# Patient Record
Sex: MALE | Race: WHITE | NOT HISPANIC OR LATINO | Employment: OTHER | URBAN - METROPOLITAN AREA
[De-identification: names, ages, dates, MRNs, and addresses within clinical notes are randomized per-mention and may not be internally consistent; named-entity substitution may affect disease eponyms.]

---

## 2019-01-10 ENCOUNTER — HOSPITAL ENCOUNTER (OUTPATIENT)
Dept: OTHER | Facility: HOSPITAL | Age: 54
Discharge: HOME | End: 2019-01-10
Attending: INTERNAL MEDICINE

## 2019-01-10 LAB — VALPROATE SERPL-MCNC: 83.2 UG/ML (ref 50–100)

## 2019-07-26 ENCOUNTER — HOSPITAL ENCOUNTER (OUTPATIENT)
Dept: OTHER | Facility: HOSPITAL | Age: 54
Discharge: HOME | End: 2019-07-26
Attending: INTERNAL MEDICINE

## 2019-07-26 LAB — VALPROATE SERPL-MCNC: 65.5 UG/ML (ref 50–100)

## 2019-07-27 LAB — HCV AB SER DONR QL: <0.1 S/CO RATIO (ref 0–0.9)

## 2020-05-29 ENCOUNTER — HOSPITAL ENCOUNTER (OUTPATIENT)
Dept: DIABETES SERVICES | Facility: HOSPITAL | Age: 55
Setting detail: RECURRING SERIES
Discharge: HOME | End: 2020-08-27
Attending: NURSE PRACTITIONER

## 2021-01-28 ENCOUNTER — OFFICE VISIT CONVERTED (OUTPATIENT)
Dept: FAMILY MEDICINE CLINIC | Facility: CLINIC | Age: 56
End: 2021-01-28
Attending: NURSE PRACTITIONER

## 2021-01-28 ENCOUNTER — HOSPITAL ENCOUNTER (OUTPATIENT)
Dept: FAMILY MEDICINE CLINIC | Facility: CLINIC | Age: 56
Discharge: HOME | End: 2021-01-28
Attending: NURSE PRACTITIONER

## 2021-01-28 LAB
ALBUMIN SERPL-MCNC: 4.2 G/DL (ref 3.5–5)
ALBUMIN/GLOB SERPL: 1.3 {RATIO} (ref 1.4–2.6)
ALP SERPL-CCNC: 57 U/L (ref 56–119)
ALT SERPL-CCNC: 31 U/L (ref 10–40)
ANION GAP SERPL CALC-SCNC: 21 MMOL/L (ref 8–19)
AST SERPL-CCNC: 35 U/L (ref 15–50)
BASOPHILS # BLD AUTO: 0.11 10*3/UL (ref 0–0.2)
BASOPHILS NFR BLD AUTO: 1.1 % (ref 0–3)
BILIRUB SERPL-MCNC: 0.32 MG/DL (ref 0.2–1.3)
BUN SERPL-MCNC: 10 MG/DL (ref 5–25)
BUN/CREAT SERPL: 9 {RATIO} (ref 6–20)
CALCIUM SERPL-MCNC: 9.2 MG/DL (ref 8.7–10.4)
CHLORIDE SERPL-SCNC: 101 MMOL/L (ref 99–111)
CHOLEST SERPL-MCNC: 249 MG/DL (ref 107–200)
CHOLEST/HDLC SERPL: 8 {RATIO} (ref 3–6)
CONV ABS IMM GRAN: 0.46 10*3/UL (ref 0–0.2)
CONV CO2: 22 MMOL/L (ref 22–32)
CONV IMMATURE GRAN: 4.8 % (ref 0–1.8)
CONV TOTAL PROTEIN: 7.4 G/DL (ref 6.3–8.2)
CREAT UR-MCNC: 1.12 MG/DL (ref 0.7–1.2)
DEPRECATED RDW RBC AUTO: 49.4 FL (ref 35.1–43.9)
EOSINOPHIL # BLD AUTO: 0.65 10*3/UL (ref 0–0.7)
EOSINOPHIL # BLD AUTO: 6.7 % (ref 0–7)
ERYTHROCYTE [DISTWIDTH] IN BLOOD BY AUTOMATED COUNT: 14 % (ref 11.6–14.4)
GFR SERPLBLD BASED ON 1.73 SQ M-ARVRAT: >60 ML/MIN/{1.73_M2}
GLOBULIN UR ELPH-MCNC: 3.2 G/DL (ref 2–3.5)
GLUCOSE SERPL-MCNC: 95 MG/DL (ref 70–99)
HCT VFR BLD AUTO: 48.3 % (ref 42–52)
HDLC SERPL-MCNC: 31 MG/DL (ref 40–60)
HGB BLD-MCNC: 15.2 G/DL (ref 14–18)
LDLC SERPL CALC-MCNC: 143 MG/DL (ref 70–100)
LYMPHOCYTES # BLD AUTO: 2.49 10*3/UL (ref 1–5)
LYMPHOCYTES NFR BLD AUTO: 25.8 % (ref 20–45)
MCH RBC QN AUTO: 30.3 PG (ref 27–31)
MCHC RBC AUTO-ENTMCNC: 31.5 G/DL (ref 33–37)
MCV RBC AUTO: 96.2 FL (ref 80–96)
MONOCYTES # BLD AUTO: 1.18 10*3/UL (ref 0.2–1.2)
MONOCYTES NFR BLD AUTO: 12.2 % (ref 3–10)
NEUTROPHILS # BLD AUTO: 4.75 10*3/UL (ref 2–8)
NEUTROPHILS NFR BLD AUTO: 49.4 % (ref 30–85)
NRBC CBCN: 0 % (ref 0–0.7)
OSMOLALITY SERPL CALC.SUM OF ELEC: 287 MOSM/KG (ref 273–304)
PLATELET # BLD AUTO: 239 10*3/UL (ref 130–400)
PMV BLD AUTO: 10.6 FL (ref 9.4–12.4)
POTASSIUM SERPL-SCNC: 4.6 MMOL/L (ref 3.5–5.3)
PSA SERPL-MCNC: 0.33 NG/ML (ref 0–4)
RBC # BLD AUTO: 5.02 10*6/UL (ref 4.7–6.1)
SODIUM SERPL-SCNC: 139 MMOL/L (ref 135–147)
TRIGL SERPL-MCNC: 374 MG/DL (ref 40–150)
TSH SERPL-ACNC: 1.92 M[IU]/L (ref 0.27–4.2)
VLDLC SERPL-MCNC: 75 MG/DL (ref 5–37)
WBC # BLD AUTO: 9.64 10*3/UL (ref 4.8–10.8)

## 2021-03-04 ENCOUNTER — OFFICE VISIT CONVERTED (OUTPATIENT)
Dept: FAMILY MEDICINE CLINIC | Facility: CLINIC | Age: 56
End: 2021-03-04
Attending: NURSE PRACTITIONER

## 2021-03-04 ENCOUNTER — CONVERSION ENCOUNTER (OUTPATIENT)
Dept: FAMILY MEDICINE CLINIC | Facility: CLINIC | Age: 56
End: 2021-03-04

## 2021-03-22 ENCOUNTER — OFFICE VISIT CONVERTED (OUTPATIENT)
Dept: FAMILY MEDICINE CLINIC | Facility: CLINIC | Age: 56
End: 2021-03-22
Attending: NURSE PRACTITIONER

## 2021-04-06 ENCOUNTER — HOSPITAL ENCOUNTER (OUTPATIENT)
Dept: FAMILY MEDICINE CLINIC | Facility: CLINIC | Age: 56
Discharge: HOME | End: 2021-04-06
Attending: NURSE PRACTITIONER

## 2021-04-06 ENCOUNTER — OFFICE VISIT CONVERTED (OUTPATIENT)
Dept: FAMILY MEDICINE CLINIC | Facility: CLINIC | Age: 56
End: 2021-04-06
Attending: NURSE PRACTITIONER

## 2021-04-06 LAB
25(OH)D3 SERPL-MCNC: 27 NG/ML (ref 30–100)
ALBUMIN SERPL-MCNC: 4.2 G/DL (ref 3.5–5)
ALBUMIN/GLOB SERPL: 1.4 {RATIO} (ref 1.4–2.6)
ALP SERPL-CCNC: 55 U/L (ref 56–119)
ALT SERPL-CCNC: 21 U/L (ref 10–40)
ANION GAP SERPL CALC-SCNC: 19 MMOL/L (ref 8–19)
AST SERPL-CCNC: 27 U/L (ref 15–50)
BILIRUB SERPL-MCNC: 0.19 MG/DL (ref 0.2–1.3)
BUN SERPL-MCNC: 9 MG/DL (ref 5–25)
BUN/CREAT SERPL: 8 {RATIO} (ref 6–20)
CALCIUM SERPL-MCNC: 9.7 MG/DL (ref 8.7–10.4)
CHLORIDE SERPL-SCNC: 96 MMOL/L (ref 99–111)
CHOLEST SERPL-MCNC: 223 MG/DL (ref 107–200)
CHOLEST/HDLC SERPL: 7.2 {RATIO} (ref 3–6)
CONV CO2: 24 MMOL/L (ref 22–32)
CONV HIV-1/ HIV-2: NONREACTIVE
CONV TOTAL PROTEIN: 7.3 G/DL (ref 6.3–8.2)
CREAT UR-MCNC: 1.16 MG/DL (ref 0.7–1.2)
GFR SERPLBLD BASED ON 1.73 SQ M-ARVRAT: >60 ML/MIN/{1.73_M2}
GLOBULIN UR ELPH-MCNC: 3.1 G/DL (ref 2–3.5)
GLUCOSE SERPL-MCNC: 97 MG/DL (ref 70–99)
HDLC SERPL-MCNC: 31 MG/DL (ref 40–60)
LDLC SERPL CALC-MCNC: 126 MG/DL (ref 70–100)
OSMOLALITY SERPL CALC.SUM OF ELEC: 279 MOSM/KG (ref 273–304)
POTASSIUM SERPL-SCNC: 4.3 MMOL/L (ref 3.5–5.3)
SODIUM SERPL-SCNC: 135 MMOL/L (ref 135–147)
TRIGL SERPL-MCNC: 522 MG/DL (ref 40–150)

## 2021-04-08 LAB
CONV HEPATITIS C AB WITH REFLEX TO CONFIRMATION: <0.1 S/CO RATIO (ref 0–0.9)
CONV HEPATITIS COMMENT: NORMAL

## 2021-04-12 LAB
A1AT SERPL-MCNC: 132 MG/DL (ref 101–187)
PHENOTYPE: NORMAL

## 2021-05-10 NOTE — H&P
History and Physical      Patient Name: Atilio Recinos   Patient ID: [id number]   Sex: Male   YOB: 1965    Primary Care Provider: Ronaldo CAVAZOS   Referring Provider: Lynne CAVAZOS    Visit Date: January 28, 2021    Provider: DIRK Ibarra   Location: [facility] [place]   Location Address: [address] [place] [address], [address]  [place], [place]  [id number]   Location Phone: [phone]          Chief Complaint  · est care      History Of Present Illness  Atilio Recinos is a 56 year old male who presents for evaluation and treatment of:      Presents today to [facility] care.  Previous PCP is Dr. Sellers.  He has a history of COPD and oxygen dependent x3 years.  He smokes 1-1/2 pack/day and drinks alcohol.  Denies substance use.  He also has a history of hypertension, bipolar, anxiety, depression, tremors, and hyperlipidemia.  He sees a psychiatrist in [place].  He sees a neurologist in [place] for his tremors and is being treated with gabapentin.    He has a history of pancreatitis in 2019 and 2020.  He had pneumonia COPD and was hospitalized in 2017.       Past Medical History  Allergies; Anxiety; Bipolar 1 disorder; COPD (chronic obstructive pulmonary disease); Depression; Forgetfulness; Head injury; Hepatitis; High blood pressure; High cholesterol; Psychiatric illness; Shortness of Breath; Sinus trouble         Past Surgical History  Pneumothorax; Sphincterotomy         Medication List  amlodipine 5 mg oral tablet; Depakote 250 mg oral tablet,delayed release (DR/EC); gabapentin 800 mg oral tablet; lisinopril-hydrochlorothiazide 20-12.5 mg oral tablet; Lopid 600 mg oral tablet; mirtazapine 15 mg oral tablet; ProAir HFA 90 mcg/actuation inhalation HFA aerosol inhaler; Seroquel 300 mg oral tablet; Symbicort 160-4.5 mcg/actuation inhalation HFA aerosol inhaler; Trelegy Ellipta 100-62.5-25 mcg inhalation blister with device; Vitamin D2 1,250 mcg (50,000 unit) oral capsule  "        Allergy List  Antihistamine; Augmentin; Contrast media allergy         Family Medical History  Stroke; Heart Disease; Diabetes         Social History  Tobacco (Current every day)         Review of Systems  · Constitutional  o Denies  o : fatigue, fever, night sweats  · Eyes  o Denies  o : double vision, blurred vision  · HENT  o Denies  o : headaches, vertigo, lightheadedness  · Cardiovascular  o Admits  o : dyspnea on exertion  o Denies  o : chest pain, irregular heart beats, rapid heart rate, lower extremity edema  · Respiratory  o Admits  o : shortness of breath  o Denies  o : wheezing  · Gastrointestinal  o Denies  o : nausea, vomiting, diarrhea, constipation, reflux, abdominal pain  · Genitourinary  o Denies  o : dysuria, urinary retention  · Integument  o Denies  o : hair growth change, new skin lesions  · Neurologic  o Admits  o : tremors  o Denies  o : altered mental status, seizures  · Endocrine  o Admits  o : central obesity  o Denies  o : polyuria, polydipsia, cold intolerance, heat intolerance  · Psychiatric  o Admits  o : anxiety, depression, difficulty sleeping  o Denies  o : hallucinations, delusions, feeling confused, compulsive behaviors, impulsive behaviors, suicidal ideation, homicidal ideation  · Heme-Lymph  o Denies  o : petechiae, lymph node enlargement or tenderness      Vitals  Date Time BP Position Site L\R Cuff Size HR RR TEMP (F) WT  HT  BMI kg/m2 BSA m2 O2 Sat FR L/min FiO2 HC       01/28/2021 10:30 /82 Sitting    100 - R  97.5 227lbs 8oz 5'  6\" 36.72 2.19 96 %  21%          Physical Examination  · Constitutional  o Appearance  o : obese, alert, in no acute distress  · Head and Face  o Head  o :   § Inspection  § : atraumatic, normocephalic  o Face  o :   § Inspection  § : no facial lesions  o HEENT  o : Unremarkable  · Eyes  o Conjunctivae  o : conjunctivae normal  o Sclerae  o : sclerae white  o Pupils and Irises  o : pupils equal and round, pupils reactive to light " bilaterally  o Eyelids/Ocular Adnexae  o : eyelid appearance normal  · Ears, Nose, Mouth and Throat  o Ears  o :   § External Ears  § : appearance within normal limits, no lesions present  o Nose  o :   § External Nose  § : appearance normal  o Oral Cavity  o :   § Oral Mucosa  § : oral mucosa normal  § Lips  § : lip appearance normal  § Teeth  § : normal dentition for age  § Gums  § : gums pink, non-swollen, no bleeding present  § Tongue  § : tongue appearance normal  § Palate  § : hard palate normal, soft palate appearance normal  · Neck  o Inspection/Palpation  o : normal appearance, no masses or tenderness, trachea midline  o Thyroid  o : gland size normal, nontender, no nodules or masses present on palpation  · Respiratory  o Respiratory Effort  o : breathing unlabored, no accessory muscle use  o Auscultation of Lungs  o : diminished breath sounds present    · Cardiovascular  o Heart  o :   § Auscultation of Heart  § : regular rate, normal rhythm, no murmurs present, no pericardial friction rub  o Peripheral Vascular System  o :   § Extremities  § : no edema  · Gastrointestinal  o Abdominal Examination  o : abdomen nontender to palpation, normal bowel sounds, tone normal without rigidity or guarding, no masses present, abdominal obesity present, abdomen scaphoid upon supine  o Liver and spleen  o : no hepatomegaly present  · Lymphatic  o Neck  o : no lymphadenopathy   o Supraclavicular Nodes  o : no supraclavicular nodes          Assessment  · Annual physical exam     V70.0/Z00.00  Check to CBC, CMP, TSH, and lipid  · COPD (chronic obstructive pulmonary disease)     496/J44.9  Consult pulmonology  · Essential hypertension     401.9/I10  refill lisinopril and amlodipine  · Hyperlipidemia     272.4/E78.5  · Nicotine dependence     305.1/F17.200  Discussed smoking cessation  · Obesity     278.00/E66.9  · Tobacco abuse counseling       Tobacco abuse counseling     V65.42/Z71.6  · Screening for prostate  cancer     V76.44/Z12.5  check PSA  · Oxygen dependent     V46.2/Z99.81  · Bipolar 1 disorder     296.7/F31.9  Continue follow-ups with counseling and psychiatry      Plan  · Orders  o Smoking cessation counseling, 3-10 minutes [facility] ([id number]) - 496/J44.9 - 01/28/2021  o ACO-17: Screened for tobacco use AND received tobacco cessation intervention (4004F) - 305.1/F17.200 - 01/28/2021  o Smoking cessation counseling, 3-10 minutes [facility] ([id number]) - V65.42/Z71.6 - 02/03/2021  o ACO-17: Screened for tobacco use AND received tobacco cessation intervention (4004F) - V65.42/Z71.6 - 01/28/2021  o Male Physical Primary Care Panel (CMP, CBC, TSH, Lipid, PSA) [facility] (83357, 70597, 86033, 81175, 88763, ) - - 01/28/2021  o ACO-18: Positive screen for clinical depression using a standardized tool and a follow-up plan documented () - - 01/28/2021   7 points  o ACO-39: Current medications updated and reviewed (1159F, ) - - 01/28/2021  o Fluzone Quadrivalent Vaccine, age 6 months + (75736) - - 01/28/2021   Vaccine - Influenza; Dose: 0.5; Site: Right Upper Arm; Route: Intramuscular; Date: 01/28/2021 12:02:00; Exp: 06/30/2021; Lot: [id number]; Mfg: [employer]i pasteur; TradeName: Fluzone Quadrivalent; Administered By: Jyoti Fierro MA; Comment: tolerated well.left office stable  o PULMONARY CONSULTATION (PULMO) - 496/J44.9, V46.2/Z99.81 - 02/03/2021   [facility]  · Medications  o amlodipine 5 mg oral tablet   SIG: take 1 tablet (5 mg) by oral route once daily for 30 days   DISP: (30) Tablet with 2 refills  Prescribed on 01/28/2021     o lisinopril-hydrochlorothiazide 20-12.5 mg oral tablet   SIG: take 1 tablet by oral route once daily for 30 days   DISP: (30) Tablet with 2 refills  Prescribed on 01/28/2021     o Louis Mccray 100-62.5-25 mcg inhalation blister with device   SIG: inhale 1 puff by inhalation route once daily at the same time each day   DISP: (1) Inhaler with 2 refills  Prescribed on 01/28/2021      o Medications have been Reconciled  o Transition of Care or Provider Policy  · Instructions  o Reviewed health maintenance flowsheet and updated information. Orders were placed and/or patient's response was documented.  o Smoking Cessation counseling and education provided.   o Patient advised to monitor blood pressure (B/P) at home and journal readings. Patient informed that a B/P reading at home of more than 130/80 is considered hypertension. For readings greater irkc198/90 or higher patient is advised to follow up in the office with readings for management. Patient advised to limit sodium intake.  o Advised that cheeses and other sources of dairy fats, animal fats, fast food, and the extras (candy, pastries, pies, doughnuts and cookies) all contain LDL raising nutrients. Advised to increase fruits, vegetables, whole grains, and to monitor portion sizes.   o *Form of nicotine being used:   o Patient was strongly encouraged to discontinue use of any nicotine containing product or minimize the use of the product.  o Tobacco and smoking cessation counseling for more than 3 minutes was completed.  o Patient was educated/instructed on their diagnosis, treatment and medications prior to discharge from the clinic today.  o Patient instructed to seek medical attention urgently for new or worsening symptoms.  o Call the office with any concerns or questions.  · Disposition  o Call or Return if symptoms worsen or persist.  o f/u 1 month            Electronically Signed by: DIRK Ibarra -Author on February 3, 2021 07:25:41 AM

## 2021-05-14 VITALS
OXYGEN SATURATION: 98 % | TEMPERATURE: 98.1 F | SYSTOLIC BLOOD PRESSURE: 158 MMHG | DIASTOLIC BLOOD PRESSURE: 72 MMHG | WEIGHT: 223.37 LBS | BODY MASS INDEX: 35.9 KG/M2 | HEIGHT: 66 IN | HEART RATE: 85 BPM

## 2021-05-14 VITALS
TEMPERATURE: 97.5 F | WEIGHT: 227.5 LBS | DIASTOLIC BLOOD PRESSURE: 82 MMHG | SYSTOLIC BLOOD PRESSURE: 148 MMHG | HEART RATE: 100 BPM | OXYGEN SATURATION: 96 % | BODY MASS INDEX: 36.56 KG/M2 | HEIGHT: 66 IN

## 2021-05-14 VITALS
HEART RATE: 100 BPM | HEIGHT: 66 IN | OXYGEN SATURATION: 96 % | TEMPERATURE: 97 F | DIASTOLIC BLOOD PRESSURE: 76 MMHG | SYSTOLIC BLOOD PRESSURE: 158 MMHG | BODY MASS INDEX: 36 KG/M2 | WEIGHT: 224 LBS

## 2021-05-14 VITALS
HEIGHT: 66 IN | OXYGEN SATURATION: 94 % | SYSTOLIC BLOOD PRESSURE: 124 MMHG | SYSTOLIC BLOOD PRESSURE: 142 MMHG | BODY MASS INDEX: 37.14 KG/M2 | DIASTOLIC BLOOD PRESSURE: 62 MMHG | HEART RATE: 102 BPM | DIASTOLIC BLOOD PRESSURE: 64 MMHG | WEIGHT: 231.12 LBS | TEMPERATURE: 98.6 F

## 2021-05-14 NOTE — PROGRESS NOTES
Progress Note      Patient Name: Atilio Recinos   Patient ID: [id number]   Sex: Male   YOB: 1965    Primary Care Provider: Ronaldo CAVAZOS   Referring Provider: Lynne CAVAZOS    Visit Date: March 22, 2021    Provider: DIRK Valenzuela   Location: [facility]   Location Address: [address] [place] [address], [address] [place] [address]  [id number]   Location Phone: [phone]          Chief Complaint  · broncitis       History Of Present Illness  Atilio Recinos is a 56 year old male who presents for evaluation and treatment of:      He is here for an acute visit today, he is a patient of DIRK Dixon.    History of COPD: He is complaining of cough and tightness in his chest for the past 2 days.  He states he did have a fever of 101 yesterday but is afebrile today.  He denies any ear pain or sore throat.  He denies been exposed anyone who has been sick.  He is a current smoker.  He states Z-Paks do not work for him.  He is allergic to Augmentin.       Past Medical History  Disease Name Date Onset Notes   Allergies --  --    Anxiety --  --    Bipolar 1 disorder --  --    COPD (chronic obstructive pulmonary disease) --  --    Depression --  --    Forgetfulness --  --    Head injury --  --    Hepatitis --  --    High blood pressure --  --    High cholesterol --  --    Psychiatric illness --  --    Shortness of Breath --  --    Sinus trouble --  --          Past Surgical History  Procedure Name Date Notes   Pneumothorax --  --    Sphincterotomy --  --          Medication List  Name Date Started Instructions   amlodipine 5 mg oral tablet 01/28/2021 take 1 tablet (5 mg) by oral route once daily for 30 days   Depakote 250 mg oral tablet,delayed release (DR/EC)  take 2 tablets in am 4 tablets in pm   gabapentin 800 mg oral tablet  take 1 tablet (800 mg) by oral route 4 times per day   lisinopril-hydrochlorothiazide 20-12.5 mg oral tablet 01/28/2021 take 1 tablet by oral route once  daily for 30 days   Lopid 600 mg oral tablet  take 1 tablet (600 mg) by oral route 2 times per day 30 minutes before morning and evening meal   mirtazapine 15 mg oral tablet  take 1 tablet (15 mg) by oral route once daily before bedtime   ProAir HFA 90 mcg/actuation inhalation HFA aerosol inhaler  inhale 1 puff (90 mcg) by inhalation route every 6 hours as needed   Seroquel 300 mg oral tablet  take 1 tablet (300 mg) by oral route once daily   Trelegy Ellipta 100-62.5-25 mcg inhalation blister with device 03/04/2021 inhale 1 puff by inhalation route once daily at the same time each day   Vitamin D2 1,250 mcg (50,000 unit) oral capsule  take 1 capsule by oral route weekly         Allergy List  Allergen Name Date Reaction Notes   Antihistamine --  --  --    Augmentin --  dehydration --    Contrast media allergy --  --  --        Allergies Reconciled  Family Medical History  Disease Name Relative/Age Notes   Stroke  --    Heart Disease  --    Diabetes  --          Social History  Finding Status Start/Stop Quantity Notes   Tobacco Current every day --/-- --  --          Immunizations  NameDate Admin Mfg Trade Name Lot Number Route Inj VIS Given VIS Publication   [id number]/28/2021 PMC Fluzone Quadrivalent [id number] IM  01/28/2021 08/15/2019   Comments: tolerated well.left office stable   [id number]/04/2021 MSD PNEUMOVAX 23 [id number] IM LD 03/04/2021    Comments: Patient tolerated injection well, left in stable condition.         Review of Systems  · Constitutional  o Denies  o : fever, fatigue, weight loss, weight gain  · HENT  o Admits  o : nasal congestion  o Denies  o : nasal discharge, sore throat, ear pain  · Cardiovascular  o Denies  o : lower extremity edema, claudication, chest pressure, palpitations  · Respiratory  o Admits  o : shortness of breath, dry cough  o Denies  o : wheezing, hemoptysis, productive cough  · Gastrointestinal  o Denies  o : nausea, vomiting, diarrhea, constipation, abdominal  "pain  · Integument  o Denies  o : rash, itching      Vitals  Date Time BP Position Site L\R Cuff Size HR RR TEMP (F) WT  HT  BMI kg/m2 BSA m2 O2 Sat FR L/min FiO2 HC       03/22/2021 03:38 /72 Sitting    85 - R  98.1 223lbs 6oz 5'  6\" 36.05 2.17 98 %            Physical Examination  · Constitutional  o Appearance  o : no acute distress, well-nourished  · Head and Face  o Head  o :   § Inspection  § : atraumatic, normocephalic  · Ears, Nose, Mouth and Throat  o Ears  o :   § External Ears  § : no auricle tenderness to palpation present  § Otoscopic Examination  § : tympanic membrane appearance within normal limits bilaterally  o Throat  o :   § Oropharynx  § : no inflammation or lesions present, tonsils within normal limits  · Neck  o Thyroid  o : gland size normal, nontender, no nodules or masses present on palpation, symmetric  · Respiratory  o Respiratory Effort  o : breathing comfortably, symmetric chest rise  o Auscultation of Lungs  o : clear to asculatation bilaterally, no wheezes, rales, or rhonchii  · Cardiovascular  o Heart  o :   § Auscultation of Heart  § : regular rate and rhythm, no murmurs, rubs, or gallops  o Peripheral Vascular System  o :   § Extremities  § : no edema  · Lymphatic  o Neck  o : no lymphadenopathy present  · Neurologic  o Mental Status Examination  o :   § Orientation  § : grossly oriented to person, place and time  o Gait and Station  o :   § Gait Screening  § : normal gait  · Psychiatric  o General  o : normal mood and affect          Assessment  · Bronchitis, acute     466.0/J20.9  I will treat him with prednisone Dosepak. I advised him that if he does not feel better or if he starts feeling worse in the next 2 to 3 days to give us a call and we will call him in a prescription for an antibiotic.  · Chronic obstructive pulmonary disease with acute lower respiratory infection       Chronic obstructive pulmonary disease with (acute) lower respiratory " infection     496/J44.0  Advised him to continue using his Trelegy inhaler daily and to use his albuterol inhaler 3-4 times a day while he is sick.  · Cough     786.2/R05  Tessalon Perles 3 times daily as needed for cough.      Plan  · Orders  o ACO-14: Influenza immunization administered or previously received [facility] () - - 03/22/2021 01/28/2021  o ACO-39: Current medications updated and reviewed (, 1159F) - - 03/22/2021  · Medications  o benzonatate 200 mg oral capsule   SIG: take 1 capsule (200 mg) by oral route 3 times per day as needed for cough for 7 days   DISP: (20) Capsule with 0 refills  Prescribed on 03/22/2021     o prednisone 20 mg oral tablet   SIG: Take 3 tabs daily (60 mg) for 2 days, then take 2 tabs daily (40 mg) x3 days, then take 1 tab daily (20 mg) x3 days   DISP: (15) Tablet with 0 refills  Prescribed on 03/22/2021     · Instructions  o Instructed patient when feeling out of breath to use their fast-acting or rescue inhaler.   o Instructed patient to use their long-acting inhaler everyday whether they feel short of breath or not. NEVER to use the long-acting inhaler as a short acting inhaler.  o Patient was educated/instructed on their diagnosis, treatment and medications prior to discharge from the clinic today.  o Patient instructed to seek medical attention urgently for new or worsening symptoms.  o Call the office with any concerns or questions.  · Disposition  o Call or Return if symptoms worsen or persist.            Electronically Signed by: DIRK Valenzuela -Author on March 22, 2021 04:03:09 PM

## 2021-05-14 NOTE — PROGRESS NOTES
Progress Note      Patient Name: Atilio Recinos   Patient ID: [id number]   Sex: Male   YOB: 1965    Primary Care Provider: Ronaldo CAVAOZS   Referring Provider: Lynne CAVAZOS    Visit Date: March 4, 2021    Provider: DIKR Ibarra   Location: [facility] [place]   Location Address: [address] [place] [address], [address]  [place], [place]  [id number]   Location Phone: [phone]          Chief Complaint  · 1 MO F/U      History Of Present Illness  Atilio Recinos is a 56 year old male who presents for evaluation and treatment of:      Presents today for a 1 month follow-up for hypertension, COPD, oxygen dependent, hyperlipidemia.  He does not check his blood pressure at home.  BP today is elevated 142/62.  Heart rate is 102.  Repeat blood pressure is 124/64.  Denies chest pain, syncope, palpitations.  His cholesterol remains elevated while he is on Lopid triglycerides 374, total cholesterol 249, HDL 31, .      He smokes and has a morning cough.  He denies shortness of breath.  He uses oxygen at home intermittently or at night.  Shortness of breath with exertion.  He is scheduled to have PFT in April and see pulmonologist in July.  He misunderstood that the trilogy Ellipta inhaler would replace his other inhalers.  He did not  the inhaler since all the inhalers together would cost too much.    No history of colorectal cancer screening.  Denies blood in stools.       Past Medical History  Allergies; Anxiety; Bipolar 1 disorder; COPD (chronic obstructive pulmonary disease); Depression; Forgetfulness; Head injury; Hepatitis; High blood pressure; High cholesterol; Psychiatric illness; Shortness of Breath; Sinus trouble         Past Surgical History  Pneumothorax; Sphincterotomy         Medication List  amlodipine 5 mg oral tablet; Depakote 250 mg oral tablet,delayed release (DR/EC); gabapentin 800 mg oral tablet; lisinopril-hydrochlorothiazide 20-12.5 mg oral tablet; Lopid 600 mg  "oral tablet; metoprolol succinate 25 mg oral tablet extended release 24 hr; mirtazapine 15 mg oral tablet; ProAir HFA 90 mcg/actuation inhalation HFA aerosol inhaler; Seroquel 300 mg oral tablet; Trelegy Ellipta 100-62.5-25 mcg inhalation blister with device; Vitamin D2 1,250 mcg (50,000 unit) oral capsule         Allergy List  Antihistamine; Augmentin; Contrast media allergy         Family Medical History  Stroke; Heart Disease; Diabetes         Social History  Tobacco (Current every day)         Immunizations  Name Date Admin   Influenza 01/28/2021   [id number] 03/04/2021         Review of Systems  · Constitutional  o Denies  o : fatigue  · Eyes  o Denies  o : blurred vision, changes in vision  · HENT  o Denies  o : headaches, lightheadedness  · Cardiovascular  o Denies  o : chest pain, irregular heart beats, rapid heart rate, dyspnea on exertion, lower extremity edema  · Respiratory  o Admits  o : cough  o Denies  o : shortness of breath, wheezing  · Gastrointestinal  o Denies  o : nausea, vomiting, diarrhea, constipation, abdominal pain, blood in stools, melena  · Genitourinary  o Denies  o : frequency, dysuria, hematuria  · Integument  o Denies  o : rash, new skin lesions  · Musculoskeletal  o Denies  o : joint pain, joint swelling, muscle pain  · Endocrine  o Admits  o : central obesity  o Denies  o : polyuria, polydipsia      Vitals  Date Time BP Position Site L\R Cuff Size HR RR TEMP (F) WT  HT  BMI kg/m2 BSA m2 O2 Sat FR L/min FiO2 HC       03/04/2021 01:20 /62 Sitting    102 - R  98.6 231lbs 2oz 5'  6\" 37.3 2.21 94 %      03/04/2021 02:34 /64 Sitting                       Physical Examination  · Constitutional  o Appearance  o : alert, in no acute distress  · Head and Face  o Head  o :   § Inspection  § : atraumatic, normocephalic  o Face  o :   § Inspection  § : no facial lesions  o HEENT  o : Unremarkable  · Eyes  o Conjunctivae  o : conjunctivae normal  o Sclerae  o : sclerae " white  o Pupils and Irises  o : pupils equal and round, pupils reactive to light bilaterally  o Eyelids/Ocular Adnexae  o : eyelid appearance normal  · Neck  o Inspection/Palpation  o : normal appearance, no masses or tenderness, trachea midline  o Thyroid  o : gland size normal, nontender, no nodules or masses present on palpation  · Respiratory  o Respiratory Effort  o : breathing unlabored  o Auscultation of Lungs  o : normal breath sounds  · Cardiovascular  o Heart  o :   § Auscultation of Heart  § : regular rate, normal rhythm, no murmurs present  o Peripheral Vascular System  o :   § Extremities  § : no edema  · Gastrointestinal  o Abdominal Examination  o : abdomen nontender to palpation, normal bowel sounds, tone normal without rigidity or guarding, no masses present, abdominal obesity present, abdomen scaphoid upon supine  o Liver and spleen  o : no hepatomegaly present  · Lymphatic  o Neck  o : no lymphadenopathy           Assessment  · Need for pneumococcal vaccination     V03.82/Z23  Give Pneumovax 23  · Screening for colon cancer     V76.51/Z12.11  Order Cologuard  · COPD (chronic obstructive pulmonary disease)     496/J44.9  Provide Trelegy Ellipta samples, only use rescue inhaler as needed and Trelegy Ellipta daily  · Essential hypertension     401.9/I10  Blood pressures controlled, continue current regimen  · Tachycardia     785.0/R00.0  Heart rate when walking in was 102. EKG done due to tachycardia. EKG showed sinus rhythm, rate 87, , QRST 90, , QTc B4 115, axis P 75, QRS 78, T 55.      Plan  · Orders  o Immunization Admin Fee (Single) ([facility]) (76616) - V03.82/Z23 - 03/04/2021  o Pneumococcal Vaccine, 23 valent, adult (08876) - V03.82/Z23 - 03/04/2021   Vaccine - [id number]; Dose: 0.5; Site: Left Deltoid; Route: Intramuscular; Date: 03/04/2021 14:36:00; Exp: 01/08/2022; Lot: [id number]; Mfg: [employer] TradeName: [id number] 23; Administered By: Mark Carter MA; Comment: Patient  tolerated injection well, left in stable condition.  o Arvind ([id number]) - V76.51/Z12.11 - 03/04/2021  o ACO-39: Current medications updated and reviewed (, 1159F) - - 03/04/2021  o EKG (Recording only) [facility] ([id number]) - - 03/04/2021   EKG performed in office by KARMEN HERNÁNDEZ.  · Medications  o Trelegy Ellipta 100-62.5-25 mcg inhalation blister with device   SIG: inhale 1 puff by inhalation route once daily at the same time each day   DISP: (1) Inhaler with 2 refills  Adjusted on 03/04/2021     o metoprolol succinate 25 mg oral tablet extended release 24 hr   SIG: take 1 tablet (25 mg) by oral route once daily for 30 days   DISP: (30) Tablet with 2 refills  Discontinued on 03/04/2021     o Symbicort 160-4.5 mcg/actuation inhalation HFA aerosol inhaler   SIG: inhale 2 puffs by inhalation route 2 times per day in the morning and evening for 30 days   DISP: (1) Inhaler with 2 refills  Discontinued on 03/04/2021     · Instructions  o Patient was educated/instructed on their diagnosis, treatment and medications prior to discharge from the clinic today.  o Patient instructed to seek medical attention urgently for new or worsening symptoms.  o Call the office with any concerns or questions.  · Disposition  o Call or Return if symptoms worsen or persist.  o f/u 1 month            Electronically Signed by: DIRK Ibarra -Author on March 4, 2021 03:31:21 PM

## 2021-05-14 NOTE — PROGRESS NOTES
Progress Note      Patient Name: Atilio Recinos   Patient ID: [id number]   Sex: Male   YOB: 1965    Primary Care Provider: Ronaldo CAVAZOS   Referring Provider: Lynne CAVAZOS    Visit Date: April 6, 2021    Provider: DIRK Ibarra   Location: [facility] [place]   Location Address: [address] [place] [address], [address] [place] [address]  [id number]   Location Phone: [phone]          Chief Complaint  · Annual Wellness Exam      History Of Present Illness  The patient is a 56 year old male who has come to this office for his Annual Wellness Visit.   His Primary Care Provider is Ronaldo CAVAZOS. His comprehensive Care Team list, including suppliers, has been updated on the Facesheet. His medical/family history, height, weight, BMI, and blood pressure have been reviewed and are in the chart. The Health Risk Assessment has been completed and scanned in the chart.   Medications are listed in the medication list.   The active problem list includes: Allergies, Anxiety, Bipolar 1 disorder, COPD (chronic obstructive pulmonary disease), Depression, Forgetfulness, Hepatitis, High blood pressure, High cholesterol, Psychiatric illness, Shortness of Breath, and Sinus trouble   The patient does not have a history of substance use.   Patient reports his diet is adequate.   The Mini-Cog has been administered and is scanned in chart. The results are negative. His cognitive function is without limitation.   A hearing loss screen was completed today and the result is negative.   Patient does not have any risk factors for depression. Patient completed the PHQ-9 today and it has been scanned in the chart. The total score is 1-4.   The Timed Up and Go screen was administered today and the result is negative.   The Gupta Index of [place] in ADLs indicated full function (score of 6).   A Falls Risk Assessment has been completed, including a review of home fall hazards and medication review.    Overall, the patient's functional ability is noted by this provider to be within normal limits. His level of safety is noted to be within normal limits. His balance/gait is within normal limits. There have been no falls in the past year. Patient-specific home safety recommendations have been reviewed and a copy has been given to patient.   He denies issues with leaking urine.   There are no additional risk factors identified.   Living Will/Advanced Directive previously executed but not in chart.   Personalized health advice was given to the patient and a written health screening schedule was established; see Plan for details.   Atilio Recinos is a 56 year old male who presents for evaluation and treatment of:      History of COPD, hypertension, hyperlipidemia, and bipolar.  Since being on trilogy Ellipta daily he is shortness of breath has improved.  He uses oxygen as needed at home.  He is scheduled for pulmonary function test on 4/16/2021.  He smokes 1-1/2 packs/day.    He does not check his blood pressure at home.  There was a miscommunication when she had stopped taking his amlodipine 5 mg daily.  Blood pressure is elevated today 158/76.  Denies chest pain, syncope, palpitations.  Last month he started taking metoprolol succinate for his tachycardia.    He has a male partner and has been in a relationship for the past 10 years.  Do not use condoms.  He would like to be checked for HIV and hep C.       Past Medical History  Allergies; Anxiety; Bipolar 1 disorder; COPD (chronic obstructive pulmonary disease); Depression; Forgetfulness; Head injury; Hepatitis; High blood pressure; High cholesterol; Psychiatric illness; Shortness of Breath; Sinus trouble         Past Surgical History  Pneumothorax; Sphincterotomy         Medication List  Depakote 250 mg oral tablet,delayed release (DR/EC); gabapentin 800 mg oral tablet; lisinopril-hydrochlorothiazide 20-12.5 mg oral tablet; Lopid 600 mg oral tablet; mirtazapine 15 mg  "oral tablet; ProAir HFA 90 mcg/actuation inhalation HFA aerosol inhaler; Seroquel 300 mg oral tablet; Trelegy Ellipta 100-62.5-25 mcg inhalation blister with device; Vitamin D2 1,250 mcg (50,000 unit) oral capsule         Allergy List  Antihistamine; Augmentin; Contrast media allergy         Family Medical History  Stroke; Heart Disease; Diabetes         Social History  Tobacco (Current every day)         Immunizations  Name Date Admin   Influenza 01/28/2021   [id number] 03/04/2021         Review of Systems  · Constitutional  o Denies  o : fatigue, fever, chills, night sweats  · Eyes  o Denies  o : double vision, blurred vision  · HENT  o Denies  o : vertigo, recent head injury  · Cardiovascular  o Admits  o : dyspnea on exertion  o Denies  o : chest pain, irregular heart beats, rapid heart rate, lower extremity edema  · Respiratory  o Denies  o : shortness of breath, productive cough  · Gastrointestinal  o Denies  o : nausea, vomiting  · Genitourinary  o Denies  o : dysuria, urinary retention  · Integument  o Denies  o : hair growth change, new skin lesions  · Neurologic  o Denies  o : altered mental status, seizures  · Musculoskeletal  o Denies  o : joint swelling, limitation of motion  · Endocrine  o Denies  o : cold intolerance, heat intolerance  · Heme-Lymph  o Denies  o : petechiae, lymph node enlargement or tenderness  · Allergic-Immunologic  o Denies  o : frequent illnesses      Vitals  Date Time BP Position Site L\R Cuff Size HR RR TEMP (F) WT  HT  BMI kg/m2 BSA m2 O2 Sat FR L/min FiO2 HC       04/06/2021 01:41 /76 Sitting    100 - R  97 223lbs 16oz 5'  6\" 36.15 2.18 96 %            Physical Examination  · Constitutional  o Appearance  o : alert, in no acute distress  · Head and Face  o Head  o :   § Inspection  § : atraumatic, normocephalic  o Face  o :   § Inspection  § : no facial lesions  o HEENT  o : Unremarkable  · Eyes  o Conjunctivae  o : conjunctivae normal  o Sclerae  o : sclerae " white  o Pupils and Irises  o : pupils equal and round, pupils reactive to light bilaterally  o Eyelids/Ocular Adnexae  o : eyelid appearance normal  · Ears, Nose, Mouth and Throat  o Ears  o :   § External Ears  § : appearance within normal limits, no lesions present  o Nose  o :   § External Nose  § : appearance normal  o Oral Cavity  o :   § Oral Mucosa  § : oral mucosa normal  § Lips  § : lip appearance normal  § Teeth  § : normal dentition for age  § Gums  § : gums pink, non-swollen, no bleeding present  § Tongue  § : tongue appearance normal  § Palate  § : hard palate normal, soft palate appearance normal  · Neck  o Inspection/Palpation  o : normal appearance, no masses or tenderness, trachea midline  o Thyroid  o : gland size normal, nontender, no nodules or masses present on palpation  · Respiratory  o Respiratory Effort  o : breathing unlabored  o Auscultation of Lungs  o : normal breath sounds  · Cardiovascular  o Heart  o :   § Auscultation of Heart  § : regular rate, normal rhythm, no murmurs present  o Peripheral Vascular System  o :   § Extremities  § : no edema  · Gastrointestinal  o Abdominal Examination  o : abdomen nontender to palpation, normal bowel sounds, tone normal without rigidity or guarding, no masses present, abdominal obesity present, abdomen scaphoid upon supine  o Liver and spleen  o : no hepatomegaly present  · Lymphatic  o Neck  o : no lymphadenopathy   o Supraclavicular Nodes  o : no supraclavicular nodes          Assessment  · Encounter for Medicare annual wellness exam     V70.0/Z00.00  · Screening for depression     V79.0/Z13.89  · Screening for alcoholism     V79.1/Z13.39  · Need for hepatitis C screening test     V73.89/Z11.59  · Nicotine dependence     305.1/F17.200  Discussed smoking cessation. He agreed that he would decrease his cigarettes to 1 pack/day by May.  · COPD (chronic obstructive pulmonary disease)     496/J44.9  · Essential hypertension     401.9/I10  Refill  amlodipine 5 mg. Continue metoprolol succinate and lisinopril hydrochlorothiazide  · Hyperlipidemia     272.4/E78.5  Check lipid panel. He states he has an intolerance to statins and is taking Lopid  · Obesity     278.00/E66.9  · Tobacco abuse counseling       Tobacco abuse counseling     V65.42/Z71.6  · Vitamin D deficiency     268.9/E55.9  Check Vitamin D  · Screening for HIV (human immunodeficiency virus)     V73.89/Z11.4  Check HIV  · Statin intolerance     995.27/Z78.9  · Oxygen dependent     V46.2/Z99.81  · Bipolar disorder     296.80/F31.9  Continue follow-ups with psychiatrist  · BMI 36.0-36.9,adult     V85.36/Z68.36  Encourage dieting exercising      Plan  · Orders  o Falls Risk Assessment Completed (3288F) - V70.0/Z00.00 - 04/06/2021  o Brief hearing screening (written) [facility] () - V70.0/Z00.00 - 04/06/2021  o Presence or absence of urinary incontinence assessed (CORNELL) (1090F) - V70.0/Z00.00 - 04/06/2021  o Hepatitis C antibody MEDICARE screening [facility] (89885, ) - V73.89/Z11.59 - 04/06/2021  o Smoking cessation counseling, 3-10 minutes [facility] (68205) - 305.1/F17.200 - 04/06/2021  o ACO-17: Screened for tobacco use AND received tobacco cessation intervention (4004F) - 305.1/F17.200 - 04/06/2021  o Alpha-1-Antitrypsin/Phenotype [facility] (15502, 96996) - 496/J44.9 - 04/06/2021  o Smoking cessation counseling, 3-10 minutes [facility] (51858) - 496/J44.9 - 04/06/2021  o ACO-17: Screened for tobacco use AND received tobacco cessation intervention (4004F) - V65.42/Z71.6 - 04/06/2021  o HIV Screen by EIA [facility] (60031, ) - V73.89/Z11.4 - 04/06/2021  o ACO-39: Current medications updated and reviewed (, 1159F) - - 04/06/2021  o CMP [facility] (23993) - 401.9/I10 - 04/06/2021  o Lipid Panel [facility] (21170) - 272.4/E78.5 - 04/06/2021  o Vitamin D (25-Hydroxy) Level (17693) - 268.9/E55.9 - 04/06/2021  o ACO-18: Negative screen for clinical depression using a standardized tool () - - 04/06/2021   3  o ACO-13: Fall Risk Screening  with no falls in past year or only one fall without injury in the past year (1101F) - - 04/06/2021  o Hepatitis C antibody MEDICARE screening [facility] (78203, ) - V73.89/Z11.59 - 04/06/2021  · Medications  o amlodipine 5 mg oral tablet   SIG: take 1 tablet (5 mg) by oral route once daily for 90 days   DISP: (90) Tablet with 1 refills  Prescribed on 04/06/2021     o metoprolol succinate 25 mg oral tablet extended release 24 hr   SIG: take 1 tablet (25 mg) by oral route once daily for 90 days   DISP: (90) Tablet with 1 refills  Prescribed on 04/06/2021     o Lopid 600 mg oral tablet   SIG: take 1 tablet (600 mg) by oral route 2 times per day 30 minutes before morning and evening meal for 90 days   DISP: (180) Tablet with 1 refills  Prescribed on 04/06/2021     · Instructions  o Health Risk Assessment has been reviewed with the patient.  o Written health screening schedule for next 5-10 years was established with patient; information scanned in chart and given/mailed to patient.  o Fall prevention methods discussed and a copy of recommendations given/mailed to patient.  o Depression Screen completed and scanned into the EMR under the designated folder within the patient's documents.  o Medicare suggests a once in a lifetime screening for Hepatitis C for all Medicare beneficiaries born between 3476-1088.  o Discussed benefits of smoking cessation and given suggestions on how to stop smoking. Today's discussion lasted 3-10 minutes.   o *Form of nicotine being used:   o Patient was strongly encouraged to discontinue use of any nicotine containing product or minimize the use of the product.  o Patient advised to monitor blood pressure (B/P) at home and journal readings. Patient informed that a B/P reading at home of more than 130/80 is considered hypertension. For readings greater wooh659/90 or higher patient is advised to follow up in the office with readings for management. Patient advised to limit sodium  intake.  o Advised that cheeses and other sources of dairy fats, animal fats, fast food, and the extras (candy, pastries, pies, doughnuts and cookies) all contain LDL raising nutrients. Advised to increase fruits, vegetables, whole grains, and to monitor portion sizes.   o Tobacco and smoking cessation counseling for more than 3 minutes was completed.  o CDC recommends that everyone between 13 and 64 get tested for HIV at least once as part of routine healthcare.  o Patient has reported an intolerance to HmgCoA Inhibitors (statins).  o Patient was educated/instructed on their diagnosis, treatment and medications prior to discharge from the clinic today.  o Patient instructed to seek medical attention urgently for new or worsening symptoms.  o Call the office with any concerns or questions.  · Disposition  o Call or Return if symptoms worsen or persist.  o f/u 1 month            Electronically Signed by: DIRK Ibarra -Author on April 7, 2021 08:16:42 AM

## 2021-05-22 ENCOUNTER — TRANSCRIBE ORDERS (OUTPATIENT)
Dept: ADMINISTRATIVE | Facility: HOSPITAL | Age: 56
End: 2021-05-22

## 2021-05-22 DIAGNOSIS — J44.9 CHRONIC OBSTRUCTIVE PULMONARY DISEASE, UNSPECIFIED COPD TYPE (HCC): Primary | ICD-10-CM

## 2021-06-17 ENCOUNTER — PROCEDURE VISIT (OUTPATIENT)
Dept: CARDIAC REHAB | Facility: HOSPITAL | Age: 56
End: 2021-06-17

## 2021-06-17 ENCOUNTER — HOSPITAL ENCOUNTER (OUTPATIENT)
Dept: RESPIRATORY THERAPY | Facility: HOSPITAL | Age: 56
Discharge: HOME | End: 2021-06-17

## 2021-06-17 DIAGNOSIS — J44.9 CHRONIC OBSTRUCTIVE PULMONARY DISEASE, UNSPECIFIED COPD TYPE (HCC): ICD-10-CM

## 2021-06-17 DIAGNOSIS — J44.9 CHRONIC OBSTRUCTIVE PULMONARY DISEASE, UNSPECIFIED COPD TYPE (HCC): Primary | ICD-10-CM

## 2021-06-17 PROCEDURE — 94729 DIFFUSING CAPACITY: CPT

## 2021-06-17 PROCEDURE — 94761 N-INVAS EAR/PLS OXIMETRY MLT: CPT

## 2021-06-17 PROCEDURE — 94726 PLETHYSMOGRAPHY LUNG VOLUMES: CPT

## 2021-06-17 PROCEDURE — 94060 EVALUATION OF WHEEZING: CPT | Performed by: INTERNAL MEDICINE

## 2021-06-17 PROCEDURE — 94060 EVALUATION OF WHEEZING: CPT

## 2021-06-17 PROCEDURE — 94726 PLETHYSMOGRAPHY LUNG VOLUMES: CPT | Performed by: INTERNAL MEDICINE

## 2021-06-17 PROCEDURE — 94729 DIFFUSING CAPACITY: CPT | Performed by: INTERNAL MEDICINE

## 2021-06-17 RX ORDER — ALBUTEROL SULFATE 2.5 MG/3ML
2.5 SOLUTION RESPIRATORY (INHALATION) ONCE
Status: COMPLETED | OUTPATIENT
Start: 2021-06-17 | End: 2021-06-17

## 2021-06-17 RX ADMIN — ALBUTEROL SULFATE 2.5 MG: 2.5 SOLUTION RESPIRATORY (INHALATION) at 12:10

## 2021-07-07 ENCOUNTER — TELEPHONE (OUTPATIENT)
Dept: PULMONOLOGY | Facility: CLINIC | Age: 56
End: 2021-07-07

## 2021-07-15 ENCOUNTER — OFFICE VISIT (OUTPATIENT)
Dept: FAMILY MEDICINE CLINIC | Facility: CLINIC | Age: 56
End: 2021-07-15

## 2021-07-15 ENCOUNTER — HOSPITAL ENCOUNTER (OUTPATIENT)
Dept: GENERAL RADIOLOGY | Facility: HOSPITAL | Age: 56
Discharge: HOME | End: 2021-07-15
Admitting: NURSE PRACTITIONER

## 2021-07-15 VITALS
HEIGHT: 66 IN | TEMPERATURE: 97.6 F | OXYGEN SATURATION: 93 % | SYSTOLIC BLOOD PRESSURE: 146 MMHG | WEIGHT: 244.6 LBS | DIASTOLIC BLOOD PRESSURE: 76 MMHG | BODY MASS INDEX: 39.31 KG/M2 | HEART RATE: 98 BPM

## 2021-07-15 DIAGNOSIS — M25.551 HIP PAIN, RIGHT: ICD-10-CM

## 2021-07-15 DIAGNOSIS — M25.551 HIP PAIN, RIGHT: Primary | ICD-10-CM
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
## 2021-07-15 PROCEDURE — 99213 OFFICE O/P EST LOW 20 MIN: CPT | Performed by: NURSE PRACTITIONER

## 2021-07-15 PROCEDURE — 73502 X-RAY EXAM HIP UNI 2-3 VIEWS: CPT

## 2021-07-15 RX ORDER — QUETIAPINE FUMARATE 300 MG/1
300 TABLET, FILM COATED ORAL NIGHTLY
COMMUNITY
End: 2022-02-08 | Stop reason: HOSPADM

## 2021-07-15 RX ORDER — METOPROLOL SUCCINATE 25 MG/1
25 TABLET, EXTENDED RELEASE ORAL DAILY
COMMUNITY
Start: 2021-06-14 | End: 2021-09-22 | Stop reason: SDUPTHER

## 2021-07-15 RX ORDER — DIVALPROEX SODIUM 250 MG/1
250 TABLET, DELAYED RELEASE ORAL 2 TIMES DAILY
COMMUNITY
End: 2021-09-14 | Stop reason: SDUPTHER

## 2021-07-15 RX ORDER — GABAPENTIN 800 MG/1
800 TABLET ORAL 3 TIMES DAILY
COMMUNITY
End: 2022-02-16 | Stop reason: HOSPADM

## 2021-07-15 RX ORDER — GEMFIBROZIL 600 MG/1
600 TABLET, FILM COATED ORAL 2 TIMES DAILY
COMMUNITY
Start: 2021-05-21 | End: 2021-09-22 | Stop reason: SDUPTHER

## 2021-07-15 RX ORDER — LISINOPRIL AND HYDROCHLOROTHIAZIDE 20; 12.5 MG/1; MG/1
1 TABLET ORAL DAILY
COMMUNITY
Start: 2021-04-26 | End: 2021-07-27

## 2021-07-15 RX ORDER — MIRTAZAPINE 15 MG/1
15 TABLET, FILM COATED ORAL DAILY
COMMUNITY

## 2021-07-15 RX ORDER — DICLOFENAC SODIUM 75 MG/1
75 TABLET, DELAYED RELEASE ORAL 2 TIMES DAILY
Qty: 30 TABLET | Refills: 1 | Status: SHIPPED | OUTPATIENT
Start: 2021-07-15 | End: 2021-08-06

## 2021-07-15 RX ORDER — ERGOCALCIFEROL 1.25 MG/1
50000 CAPSULE ORAL WEEKLY
COMMUNITY
Start: 2021-04-20 | End: 2021-11-19

## 2021-07-15 RX ORDER — AMLODIPINE BESYLATE 5 MG/1
5 TABLET ORAL DAILY
COMMUNITY
Start: 2021-04-20 | End: 2021-09-22 | Stop reason: SDUPTHER

## 2021-07-15 NOTE — PROGRESS NOTES
"Chief Complaint  Hip Pain (right)    Mena Recinos presents to [facility]  Hip Pain   The incident occurred 5 to 7 days ago. The incident occurred in the yard. The injury mechanism was a fall. The pain is present in the right hip. The quality of the pain is described as aching and stabbing. The pain is at a severity of 6/10. The pain is moderate. The pain has been constant since onset. Pertinent negatives include no inability to bear weight, numbness or tingling. The symptoms are aggravated by movement. He has tried rest and NSAIDs for the symptoms. The treatment provided no relief.       Objective   Vital Signs:   /76   Pulse 98   Temp 97.6 °F (36.4 °C)   Ht 167.6 cm (66\")   Wt 111 kg (244 lb 9.6 oz)   SpO2 93%   BMI 39.48 kg/m²     Physical Exam  Vitals reviewed.   Constitutional:       Appearance: Normal appearance. He is well-developed.   HENT:      Head: Normocephalic and atraumatic.      Right Ear: External ear normal.      Left Ear: External ear normal.      Mouth/Throat:      Pharynx: No oropharyngeal exudate.   Eyes:      Conjunctiva/sclera: Conjunctivae normal.      Pupils: Pupils are equal, round, and reactive to light.   Cardiovascular:      Rate and Rhythm: Normal rate and regular rhythm.      Heart sounds: No murmur heard.   No friction rub. No gallop.    Pulmonary:      Effort: Pulmonary effort is normal.      Breath sounds: Normal breath sounds. No wheezing or rhonchi.   Musculoskeletal:      Left hip: Tenderness present. No deformity, lacerations, bony tenderness or crepitus. Decreased range of motion. Normal strength.   Skin:     General: Skin is warm and dry.   Neurological:      Mental Status: He is alert and oriented to person, place, and time.   Psychiatric:         Mood and Affect: Affect normal.        Result Review :                 Assessment and Plan    Diagnoses and all orders for this visit:    1. Hip pain, right " (Primary)  Comments:  Order x-ray of the right hip.  Prescribed diclofenac sodium 75 mg twice daily as needed  Orders:  -     XR Hip With or Without Pelvis 2 - 3 View Right; Future  -     diclofenac (VOLTAREN) 75 MG EC tablet; Take 1 tablet by mouth 2 (Two) Times a Day.  Dispense: 30 tablet; Refill: 1    2. Fall, initial encounter        Follow Up   Return in about 4 weeks (around 8/12/2021), or if symptoms worsen or fail to improve.  Patient was given instructions and counseling regarding his condition or for health maintenance advice. Please see specific information pulled into the AVS if appropriate.

## 2021-07-28 RX ORDER — LISINOPRIL AND HYDROCHLOROTHIAZIDE 20; 12.5 MG/1; MG/1
TABLET ORAL
Qty: 90 TABLET | Refills: 1 | Status: SHIPPED | OUTPATIENT
Start: 2021-07-28 | End: 2021-09-22 | Stop reason: SDUPTHER

## 2021-08-03 ENCOUNTER — TELEPHONE (OUTPATIENT)
Dept: FAMILY MEDICINE CLINIC | Facility: CLINIC | Age: 56
End: 2021-08-03

## 2021-08-03 NOTE — TELEPHONE ENCOUNTER
Caller: LAUREN    Relationship:     Best call back number: [phone] / [phone]    What is the best time to reach you: ANYTIME    Who are you requesting to speak with (clinical staff, provider,  specific staff member): MR. DE SANTIAGO OR CLINICAL STAFF      What was the call regarding: THE PATIENT'S  HAS CALLED WITH CONCERNS OF PATIENT POSSIBLY HAVING COVID. THE  WAS EXPOSED TO SOMEONE WHO HAD TESTED POSITIVE FOR COVID.    THE PATIENT IS FEELING CONGESTION AND CHILLS.     THE  IS ALSO CONCERNED BECAUSE THE PATIENT HAS COPD.     Do you require a callback: YES, PLEASE CALL AND ADVISE. THANK YOU

## 2021-08-06 ENCOUNTER — OFFICE VISIT (OUTPATIENT)
Dept: FAMILY MEDICINE CLINIC | Facility: CLINIC | Age: 56
End: 2021-08-06

## 2021-08-06 VITALS
WEIGHT: 218.2 LBS | DIASTOLIC BLOOD PRESSURE: 70 MMHG | SYSTOLIC BLOOD PRESSURE: 126 MMHG | TEMPERATURE: 97.7 F | HEART RATE: 90 BPM | HEIGHT: 66 IN | BODY MASS INDEX: 35.07 KG/M2 | OXYGEN SATURATION: 97 %

## 2021-08-06 DIAGNOSIS — B35.6 TINEA CRURIS: ICD-10-CM

## 2021-08-06 DIAGNOSIS — Z20.822 EXPOSURE TO COVID-19 VIRUS: Primary | ICD-10-CM

## 2021-08-06 DIAGNOSIS — Z12.11 SCREEN FOR COLON CANCER: ICD-10-CM

## 2021-08-06 PROBLEM — F41.9 ANXIETY: Status: ACTIVE | Noted: 2021-08-06

## 2021-08-06 PROBLEM — F31.9 BIPOLAR 1 DISORDER: Status: ACTIVE | Noted: 2021-08-06

## 2021-08-06 PROBLEM — J01.80 OTHER ACUTE SINUSITIS: Status: ACTIVE | Noted: 2021-08-06

## 2021-08-06 PROBLEM — F32.A DEPRESSION: Status: ACTIVE | Noted: 2021-08-06

## 2021-08-06 PROBLEM — E78.00 HIGH CHOLESTEROL: Status: ACTIVE | Noted: 2021-08-06

## 2021-08-06 PROBLEM — J44.9 COPD (CHRONIC OBSTRUCTIVE PULMONARY DISEASE) (HCC): Status: ACTIVE | Noted: 2021-08-06

## 2021-08-06 PROBLEM — I10 HIGH BLOOD PRESSURE: Status: ACTIVE | Noted: 2021-08-06

## 2021-08-06 LAB — SARS-COV-2 RNA RESP QL NAA+PROBE: NOT DETECTED

## 2021-08-06 PROCEDURE — 99213 OFFICE O/P EST LOW 20 MIN: CPT | Performed by: NURSE PRACTITIONER

## 2021-08-06 PROCEDURE — U0003 INFECTIOUS AGENT DETECTION BY NUCLEIC ACID (DNA OR RNA); SEVERE ACUTE RESPIRATORY SYNDROME CORONAVIRUS 2 (SARS-COV-2) (CORONAVIRUS DISEASE [COVID-19]), AMPLIFIED PROBE TECHNIQUE, MAKING USE OF HIGH THROUGHPUT TECHNOLOGIES AS DESCRIBED BY CMS-2020-01-R: HCPCS | Performed by: NURSE PRACTITIONER

## 2021-08-06 RX ORDER — GEMFIBROZIL 600 MG/1
600 TABLET, FILM COATED ORAL
COMMUNITY
End: 2021-09-14 | Stop reason: SDUPTHER

## 2021-08-06 RX ORDER — CHLORAL HYDRATE 500 MG
1 CAPSULE ORAL 2 TIMES DAILY
COMMUNITY
End: 2021-09-22 | Stop reason: SDUPTHER

## 2021-08-06 RX ORDER — KETOCONAZOLE 20 MG/G
CREAM TOPICAL DAILY
Qty: 60 G | Refills: 1 | Status: SHIPPED | OUTPATIENT
Start: 2021-08-06 | End: 2021-09-14

## 2021-08-06 RX ORDER — DIVALPROEX SODIUM 500 MG/1
TABLET, DELAYED RELEASE ORAL
Status: ON HOLD | COMMUNITY
Start: 2021-07-26 | End: 2022-01-16

## 2021-08-06 NOTE — PROGRESS NOTES
"Chief Complaint  covid test (pt states he wants to get covid ) and rash around groin (worse on inner righ thigh )    Subjective          Atilio Recinos presents to [facility]  Rash  This is a new problem. The current episode started in the past 7 days. The problem has been gradually improving since onset. The affected locations include the groin. The rash is characterized by blistering, itchiness and redness. He was exposed to nothing. Pertinent negatives include no diarrhea, fatigue, fever, joint pain or vomiting. Past treatments include antihistamine. The treatment provided no relief.   Patient's  was exposed to COVID-19.  Recently had a COVID-19 test but results are pending.  He is requesting to be tested for COVID-19 since his spouse was exposed to COVID-19.  He denies fever, chills, body aches.  Denies shortness of breath, coughing, wheezing.  Denies loss of taste and smell.    Objective   Vital Signs:   /70 (BP Location: Right arm, Patient Position: Sitting, Cuff Size: Adult)   Pulse 90   Temp 97.7 °F (36.5 °C) (Oral)   Ht 167.6 cm (66\")   Wt 99 kg (218 lb 3.2 oz)   SpO2 97%   BMI 35.22 kg/m²     Physical Exam  Vitals reviewed.   Constitutional:       Appearance: Normal appearance. He is well-developed. He is obese.   HENT:      Head: Normocephalic and atraumatic.      Right Ear: External ear normal.      Left Ear: External ear normal.      Mouth/Throat:      Pharynx: No oropharyngeal exudate.   Eyes:      Conjunctiva/sclera: Conjunctivae normal.      Pupils: Pupils are equal, round, and reactive to light.   Cardiovascular:      Rate and Rhythm: Normal rate and regular rhythm.      Heart sounds: No murmur heard.   No friction rub. No gallop.    Pulmonary:      Effort: Pulmonary effort is normal.      Breath sounds: Normal breath sounds. No wheezing or rhonchi.   Abdominal:      General: Bowel sounds are normal. There is no distension.      Palpations: Abdomen " is soft.      Tenderness: There is no abdominal tenderness.   Skin:     General: Skin is warm and dry.      Findings: Rash present. Rash is macular and papular.      Comments: Erythema rash in the groin worse on right side.  Appearance of tinea cruris   Neurological:      Mental Status: He is alert and oriented to person, place, and time.      Cranial Nerves: No cranial nerve deficit.        Result Review :                 Assessment and Plan    Diagnoses and all orders for this visit:    1. Exposure to COVID-19 virus (Primary)  -     COVID-19,CEPHEID/ARIEL/BDMAX,COR/DANII/PAD/CELESTINE IN-HOUSE(OR EMERGENT/ADD-ON),NP SWAB IN TRANSPORT MEDIA 3-4 HR TAT, RT-PCR - Swab, Nasopharynx    2. Screen for colon cancer  -     Cologuard - Stool, Per Rectum; Future    3. Tinea cruris  -     ketoconazole (NIZORAL) 2 % cream; Apply  topically to the appropriate area as directed Daily.  Dispense: 60 g; Refill: 1    Patient reports previously prescribed a Cologuard.  Cologuard  and through the box away.      Follow Up   Return in about 4 weeks (around 9/3/2021), or if symptoms worsen or fail to improve.  Patient was given instructions and counseling regarding his condition or for health maintenance advice. Please see specific information pulled into the AVS if appropriate.

## 2021-09-14 ENCOUNTER — OFFICE VISIT (OUTPATIENT)
Dept: FAMILY MEDICINE CLINIC | Facility: CLINIC | Age: 56
End: 2021-09-14

## 2021-09-14 VITALS
DIASTOLIC BLOOD PRESSURE: 78 MMHG | HEART RATE: 83 BPM | HEIGHT: 66 IN | WEIGHT: 219.8 LBS | TEMPERATURE: 98.1 F | OXYGEN SATURATION: 94 % | SYSTOLIC BLOOD PRESSURE: 128 MMHG | BODY MASS INDEX: 35.32 KG/M2

## 2021-09-14 DIAGNOSIS — J44.9 CHRONIC OBSTRUCTIVE PULMONARY DISEASE, UNSPECIFIED COPD TYPE (HCC): ICD-10-CM

## 2021-09-14 DIAGNOSIS — E66.01 CLASS 2 SEVERE OBESITY DUE TO EXCESS CALORIES WITH SERIOUS COMORBIDITY AND BODY MASS INDEX (BMI) OF 35.0 TO 35.9 IN ADULT (HCC): ICD-10-CM

## 2021-09-14 DIAGNOSIS — Z23 NEED FOR INFLUENZA VACCINATION: ICD-10-CM

## 2021-09-14 DIAGNOSIS — Z51.81 ENCOUNTER FOR MEDICATION MONITORING: ICD-10-CM

## 2021-09-14 DIAGNOSIS — E78.00 HIGH CHOLESTEROL: ICD-10-CM

## 2021-09-14 DIAGNOSIS — I10 ESSENTIAL HYPERTENSION: Primary | ICD-10-CM

## 2021-09-14 DIAGNOSIS — F17.210 CIGARETTE NICOTINE DEPENDENCE WITHOUT COMPLICATION: ICD-10-CM

## 2021-09-14 DIAGNOSIS — R73.9 ELEVATED BLOOD SUGAR: ICD-10-CM

## 2021-09-14 DIAGNOSIS — Z23 NEED FOR TDAP VACCINATION: ICD-10-CM

## 2021-09-14 PROBLEM — E66.9 OBESITY: Status: ACTIVE | Noted: 2021-09-14

## 2021-09-14 PROCEDURE — G0008 ADMIN INFLUENZA VIRUS VAC: HCPCS | Performed by: NURSE PRACTITIONER

## 2021-09-14 PROCEDURE — 90686 IIV4 VACC NO PRSV 0.5 ML IM: CPT | Performed by: NURSE PRACTITIONER

## 2021-09-14 PROCEDURE — 99214 OFFICE O/P EST MOD 30 MIN: CPT | Performed by: NURSE PRACTITIONER

## 2021-09-14 PROCEDURE — 90715 TDAP VACCINE 7 YRS/> IM: CPT | Performed by: NURSE PRACTITIONER

## 2021-09-14 PROCEDURE — 90471 IMMUNIZATION ADMIN: CPT | Performed by: NURSE PRACTITIONER

## 2021-09-14 NOTE — ASSESSMENT & PLAN NOTE
Hypertension is well controlled.  Dietary sodium restriction.  Weight loss.  Stop smoking.  Blood pressure will be reassessed in 3 months.

## 2021-09-14 NOTE — ASSESSMENT & PLAN NOTE
COPD is unchanged.  Patient to keep COPD diary.  Discussed monitoring symptoms and use of quick-relief medications and contacting us early in the course of exacerbations.  Warning signs of respiratory distress were reviewed with the patient.   Counseled to avoid exposure to cigarette smoke.  Continue current medications.

## 2021-09-14 NOTE — PROGRESS NOTES
"Chief Complaint  Follow-up, Hypertension, Hyperlipidemia, and Difficulty Swallowing    Subjective          Atilio Recinos presents to [facility]  History of Present Illness     Patient presents today to follow up on hypertension, COPD, hyperlipidemia, obesity and tobacco abuse. He is still smoking 1.5 ppd with no intention of quitting at this time. He has an upcoming appointment with pulmonology to address his COPD and breathing issues. He states he is not in any distress and has used his oxygen intermittently about once or twice per week. In the past he had a chest CT performed which showed pulmonary nodules . On repeat exam the nodules were still present but unchanged. There is no record of this imaging to review.     He is accompanied by his partner who is a retired nurse. He says that his blood pressure is well controlled with systolic readings of 120s when he checks it at home. There is some concern for diabetes as he has excessive thirst and neuropathy of his fingers at times. In April, his blood glucose was normal at 97.     On review of his recent labwork, his cholesterol and triglycerides remain elevated. He does not tolerate statins and has not been taking the fish oil supplements. He is taking gemfibrozil.       Social History     Socioeconomic History   • Marital status: Single     Spouse name: Not on file   • Number of children: Not on file   • Years of education: Not on file   • Highest education level: Not on file   Tobacco Use   • Smoking status: Current Every Day Smoker     Packs/day: 1.50     Years: 40.00     Pack years: 60.00   • Smokeless tobacco: Never Used   Vaping Use   • Vaping Use: Never used       Objective   Vital Signs:   /78   Pulse 83   Temp 98.1 °F (36.7 °C)   Ht 167.6 cm (66\")   Wt 99.7 kg (219 lb 12.8 oz)   SpO2 94%   BMI 35.48 kg/m²     Physical Exam  Vitals reviewed.   Constitutional:       Appearance: Normal appearance. He is " well-developed.   HENT:      Head: Normocephalic and atraumatic.      Right Ear: External ear normal.      Left Ear: External ear normal.      Mouth/Throat:      Pharynx: No oropharyngeal exudate.   Eyes:      Conjunctiva/sclera: Conjunctivae normal.      Pupils: Pupils are equal, round, and reactive to light.   Cardiovascular:      Rate and Rhythm: Normal rate and regular rhythm.      Heart sounds: No murmur heard.   No friction rub. No gallop.    Pulmonary:      Effort: Pulmonary effort is normal. No tachypnea, accessory muscle usage or respiratory distress.      Breath sounds: Normal breath sounds. Decreased air movement present. No wheezing or rhonchi.   Abdominal:      General: Bowel sounds are normal. There is no distension.      Palpations: Abdomen is soft.      Tenderness: There is no abdominal tenderness.   Skin:     General: Skin is warm and dry.   Neurological:      Mental Status: He is alert and oriented to person, place, and time.      Cranial Nerves: No cranial nerve deficit.   Psychiatric:         Mood and Affect: Affect normal.        Result Review :     Common labs    Common Labsle 11/22/20 11/22/20 1/28/21 4/6/21 4/6/21 4/6/21    0611 0611  2117 2117 2117   Glucose  168 (A) 95 97     BUN  16 10 9     Creatinine  1.48 (A) 1.12 1.16     Sodium  131 (A) 139 135     Potassium  3.4 (A) 4.6 4.3     Chloride  92 (A) 101 96 (A)     Calcium  9.2 9.2 9.7     Albumin  4.4 4.2 4.2     Total Bilirubin  0.29 0.32 0.19 (A)     Alkaline Phosphatase  54 (A) 57 55 (A)     AST (SGOT)  26 35 27     ALT (SGPT)  22 31 21     WBC 11.71 (A)  9.64      Hemoglobin 15.0  15.2      Hematocrit 44.8  48.3      Platelets 259  239      Total Cholesterol   249 (A)  223 (A)    Triglycerides   374 (A)  522 (A)    HDL Cholesterol   31 (A)  31 (A)    LDL Cholesterol    143 (A)   126 (A)   PSA   0.33      (A) Abnormal value       Comments are available for some flowsheets but are not being displayed.                     Assessment and  Plan    Diagnoses and all orders for this visit:    1. Essential hypertension (Primary)  Assessment & Plan:  Hypertension is well controlled.  Dietary sodium restriction.  Weight loss.  Stop smoking.  Blood pressure will be reassessed in 3 months.    Orders:  -     CBC & Differential; Future  -     Comprehensive Metabolic Panel; Future    2. High cholesterol  Assessment & Plan:  Lipid abnormalities are worsening.  Nutritional counseling was provided. and He has not been taking the fish oil.  Does not tolerate statins.  We will resend the fish oil medication.  Encouraged him to take it twice daily.  Lipids will be reassessed in 3 months.      3. Chronic obstructive pulmonary disease, unspecified COPD type (CMS/[facility])  Assessment & Plan:  COPD is unchanged.  Patient to keep COPD diary.  Discussed monitoring symptoms and use of quick-relief medications and contacting us early in the course of exacerbations.  Warning signs of respiratory distress were reviewed with the patient.   Counseled to avoid exposure to cigarette smoke.  Continue current medications.          4. Cigarette nicotine dependence without complication  Assessment & Plan:  Atilio Recinos  reports that he has been smoking. He has a 60.00 pack-year smoking history. He has never used smokeless tobacco.. I have educated him on the risk of diseases from using tobacco products such as cancer, COPD and heart disease.     I advised him to quit and he is not willing to quit.    I spent 3  minutes counseling the patient.           Orders:  -      CT Chest Low Dose Cancer Screening WO; Future    5. Class 2 severe obesity due to excess calories with serious comorbidity and body mass index (BMI) of 35.0 to 35.9 in adult (CMS/[facility])  Assessment & Plan:  Patient's (Body mass index is 35.48 kg/m².) indicates that they are morbidly obese (BMI > 40 or > 35 with obesity - related health condition) with health conditions that include hypertension and dyslipidemias . Weight is  unchanged. BMI is is above average; BMI management plan is completed. We discussed portion control and increasing exercise.       6. Encounter for medication monitoring  -     Valproic Acid Level, Free; Future  -     Valproic Acid Level, Total; Future    7. Need for Tdap vaccination  -     Tdap Vaccine Greater Than or Equal To 8yo IM    8. Need for influenza vaccination  -     FluLaval >6 Months (4725-4298)    9. Elevated blood sugar  Comments:  Evaded blood sugar in November 2020 of 168  Orders:  -     Hemoglobin A1c; Future      Follow Up   Return in about 3 months (around 12/14/2021), or if symptoms worsen or fail to improve, for Next scheduled follow up.  Patient was given instructions and counseling regarding his condition or for health maintenance advice. Please see specific information pulled into the AVS if appropriate.

## 2021-09-14 NOTE — ASSESSMENT & PLAN NOTE
Patient's (Body mass index is 35.48 kg/m².) indicates that they are morbidly obese (BMI > 40 or > 35 with obesity - related health condition) with health conditions that include hypertension and dyslipidemias . Weight is unchanged. BMI is is above average; BMI management plan is completed. We discussed portion control and increasing exercise.

## 2021-09-14 NOTE — ASSESSMENT & PLAN NOTE
Lipid abnormalities are worsening.  Nutritional counseling was provided. and He has not been taking the fish oil.  Does not tolerate statins.  We will resend the fish oil medication.  Encouraged him to take it twice daily.  Lipids will be reassessed in 3 months.

## 2021-09-14 NOTE — ASSESSMENT & PLAN NOTE
Atilio CAROL Recinos  reports that he has been smoking. He has a 60.00 pack-year smoking history. He has never used smokeless tobacco.. I have educated him on the risk of diseases from using tobacco products such as cancer, COPD and heart disease.     I advised him to quit and he is not willing to quit.    I spent 3  minutes counseling the patient.

## 2021-09-22 ENCOUNTER — LAB (OUTPATIENT)
Dept: FAMILY MEDICINE CLINIC | Facility: CLINIC | Age: 56
End: 2021-09-22

## 2021-09-22 DIAGNOSIS — Z51.81 ENCOUNTER FOR MEDICATION MONITORING: ICD-10-CM

## 2021-09-22 DIAGNOSIS — R73.9 ELEVATED BLOOD SUGAR: ICD-10-CM

## 2021-09-22 DIAGNOSIS — I10 ESSENTIAL HYPERTENSION: ICD-10-CM

## 2021-09-22 LAB
ALBUMIN SERPL-MCNC: 4.7 G/DL (ref 3.5–5.2)
ALBUMIN/GLOB SERPL: 2 G/DL
ALP SERPL-CCNC: 49 U/L (ref 39–117)
ALT SERPL W P-5'-P-CCNC: 13 U/L (ref 1–41)
ANION GAP SERPL CALCULATED.3IONS-SCNC: 16.2 MMOL/L (ref 5–15)
AST SERPL-CCNC: 19 U/L (ref 1–40)
BASOPHILS # BLD AUTO: 0.04 10*3/MM3 (ref 0–0.2)
BASOPHILS NFR BLD AUTO: 0.6 % (ref 0–1.5)
BILIRUB SERPL-MCNC: 0.4 MG/DL (ref 0–1.2)
BUN SERPL-MCNC: 16 MG/DL (ref 6–20)
BUN/CREAT SERPL: 8.9 (ref 7–25)
CALCIUM SPEC-SCNC: 9.7 MG/DL (ref 8.6–10.5)
CHLORIDE SERPL-SCNC: 99 MMOL/L (ref 98–107)
CO2 SERPL-SCNC: 23.8 MMOL/L (ref 22–29)
CREAT SERPL-MCNC: 1.8 MG/DL (ref 0.76–1.27)
DEPRECATED RDW RBC AUTO: 45.7 FL (ref 37–54)
EOSINOPHIL # BLD AUTO: 0.44 10*3/MM3 (ref 0–0.4)
EOSINOPHIL NFR BLD AUTO: 6.9 % (ref 0.3–6.2)
ERYTHROCYTE [DISTWIDTH] IN BLOOD BY AUTOMATED COUNT: 13.6 % (ref 12.3–15.4)
GFR SERPL CREATININE-BSD FRML MDRD: 39 ML/MIN/1.73
GLOBULIN UR ELPH-MCNC: 2.4 GM/DL
GLUCOSE SERPL-MCNC: 74 MG/DL (ref 65–99)
HBA1C MFR BLD: 5.81 % (ref 4.8–5.6)
HCT VFR BLD AUTO: 39.8 % (ref 37.5–51)
HGB BLD-MCNC: 13.9 G/DL (ref 13–17.7)
IMM GRANULOCYTES # BLD AUTO: 0.07 10*3/MM3 (ref 0–0.05)
IMM GRANULOCYTES NFR BLD AUTO: 1.1 % (ref 0–0.5)
LYMPHOCYTES # BLD AUTO: 2.74 10*3/MM3 (ref 0.7–3.1)
LYMPHOCYTES NFR BLD AUTO: 42.9 % (ref 19.6–45.3)
MCH RBC QN AUTO: 32 PG (ref 26.6–33)
MCHC RBC AUTO-ENTMCNC: 34.9 G/DL (ref 31.5–35.7)
MCV RBC AUTO: 91.7 FL (ref 79–97)
MONOCYTES # BLD AUTO: 0.74 10*3/MM3 (ref 0.1–0.9)
MONOCYTES NFR BLD AUTO: 11.6 % (ref 5–12)
NEUTROPHILS NFR BLD AUTO: 2.35 10*3/MM3 (ref 1.7–7)
NEUTROPHILS NFR BLD AUTO: 36.9 % (ref 42.7–76)
NRBC BLD AUTO-RTO: 0 /100 WBC (ref 0–0.2)
PLATELET # BLD AUTO: 210 10*3/MM3 (ref 140–450)
PMV BLD AUTO: 11.2 FL (ref 6–12)
POTASSIUM SERPL-SCNC: 4.3 MMOL/L (ref 3.5–5.2)
PROT SERPL-MCNC: 7.1 G/DL (ref 6–8.5)
RBC # BLD AUTO: 4.34 10*6/MM3 (ref 4.14–5.8)
SODIUM SERPL-SCNC: 139 MMOL/L (ref 136–145)
VALPROATE SERPL-MCNC: 69 MCG/ML (ref 50–125)
WBC # BLD AUTO: 6.38 10*3/MM3 (ref 3.4–10.8)

## 2021-09-22 PROCEDURE — 83036 HEMOGLOBIN GLYCOSYLATED A1C: CPT | Performed by: NURSE PRACTITIONER

## 2021-09-22 PROCEDURE — 36415 COLL VENOUS BLD VENIPUNCTURE: CPT | Performed by: FAMILY MEDICINE

## 2021-09-22 PROCEDURE — 85025 COMPLETE CBC W/AUTO DIFF WBC: CPT | Performed by: NURSE PRACTITIONER

## 2021-09-22 PROCEDURE — 80053 COMPREHEN METABOLIC PANEL: CPT | Performed by: NURSE PRACTITIONER

## 2021-09-22 PROCEDURE — 80164 ASSAY DIPROPYLACETIC ACD TOT: CPT | Performed by: NURSE PRACTITIONER

## 2021-09-22 RX ORDER — LISINOPRIL AND HYDROCHLOROTHIAZIDE 20; 12.5 MG/1; MG/1
1 TABLET ORAL DAILY
Qty: 90 TABLET | Refills: 1 | Status: SHIPPED | OUTPATIENT
Start: 2021-09-22 | End: 2021-11-04

## 2021-09-22 RX ORDER — GEMFIBROZIL 600 MG/1
600 TABLET, FILM COATED ORAL 2 TIMES DAILY
Qty: 180 TABLET | Refills: 1 | Status: SHIPPED | OUTPATIENT
Start: 2021-09-22 | End: 2022-11-10 | Stop reason: SDUPTHER

## 2021-09-22 RX ORDER — CHLORAL HYDRATE 500 MG
1 CAPSULE ORAL 2 TIMES DAILY
Qty: 180 CAPSULE | Refills: 1 | Status: SHIPPED | OUTPATIENT
Start: 2021-09-22 | End: 2023-03-02 | Stop reason: SDUPTHER

## 2021-09-22 RX ORDER — METOPROLOL SUCCINATE 25 MG/1
25 TABLET, EXTENDED RELEASE ORAL DAILY
Qty: 90 TABLET | Refills: 1 | Status: SHIPPED | OUTPATIENT
Start: 2021-09-22 | End: 2022-02-08 | Stop reason: HOSPADM

## 2021-09-22 RX ORDER — AMLODIPINE BESYLATE 5 MG/1
5 TABLET ORAL DAILY
Qty: 90 TABLET | Refills: 1 | Status: ON HOLD | OUTPATIENT
Start: 2021-09-22 | End: 2022-09-22

## 2021-09-23 ENCOUNTER — HOSPITAL ENCOUNTER (OUTPATIENT)
Dept: CT IMAGING | Facility: HOSPITAL | Age: 56
Discharge: HOME | End: 2021-09-23
Admitting: NURSE PRACTITIONER

## 2021-09-23 DIAGNOSIS — F17.210 CIGARETTE NICOTINE DEPENDENCE WITHOUT COMPLICATION: ICD-10-CM

## 2021-09-23 PROCEDURE — 71271 CT THORAX LUNG CANCER SCR C-: CPT

## 2021-09-29 RX ORDER — DICLOFENAC SODIUM 75 MG/1
75 TABLET, DELAYED RELEASE ORAL 2 TIMES DAILY
Qty: 60 TABLET | Refills: 2 | Status: SHIPPED | OUTPATIENT
Start: 2021-09-29 | End: 2021-10-21 | Stop reason: SDUPTHER

## 2021-09-29 RX ORDER — DICLOFENAC SODIUM 75 MG/1
75 TABLET, DELAYED RELEASE ORAL 2 TIMES DAILY
COMMUNITY
Start: 2021-09-21 | End: 2021-09-29 | Stop reason: SDUPTHER

## 2021-10-13 RX ORDER — DICLOFENAC SODIUM 75 MG/1
75 TABLET, DELAYED RELEASE ORAL 2 TIMES DAILY
Qty: 60 TABLET | Refills: 2 | OUTPATIENT
Start: 2021-10-13

## 2021-10-13 NOTE — TELEPHONE ENCOUNTER
Patient should only be taking medication twice daily. Was given 60 tablets with 2 refills. Too soon for refill.

## 2021-10-13 NOTE — TELEPHONE ENCOUNTER
Caller: LAUREN ZAVALA    Relationship: Emergency Contact    Medication requested (name and dosage): diclofenac (VOLTAREN) 75 MG EC tablet    Pharmacy where request should be sent: [facility] [phone]    Additional details provided by patient: LAUREN STATES PATIENT HAS 1 PILL LEFT    Best call back number:[phone]    Does the patient have less than a 3 day supply:  [x] Yes  [] No    Kristen Kaufman Rep   10/13/21 13:04 EDT

## 2021-10-20 ENCOUNTER — TELEPHONE (OUTPATIENT)
Dept: FAMILY MEDICINE CLINIC | Facility: CLINIC | Age: 56
End: 2021-10-20

## 2021-10-20 NOTE — TELEPHONE ENCOUNTER
HUB WAS UNABLE TO WARM TRANSFER       Caller: Atilio Recinos    Relationship: Self    Best call back number: [phone]     What is the best time to reach you: ANYTIME AFTER 9:00 AM     Who are you requesting to speak with (clinical staff, provider,  specific staff member):   CLINICAL TEAM       What was the call regarding: PATIENT CALLING CHECKING THE ORDER STATUS OF, HIS RX diclofenac (VOLTAREN) 75 MG EC tablet.    Do you require a callback: YES, PLEASE ADVISE

## 2021-10-21 ENCOUNTER — TELEPHONE (OUTPATIENT)
Dept: FAMILY MEDICINE CLINIC | Facility: CLINIC | Age: 56
End: 2021-10-21

## 2021-10-21 RX ORDER — DICLOFENAC SODIUM 75 MG/1
75 TABLET, DELAYED RELEASE ORAL 2 TIMES DAILY
Qty: 60 TABLET | Refills: 2 | Status: SHIPPED | OUTPATIENT
Start: 2021-10-21 | End: 2021-11-19

## 2021-10-21 NOTE — TELEPHONE ENCOUNTER
Pt was upset that his med was sent to the wrong pharm and he couldn't afford the med. Pt is now out of med and will not get the med till it is sent through the new pharm. I corrected the pharm and resent the med to [facility]

## 2021-10-21 NOTE — TELEPHONE ENCOUNTER
Lm letting pt know he still should have refills at his pharmacy and if he does not then he can give us a call back

## 2021-10-28 ENCOUNTER — OFFICE VISIT (OUTPATIENT)
Dept: FAMILY MEDICINE CLINIC | Facility: CLINIC | Age: 56
End: 2021-10-28

## 2021-10-28 DIAGNOSIS — Z53.21 PATIENT LEFT WITHOUT BEING SEEN: Primary | ICD-10-CM

## 2021-10-28 NOTE — PROGRESS NOTES
MA brought the patient back to the room.  He was upset that the provider was running behind.  His appointment was 8 AM.  He was brought back to the room at about 820.  He left the office without being seen today at 825 am.

## 2021-10-29 ENCOUNTER — TELEPHONE (OUTPATIENT)
Dept: FAMILY MEDICINE CLINIC | Facility: CLINIC | Age: 56
End: 2021-10-29

## 2021-10-29 ENCOUNTER — OFFICE VISIT (OUTPATIENT)
Dept: PULMONOLOGY | Facility: CLINIC | Age: 56
End: 2021-10-29

## 2021-10-29 VITALS
HEIGHT: 66 IN | SYSTOLIC BLOOD PRESSURE: 129 MMHG | RESPIRATION RATE: 17 BRPM | OXYGEN SATURATION: 95 % | DIASTOLIC BLOOD PRESSURE: 55 MMHG | BODY MASS INDEX: 35.2 KG/M2 | WEIGHT: 219 LBS | TEMPERATURE: 98.4 F | HEART RATE: 82 BPM

## 2021-10-29 DIAGNOSIS — J01.80 OTHER ACUTE SINUSITIS, RECURRENCE NOT SPECIFIED: ICD-10-CM

## 2021-10-29 DIAGNOSIS — G47.33 OSA (OBSTRUCTIVE SLEEP APNEA): ICD-10-CM

## 2021-10-29 DIAGNOSIS — E66.09 CLASS 2 OBESITY DUE TO EXCESS CALORIES WITH BODY MASS INDEX (BMI) OF 35.0 TO 35.9 IN ADULT, UNSPECIFIED WHETHER SERIOUS COMORBIDITY PRESENT: ICD-10-CM

## 2021-10-29 DIAGNOSIS — E78.00 HIGH CHOLESTEROL: ICD-10-CM

## 2021-10-29 DIAGNOSIS — J18.9 RECURRENT PNEUMONIA: ICD-10-CM

## 2021-10-29 DIAGNOSIS — F31.9 BIPOLAR 1 DISORDER (HCC): ICD-10-CM

## 2021-10-29 DIAGNOSIS — J44.9 CHRONIC OBSTRUCTIVE PULMONARY DISEASE, UNSPECIFIED COPD TYPE (HCC): Primary | ICD-10-CM

## 2021-10-29 DIAGNOSIS — F17.210 CIGARETTE NICOTINE DEPENDENCE WITHOUT COMPLICATION: ICD-10-CM

## 2021-10-29 DIAGNOSIS — T78.40XA ALLERGY, INITIAL ENCOUNTER: ICD-10-CM

## 2021-10-29 DIAGNOSIS — R91.8 MULTIPLE PULMONARY NODULES DETERMINED BY COMPUTED TOMOGRAPHY OF LUNG: ICD-10-CM

## 2021-10-29 DIAGNOSIS — I10 PRIMARY HYPERTENSION: ICD-10-CM

## 2021-10-29 DIAGNOSIS — F41.9 ANXIETY: ICD-10-CM

## 2021-10-29 DIAGNOSIS — F32.A DEPRESSION, UNSPECIFIED DEPRESSION TYPE: ICD-10-CM

## 2021-10-29 PROCEDURE — 99203 OFFICE O/P NEW LOW 30 MIN: CPT | Performed by: SPECIALIST

## 2021-10-29 RX ORDER — FORMOTEROL FUMARATE 20 UG/2ML
20 SOLUTION RESPIRATORY (INHALATION)
Qty: 60 EACH | Refills: 5 | Status: SHIPPED | OUTPATIENT
Start: 2021-10-29 | End: 2021-12-12

## 2021-10-29 RX ORDER — ALBUTEROL SULFATE 2.5 MG/3ML
2.5 SOLUTION RESPIRATORY (INHALATION) EVERY 4 HOURS PRN
Qty: 120 EACH | Refills: 5
Start: 2021-10-29 | End: 2021-12-12

## 2021-10-29 RX ORDER — BUDESONIDE 0.5 MG/2ML
0.5 INHALANT ORAL 2 TIMES DAILY
Qty: 60 EACH | Refills: 5 | Status: SHIPPED | OUTPATIENT
Start: 2021-10-29 | End: 2021-12-12

## 2021-10-29 NOTE — TELEPHONE ENCOUNTER
Caller: Atilio Recinos    Relationship: Self    Best call back number: [phone]    Additional notes: PATIENT STATED NEED TO DISCUSS LABS THAT HAVE BEEN ORDERED, IF THE LABS ARE FASTING LABS AND IF HE IS TO HAVE AN APPOINTMENT WITH [facility] PRIOR TO LABS THAT ARE TO BE DONE BY 11/3/21

## 2021-10-29 NOTE — PATIENT INSTRUCTIONS
Allergies, Adult  An allergy means that your body reacts to something that bothers it (allergen). This can happen from something that you eat, breathe in, or touch.  Allergies often affect the nose, eyes, skin, and stomach. They can be mild, moderate, or very bad (severe). An allergy cannot spread from person to person. They can happen at any age. Sometimes, people outgrow them.  What are the causes?  · Outdoor things, such as pollen, car fumes, and mold.  · Indoor things, such as dust, smoke, mold, and pets.  · Foods.  · Medicines.  · Things that bother your skin, such as perfume and bug bites.  What increases the risk?  · Having family members with allergies or asthma.  What are the signs or symptoms?  Symptoms depend on how bad your allergy is.  Mild to moderate symptoms  · Runny nose, stuffy nose, or sneezing.  · Itchy mouth, ears, or throat.  · A feeling of mucus dripping down the back of your throat.  · Sore throat.  · Eyes that are itchy, red, watery, or puffy.  · A skin rash, or red, swollen areas of skin (hives).  · Stomach cramps or bloating.  Severe symptoms  Very bad allergies to food, medicine, or bug bites may cause a very bad allergy reaction (anaphylaxis). This can be life-threatening. Symptoms include:  · A red face.  · Wheezing or coughing.  · Swollen lips, tongue, or mouth.  · Tight or swollen throat.  · Chest pain or tightness, or a fast heartbeat.  · Trouble breathing or shortness of breath.  · Pain in your belly (abdomen), vomiting, or watery poop (diarrhea).  · Feeling dizzy or fainting.  How is this treated?         Treatment for this condition depends on your symptoms. Treatment may include:  · Cold, wet cloths for itching and swelling.  · Eye drops, nose sprays, or skin creams.  · Washing out your nose each day.  · A humidifier.  · Medicines.  · A change to the foods you eat.  · Being exposed again and again to tiny amounts of allergens. This helps your body get used to them. You might  have:  ? Allergy shots.  ? Very small amounts of allergen put under your tongue.  · An emergency shot (auto-injector pen) if you have a very bad allergy reaction.  ? This is a medicine with a needle. You can put it into your skin by yourself.  ? Your doctor will teach you how to use it.  Follow these instructions at home:  Medicines    · Take or apply over-the-counter and prescription medicines only as told by your doctor.  · If you are at risk for a very bad allergy reaction, keep an auto-injector pen with you all the time.    Eating and drinking  · Follow instructions from your doctor about what to eat and drink.  · Drink enough fluid to keep your pee (urine) pale yellow.  General instructions  · If you have ever had a very bad allergy reaction, wear a medical alert bracelet or necklace.  · Stay away from things that you are allergic to.  · Keep all follow-up visits as told by your doctor. This is important.  Contact a doctor if:  · Your symptoms do not get better with treatment.  Get help right away if:  · You have symptoms of a very bad allergy reaction. These include:  ? A swollen mouth, tongue, or throat.  ? Pain or tightness in your chest.  ? Trouble breathing.  ? Being short of breath.  ? Dizziness.  ? Fainting.  ? Very bad pain in your belly.  ? Vomiting.  ? Watery poop.  These symptoms may be an emergency. Do not wait to see if the symptoms will go away. Get medical help right away. Call your local emergency services (911 in the [place] Do not drive yourself to the hospital.  Summary  · Take or apply over-the-counter and prescription medicines only as told by your doctor.  · Stay away from things you are allergic to.  · If you are at risk for a very bad allergy reaction, carry an auto-injector pen all the time.  · Wear a medical alert bracelet or necklace.  · Very bad allergy reactions can be life-threatening. Get help right away.  This information is not intended to replace advice given to you by your  health care provider. Make sure you discuss any questions you have with your health care provider.  Document Revised: 10/28/2020 Document Reviewed: 10/28/2020  [employer]vier Patient Education © 2021 [employer]    Chronic Obstructive Pulmonary Disease  Chronic obstructive pulmonary disease (COPD) is a long-term (chronic) lung problem. When you have COPD, it is hard for air to get in and out of your lungs. Usually the condition gets worse over time, and your lungs will never return to normal. There are things you can do to keep yourself as healthy as possible.  · Your doctor may treat your condition with:  ? Medicines.  ? Oxygen.  ? Lung surgery.  · Your doctor may also recommend:  ? Rehabilitation. This includes steps to make your body work better. It may involve a team of specialists.  ? Quitting smoking, if you smoke.  ? Exercise and changes to your diet.  ? Comfort measures (palliative care).  Follow these instructions at home:  Medicines  · Take over-the-counter and prescription medicines only as told by your doctor.  · Talk to your doctor before taking any cough or allergy medicines. You may need to avoid medicines that cause your lungs to be dry.  Lifestyle  · If you smoke, stop. Smoking makes the problem worse. If you need help quitting, ask your doctor.  · Avoid being around things that make your breathing worse. This may include smoke, chemicals, and fumes.  · Stay active, but remember to rest as well.  · Learn and use tips on how to relax.  · Make sure you get enough sleep. Most adults need at least 7 hours of sleep every night.  · Eat healthy foods. Eat smaller meals more often. Rest before meals.  Controlled breathing  Learn and use tips on how to control your breathing as told by your doctor. Try:  · Breathing in (inhaling) through your nose for 1 second. Then, pucker your lips and breath out (exhale) through your lips for 2 seconds.  · Putting one hand on your belly (abdomen). Breathe in slowly through  your nose for 1 second. Your hand on your belly should move out. Pucker your lips and breathe out slowly through your lips. Your hand on your belly should move in as you breathe out.    Controlled coughing  Learn and use controlled coughing to clear mucus from your lungs. Follow these steps:  1. Lean your head a little forward.  2. Breathe in deeply.  3. Try to hold your breath for 3 seconds.  4. Keep your mouth slightly open while coughing 2 times.  5. Spit any mucus out into a tissue.  6. Rest and do the steps again 1 or 2 times as needed.  General instructions  · Make sure you get all the shots (vaccines) that your doctor recommends. Ask your doctor about a flu shot and a pneumonia shot.  · Use oxygen therapy and pulmonary rehabilitation if told by your doctor. If you need home oxygen therapy, ask your doctor if you should buy a tool to measure your oxygen level (oximeter).  · Make a COPD action plan with your doctor. This helps you to know what to do if you feel worse than usual.  · Manage any other conditions you have as told by your doctor.  · Avoid going outside when it is very hot, cold, or humid.  · Avoid people who have a sickness you can catch (contagious).  · Keep all follow-up visits as told by your doctor. This is important.  Contact a doctor if:  · You cough up more mucus than usual.  · There is a change in the color or thickness of the mucus.  · It is harder to breathe than usual.  · Your breathing is faster than usual.  · You have trouble sleeping.  · You need to use your medicines more often than usual.  · You have trouble doing your normal activities such as getting dressed or walking around the house.  Get help right away if:  · You have shortness of breath while resting.  · You have shortness of breath that stops you from:  ? Being able to talk.  ? Doing normal activities.  · Your chest hurts for longer than 5 minutes.  · Your skin color is more blue than usual.  · Your pulse oximeter shows that  you have low oxygen for longer than 5 minutes.  · You have a fever.  · You feel too tired to breathe normally.  Summary  · Chronic obstructive pulmonary disease (COPD) is a long-term lung problem.  · The way your lungs work will never return to normal. Usually the condition gets worse over time. There are things you can do to keep yourself as healthy as possible.  · Take over-the-counter and prescription medicines only as told by your doctor.  · If you smoke, stop. Smoking makes the problem worse.  This information is not intended to replace advice given to you by your health care provider. Make sure you discuss any questions you have with your health care provider.  Document Revised: 11/30/2018 Document Reviewed: 01/22/2018  [employer] Patient Education © 2021 [employer]    Sleep Apnea  Sleep apnea affects breathing during sleep. It causes breathing to stop for a short time or to become shallow. It can also increase the risk of:  · Heart attack.  · Stroke.  · Being very overweight (obese).  · Diabetes.  · Heart failure.  · Irregular heartbeat.  The goal of treatment is to help you breathe normally again.  What are the causes?  There are three kinds of sleep apnea:  · Obstructive sleep apnea. This is caused by a blocked or collapsed airway.  · Central sleep apnea. This happens when the brain does not send the right signals to the muscles that control breathing.  · Mixed sleep apnea. This is a combination of obstructive and central sleep apnea.  The most common cause of this condition is a collapsed or blocked airway. This can happen if:  · Your throat muscles are too relaxed.  · Your tongue and tonsils are too large.  · You are overweight.  · Your airway is too small.  What increases the risk?  · Being overweight.  · Smoking.  · Having a small airway.  · Being older.  · Being male.  · Drinking alcohol.  · Taking medicines to calm yourself (sedatives or tranquilizers).  · Having family members with the  condition.  What are the signs or symptoms?  · Trouble staying asleep.  · Being sleepy or tired during the day.  · Getting angry a lot.  · Loud snoring.  · Headaches in the morning.  · Not being able to focus your mind (concentrate).  · Forgetting things.  · Less interest in sex.  · Mood swings.  · Personality changes.  · Feelings of sadness (depression).  · Waking up a lot during the night to pee (urinate).  · Dry mouth.  · Sore throat.  How is this diagnosed?  · Your medical history.  · A physical exam.  · A test that is done when you are sleeping (sleep study). The test is most often done in a sleep lab but may also be done at home.  How is this treated?    · Sleeping on your side.  · Using a medicine to get rid of mucus in your nose (decongestant).  · Avoiding the use of alcohol, medicines to help you relax, or certain pain medicines (narcotics).  · Losing weight, if needed.  · Changing your diet.  · Not smoking.  · Using a machine to open your airway while you sleep, such as:  ? An oral appliance. This is a mouthpiece that shifts your lower jaw forward.  ? A CPAP device. This device blows air through a mask when you breathe out (exhale).  ? An EPAP device. This has valves that you put in each nostril.  ? A BPAP device. This device blows air through a mask when you breathe in (inhale) and breathe out.  · Having surgery if other treatments do not work.  It is important to get treatment for sleep apnea. Without treatment, it can lead to:  · High blood pressure.  · Coronary artery disease.  · In men, not being able to have an erection (impotence).  · Reduced thinking ability.  Follow these instructions at home:  Lifestyle  · Make changes that your doctor recommends.  · Eat a healthy diet.  · Lose weight if needed.  · Avoid alcohol, medicines to help you relax, and some pain medicines.  · Do not use any products that contain nicotine or tobacco, such as cigarettes, e-cigarettes, and chewing tobacco. If you need help  quitting, ask your doctor.  General instructions  · Take over-the-counter and prescription medicines only as told by your doctor.  · If you were given a machine to use while you sleep, use it only as told by your doctor.  · If you are having surgery, make sure to tell your doctor you have sleep apnea. You may need to bring your device with you.  · Keep all follow-up visits as told by your doctor. This is important.  Contact a doctor if:  · The machine that you were given to use during sleep bothers you or does not seem to be working.  · You do not get better.  · You get worse.  Get help right away if:  · Your chest hurts.  · You have trouble breathing in enough air.  · You have an uncomfortable feeling in your back, arms, or stomach.  · You have trouble talking.  · One side of your body feels weak.  · A part of your face is hanging down.  These symptoms may be an emergency. Do not wait to see if the symptoms will go away. Get medical help right away. Call your local emergency services (911 in the [place] Do not drive yourself to the hospital.  Summary  · This condition affects breathing during sleep.  · The most common cause is a collapsed or blocked airway.  · The goal of treatment is to help you breathe normally while you sleep.  This information is not intended to replace advice given to you by your health care provider. Make sure you discuss any questions you have with your health care provider.  Document Revised: 10/04/2019 Document Reviewed: 08/13/2019  [employer] Patient Education © 2021 [employer]    Steps to Quit Smoking  Smoking tobacco is the leading cause of preventable death. It can affect almost every organ in the body. Smoking puts you and people around you at risk for many serious, long-lasting (chronic) diseases. Quitting smoking can be hard, but it is one of the best things that you can do for your health. It is never too late to quit.  How do I get ready to quit?  When you decide to quit smoking,  make a plan to help you succeed. Before you quit:  · Pick a date to quit. Set a date within the next 2 weeks to give you time to prepare.  · Write down the reasons why you are quitting. Keep this list in places where you will see it often.  · Tell your family, friends, and co-workers that you are quitting. Their support is important.  · Talk with your doctor about the choices that may help you quit.  · Find out if your health insurance will pay for these treatments.  · Know the people, places, things, and activities that make you want to smoke (triggers). Avoid them.  What first steps can I take to quit smoking?  · Throw away all cigarettes at home, at work, and in your car.  · Throw away the things that you use when you smoke, such as ashtrays and lighters.  · Clean your car. Make sure to empty the ashtray.  · Clean your home, including curtains and carpets.  What can I do to help me quit smoking?  Talk with your doctor about taking medicines and seeing a counselor at the same time. You are more likely to succeed when you do both.  · If you are pregnant or breastfeeding, talk with your doctor about counseling or other ways to quit smoking. Do not take medicine to help you quit smoking unless your doctor tells you to do so.  To quit smoking:  Quit right away  · Quit smoking totally, instead of slowly cutting back on how much you smoke over a period of time.  · Go to counseling. You are more likely to quit if you go to counseling sessions regularly.  Take medicine  You may take medicines to help you quit. Some medicines need a prescription, and some you can buy over-the-counter. Some medicines may contain a drug called nicotine to replace the nicotine in cigarettes. Medicines may:  · Help you to stop having the desire to smoke (cravings).  · Help to stop the problems that come when you stop smoking (withdrawal symptoms).  Your doctor may ask you to use:  · Nicotine patches, gum, or lozenges.  · Nicotine inhalers or  sprays.  · Non-nicotine medicine that is taken by mouth.  Find resources  Find resources and other ways to help you quit smoking and remain smoke-free after you quit. These resources are most helpful when you use them often. They include:  · Online chats with a counselor.  · Phone quitlines.  · Printed self-help materials.  · Support groups or group counseling.  · Text messaging programs.  · Mobile phone apps. Use apps on your mobile phone or tablet that can help you stick to your quit plan. There are many free apps for mobile phones and tablets as well as websites. Examples include Quit Guide from the CDC and smokefree.gov    What things can I do to make it easier to quit?    · Talk to your family and friends. Ask them to support and encourage you.  · Call a phone quitline (1-800-QUIT-NOW), reach out to support groups, or work with a counselor.  · Ask people who smoke to not smoke around you.  · Avoid places that make you want to smoke, such as:  ? Bars.  ? Parties.  ? Smoke-break areas at work.  · Spend time with people who do not smoke.  · Lower the stress in your life. Stress can make you want to smoke. Try these things to help your stress:  ? Getting regular exercise.  ? Doing deep-breathing exercises.  ? Doing yoga.  ? Meditating.  ? Doing a body scan. To do this, close your eyes, focus on one area of your body at a time from head to toe. Notice which parts of your body are tense. Try to relax the muscles in those areas.  How will I feel when I quit smoking?  Day 1 to 3 weeks  Within the first 24 hours, you may start to have some problems that come from quitting tobacco. These problems are very bad 2-3 days after you quit, but they do not often last for more than 2-3 weeks. You may get these symptoms:  · Mood swings.  · Feeling restless, nervous, angry, or annoyed.  · Trouble concentrating.  · Dizziness.  · Strong desire for high-sugar foods and nicotine.  · Weight gain.  · Trouble pooping  (constipation).  · Feeling like you may vomit (nausea).  · Coughing or a sore throat.  · Changes in how the medicines that you take for other issues work in your body.  · Depression.  · Trouble sleeping (insomnia).  Week 3 and afterward  After the first 2-3 weeks of quitting, you may start to notice more positive results, such as:  · Better sense of smell and taste.  · Less coughing and sore throat.  · Slower heart rate.  · Lower blood pressure.  · Clearer skin.  · Better breathing.  · Fewer sick days.  Quitting smoking can be hard. Do not give up if you fail the first time. Some people need to try a few times before they succeed. Do your best to stick to your quit plan, and talk with your doctor if you have any questions or concerns.  Summary  · Smoking tobacco is the leading cause of preventable death. Quitting smoking can be hard, but it is one of the best things that you can do for your health.  · When you decide to quit smoking, make a plan to help you succeed.  · Quit smoking right away, not slowly over a period of time.  · When you start quitting, seek help from your doctor, family, or friends.  This information is not intended to replace advice given to you by your health care provider. Make sure you discuss any questions you have with your health care provider.  Document Revised: 09/11/2020 Document Reviewed: 03/07/2020  [email or web address] Patient Education © 2021 [email or web address]    Smoking Tobacco Information, Adult  Smoking tobacco can be harmful to your health. Tobacco contains a poisonous (toxic), colorless chemical called nicotine. Nicotine is addictive. It changes the brain and can make it hard to stop smoking. Tobacco also has other toxic chemicals that can hurt your body and raise your risk of many cancers.  How can smoking tobacco affect me?  Smoking tobacco puts you at risk for:  · Cancer. Smoking is most commonly associated with lung cancer, but can also lead to cancer in other parts of the body.  · Chronic  obstructive pulmonary disease (COPD). This is a long-term lung condition that makes it hard to breathe. It also gets worse over time.  · High blood pressure (hypertension), heart disease, stroke, or heart attack.  · Lung infections, such as pneumonia.  · Cataracts. This is when the lenses in the eyes become clouded.  · Digestive problems. This may include peptic ulcers, heartburn, and gastroesophageal reflux disease (GERD).  · Oral health problems, such as gum disease and tooth loss.  · Loss of taste and smell.  Smoking can affect your appearance by causing:  · Wrinkles.  · Yellow or stained teeth, fingers, and fingernails.  Smoking tobacco can also affect your social life, because:  · It may be challenging to find places to smoke when away from home. Many workplaces, restaurants, hotels, and public places are tobacco-free.  · Smoking is expensive. This is due to the cost of tobacco and the long-term costs of treating health problems from smoking.  · Secondhand smoke may affect those around you. Secondhand smoke can cause lung cancer, breathing problems, and heart disease. Children of smokers have a higher risk for:  ? Sudden infant death syndrome (SIDS).  ? Ear infections.  ? Lung infections.  If you currently smoke tobacco, quitting now can help you:  · Lead a longer and healthier life.  · Look, smell, breathe, and feel better over time.  · Save money.  · Protect others from the harms of secondhand smoke.  What actions can I take to prevent health problems?  Quit smoking    · Do not start smoking. Quit if you already do.  · Make a plan to quit smoking and commit to it. Look for programs to help you and ask your health care provider for recommendations and ideas.  · Set a date and write down all the reasons you want to quit.  · Let your friends and family know you are quitting so they can help and support you. Consider finding friends who also want to quit. It can be easier to quit with someone else, so that you  can support each other.  · Talk with your health care provider about using nicotine replacement medicines to help you quit, such as gum, lozenges, patches, sprays, or pills.  · Do not replace cigarette smoking with electronic cigarettes, which are commonly called e-cigarettes. The safety of e-cigarettes is not known, and some may contain harmful chemicals.  · If you try to quit but return to smoking, stay positive. It is common to slip up when you first quit, so take it one day at a time.  · Be prepared for cravings. When you feel the urge to smoke, chew gum or suck on hard candy.    Lifestyle  · Stay busy and take care of your body.  · Drink enough fluid to keep your urine pale yellow.  · Get plenty of exercise and eat a healthy diet. This can help prevent weight gain after quitting.  · Monitor your eating habits. Quitting smoking can cause you to have a larger appetite than when you smoke.  · Find ways to relax. Go out with friends or family to a movie or a restaurant where people do not smoke.  · Ask your health care provider about having regular tests (screenings) to check for cancer. This may include blood tests, imaging tests, and other tests.  · Find ways to manage your stress, such as meditation, yoga, or exercise.  Where to find support  To get support to quit smoking, consider:  · Asking your health care provider for more information and resources.  · Taking classes to learn more about quitting smoking.  · Looking for local organizations that offer resources about quitting smoking.  · Joining a support group for people who want to quit smoking in your local community.  · Calling the smokefree.gov counselor helpline: 1-800-Quit-Now (1-190.377.6818)  Where to find more information  You may find more information about quitting smoking from:  · HelpGuide.org: www.helpguide.org  · Smokefree.gov: smokefree.gov  · American Lung Association: www.lung.org  Contact a health care provider if you:  · Have problems  breathing.  · Notice that your lips, nose, or fingers turn blue.  · Have chest pain.  · Are coughing up blood.  · Feel faint or you pass out.  · Have other health changes that cause you to worry.  Summary  · Smoking tobacco can negatively affect your health, the health of those around you, your finances, and your social life.  · Do not start smoking. Quit if you already do. If you need help quitting, ask your health care provider.  · Think about joining a support group for people who want to quit smoking in your local community. There are many effective programs that will help you to quit this behavior.  This information is not intended to replace advice given to you by your health care provider. Make sure you discuss any questions you have with your health care provider.  Document Revised: 09/11/2020 Document Reviewed: 01/02/2018  Elsevier Patient Education © 2021 Elsevier Inc.

## 2021-10-29 NOTE — TELEPHONE ENCOUNTER
Caller: Atilio Recinos    Relationship: Self    Best call back number: [phone]     Who is your current provider: JONI DE SANTIAGO    Who would you like your new provider to be: DR. KNUTSON     What are your reasons for transferring care: PATIENT STATES HE PREFERS AN MD     Additional notes: PATIENT STATES HE WOULD LIKE A CALL BACK WHEN HE CAN SCHEDULE A NEW PATIENT APPOINTMENT

## 2021-10-29 NOTE — TELEPHONE ENCOUNTER
Contacted pt with concern. Offered to rescheduled esteban due pt leaving with out being seen on 10/28/21. Pt stated he's was treated unfairly but did not elaborate. Pt stated the he wanted to speak with spouse due the clinic soha changing provider at this point in time. Pt state that would reach out on 11/1/21 for further discussion

## 2021-10-29 NOTE — PROGRESS NOTES
CONSULT NOTE     CHIEF COMPLAINT  Chief Complaint   Patient presents with   • NEW PATIENT     PFT results    • Shortness of Breath   • Wheezing   • Cough        Primary Care Provider  Ronaldo Prater APRN     Referring Provider  No ref. provider found    Patient Complaint    ICD-10-CM ICD-9-CM   1. Chronic obstructive pulmonary disease, unspecified COPD type ([facility])  J44.9 496   2. Cigarette nicotine dependence without complication  F17.210 305.1   3. Depression, unspecified depression type  F32.A 311   4. Bipolar 1 disorder ([facility])  F31.9 296.7   5. Primary hypertension  I10 401.9   6. Class 2 obesity due to excess calories with body mass index (BMI) of 35.0 to 35.9 in adult, unspecified whether serious comorbidity present  E66.09 278.00    Z68.35 V85.35   7. SUKHWINDER (obstructive sleep apnea)  G47.33 327.23   8. High cholesterol  E78.00 272.0   9. Other acute sinusitis, recurrence not specified  J01.80 461.8   10. Anxiety  F41.9 300.00   11. Multiple pulmonary nodules determined by computed tomography of lung  R91.8 793.19   12. Recurrent pneumonia  J18.9 486   13. Allergy, initial encounter  T78.40XA 995.3            Subjective          History of Presenting Illness  Atilio Recinos is a 56 y.o. male is a very heavy smoker smokes about 2 packs/day for now and has been having persistently getting worse with the shortness of breath cough nonproductive sputum and also persistent wheezing.  Patient was started on trilogy and she has not improved and he has been having the significant shaking and not able to take properly.  However will check the DPI uncontrolled mother it is 80 if he can take it properly.  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  He has dogs in the house and also get the chronic sinus problems and postnasal drip.  He smokes about a 2 packs/day for long time.  The patient is accompanied by his  wife who stated the patient snores significantly wake up multiple times and feel extremely tired and daytime sleepiness.  Patient gained weight.  He tried to quit smoking but is not working and he tried to do it slowly.  Patient denies of having any loss of taste or smell.  No other GI symptoms.  Denied any chest pain, palpitation or any other related diaphoretic symptoms.  Patient has history of bipolar disorder and anxiety which also makes him feel more short of breath.  Admits for shortness of breath on mild-to-moderate exertion with cough which is usually dry.  Denied any fever, chills, sweating, hemoptysis.  Denied any recent travel history or any exposure to tuberculosis or anybody who is sick around.  No family history of tuberculosis.  Patient had the CT scan of the chest done in the past showing multiple lung nodules and mediastinal nodes with no obvious and significant changes.  Patient is up-to-date on flu vaccine and the COVID-19 vaccination and other including the pneumonia vaccination.  Patient stated that he gets the recurrent pneumonia and bronchitis treated multiple times with the many antibiotics.    I have personally reviewed the review of systems, past family, social, medical and surgical histories; and agree with their findings.    HISTORY    Family History   Problem Relation Age of Onset   • Stroke Other    • Heart disease Other    • Diabetes Other         Social History     Socioeconomic History   • Marital status: Single   Tobacco Use   • Smoking status: Current Every Day Smoker     Packs/day: 2.00     Years: 40.00     Pack years: 80.00     Types: Cigarettes   • Smokeless tobacco: Never Used   Vaping Use   • Vaping Use: Never used        Past Medical History:   Diagnosis Date   • Allergies    • Anxiety    • Bipolar 1 disorder (HCC)    • COPD (chronic obstructive pulmonary disease) (HCC)    • Depression    • Forgetfulness    • Head injury    • Hepatitis    • High blood pressure    • High  cholesterol    • Psychiatric illness    • Shortness of breath    • Sinus trouble         Immunization History   Administered Date(s) Administered   • COVID-19 (PFIZER) 04/16/2021, 05/07/2021   • Flu Vaccine Quad PF >18YRS 10/18/2018   • FluLaval/Fluarix/Fluzone >6 09/14/2021   • Influenza, Unspecified 01/28/2021   • Pneumococcal Polysaccharide (PPSV23) 03/04/2021   • Tdap 09/14/2021       Allergies   Allergen Reactions   • Antihistamines, Chlorpheniramine-Type Other (See Comments)   • Amoxicillin-Pot Clavulanate Hives   • Contrast Dye Hives and Nausea Only   • Diphenhydramine Hives          Current Outpatient Medications:   •  amLODIPine (NORVASC) 5 MG tablet, Take 1 tablet by mouth Daily., Disp: 90 tablet, Rfl: 1  •  diclofenac (VOLTAREN) 75 MG EC tablet, Take 1 tablet by mouth 2 (Two) Times a Day., Disp: 60 tablet, Rfl: 2  •  divalproex (DEPAKOTE) 500 MG DR tablet, TAKE 1 TABLET BY MOUTH EVERY MORNING AND 2 TABLETS AT BEDTIME, Disp: , Rfl:   •  ergocalciferol (ERGOCALCIFEROL) 1.25 MG (28644 UT) capsule, Take 50,000 Units by mouth 1 (One) Time Per Week., Disp: , Rfl:   •  gabapentin (NEURONTIN) 800 MG tablet, Take 800 mg by mouth 4 (Four) Times a Day., Disp: , Rfl:   •  gemfibrozil (LOPID) 600 MG tablet, Take 1 tablet by mouth 2 (two) times a day., Disp: 180 tablet, Rfl: 1  •  lisinopril-hydrochlorothiazide (PRINZIDE,ZESTORETIC) 20-12.5 MG per tablet, Take 1 tablet by mouth Daily., Disp: 90 tablet, Rfl: 1  •  metoprolol succinate XL (TOPROL-XL) 25 MG 24 hr tablet, Take 1 tablet by mouth Daily., Disp: 90 tablet, Rfl: 1  •  mirtazapine (REMERON) 15 MG tablet, Take 15 mg by mouth Daily., Disp: , Rfl:   •  Omega-3 Fatty Acids (fish oil) 1000 MG capsule capsule, Take 1 capsule by mouth 2 (two) times a day., Disp: 180 capsule, Rfl: 1  •  QUEtiapine (SEROquel) 300 MG tablet, Take 300 mg by mouth Daily., Disp: , Rfl:   •  albuterol (PROVENTIL) (2.5 MG/3ML) 0.083% nebulizer solution, Take 2.5 mg by nebulization Every 4  (Four) Hours As Needed for Wheezing., Disp: 120 each, Rfl: 5  •  budesonide (Pulmicort) 0.5 MG/2ML nebulizer solution, Take 2 mL by nebulization 2 (Two) Times a Day for 30 days., Disp: 60 each, Rfl: 5  •  formoterol (PERFOROMIST) 20 MCG/2ML nebulizer solution, Take 2 mL by nebulization 2 (Two) Times a Day., Disp: 60 each, Rfl: 5  •  revefenacin (YUPELRI) 175 MCG/3ML nebulizer solution, Take 3 mL by nebulization Daily., Disp: 90 mL, Rfl: 5     Smoking status: Current smoker smokes about 2 packet/day.    Objective     Vital Signs:   Vitals:    10/29/21 1029   BP: 129/55   Pulse: 82   Resp: 17   Temp: 98.4 °F (36.9 °C)   SpO2: 95%        Physical Exam: Very pleasant gentleman.  Alert and oriented x3 and very polite.  Answering the questions appropriately.  HEENT: Atraumatic, anicteric, patient is using the face mask for COVID-19 precautions.  Asymmetric nasal septum.  Mildly congested nose.  Mallampati class IV airway.  High arched palate and relatively large tongue and overbite.  Neck: Supple, short, trachea central, no masses palpable and no bruits heard.  Chest and lungs: Nontender, decreased breath sounds, more at the bases posteriorly.  Bilateral scattered rhonchi worse on expiration.  Cardiovascular system: S1 and S2.  No murmurs appreciated.  Abdomen: Obese, soft, nontender, bowel sounds present.  Extremities: No clubbing/no cyanosis/no obvious edema.  Central nervous system: Grossly intact.  Patient able to communicate reasonably well and able to ambulate with no problems.     Result Review :   I have personally reviewed the the CT scan of the chest showed mediastinal lymph nodes which are increasing number but states that those are not pathologically enlarged and somewhat decreased in size since 2017 had also showed emphysematous changes with apical scarring right greater than the left .  Patient had the pulmonary function testing done in June 17, 2021 showed FEV1 of 1.99 L, 62% predicted FVC is 3.09 L, 73%  predicted with a ratio of 64%.  Lung volumes are within normal limits and decrease the diffusion up to 56% and flow volume loop consistent with the above findings.  Problem List  Visit Diagnosis   ROS  Review (Popup)  Health Maintenance  Quality  BestPractice  Medications  SmartSets  SnapShot Encounters  Media :23}     Impression:  Current smoker  Moderately severe obstructive airway disease.  Not able to take the Trelegy very well and still patient continues to have the problem and has been having the shivering and did not like it.  Obesity  Obstructive sleep apnea  Allergies  Occupational exposure  Recurrent bronchitis/pneumonia    Plan:  Will work-up for any other connective tissue disorder, immunoglobulin deficiency.  We will change the inhalation therapy to the nebulizer treatments as ordered.  Patient education none smoking cessation and its benefits of quitting and the consequences of continuation of the smoking.  Diet and exercise modification  Home sleep study.  Acapella.  IgE and mini Rast test  Alpha-1 antitrypsin with the phenotype and other as ordered and the general medical management is being well followed by primary attending physician.  Follow-up in 6 weeks unless need to be seen sooner.    Follow Up   Return in about 6 weeks (around 12/10/2021).  Patient was given instructions and counseling regarding his condition or for health maintenance advice. Please see specific information pulled into the AVS if appropriate.     Sam Mckee MD

## 2021-10-29 NOTE — TELEPHONE ENCOUNTER
Patient needs to continue being seen by his provider, Roberto at the clinic. I do not agree the change.  Thanks

## 2021-11-01 ENCOUNTER — TELEPHONE (OUTPATIENT)
Dept: FAMILY MEDICINE CLINIC | Facility: CLINIC | Age: 56
End: 2021-11-01

## 2021-11-01 NOTE — TELEPHONE ENCOUNTER
Pulmonologist ordered his labs. They dont need to be fasting. He needs to have them done at the hospital.

## 2021-11-01 NOTE — TELEPHONE ENCOUNTER
Spoke with pt regarding his labs. That the best course of action would be to get labs drawn at the hospital due to many of the labs requested we are unable to do. In addition, some of the labs were time sensitive so it would best to address this at the hospital lab to avoid delay. I also schedule a F/U visit with PCP on 11/4/21

## 2021-11-03 ENCOUNTER — APPOINTMENT (OUTPATIENT)
Dept: SLEEP MEDICINE | Facility: HOSPITAL | Age: 56
End: 2021-11-03

## 2021-11-04 ENCOUNTER — OFFICE VISIT (OUTPATIENT)
Dept: FAMILY MEDICINE CLINIC | Facility: CLINIC | Age: 56
End: 2021-11-04

## 2021-11-04 VITALS
OXYGEN SATURATION: 93 % | BODY MASS INDEX: 34.39 KG/M2 | HEIGHT: 66 IN | DIASTOLIC BLOOD PRESSURE: 83 MMHG | WEIGHT: 214 LBS | HEART RATE: 92 BPM | SYSTOLIC BLOOD PRESSURE: 146 MMHG | TEMPERATURE: 97.9 F

## 2021-11-04 DIAGNOSIS — R20.2 PARESTHESIA OF LEFT ARM: ICD-10-CM

## 2021-11-04 DIAGNOSIS — R07.9 CHEST PAIN, UNSPECIFIED TYPE: ICD-10-CM

## 2021-11-04 DIAGNOSIS — I10 PRIMARY HYPERTENSION: Primary | ICD-10-CM

## 2021-11-04 DIAGNOSIS — R06.02 SHORTNESS OF BREATH: ICD-10-CM

## 2021-11-04 DIAGNOSIS — M54.2 CERVICAL PAIN: ICD-10-CM

## 2021-11-04 PROCEDURE — 99214 OFFICE O/P EST MOD 30 MIN: CPT | Performed by: NURSE PRACTITIONER

## 2021-11-04 RX ORDER — HYDROCHLOROTHIAZIDE 12.5 MG/1
12.5 TABLET ORAL DAILY
Qty: 90 TABLET | Refills: 1 | Status: SHIPPED | OUTPATIENT
Start: 2021-11-04 | End: 2022-02-16 | Stop reason: HOSPADM

## 2021-11-04 RX ORDER — LISINOPRIL 40 MG/1
40 TABLET ORAL DAILY
Qty: 90 TABLET | Refills: 1 | Status: SHIPPED | OUTPATIENT
Start: 2021-11-04 | End: 2022-02-08 | Stop reason: HOSPADM

## 2021-11-04 NOTE — ASSESSMENT & PLAN NOTE
Increase lisinopril to 40 mg daily.  Continue hydrochlorothiazide 12.5 mg daily.  We will check a BNP.  Continue amlodipine 5 mg daily and metoprolol 25 mg daily.

## 2021-11-04 NOTE — PROGRESS NOTES
"Chief Complaint  left arm numbness and hypertension    Subjective          Atilio Recinos presents to [facility]  History of Present Illness  Presents today for an acute visit for left arm numbness and to follow-up for hypertension.  He reports his left arm numbness occurs from his left elbow to his hand.  He reports this started a couple weeks ago.  Denies pain in arm.  Denies any trauma.  He reports he has a minor pain in his cervical spine on the left side.  Denies redness and swelling.    Last Saturday he reports he began vomiting, coughing, shaking, and having chest pain.  He reports symptoms have improved.  Recently saw pulmonologist.  Pulmonologist had changed his trilogy to additional nebulizer.  He reports the nebulizers will cost him $300 a month which she cannot afford.    Blood pressure today in office is elevated 146/83.  Chest pain has resolved.  He does have shortness of breath.  Does not monitor his blood pressure at home.  Denies syncope palpitations.  Objective   Vital Signs:   /83   Pulse 92   Temp 97.9 °F (36.6 °C)   Ht 167.6 cm (66\")   Wt 97.1 kg (214 lb)   SpO2 93%   BMI 34.54 kg/m²     Physical Exam  Vitals reviewed.   Constitutional:       Appearance: Normal appearance. He is well-developed.   HENT:      Head: Normocephalic and atraumatic.      Right Ear: External ear normal.      Left Ear: External ear normal.      Mouth/Throat:      Pharynx: No oropharyngeal exudate.   Eyes:      Conjunctiva/sclera: Conjunctivae normal.      Pupils: Pupils are equal, round, and reactive to light.   Cardiovascular:      Rate and Rhythm: Normal rate and regular rhythm.      Heart sounds: No murmur heard.  No friction rub. No gallop.    Pulmonary:      Effort: Pulmonary effort is normal.      Breath sounds: Normal breath sounds. No wheezing or rhonchi.   Abdominal:      General: Bowel sounds are normal. There is no distension.      Palpations: Abdomen is soft.      " Tenderness: There is no abdominal tenderness.   Musculoskeletal:      Left shoulder: No deformity, effusion, tenderness or crepitus. Normal range of motion.      Left elbow: No swelling, deformity, effusion or lacerations. Normal range of motion. No tenderness.      Cervical back: No edema or crepitus. Muscular tenderness present. No pain with movement. Normal range of motion.      Right lower leg: No edema.      Left lower leg: No edema.   Skin:     General: Skin is warm and dry.   Neurological:      Mental Status: He is alert and oriented to person, place, and time.        Result Review :     Common labs    Common Labsle 1/28/21 4/6/21 4/6/21 4/6/21 9/22/21 9/22/21 9/22/21     2117 2117 2117 0950 0950 0950   Glucose 95 97     74   BUN 10 9     16   Creatinine 1.12 1.16     1.80 (A)   eGFR Non  Am       39 (A)   Sodium 139 135     139   Potassium 4.6 4.3     4.3   Chloride 101 96 (A)     99   Calcium 9.2 9.7     9.7   Albumin 4.2 4.2     4.70   Total Bilirubin 0.32 0.19 (A)     0.4   Alkaline Phosphatase 57 55 (A)     49   AST (SGOT) 35 27     19   ALT (SGPT) 31 21     13   WBC 9.64    6.38     Hemoglobin 15.2    13.9     Hematocrit 48.3    39.8     Platelets 239    210     Total Cholesterol 249 (A)  223 (A)       Triglycerides 374 (A)  522 (A)       HDL Cholesterol 31 (A)  31 (A)       LDL Cholesterol  143 (A)   126 (A)      Hemoglobin A1C      5.81 (A)    PSA 0.33         (A) Abnormal value       Comments are available for some flowsheets but are not being displayed.                     Assessment and Plan    Diagnoses and all orders for this visit:    1. Primary hypertension (Primary)  Assessment & Plan:  Increase lisinopril to 40 mg daily.  Continue hydrochlorothiazide 12.5 mg daily.  We will check a BNP.  Continue amlodipine 5 mg daily and metoprolol 25 mg daily.    Orders:  -     lisinopril (PRINIVIL,ZESTRIL) 40 MG tablet; Take 1 tablet by mouth Daily.  Dispense: 90 tablet; Refill: 1  -      hydroCHLOROthiazide (HYDRODIURIL) 12.5 MG tablet; Take 1 tablet by mouth Daily.  Dispense: 90 tablet; Refill: 1  -     proBNP; Future    2. Chest pain, unspecified type  Comments:  Will order an echocardiogram for the chest pain.  Also has hypertension.  Patient also has shortness of breath but has COPD.  Orders:  -     Adult Transthoracic Echo Complete W/ Cont if Necessary Per Protocol; Future  -     proBNP; Future    3. Paresthesia of left arm  Comments:  Will order an x-ray of the cervical spine for paresthesia left arm and cervical spine pain.  Discussed ordering an EMG.  At this time he wants to wait.  Orders:  -     XR Spine Cervical Complete 4 or 5 View; Future    4. Cervical pain  -     XR Spine Cervical Complete 4 or 5 View; Future    5. Shortness of breath   -     proBNP; Future      Follow Up   Return in about 4 weeks (around 12/2/2021), or if symptoms worsen or fail to improve, for Next scheduled follow up.  Patient was given instructions and counseling regarding his condition or for health maintenance advice. Please see specific information pulled into the AVS if appropriate.

## 2021-11-05 ENCOUNTER — HOSPITAL ENCOUNTER (OUTPATIENT)
Dept: GENERAL RADIOLOGY | Facility: HOSPITAL | Age: 56
Discharge: HOME | End: 2021-11-05

## 2021-11-05 ENCOUNTER — LAB (OUTPATIENT)
Dept: LAB | Facility: HOSPITAL | Age: 56
End: 2021-11-05

## 2021-11-05 DIAGNOSIS — R07.9 CHEST PAIN, UNSPECIFIED TYPE: ICD-10-CM

## 2021-11-05 DIAGNOSIS — R20.2 PARESTHESIA OF LEFT ARM: ICD-10-CM

## 2021-11-05 DIAGNOSIS — I10 PRIMARY HYPERTENSION: ICD-10-CM

## 2021-11-05 DIAGNOSIS — R91.8 MULTIPLE PULMONARY NODULES DETERMINED BY COMPUTED TOMOGRAPHY OF LUNG: ICD-10-CM

## 2021-11-05 DIAGNOSIS — F17.210 CIGARETTE NICOTINE DEPENDENCE WITHOUT COMPLICATION: ICD-10-CM

## 2021-11-05 DIAGNOSIS — T78.40XA ALLERGY, INITIAL ENCOUNTER: ICD-10-CM

## 2021-11-05 DIAGNOSIS — R06.02 SHORTNESS OF BREATH: ICD-10-CM

## 2021-11-05 DIAGNOSIS — J18.9 RECURRENT PNEUMONIA: ICD-10-CM

## 2021-11-05 DIAGNOSIS — M54.2 CERVICAL PAIN: ICD-10-CM

## 2021-11-05 DIAGNOSIS — J44.9 CHRONIC OBSTRUCTIVE PULMONARY DISEASE, UNSPECIFIED COPD TYPE (HCC): ICD-10-CM

## 2021-11-05 LAB
CHROMATIN AB SERPL-ACNC: <10 IU/ML (ref 0–14)
IGA1 MFR SER: 271 MG/DL (ref 70–400)
IGG1 SER-MCNC: 1164 MG/DL (ref 700–1600)
IGM SERPL-MCNC: 29 MG/DL (ref 40–230)
NT-PROBNP SERPL-MCNC: 93.6 PG/ML (ref 0–900)

## 2021-11-05 PROCEDURE — 86609 BACTERIUM ANTIBODY: CPT

## 2021-11-05 PROCEDURE — 86003 ALLG SPEC IGE CRUDE XTRC EA: CPT

## 2021-11-05 PROCEDURE — 82103 ALPHA-1-ANTITRYPSIN TOTAL: CPT | Performed by: SPECIALIST

## 2021-11-05 PROCEDURE — 72050 X-RAY EXAM NECK SPINE 4/5VWS: CPT

## 2021-11-05 PROCEDURE — 86256 FLUORESCENT ANTIBODY TITER: CPT

## 2021-11-05 PROCEDURE — 82785 ASSAY OF IGE: CPT

## 2021-11-05 PROCEDURE — 86602 ANTINOMYCES ANTIBODY: CPT

## 2021-11-05 PROCEDURE — 86431 RHEUMATOID FACTOR QUANT: CPT

## 2021-11-05 PROCEDURE — 86606 ASPERGILLUS ANTIBODY: CPT

## 2021-11-05 PROCEDURE — 83520 IMMUNOASSAY QUANT NOS NONAB: CPT

## 2021-11-05 PROCEDURE — 86225 DNA ANTIBODY NATIVE: CPT

## 2021-11-05 PROCEDURE — 82164 ANGIOTENSIN I ENZYME TEST: CPT

## 2021-11-05 PROCEDURE — 86480 TB TEST CELL IMMUN MEASURE: CPT

## 2021-11-05 PROCEDURE — 36415 COLL VENOUS BLD VENIPUNCTURE: CPT

## 2021-11-05 PROCEDURE — 86671 FUNGUS NES ANTIBODY: CPT

## 2021-11-05 PROCEDURE — 83880 ASSAY OF NATRIURETIC PEPTIDE: CPT

## 2021-11-05 PROCEDURE — 82784 ASSAY IGA/IGD/IGG/IGM EACH: CPT

## 2021-11-05 PROCEDURE — 86038 ANTINUCLEAR ANTIBODIES: CPT

## 2021-11-05 PROCEDURE — 86612 BLASTOMYCES ANTIBODY: CPT

## 2021-11-05 PROCEDURE — 82104 ALPHA-1-ANTITRYPSIN PHENO: CPT | Performed by: SPECIALIST

## 2021-11-05 PROCEDURE — 86331 IMMUNODIFFUSION OUCHTERLONY: CPT

## 2021-11-08 ENCOUNTER — TELEPHONE (OUTPATIENT)
Dept: PULMONOLOGY | Facility: CLINIC | Age: 56
End: 2021-11-08

## 2021-11-08 LAB
ACE SERPL-CCNC: 15 U/L (ref 14–82)
C-ANCA TITR SER IF: NORMAL TITER
MYELOPEROXIDASE AB SER IA-ACNC: <9 U/ML (ref 0–9)
P-ANCA ATYPICAL TITR SER IF: NORMAL TITER
P-ANCA TITR SER IF: NORMAL TITER
PROTEINASE3 AB SER IA-ACNC: <3.5 U/ML (ref 0–3.5)

## 2021-11-08 NOTE — TELEPHONE ENCOUNTER
Patient called and left a voicemail stating that he needed a PA done for one of his nebulizer medications, and had some other questions as well.   I called patient back and he stated that his insurance needs a PA for Perforomist. He also said that Dr. BERKOWITZ prescribed him Yupelri and Elian is wanting to switch that out for Duo-Neb instead, that way a PA does not have to be completed for it as well.   Patient also asked when he could come  a nebulizer machine because he did not receive one during his visit with Dr. BERKOWITZ on 10/29/21. I told him I would send a message out to ask all of these questions for him and someone would be in contact with him.

## 2021-11-09 ENCOUNTER — HOSPITAL ENCOUNTER (OUTPATIENT)
Dept: SLEEP MEDICINE | Facility: HOSPITAL | Age: 56
Discharge: HOME | End: 2021-11-09
Admitting: SPECIALIST

## 2021-11-09 DIAGNOSIS — G47.33 OSA (OBSTRUCTIVE SLEEP APNEA): ICD-10-CM

## 2021-11-09 DIAGNOSIS — E66.09 CLASS 2 OBESITY DUE TO EXCESS CALORIES WITH BODY MASS INDEX (BMI) OF 35.0 TO 35.9 IN ADULT, UNSPECIFIED WHETHER SERIOUS COMORBIDITY PRESENT: ICD-10-CM

## 2021-11-09 DIAGNOSIS — F17.210 CIGARETTE NICOTINE DEPENDENCE WITHOUT COMPLICATION: ICD-10-CM

## 2021-11-09 LAB
A ALTERNATA IGE QN: <0.1 KU/L
A FLAVUS AB SER QL ID: NEGATIVE
A FUMIGATUS AB SER QL ID: NEGATIVE
A FUMIGATUS IGE QN: <0.1 KU/L
A NIGER AB SER QL ID: NEGATIVE
AMER ROACH IGE QN: <0.1 KU/L
B DERMAT AB TITR SER: NEGATIVE {TITER}
BAHIA GRASS IGE QN: <0.1 KU/L
BAYBERRY POLN IGE QN: <0.1 KU/L
BERMUDA GRASS IGE QN: <0.1 KU/L
BOXELDER IGE QN: <0.1 KU/L
C HERBARUM IGE QN: <0.1 KU/L
CAT DANDER IGE QN: <0.1 KU/L
COMMON RAGWEED IGE QN: <0.1 KU/L
CONV CLASS DESCRIPTION: NORMAL
D FARINAE IGE QN: <0.1 KU/L
D PTERONYSS IGE QN: <0.1 KU/L
DOG DANDER IGE QN: <0.1 KU/L
DOG FENNEL IGE QN: <0.1 KU/L
DSDNA IGG SERPL IA-ACNC: NEGATIVE [IU]/ML
ENGL PLANTAIN IGE QN: <0.1 KU/L
GOOSEFOOT IGE QN: <0.1 KU/L
GUM-TREE IGE QN: <0.1 KU/L
IGE SERPL-ACNC: 31.1 KU/L
ITALIAN CYPRESS IGE QN: <0.1 KU/L
JOHNSON GRASS IGE QN: <0.1 KU/L
M RACEMOSUS IGE QN: <0.1 KU/L
NUCLEAR IGG SER IA-RTO: NEGATIVE
P NOTATUM IGE QN: <0.1 KU/L
PEPPER TREE IGE QN: <0.1 KU/L
PER RYE GRASS IGE QN: <0.1 KU/L
PRIVET IGE QN: <0.1 KU/L
QUEEN PALM IGE QN: <0.1 KU/L
S BOTRYOSUM IGE QN: <0.1 KU/L
SHEEP SORREL IGE QN: <0.1 KU/L
VIRG LIVE OAK IGE QN: <0.1 KU/L
WHITE ELM IGE QN: <0.1 KU/L

## 2021-11-09 PROCEDURE — 95806 SLEEP STUDY UNATT&RESP EFFT: CPT | Performed by: INTERNAL MEDICINE

## 2021-11-09 PROCEDURE — 95806 SLEEP STUDY UNATT&RESP EFFT: CPT

## 2021-11-09 NOTE — TELEPHONE ENCOUNTER
I called and spoke with patient, informed that I have submitted the PA for Perforomist and I am waiting to hear back. Also informed the patient that the Yupleri can not be switched out for Duo-Neb, patient stated that [facility] informed him that it could be. I informed him that they are not the same medication and therefore could not be switched out but that I can do the PA for the Yupelri and if they come back denied then I can inform Dr. Mckee and he can decide what to do from there. Patient stated that he would greatly appreciate it. I have submitted the PA for the Yupelri and now just waiting to hear back weather they have been denied or approved.

## 2021-11-11 LAB
A FUMIGATUS IGG SER QL: NEGATIVE
A PULLULANS AB SER QL: NEGATIVE
A1AT PHENOTYP SERPL IFE: NORMAL
A1AT SERPL-MCNC: 130 MG/DL (ref 101–187)
LACEYELLA SACCHARI AB SER QL: NEGATIVE
PIGEON SERUM AB QL ID: NEGATIVE
S RECTIVIRGULA AB SER QL ID: NEGATIVE
T VULGARIS AB SER QL ID: NEGATIVE

## 2021-11-12 LAB
GAMMA INTERFERON BACKGROUND BLD IA-ACNC: 0.04 IU/ML
M TB IFN-G BLD-IMP: NEGATIVE
M TB IFN-G CD4+ BCKGRND COR BLD-ACNC: 0.06 IU/ML
M TB IFN-G CD4+CD8+ BCKGRND COR BLD-ACNC: 0.08 IU/ML
MITOGEN IGNF BLD-ACNC: >10 IU/ML
QUANTIFERON INCUBATION: NORMAL
SERVICE CMNT-IMP: NORMAL

## 2021-11-19 RX ORDER — DICLOFENAC SODIUM 75 MG/1
TABLET, DELAYED RELEASE ORAL
Qty: 180 TABLET | Refills: 1 | Status: SHIPPED | OUTPATIENT
Start: 2021-11-19 | End: 2022-02-16 | Stop reason: HOSPADM

## 2021-11-19 RX ORDER — ERGOCALCIFEROL 1.25 MG/1
CAPSULE ORAL
Qty: 13 CAPSULE | Refills: 1 | Status: SHIPPED | OUTPATIENT
Start: 2021-11-19 | End: 2023-03-02 | Stop reason: SDUPTHER

## 2021-11-22 ENCOUNTER — TELEPHONE (OUTPATIENT)
Dept: FAMILY MEDICINE CLINIC | Facility: CLINIC | Age: 56
End: 2021-11-22

## 2021-11-22 ENCOUNTER — TELEPHONE (OUTPATIENT)
Dept: PULMONOLOGY | Facility: CLINIC | Age: 56
End: 2021-11-22

## 2021-11-22 NOTE — TELEPHONE ENCOUNTER
Caller: Atilio Recinos    Relationship: Self    Best call back number:[phone]    Requested Prescriptions:   Requested Prescriptions      No prescriptions requested or ordered in this encounter        Pharmacy where request should be sent: [facility] - [facility] [address] [facility] - [phone]  - [phone]      Additional details provided by patient:PATIENT CALLED AND STATED THAT THE PULMONOLOGIST HE WAS REFERRED TO BY KARI CRABTREE WROTE SCRIPTS FOR MEDICATIONS HE COULD NOT AFFORD.  HE CALLED [facility] AND WAS GIVEN ALTERNATE MEDICATIONS AND THE PULMONOLOGIST REFUSED TO PRESCRIBE THESE.  PATIENT IS NOW OUT OF MEDICATION AND NEEDS THESE ALTERNATE SCRIPTS CALLED IN ASAP.  PLEASE CALL PATIENT TO DETERMINE WHICH PHARMACY THESE CAN BE CALLED INTO.    Does the patient have less than a 3 day supply:  [x] Yes  [] No    Kristen Avila Rep   11/22/21 15:17 EST         ”

## 2021-11-22 NOTE — TELEPHONE ENCOUNTER
PATIENT STATES THAT BECAUSE WE CANT GET HIS MEDICATIONS CHEAPER HE DOESN'T WANT TO BE SEEN AS A PT HERE ANYMORE AND TO CANCEL ALL APPOINTMENTS AND NOT TO CALL HIM

## 2021-11-22 NOTE — TELEPHONE ENCOUNTER
PATIENT LEFT  STATING HE WAS RETURNING SOMEONE'S PHONE CALL REGARDING RESULTS. ALSO STATED HE NEEDS TO DISCUSS MEDS.

## 2021-11-23 ENCOUNTER — TELEPHONE (OUTPATIENT)
Dept: FAMILY MEDICINE CLINIC | Facility: CLINIC | Age: 56
End: 2021-11-23

## 2021-11-23 NOTE — TELEPHONE ENCOUNTER
Caller: Atilio Recinos    Relationship: Self    Best call back number: [phone]     What was the call regarding: PT WOULD LIKE TO BE CALLED BACK ABOUT SOME MEDICATION.  PLEASE ADVISE, THANK YOU.    Do you require a callback: YES

## 2021-12-01 ENCOUNTER — OFFICE VISIT (OUTPATIENT)
Dept: FAMILY MEDICINE CLINIC | Facility: CLINIC | Age: 56
End: 2021-12-01

## 2021-12-01 VITALS
HEIGHT: 66 IN | HEART RATE: 93 BPM | TEMPERATURE: 97.2 F | SYSTOLIC BLOOD PRESSURE: 138 MMHG | WEIGHT: 210.4 LBS | OXYGEN SATURATION: 94 % | BODY MASS INDEX: 33.82 KG/M2 | DIASTOLIC BLOOD PRESSURE: 62 MMHG

## 2021-12-01 DIAGNOSIS — J44.9 CHRONIC OBSTRUCTIVE PULMONARY DISEASE, UNSPECIFIED COPD TYPE (HCC): Primary | ICD-10-CM

## 2021-12-01 PROCEDURE — 99213 OFFICE O/P EST LOW 20 MIN: CPT | Performed by: NURSE PRACTITIONER

## 2021-12-17 ENCOUNTER — TELEPHONE (OUTPATIENT)
Dept: FAMILY MEDICINE CLINIC | Facility: CLINIC | Age: 56
End: 2021-12-17

## 2021-12-17 NOTE — TELEPHONE ENCOUNTER
Spoke to Atilio Recinos and informed him that I have not received an  Update about his insurance issue regarding Roberto Prater. I will Follow up with pt on 20 Dec 21

## 2021-12-17 NOTE — TELEPHONE ENCOUNTER
Hub staff attempted to follow warm transfer process and was unsuccessful     Caller: Atilio Recinos    Relationship to patient: Self    Best call back number: [phone]    Patient is needing: PATIENT WAS INSISTING TO SPEAK TO NO ONE ELSE BESIDE MR. SPENCER. HE IS REQUESTING A CALL BACK ASAP BY PRACTICE MANAGER OR SOMEONE ABOVE HIM. PLEASE CALL AND ADVISE.

## 2021-12-27 ENCOUNTER — APPOINTMENT (OUTPATIENT)
Dept: MRI IMAGING | Facility: HOSPITAL | Age: 56
End: 2021-12-27

## 2021-12-27 ENCOUNTER — HOSPITAL ENCOUNTER (EMERGENCY)
Facility: HOSPITAL | Age: 56
Discharge: HOME | End: 2021-12-27
Attending: EMERGENCY MEDICINE | Admitting: EMERGENCY MEDICINE

## 2021-12-27 VITALS
BODY MASS INDEX: 32.63 KG/M2 | RESPIRATION RATE: 16 BRPM | DIASTOLIC BLOOD PRESSURE: 61 MMHG | TEMPERATURE: 99.1 F | SYSTOLIC BLOOD PRESSURE: 158 MMHG | WEIGHT: 203.04 LBS | OXYGEN SATURATION: 93 % | HEART RATE: 91 BPM | HEIGHT: 66 IN

## 2021-12-27 DIAGNOSIS — N28.9 RENAL INSUFFICIENCY: ICD-10-CM

## 2021-12-27 DIAGNOSIS — G25.2 COARSE TREMORS: Primary | ICD-10-CM

## 2021-12-27 DIAGNOSIS — R41.89 BRAIN FOG: ICD-10-CM

## 2021-12-27 LAB
ALBUMIN SERPL-MCNC: 4.8 G/DL (ref 3.5–5.2)
ALBUMIN/GLOB SERPL: 1.6 G/DL
ALP SERPL-CCNC: 56 U/L (ref 39–117)
ALT SERPL W P-5'-P-CCNC: 8 U/L (ref 1–41)
AMMONIA BLD-SCNC: 26 UMOL/L (ref 16–60)
ANION GAP SERPL CALCULATED.3IONS-SCNC: 13 MMOL/L (ref 5–15)
AST SERPL-CCNC: 16 U/L (ref 1–40)
BASOPHILS # BLD AUTO: 0.05 10*3/MM3 (ref 0–0.2)
BASOPHILS NFR BLD AUTO: 0.6 % (ref 0–1.5)
BILIRUB SERPL-MCNC: 0.4 MG/DL (ref 0–1.2)
BILIRUB UR QL STRIP: NEGATIVE
BUN SERPL-MCNC: 21 MG/DL (ref 6–20)
BUN/CREAT SERPL: 11.7 (ref 7–25)
CALCIUM SPEC-SCNC: 10.1 MG/DL (ref 8.6–10.5)
CHLORIDE SERPL-SCNC: 102 MMOL/L (ref 98–107)
CLARITY UR: CLEAR
CO2 SERPL-SCNC: 27 MMOL/L (ref 22–29)
COLOR UR: YELLOW
CREAT SERPL-MCNC: 1.8 MG/DL (ref 0.76–1.27)
DEPRECATED RDW RBC AUTO: 43.3 FL (ref 37–54)
EOSINOPHIL # BLD AUTO: 0.13 10*3/MM3 (ref 0–0.4)
EOSINOPHIL NFR BLD AUTO: 1.7 % (ref 0.3–6.2)
ERYTHROCYTE [DISTWIDTH] IN BLOOD BY AUTOMATED COUNT: 12.6 % (ref 12.3–15.4)
ETHANOL BLD-MCNC: <10 MG/DL (ref 0–10)
ETHANOL UR QL: <0.01 %
GFR SERPL CREATININE-BSD FRML MDRD: 39 ML/MIN/1.73
GLOBULIN UR ELPH-MCNC: 3 GM/DL
GLUCOSE SERPL-MCNC: 92 MG/DL (ref 65–99)
GLUCOSE UR STRIP-MCNC: NEGATIVE MG/DL
HCT VFR BLD AUTO: 41.6 % (ref 37.5–51)
HGB BLD-MCNC: 14.3 G/DL (ref 13–17.7)
HGB UR QL STRIP.AUTO: NEGATIVE
HOLD SPECIMEN: NORMAL
HOLD SPECIMEN: NORMAL
IMM GRANULOCYTES # BLD AUTO: 0.12 10*3/MM3 (ref 0–0.05)
IMM GRANULOCYTES NFR BLD AUTO: 1.5 % (ref 0–0.5)
KETONES UR QL STRIP: ABNORMAL
LEUKOCYTE ESTERASE UR QL STRIP.AUTO: NEGATIVE
LIPASE SERPL-CCNC: 57 U/L (ref 13–60)
LYMPHOCYTES # BLD AUTO: 3.24 10*3/MM3 (ref 0.7–3.1)
LYMPHOCYTES NFR BLD AUTO: 41.8 % (ref 19.6–45.3)
MCH RBC QN AUTO: 32.1 PG (ref 26.6–33)
MCHC RBC AUTO-ENTMCNC: 34.4 G/DL (ref 31.5–35.7)
MCV RBC AUTO: 93.5 FL (ref 79–97)
MONOCYTES # BLD AUTO: 0.89 10*3/MM3 (ref 0.1–0.9)
MONOCYTES NFR BLD AUTO: 11.5 % (ref 5–12)
NEUTROPHILS NFR BLD AUTO: 3.33 10*3/MM3 (ref 1.7–7)
NEUTROPHILS NFR BLD AUTO: 42.9 % (ref 42.7–76)
NITRITE UR QL STRIP: NEGATIVE
NRBC BLD AUTO-RTO: 0 /100 WBC (ref 0–0.2)
PH UR STRIP.AUTO: 5.5 [PH] (ref 5–8)
PLATELET # BLD AUTO: 242 10*3/MM3 (ref 140–450)
PMV BLD AUTO: 10 FL (ref 6–12)
POTASSIUM SERPL-SCNC: 4.3 MMOL/L (ref 3.5–5.2)
PROT SERPL-MCNC: 7.8 G/DL (ref 6–8.5)
PROT UR QL STRIP: NEGATIVE
RBC # BLD AUTO: 4.45 10*6/MM3 (ref 4.14–5.8)
SODIUM SERPL-SCNC: 142 MMOL/L (ref 136–145)
SP GR UR STRIP: 1.02 (ref 1–1.03)
UROBILINOGEN UR QL STRIP: ABNORMAL
VALPROATE SERPL-MCNC: 48 MCG/ML (ref 50–125)
WBC NRBC COR # BLD: 7.76 10*3/MM3 (ref 3.4–10.8)
WHOLE BLOOD HOLD SPECIMEN: NORMAL
WHOLE BLOOD HOLD SPECIMEN: NORMAL

## 2021-12-27 PROCEDURE — 81003 URINALYSIS AUTO W/O SCOPE: CPT | Performed by: NURSE PRACTITIONER

## 2021-12-27 PROCEDURE — 80164 ASSAY DIPROPYLACETIC ACD TOT: CPT | Performed by: NURSE PRACTITIONER

## 2021-12-27 PROCEDURE — 82077 ASSAY SPEC XCP UR&BREATH IA: CPT | Performed by: NURSE PRACTITIONER

## 2021-12-27 PROCEDURE — 82140 ASSAY OF AMMONIA: CPT | Performed by: NURSE PRACTITIONER

## 2021-12-27 PROCEDURE — 99283 EMERGENCY DEPT VISIT LOW MDM: CPT

## 2021-12-27 PROCEDURE — 85025 COMPLETE CBC W/AUTO DIFF WBC: CPT | Performed by: NURSE PRACTITIONER

## 2021-12-27 PROCEDURE — 25010000002 LORAZEPAM PER 2 MG: Performed by: EMERGENCY MEDICINE

## 2021-12-27 PROCEDURE — 80053 COMPREHEN METABOLIC PANEL: CPT | Performed by: NURSE PRACTITIONER

## 2021-12-27 PROCEDURE — 70551 MRI BRAIN STEM W/O DYE: CPT

## 2021-12-27 PROCEDURE — 96374 THER/PROPH/DIAG INJ IV PUSH: CPT

## 2021-12-27 PROCEDURE — 36415 COLL VENOUS BLD VENIPUNCTURE: CPT | Performed by: NURSE PRACTITIONER

## 2021-12-27 PROCEDURE — 83690 ASSAY OF LIPASE: CPT | Performed by: NURSE PRACTITIONER

## 2021-12-27 RX ORDER — SODIUM CHLORIDE 0.9 % (FLUSH) 0.9 %
10 SYRINGE (ML) INJECTION AS NEEDED
Status: DISCONTINUED | OUTPATIENT
Start: 2021-12-27 | End: 2021-12-27 | Stop reason: HOSPADM

## 2021-12-27 RX ORDER — LORAZEPAM 2 MG/ML
1 INJECTION INTRAMUSCULAR ONCE
Status: COMPLETED | OUTPATIENT
Start: 2021-12-27 | End: 2021-12-27

## 2021-12-27 RX ADMIN — LORAZEPAM 1 MG: 2 INJECTION INTRAMUSCULAR; INTRAVENOUS at 06:32

## 2021-12-27 RX ADMIN — SODIUM CHLORIDE 500 ML: 9 INJECTION, SOLUTION INTRAVENOUS at 04:27

## 2021-12-27 NOTE — DISCHARGE INSTRUCTIONS
"Rest, drink plenty fluids.  Use caution with ambulation until your symptoms improve.  Call DIRK Dixon's office today to follow-up Wednesday or Thursday for repeat lab work to check your kidney function and your ammonia levels and to discuss your current psychiatric medications to see if they need to be adjusted due to your recent increase in tremors and \"brain fog\".  Return to the emergency department for any acutely developing neurological symptoms, any persistent vomiting, any fevers or any new or worse concerns.  "

## 2021-12-27 NOTE — ED PROVIDER NOTES
"Subjective   The patient presents to the emergency department with a complaint that he has had increased tremors and increased memory loss and feels as if he has been in a \"brain fog\" for 6 days.  The patient's spouse states that they were at [facility] emergency department yesterday evening but left AMA.  States that they were going to admit him therefore hepatic encephalopathy.  Patient states that he is never had any liver issues previously.  He states he has never had cirrhosis or been diagnosed with hepatitis.  He states he is never had any history of CVA or stroke.  On reviewing the labs and imaging from [facility] the patient's abnormal labs was his hematocrit was 40.8, his ammonia level there was 47, his creatinine was 2.1, and his lactic acid was 2.3 at that time.  The patient's head CT and abdominal CT were all normal.  He also had a normal chest film and a lumbar spine film for his back pain that he complains of today which showed just some degenerative changes.  Patient denies any recent falls.  He states that he has been on his medications for anxiety and depression and bipolar for quite some time.  He states he has been compliant with his medications.  He denies any excessive alcohol use.  He is alert and oriented on exam but is mildly tremulous.  He denies any recent fevers.  He has no nuchal rigidity.  He denies any neck pain.  The patient's labs have all normalized since being drawn here in our emergency department.          Review of Systems   Constitutional: Negative for chills and fever.   HENT: Negative for congestion, ear pain and sore throat.    Eyes: Negative for pain.   Respiratory: Negative for cough, chest tightness and shortness of breath.    Cardiovascular: Negative for chest pain.   Gastrointestinal: Negative for abdominal pain, diarrhea, nausea and vomiting.   Genitourinary: Negative for flank pain and hematuria.   Musculoskeletal: Positive for back pain. Negative for joint swelling. "   Skin: Negative for pallor.   Neurological: Positive for tremors and weakness. Negative for seizures and headaches.   Psychiatric/Behavioral: Positive for confusion and decreased concentration. Negative for hallucinations. The patient is nervous/anxious.    All other systems reviewed and are negative.      Past Medical History:   Diagnosis Date   • Allergies    • Anxiety    • Bipolar 1 disorder (HCC)    • COPD (chronic obstructive pulmonary disease) ([facility])    • Depression    • Forgetfulness    • Head injury    • Hepatitis    • High blood pressure    • High cholesterol    • Psychiatric illness    • Shortness of breath    • Sinus trouble        Allergies   Allergen Reactions   • Antihistamines, Chlorpheniramine-Type Other (See Comments)   • Amoxicillin-Pot Clavulanate Hives   • Contrast Dye Hives and Nausea Only   • Diphenhydramine Hives       Past Surgical History:   Procedure Laterality Date   • PLEURAL SCARIFICATION     • SPHINCTEROTOMY         Family History   Problem Relation Age of Onset   • Stroke Other    • Heart disease Other    • Diabetes Other        Social History     Socioeconomic History   • Marital status: Single   Tobacco Use   • Smoking status: Current Every Day Smoker     Packs/day: 2.00     Years: 40.00     Pack years: 80.00     Types: Cigarettes   • Smokeless tobacco: Never Used   Vaping Use   • Vaping Use: Never used   Substance and Sexual Activity   • Alcohol use: Not Currently   • Drug use: Never           Objective   Physical Exam  Vitals and nursing note reviewed.   Constitutional:       General: He is not in acute distress.     Appearance: Normal appearance. He is well-developed. He is not toxic-appearing.   HENT:      Head: Normocephalic and atraumatic.      Mouth/Throat:      Mouth: Mucous membranes are moist.   Eyes:      General: No visual field deficit or scleral icterus.     Pupils: Pupils are equal, round, and reactive to light.   Cardiovascular:      Rate and Rhythm: Normal rate and  regular rhythm.      Pulses: Normal pulses.   Pulmonary:      Effort: Pulmonary effort is normal. No respiratory distress.      Breath sounds: Normal breath sounds.   Abdominal:      General: Abdomen is flat.      Palpations: Abdomen is soft.      Tenderness: There is no abdominal tenderness.   Musculoskeletal:         General: Normal range of motion.      Cervical back: Normal range of motion and neck supple.   Skin:     General: Skin is warm and dry.      Capillary Refill: Capillary refill takes less than 2 seconds.   Neurological:      Mental Status: He is alert and oriented to person, place, and time. Mental status is at baseline.      GCS: GCS eye subscore is 4. GCS verbal subscore is 5. GCS motor subscore is 6.      Cranial Nerves: No facial asymmetry.      Gait: Gait abnormal.   Psychiatric:         Mood and Affect: Mood normal.         Procedures           ED Course  ED Course as of 12/27/21 0841   Mon Dec 27, 2021   0357 I reviewed the patient's lab results with him.  We discussed his lab results and CT scans from [facility].  Patient is allergic to iodine and cannot have IV contrast.  We discussed him having a brain MRI this morning for the tremors/confusion/weakness.  He is agreeable to the MRI but did request some medications to help him relax stating that he has claustrophobia. [TC]   0642 FINDINGS:   Abdomen:   There is a small pericardial effusion which is most likely physiologic. The   gallbladder is unremarkable. The lung bases are clear.  There are multiple small   lucencies in the liver consistent with hepatic cysts. The spleen, increasing   adrenal glands are suboptimally evaluated on these unenhanced images, but   demonstrate no acute pathology. There is a 1.5 cm cyst in the left kidney.     There is no free air or lymph node enlargement.  Abdominal aorta is not aneurysmal.     Pelvis:   There is no bowel wall thickening or obstruction. The appendix is identified and is   normal. There is no free  "fluid.  Lymph nodes are not enlarged.  Urinary bladder is   unremarkable. There are bilateral inguinal hernias containing fat.     Skeleton:   There are no acute fractures.  No suspicious bony lesions.     IMPRESSION:   Unremarkable CT scan the abdomen and pelvis.     No acute process.       Signed by Preston Valderrama MD   ##### Final #####     Dictated by:    PRESTON VALDERRAMA, RAD-RAD   Dictated DT/TM: 12/26/2021 6:58 pm    [TC]   0643 INDICATION:  Altered mental status for six days.  Additional History: Hypertension,   sinus infection.     TECHNIQUE:  CT of the brain was performed without contrast.       Automated mA/kV exposure control was utilized and patient examination was performed   in strict accordance with principles of ALARA.     RADIATION AMOUNT:  915 mGy-cm.     COMPARISON:  None Available.     FINDINGS:   There is no acute intracranial hemorrhage, midline shift, mass effect or   extra-axial fluid collection.  Gray-white differentiation is preserved.       Brain volume and ventricular system are within normal limits for age.       The skull base and calvarium are intact.  The visualized paranasal sinuses are   unremarkable.  The visualized orbits, globes, and mastoid air cells are   unremarkable.       IMPRESSION:   1. No acute territorial ischemia. No intracranial mass or hemorrhage.   2. If there is clinical concern for acute ischemic event then further evaluation   with MRI may be performed.       Signed by Rohith Mensah III, MD   ##### Final #####    [TC]   9373 The patient reports feeling better and appears to be less tremulous.  I reviewed his test results and his imaging with him.  We discussed the need for him to follow-up with his primary healthcare provider to review his long-term medications to see if this could be causing his \"brain fog\" and his increased tremors.  He verbalized understanding and states he will call today to schedule an appointment.  He also verbalized understanding of return " instructions. [TC]      ED Course User Index  [TC] Kimberly Jolly APRN                                                 MDM  Number of Diagnoses or Management Options  Brain fog: new and requires workup  Coarse tremors: new and requires workup  Renal insufficiency: established and worsening     Amount and/or Complexity of Data Reviewed  Clinical lab tests: reviewed  Tests in the radiology section of CPT®: reviewed  Decide to obtain previous medical records or to obtain history from someone other than the patient: yes    Risk of Complications, Morbidity, and/or Mortality  Presenting problems: low  Diagnostic procedures: low  Management options: low    Patient Progress  Patient progress: stable      Final diagnoses:   Brain fog   Coarse tremors   Renal insufficiency       ED Disposition  ED Disposition     ED Disposition Condition Comment    Discharge Stable           Ronaldo Prater, [facility]  [address]  [address]  [place] [id number]  [phone]    Call today  FOR FOLLOW UP         Medication List      No changes were made to your prescriptions during this visit.          Kimberly Jolly APRN  12/27/21 0841

## 2021-12-27 NOTE — ED TRIAGE NOTES
Patient presents to ED with complaints of abdominal and back pain as well as altered mental status that has been going on for 6 days. States that he was at [facility] and was told that he may have hepatic encephalopathy and was told that he would be admitted there but it would be a long time. Patient left AMA and came here to be admitted.

## 2021-12-27 NOTE — ED NOTES
"Pt's  states pt has been \"scattered brained\" for six days.  Pt taken to [facility] and was worked up for hepatic encephalopathy.  Pt was scheduled to have MRI, but care was inadequate per family member and pt was brought to [facility] for further eval and admission.      Pt's psych meds changed recently, and partner states BL hand tremor that began as fine and is now more severe is \"probably related to tardive dyskinesia\".         Melissa Baldwin, RN  12/27/21 0137    "

## 2022-01-16 ENCOUNTER — APPOINTMENT (OUTPATIENT)
Dept: GENERAL RADIOLOGY | Facility: HOSPITAL | Age: 57
End: 2022-01-16

## 2022-01-16 ENCOUNTER — ANESTHESIA EVENT (OUTPATIENT)
Dept: PERIOP | Facility: HOSPITAL | Age: 57
End: 2022-01-16

## 2022-01-16 ENCOUNTER — APPOINTMENT (OUTPATIENT)
Dept: CT IMAGING | Facility: HOSPITAL | Age: 57
End: 2022-01-16

## 2022-01-16 ENCOUNTER — ANESTHESIA (OUTPATIENT)
Dept: PERIOP | Facility: HOSPITAL | Age: 57
End: 2022-01-16

## 2022-01-16 ENCOUNTER — HOSPITAL ENCOUNTER (INPATIENT)
Facility: HOSPITAL | Age: 57
LOS: 23 days | Discharge: NURSING, REHAB OR LONG-TERM CARE | End: 2022-02-08
Attending: EMERGENCY MEDICINE | Admitting: SURGERY

## 2022-01-16 DIAGNOSIS — A41.9 SEPSIS, DUE TO UNSPECIFIED ORGANISM, UNSPECIFIED WHETHER ACUTE ORGAN DYSFUNCTION PRESENT: ICD-10-CM

## 2022-01-16 DIAGNOSIS — Z78.9 DECREASED ACTIVITIES OF DAILY LIVING (ADL): ICD-10-CM

## 2022-01-16 DIAGNOSIS — Z98.890 S/P EXPLORATORY LAPAROTOMY: ICD-10-CM

## 2022-01-16 DIAGNOSIS — K63.1 BOWEL PERFORATION: ICD-10-CM

## 2022-01-16 DIAGNOSIS — I10 PRIMARY HYPERTENSION: ICD-10-CM

## 2022-01-16 DIAGNOSIS — R26.2 DIFFICULTY WALKING: ICD-10-CM

## 2022-01-16 DIAGNOSIS — K66.8 PNEUMOPERITONEUM: Primary | ICD-10-CM

## 2022-01-16 LAB
ALBUMIN SERPL-MCNC: 3.6 G/DL (ref 3.5–5.2)
ALBUMIN/GLOB SERPL: 1.1 G/DL
ALP SERPL-CCNC: 62 U/L (ref 39–117)
ALT SERPL W P-5'-P-CCNC: 11 U/L (ref 1–41)
ANION GAP SERPL CALCULATED.3IONS-SCNC: 12.4 MMOL/L (ref 5–15)
APTT PPP: 26.3 SECONDS (ref 22.2–34.2)
AST SERPL-CCNC: 37 U/L (ref 1–40)
BACTERIA UR QL AUTO: ABNORMAL /HPF
BILIRUB SERPL-MCNC: 0.4 MG/DL (ref 0–1.2)
BILIRUB UR QL STRIP: NEGATIVE
BUN SERPL-MCNC: 47 MG/DL (ref 6–20)
BUN/CREAT SERPL: 13.2 (ref 7–25)
CA-I BLDA-SCNC: 1.12 MMOL/L (ref 1.13–1.32)
CALCIUM SPEC-SCNC: 9.5 MG/DL (ref 8.6–10.5)
CHLORIDE SERPL-SCNC: 86 MMOL/L (ref 98–107)
CLARITY UR: CLEAR
CO2 SERPL-SCNC: 25.6 MMOL/L (ref 22–29)
COLOR UR: ABNORMAL
CREAT SERPL-MCNC: 3.55 MG/DL (ref 0.76–1.27)
CRP SERPL-MCNC: 32.62 MG/DL (ref 0–0.5)
D-LACTATE SERPL-SCNC: 2.5 MMOL/L (ref 0.5–2)
D-LACTATE SERPL-SCNC: 4.2 MMOL/L (ref 0.5–2)
DEPRECATED RDW RBC AUTO: 45 FL (ref 37–54)
ERYTHROCYTE [DISTWIDTH] IN BLOOD BY AUTOMATED COUNT: 13.2 % (ref 12.3–15.4)
GFR SERPL CREATININE-BSD FRML MDRD: 18 ML/MIN/1.73
GLOBULIN UR ELPH-MCNC: 3.4 GM/DL
GLUCOSE SERPL-MCNC: 130 MG/DL (ref 65–99)
GLUCOSE UR STRIP-MCNC: NEGATIVE MG/DL
HCT VFR BLD AUTO: 39.5 % (ref 37.5–51)
HGB BLD-MCNC: 13.1 G/DL (ref 13–17.7)
HGB UR QL STRIP.AUTO: NEGATIVE
HOLD SPECIMEN: NORMAL
HOLD SPECIMEN: NORMAL
HYALINE CASTS UR QL AUTO: ABNORMAL /LPF
INR PPP: 1.06 (ref 2–3)
KETONES UR QL STRIP: NEGATIVE
LEUKOCYTE ESTERASE UR QL STRIP.AUTO: ABNORMAL
LYMPHOCYTES # BLD MANUAL: 1.13 10*3/MM3 (ref 0.7–3.1)
LYMPHOCYTES NFR BLD MANUAL: 2 % (ref 5–12)
MAGNESIUM SERPL-MCNC: >9.7 MG/DL (ref 1.6–2.6)
MCH RBC QN AUTO: 31.3 PG (ref 26.6–33)
MCHC RBC AUTO-ENTMCNC: 33.2 G/DL (ref 31.5–35.7)
MCV RBC AUTO: 94.3 FL (ref 79–97)
MONOCYTES # BLD: 0.25 10*3/MM3 (ref 0.1–0.9)
NEUTROPHILS # BLD AUTO: 11.15 10*3/MM3 (ref 1.7–7)
NEUTROPHILS NFR BLD MANUAL: 57 % (ref 42.7–76)
NEUTS BAND NFR BLD MANUAL: 32 % (ref 0–5)
NITRITE UR QL STRIP: NEGATIVE
NT-PROBNP SERPL-MCNC: 537.1 PG/ML (ref 0–900)
PH UR STRIP.AUTO: 5.5 [PH] (ref 5–8)
PHOSPHATE SERPL-MCNC: 9.7 MG/DL (ref 2.5–4.5)
PLAT MORPH BLD: NORMAL
PLATELET # BLD AUTO: 363 10*3/MM3 (ref 140–450)
PMV BLD AUTO: 9.8 FL (ref 6–12)
POTASSIUM SERPL-SCNC: 5.2 MMOL/L (ref 3.5–5.2)
PROT SERPL-MCNC: 7 G/DL (ref 6–8.5)
PROT UR QL STRIP: ABNORMAL
PROTHROMBIN TIME: 11 SECONDS (ref 9.4–12)
RBC # BLD AUTO: 4.19 10*6/MM3 (ref 4.14–5.8)
RBC # UR STRIP: ABNORMAL /HPF
RBC MORPH BLD: NORMAL
REF LAB TEST METHOD: ABNORMAL
SARS-COV-2 RNA PNL SPEC NAA+PROBE: NOT DETECTED
SCAN SLIDE: NORMAL
SODIUM SERPL-SCNC: 124 MMOL/L (ref 136–145)
SP GR UR STRIP: 1.02 (ref 1–1.03)
SQUAMOUS #/AREA URNS HPF: ABNORMAL /HPF
TROPONIN T SERPL-MCNC: 0.04 NG/ML (ref 0–0.03)
UROBILINOGEN UR QL STRIP: ABNORMAL
VALPROATE SERPL-MCNC: <2.8 MCG/ML (ref 50–125)
VARIANT LYMPHS NFR BLD MANUAL: 9 % (ref 19.6–45.3)
WBC # UR STRIP: ABNORMAL /HPF
WBC MORPH BLD: NORMAL
WBC NRBC COR # BLD: 12.53 10*3/MM3 (ref 3.4–10.8)
WHOLE BLOOD HOLD SPECIMEN: NORMAL
WHOLE BLOOD HOLD SPECIMEN: NORMAL

## 2022-01-16 PROCEDURE — C1765 ADHESION BARRIER: HCPCS | Performed by: SURGERY

## 2022-01-16 PROCEDURE — 85025 COMPLETE CBC W/AUTO DIFF WBC: CPT | Performed by: EMERGENCY MEDICINE

## 2022-01-16 PROCEDURE — 82330 ASSAY OF CALCIUM: CPT | Performed by: EMERGENCY MEDICINE

## 2022-01-16 PROCEDURE — U0004 COV-19 TEST NON-CDC HGH THRU: HCPCS | Performed by: EMERGENCY MEDICINE

## 2022-01-16 PROCEDURE — 93005 ELECTROCARDIOGRAM TRACING: CPT | Performed by: EMERGENCY MEDICINE

## 2022-01-16 PROCEDURE — 80053 COMPREHEN METABOLIC PANEL: CPT | Performed by: EMERGENCY MEDICINE

## 2022-01-16 PROCEDURE — 99285 EMERGENCY DEPT VISIT HI MDM: CPT

## 2022-01-16 PROCEDURE — 80164 ASSAY DIPROPYLACETIC ACD TOT: CPT | Performed by: EMERGENCY MEDICINE

## 2022-01-16 PROCEDURE — 83735 ASSAY OF MAGNESIUM: CPT | Performed by: EMERGENCY MEDICINE

## 2022-01-16 PROCEDURE — 25010000002 PROPOFOL 10 MG/ML EMULSION: Performed by: NURSE ANESTHETIST, CERTIFIED REGISTERED

## 2022-01-16 PROCEDURE — 0DU607Z SUPPLEMENT STOMACH WITH AUTOLOGOUS TISSUE SUBSTITUTE, OPEN APPROACH: ICD-10-PCS | Performed by: SURGERY

## 2022-01-16 PROCEDURE — 81001 URINALYSIS AUTO W/SCOPE: CPT | Performed by: EMERGENCY MEDICINE

## 2022-01-16 PROCEDURE — 99223 1ST HOSP IP/OBS HIGH 75: CPT | Performed by: SURGERY

## 2022-01-16 PROCEDURE — 85610 PROTHROMBIN TIME: CPT | Performed by: EMERGENCY MEDICINE

## 2022-01-16 PROCEDURE — 74176 CT ABD & PELVIS W/O CONTRAST: CPT

## 2022-01-16 PROCEDURE — 43840 GSTRRPHY SUTR DUOL/GSTR ULCR: CPT | Performed by: SURGERY

## 2022-01-16 PROCEDURE — 25010000002 FENTANYL CITRATE (PF) 50 MCG/ML SOLUTION: Performed by: NURSE ANESTHETIST, CERTIFIED REGISTERED

## 2022-01-16 PROCEDURE — 87040 BLOOD CULTURE FOR BACTERIA: CPT | Performed by: EMERGENCY MEDICINE

## 2022-01-16 PROCEDURE — 83880 ASSAY OF NATRIURETIC PEPTIDE: CPT | Performed by: EMERGENCY MEDICINE

## 2022-01-16 PROCEDURE — 93005 ELECTROCARDIOGRAM TRACING: CPT

## 2022-01-16 PROCEDURE — 86140 C-REACTIVE PROTEIN: CPT | Performed by: EMERGENCY MEDICINE

## 2022-01-16 PROCEDURE — 71045 X-RAY EXAM CHEST 1 VIEW: CPT

## 2022-01-16 PROCEDURE — 25010000002 LEVOFLOXACIN PER 250 MG: Performed by: EMERGENCY MEDICINE

## 2022-01-16 PROCEDURE — 93010 ELECTROCARDIOGRAM REPORT: CPT | Performed by: INTERNAL MEDICINE

## 2022-01-16 PROCEDURE — 43840 GSTRRPHY SUTR DUOL/GSTR ULCR: CPT | Performed by: SPECIALIST/TECHNOLOGIST, OTHER

## 2022-01-16 PROCEDURE — 84484 ASSAY OF TROPONIN QUANT: CPT | Performed by: EMERGENCY MEDICINE

## 2022-01-16 PROCEDURE — 25010000002 DEXAMETHASONE PER 1 MG: Performed by: NURSE ANESTHETIST, CERTIFIED REGISTERED

## 2022-01-16 PROCEDURE — 49905 OMENTAL FLAP INTRA-ABDOM: CPT | Performed by: SURGERY

## 2022-01-16 PROCEDURE — 85730 THROMBOPLASTIN TIME PARTIAL: CPT | Performed by: EMERGENCY MEDICINE

## 2022-01-16 PROCEDURE — 51702 INSERT TEMP BLADDER CATH: CPT

## 2022-01-16 PROCEDURE — 49905 OMENTAL FLAP INTRA-ABDOM: CPT | Performed by: SPECIALIST/TECHNOLOGIST, OTHER

## 2022-01-16 PROCEDURE — 83605 ASSAY OF LACTIC ACID: CPT | Performed by: EMERGENCY MEDICINE

## 2022-01-16 PROCEDURE — 84100 ASSAY OF PHOSPHORUS: CPT | Performed by: EMERGENCY MEDICINE

## 2022-01-16 PROCEDURE — 85007 BL SMEAR W/DIFF WBC COUNT: CPT | Performed by: EMERGENCY MEDICINE

## 2022-01-16 PROCEDURE — 25010000002 ONDANSETRON PER 1 MG: Performed by: NURSE ANESTHETIST, CERTIFIED REGISTERED

## 2022-01-16 PROCEDURE — 25010000002 HYDROMORPHONE 1 MG/ML SOLUTION: Performed by: EMERGENCY MEDICINE

## 2022-01-16 RX ORDER — DEXAMETHASONE SODIUM PHOSPHATE 4 MG/ML
INJECTION, SOLUTION INTRA-ARTICULAR; INTRALESIONAL; INTRAMUSCULAR; INTRAVENOUS; SOFT TISSUE AS NEEDED
Status: DISCONTINUED | OUTPATIENT
Start: 2022-01-16 | End: 2022-01-16 | Stop reason: SURG

## 2022-01-16 RX ORDER — ROCURONIUM BROMIDE 10 MG/ML
INJECTION, SOLUTION INTRAVENOUS AS NEEDED
Status: DISCONTINUED | OUTPATIENT
Start: 2022-01-16 | End: 2022-01-16 | Stop reason: SURG

## 2022-01-16 RX ORDER — PROPOFOL 10 MG/ML
VIAL (ML) INTRAVENOUS AS NEEDED
Status: DISCONTINUED | OUTPATIENT
Start: 2022-01-16 | End: 2022-01-16 | Stop reason: SURG

## 2022-01-16 RX ORDER — SODIUM CHLORIDE 0.9 % (FLUSH) 0.9 %
10 SYRINGE (ML) INJECTION AS NEEDED
Status: DISCONTINUED | OUTPATIENT
Start: 2022-01-16 | End: 2022-01-28

## 2022-01-16 RX ORDER — PANTOPRAZOLE SODIUM 40 MG/10ML
80 INJECTION, POWDER, LYOPHILIZED, FOR SOLUTION INTRAVENOUS ONCE
Status: COMPLETED | OUTPATIENT
Start: 2022-01-16 | End: 2022-01-16

## 2022-01-16 RX ORDER — NALOXONE HCL 0.4 MG/ML
0.4 VIAL (ML) INJECTION
Status: DISCONTINUED | OUTPATIENT
Start: 2022-01-16 | End: 2022-01-23

## 2022-01-16 RX ORDER — BUPIVACAINE HYDROCHLORIDE AND EPINEPHRINE 2.5; 5 MG/ML; UG/ML
INJECTION, SOLUTION EPIDURAL; INFILTRATION; INTRACAUDAL; PERINEURAL
Status: COMPLETED | OUTPATIENT
Start: 2022-01-16 | End: 2022-01-16

## 2022-01-16 RX ORDER — SODIUM CHLORIDE 0.9 % (FLUSH) 0.9 %
10 SYRINGE (ML) INJECTION AS NEEDED
Status: DISCONTINUED | OUTPATIENT
Start: 2022-01-16 | End: 2022-01-16 | Stop reason: HOSPADM

## 2022-01-16 RX ORDER — LEVOFLOXACIN 5 MG/ML
750 INJECTION, SOLUTION INTRAVENOUS
Status: DISCONTINUED | OUTPATIENT
Start: 2022-01-18 | End: 2022-01-20

## 2022-01-16 RX ORDER — ONDANSETRON 2 MG/ML
4 INJECTION INTRAMUSCULAR; INTRAVENOUS EVERY 6 HOURS PRN
Status: DISCONTINUED | OUTPATIENT
Start: 2022-01-16 | End: 2022-02-08 | Stop reason: HOSPADM

## 2022-01-16 RX ORDER — SODIUM CHLORIDE 0.9 % (FLUSH) 0.9 %
10 SYRINGE (ML) INJECTION EVERY 12 HOURS SCHEDULED
Status: DISCONTINUED | OUTPATIENT
Start: 2022-01-16 | End: 2022-01-17

## 2022-01-16 RX ORDER — MEPERIDINE HYDROCHLORIDE 25 MG/ML
12.5 INJECTION INTRAMUSCULAR; INTRAVENOUS; SUBCUTANEOUS
Status: ACTIVE | OUTPATIENT
Start: 2022-01-16 | End: 2022-01-17

## 2022-01-16 RX ORDER — OXYCODONE HYDROCHLORIDE 5 MG/1
5 TABLET ORAL
Status: DISCONTINUED | OUTPATIENT
Start: 2022-01-16 | End: 2022-01-29

## 2022-01-16 RX ORDER — PROMETHAZINE HYDROCHLORIDE 25 MG/1
25 TABLET ORAL ONCE AS NEEDED
Status: DISCONTINUED | OUTPATIENT
Start: 2022-01-16 | End: 2022-01-29

## 2022-01-16 RX ORDER — ONDANSETRON 4 MG/1
4 TABLET, FILM COATED ORAL EVERY 6 HOURS PRN
Status: DISCONTINUED | OUTPATIENT
Start: 2022-01-16 | End: 2022-02-08 | Stop reason: HOSPADM

## 2022-01-16 RX ORDER — ONDANSETRON 2 MG/ML
4 INJECTION INTRAMUSCULAR; INTRAVENOUS ONCE AS NEEDED
Status: DISCONTINUED | OUTPATIENT
Start: 2022-01-16 | End: 2022-02-08 | Stop reason: HOSPADM

## 2022-01-16 RX ORDER — ONDANSETRON 2 MG/ML
INJECTION INTRAMUSCULAR; INTRAVENOUS AS NEEDED
Status: DISCONTINUED | OUTPATIENT
Start: 2022-01-16 | End: 2022-01-16 | Stop reason: SURG

## 2022-01-16 RX ORDER — PROMETHAZINE HYDROCHLORIDE 25 MG/1
25 SUPPOSITORY RECTAL ONCE AS NEEDED
Status: DISCONTINUED | OUTPATIENT
Start: 2022-01-16 | End: 2022-02-08 | Stop reason: HOSPADM

## 2022-01-16 RX ORDER — PHENYLEPHRINE HCL IN 0.9% NACL 1 MG/10 ML
SYRINGE (ML) INTRAVENOUS AS NEEDED
Status: DISCONTINUED | OUTPATIENT
Start: 2022-01-16 | End: 2022-01-16 | Stop reason: SURG

## 2022-01-16 RX ORDER — LEVOFLOXACIN 5 MG/ML
750 INJECTION, SOLUTION INTRAVENOUS ONCE
Status: COMPLETED | OUTPATIENT
Start: 2022-01-16 | End: 2022-01-16

## 2022-01-16 RX ORDER — ATORVASTATIN CALCIUM 40 MG/1
40 TABLET, FILM COATED ORAL DAILY
COMMUNITY
End: 2022-11-10 | Stop reason: SDUPTHER

## 2022-01-16 RX ORDER — NALOXONE HCL 0.4 MG/ML
0.1 VIAL (ML) INJECTION
Status: DISCONTINUED | OUTPATIENT
Start: 2022-01-16 | End: 2022-01-29

## 2022-01-16 RX ORDER — LIDOCAINE HYDROCHLORIDE 20 MG/ML
INJECTION, SOLUTION INFILTRATION; PERINEURAL AS NEEDED
Status: DISCONTINUED | OUTPATIENT
Start: 2022-01-16 | End: 2022-01-16 | Stop reason: SURG

## 2022-01-16 RX ORDER — MAGNESIUM HYDROXIDE 1200 MG/15ML
LIQUID ORAL AS NEEDED
Status: DISCONTINUED | OUTPATIENT
Start: 2022-01-16 | End: 2022-01-16 | Stop reason: HOSPADM

## 2022-01-16 RX ORDER — SODIUM CHLORIDE 9 MG/ML
100 INJECTION, SOLUTION INTRAVENOUS CONTINUOUS
Status: DISCONTINUED | OUTPATIENT
Start: 2022-01-16 | End: 2022-01-17

## 2022-01-16 RX ORDER — FENTANYL CITRATE 50 UG/ML
INJECTION, SOLUTION INTRAMUSCULAR; INTRAVENOUS AS NEEDED
Status: DISCONTINUED | OUTPATIENT
Start: 2022-01-16 | End: 2022-01-16 | Stop reason: SURG

## 2022-01-16 RX ORDER — SODIUM CHLORIDE 0.9 % (FLUSH) 0.9 %
10 SYRINGE (ML) INJECTION AS NEEDED
Status: DISCONTINUED | OUTPATIENT
Start: 2022-01-16 | End: 2022-02-08 | Stop reason: HOSPADM

## 2022-01-16 RX ORDER — ALBUTEROL SULFATE 2.5 MG/3ML
2.5 SOLUTION RESPIRATORY (INHALATION) EVERY 6 HOURS PRN
Status: DISCONTINUED | OUTPATIENT
Start: 2022-01-16 | End: 2022-02-08 | Stop reason: HOSPADM

## 2022-01-16 RX ORDER — ALBUTEROL SULFATE 2.5 MG/3ML
SOLUTION RESPIRATORY (INHALATION)
Status: DISPENSED
Start: 2022-01-16 | End: 2022-01-17

## 2022-01-16 RX ORDER — SUCCINYLCHOLINE/SOD CL,ISO/PF 100 MG/5ML
SYRINGE (ML) INTRAVENOUS AS NEEDED
Status: DISCONTINUED | OUTPATIENT
Start: 2022-01-16 | End: 2022-01-16 | Stop reason: SURG

## 2022-01-16 RX ORDER — SODIUM CHLORIDE, SODIUM LACTATE, POTASSIUM CHLORIDE, CALCIUM CHLORIDE 600; 310; 30; 20 MG/100ML; MG/100ML; MG/100ML; MG/100ML
INJECTION, SOLUTION INTRAVENOUS CONTINUOUS PRN
Status: DISCONTINUED | OUTPATIENT
Start: 2022-01-16 | End: 2022-01-16 | Stop reason: SURG

## 2022-01-16 RX ORDER — NITROGLYCERIN 0.4 MG/1
0.4 TABLET SUBLINGUAL
Status: DISCONTINUED | OUTPATIENT
Start: 2022-01-16 | End: 2022-02-08 | Stop reason: HOSPADM

## 2022-01-16 RX ORDER — SODIUM CHLORIDE, SODIUM LACTATE, POTASSIUM CHLORIDE, CALCIUM CHLORIDE 600; 310; 30; 20 MG/100ML; MG/100ML; MG/100ML; MG/100ML
9 INJECTION, SOLUTION INTRAVENOUS CONTINUOUS PRN
Status: DISCONTINUED | OUTPATIENT
Start: 2022-01-16 | End: 2022-02-08 | Stop reason: HOSPADM

## 2022-01-16 RX ADMIN — LEVOFLOXACIN 750 MG: 750 INJECTION, SOLUTION INTRAVENOUS at 09:12

## 2022-01-16 RX ADMIN — SODIUM CHLORIDE, POTASSIUM CHLORIDE, SODIUM LACTATE AND CALCIUM CHLORIDE 1000 ML: 600; 310; 30; 20 INJECTION, SOLUTION INTRAVENOUS at 12:38

## 2022-01-16 RX ADMIN — Medication 120 MG: at 13:42

## 2022-01-16 RX ADMIN — PANTOPRAZOLE SODIUM 80 MG: 40 INJECTION, POWDER, FOR SOLUTION INTRAVENOUS at 18:11

## 2022-01-16 RX ADMIN — SODIUM CHLORIDE 1914 ML: 9 INJECTION, SOLUTION INTRAVENOUS at 08:59

## 2022-01-16 RX ADMIN — PROPOFOL 200 MG: 10 INJECTION, EMULSION INTRAVENOUS at 13:42

## 2022-01-16 RX ADMIN — Medication 100 MCG: at 14:05

## 2022-01-16 RX ADMIN — PANTOPRAZOLE SODIUM 8 MG/HR: 40 INJECTION, POWDER, FOR SOLUTION INTRAVENOUS at 18:12

## 2022-01-16 RX ADMIN — SUGAMMADEX 200 MG: 100 INJECTION, SOLUTION INTRAVENOUS at 15:20

## 2022-01-16 RX ADMIN — Medication 100 MCG: at 13:55

## 2022-01-16 RX ADMIN — METRONIDAZOLE 500 MG: 500 INJECTION, SOLUTION INTRAVENOUS at 18:46

## 2022-01-16 RX ADMIN — Medication 10 ML: at 22:24

## 2022-01-16 RX ADMIN — ARFORMOTEROL TARTRATE: 15 SOLUTION RESPIRATORY (INHALATION) at 21:00

## 2022-01-16 RX ADMIN — SODIUM CHLORIDE, POTASSIUM CHLORIDE, SODIUM LACTATE AND CALCIUM CHLORIDE: 600; 310; 30; 20 INJECTION, SOLUTION INTRAVENOUS at 14:44

## 2022-01-16 RX ADMIN — ROCURONIUM BROMIDE 10 MG: 10 INJECTION INTRAVENOUS at 13:42

## 2022-01-16 RX ADMIN — SODIUM CHLORIDE 100 ML/HR: 9 INJECTION, SOLUTION INTRAVENOUS at 18:38

## 2022-01-16 RX ADMIN — HYDROMORPHONE HYDROCHLORIDE 1 MG: 1 INJECTION, SOLUTION INTRAMUSCULAR; INTRAVENOUS; SUBCUTANEOUS at 09:41

## 2022-01-16 RX ADMIN — Medication 100 MCG: at 14:15

## 2022-01-16 RX ADMIN — ONDANSETRON 4 MG: 2 INJECTION INTRAMUSCULAR; INTRAVENOUS at 15:11

## 2022-01-16 RX ADMIN — Medication 100 MCG: at 13:47

## 2022-01-16 RX ADMIN — SODIUM CHLORIDE, POTASSIUM CHLORIDE, SODIUM LACTATE AND CALCIUM CHLORIDE: 600; 310; 30; 20 INJECTION, SOLUTION INTRAVENOUS at 13:35

## 2022-01-16 RX ADMIN — FENTANYL CITRATE 100 MCG: 50 INJECTION, SOLUTION INTRAMUSCULAR; INTRAVENOUS at 13:42

## 2022-01-16 RX ADMIN — LIDOCAINE HYDROCHLORIDE 50 MG: 20 INJECTION, SOLUTION INFILTRATION; PERINEURAL at 13:42

## 2022-01-16 RX ADMIN — BUPIVACAINE HYDROCHLORIDE AND EPINEPHRINE BITARTRATE 60 ML: 2.5; .005 INJECTION, SOLUTION EPIDURAL; INFILTRATION; INTRACAUDAL; PERINEURAL at 13:59

## 2022-01-16 RX ADMIN — METOPROLOL TARTRATE 2.5 MG: 5 INJECTION INTRAVENOUS at 18:11

## 2022-01-16 RX ADMIN — DEXAMETHASONE SODIUM PHOSPHATE 4 MG: 4 INJECTION INTRA-ARTICULAR; INTRALESIONAL; INTRAMUSCULAR; INTRAVENOUS; SOFT TISSUE at 13:50

## 2022-01-16 NOTE — ANESTHESIA PREPROCEDURE EVALUATION
Anesthesia Evaluation     Patient summary reviewed and Nursing notes reviewed   no history of anesthetic complications:  NPO Solid Status: > 8 hours  NPO Liquid Status: > 2 hours           Airway   Mallampati: I  TM distance: >3 FB  Neck ROM: full  No difficulty expected  Dental    (+) poor dentition    Comment: Just a few teeth left, severe caries    Pulmonary - normal exam    breath sounds clear to auscultation  (+) COPD,   Cardiovascular - normal exam  Exercise tolerance: good (4-7 METS)    Rhythm: regular    (+) hypertension, hyperlipidemia,       Neuro/Psych  (+) psychiatric history Anxiety, Depression and Bipolar,     GI/Hepatic/Renal/Endo    (+)   hepatitis B, liver disease, renal disease ARF,     ROS Comment: Free air in abdomen    Musculoskeletal (-) negative ROS    Abdominal    Substance History - negative use     OB/GYN negative ob/gyn ROS         Other - negative ROS                     Anesthesia Plan    ASA 4 - emergent     general with block   Rapid sequence(Pt and brother agree with TAP block and other RBA)    Anesthetic plan, all risks, benefits, and alternatives have been provided, discussed and informed consent has been obtained with: patient and sibling.    Plan discussed with CRNA.

## 2022-01-16 NOTE — ANESTHESIA PROCEDURE NOTES
Peripheral Block      Patient reassessed immediately prior to procedure    Patient location during procedure: OR  Start time: 1/16/2022 1:46 PM  Stop time: 1/16/2022 1:50 PM  Reason for block: at surgeon's request and post-op pain management  Performed by  Anesthesiologist: Gonzalez Lyons MD  Preanesthetic Checklist  Completed: patient identified, IV checked, site marked, risks and benefits discussed, surgical consent, monitors and equipment checked, pre-op evaluation and timeout performed  Prep:  Pt Position: supine  Sterile barriers:alcohol skin prep, partial drape, cap, washed/disinfected hands, gloves and mask  Prep: ChloraPrep  Patient monitoring: blood pressure monitoring, continuous pulse oximetry and EKG  Procedure    Guidance:ultrasound guided    ULTRASOUND INTERPRETATION. Using ultrasound guidance a 20 G gauge needle was placed in close proximity to the nerve, at which point, under ultrasound guidance anesthetic was injected in the area of the nerve and spread of the anesthesia was seen on ultrasound in close proximity thereto.  There were no abnormalities seen on ultrasound; a digital image was taken; and the patient tolerated the procedure with no complications. Images:still images obtained, printed/placed on chart    Laterality:Bilateral  Block Type:TAP  Injection Technique:single-shot  Needle Type:echogenic  Needle Gauge:20 G (4in)  Resistance on Injection: none    Medications Used: bupivacaine-EPINEPHrine PF (MARCAINE w/EPI) 0.25% -1:520055 injection, 60 mL      Post Assessment  Injection Assessment: no paresthesia on injection, incremental injection and negative aspiration for heme  Patient Tolerance:comfortable throughout block  Complications:no  Additional Notes  The block or continuous infusion is requested by the referring physician for management of postoperative pain, or pain related to a procedure. Ultrasound guidance (deemed medically necessary). Painless injection, pt was awake and  conversant during the procedure without complications. Needle and surrounding structures visualized throughout procedure. No adverse reactions or complications seen during this period. Post-procedure image showed no signs of complication, and anatomy was consistent with an uncomplicated nerve blockade.

## 2022-01-16 NOTE — ANESTHESIA POSTPROCEDURE EVALUATION
Patient: Atilio Recinos    Procedure Summary     Date: 01/16/22 Room / Location: [facility] OR 04 / [facility] MAIN OR    Anesthesia Start: 1335 Anesthesia Stop: 1535    Procedure: EXPLORATORY LAPAROTOMY, OVERSEW OF PERFORATED GASTRIC ULCER WITH [place] PATCH (N/A Abdomen) Diagnosis:       Pneumoperitoneum      (Pneumoperitoneum [K66.8])    Surgeons: Atilio Lindsay MD Provider: Gonzalez Lyons MD    Anesthesia Type: general with block ASA Status: 4 - Emergent          Anesthesia Type: general with block    Vitals  Vitals Value Taken Time   /38 01/16/22 1554   Temp 36.1 °C (97 °F) 01/16/22 1538   Pulse 93 01/16/22 1554   Resp 14 01/16/22 1538   SpO2 89 % 01/16/22 1554   Vitals shown include unvalidated device data.        Post Anesthesia Care and Evaluation    Patient location during evaluation: bedside  Patient participation: complete - patient participated  Level of consciousness: awake  Pain score: 0  Pain management: adequate  Airway patency: patent  Anesthetic complications: No anesthetic complications  PONV Status: none  Cardiovascular status: acceptable and stable  Respiratory status: acceptable and room air  Hydration status: acceptable    Comments: An Anesthesiologist personally participated in the most demanding procedures (including induction and emergence if applicable) in the anesthesia plan, monitored the course of anesthesia administration at frequent intervals and remained physically present and available for immediate diagnosis and treatment of emergencies.

## 2022-01-17 ENCOUNTER — APPOINTMENT (OUTPATIENT)
Dept: GENERAL RADIOLOGY | Facility: HOSPITAL | Age: 57
End: 2022-01-17

## 2022-01-17 PROBLEM — Z98.890 S/P EXPLORATORY LAPAROTOMY: Status: ACTIVE | Noted: 2022-01-17

## 2022-01-17 LAB
ANION GAP SERPL CALCULATED.3IONS-SCNC: 10.3 MMOL/L (ref 5–15)
BACTERIA UR QL AUTO: ABNORMAL /HPF
BASOPHILS # BLD AUTO: 0.15 10*3/MM3 (ref 0–0.2)
BASOPHILS NFR BLD AUTO: 0.9 % (ref 0–1.5)
BILIRUB UR QL STRIP: NEGATIVE
BUN SERPL-MCNC: 54 MG/DL (ref 6–20)
BUN/CREAT SERPL: 22.6 (ref 7–25)
CALCIUM SPEC-SCNC: 7.8 MG/DL (ref 8.6–10.5)
CHLORIDE SERPL-SCNC: 95 MMOL/L (ref 98–107)
CLARITY UR: CLEAR
CO2 SERPL-SCNC: 22.7 MMOL/L (ref 22–29)
COLOR UR: YELLOW
CREAT SERPL-MCNC: 2.39 MG/DL (ref 0.76–1.27)
DEPRECATED RDW RBC AUTO: 46.9 FL (ref 37–54)
EOSINOPHIL # BLD AUTO: 0.01 10*3/MM3 (ref 0–0.4)
EOSINOPHIL NFR BLD AUTO: 0.1 % (ref 0.3–6.2)
ERYTHROCYTE [DISTWIDTH] IN BLOOD BY AUTOMATED COUNT: 13.5 % (ref 12.3–15.4)
GFR SERPL CREATININE-BSD FRML MDRD: 28 ML/MIN/1.73
GLUCOSE SERPL-MCNC: 106 MG/DL (ref 65–99)
GLUCOSE UR STRIP-MCNC: NEGATIVE MG/DL
HCT VFR BLD AUTO: 30.1 % (ref 37.5–51)
HGB BLD-MCNC: 10.2 G/DL (ref 13–17.7)
HGB UR QL STRIP.AUTO: ABNORMAL
HYALINE CASTS UR QL AUTO: ABNORMAL /LPF
IMM GRANULOCYTES # BLD AUTO: 0.35 10*3/MM3 (ref 0–0.05)
IMM GRANULOCYTES NFR BLD AUTO: 2.1 % (ref 0–0.5)
KETONES UR QL STRIP: NEGATIVE
L PNEUMO1 AG UR QL IA: NEGATIVE
LEUKOCYTE ESTERASE UR QL STRIP.AUTO: ABNORMAL
LYMPHOCYTES # BLD AUTO: 1.01 10*3/MM3 (ref 0.7–3.1)
LYMPHOCYTES NFR BLD AUTO: 6.1 % (ref 19.6–45.3)
MAGNESIUM SERPL-MCNC: 6.4 MG/DL (ref 1.6–2.6)
MCH RBC QN AUTO: 32.3 PG (ref 26.6–33)
MCHC RBC AUTO-ENTMCNC: 33.9 G/DL (ref 31.5–35.7)
MCV RBC AUTO: 95.3 FL (ref 79–97)
MONOCYTES # BLD AUTO: 0.49 10*3/MM3 (ref 0.1–0.9)
MONOCYTES NFR BLD AUTO: 3 % (ref 5–12)
NEUTROPHILS NFR BLD AUTO: 14.49 10*3/MM3 (ref 1.7–7)
NEUTROPHILS NFR BLD AUTO: 87.8 % (ref 42.7–76)
NITRITE UR QL STRIP: NEGATIVE
NRBC BLD AUTO-RTO: 0 /100 WBC (ref 0–0.2)
PH UR STRIP.AUTO: <=5 [PH] (ref 5–8)
PHOSPHATE SERPL-MCNC: 6.7 MG/DL (ref 2.5–4.5)
PLATELET # BLD AUTO: 275 10*3/MM3 (ref 140–450)
PMV BLD AUTO: 10 FL (ref 6–12)
POTASSIUM SERPL-SCNC: 5.3 MMOL/L (ref 3.5–5.2)
PROT UR QL STRIP: ABNORMAL
RBC # BLD AUTO: 3.16 10*6/MM3 (ref 4.14–5.8)
RBC # UR STRIP: ABNORMAL /HPF
REF LAB TEST METHOD: ABNORMAL
S PNEUM AG SPEC QL LA: NEGATIVE
SODIUM SERPL-SCNC: 128 MMOL/L (ref 136–145)
SP GR UR STRIP: 1.01 (ref 1–1.03)
SQUAMOUS #/AREA URNS HPF: ABNORMAL /HPF
UROBILINOGEN UR QL STRIP: ABNORMAL
WBC # UR STRIP: ABNORMAL /HPF
WBC NRBC COR # BLD: 16.5 10*3/MM3 (ref 3.4–10.8)

## 2022-01-17 PROCEDURE — 25010000002 ENOXAPARIN PER 10 MG: Performed by: SURGERY

## 2022-01-17 PROCEDURE — 87205 SMEAR GRAM STAIN: CPT | Performed by: INTERNAL MEDICINE

## 2022-01-17 PROCEDURE — 99024 POSTOP FOLLOW-UP VISIT: CPT | Performed by: SURGERY

## 2022-01-17 PROCEDURE — 81001 URINALYSIS AUTO W/SCOPE: CPT | Performed by: INTERNAL MEDICINE

## 2022-01-17 PROCEDURE — 25010000002 MORPHINE PER 10 MG: Performed by: SURGERY

## 2022-01-17 PROCEDURE — 87070 CULTURE OTHR SPECIMN AEROBIC: CPT | Performed by: INTERNAL MEDICINE

## 2022-01-17 PROCEDURE — 25010000002 HYDROMORPHONE 1 MG/ML SOLUTION: Performed by: NURSE ANESTHETIST, CERTIFIED REGISTERED

## 2022-01-17 PROCEDURE — 87899 AGENT NOS ASSAY W/OPTIC: CPT | Performed by: INTERNAL MEDICINE

## 2022-01-17 PROCEDURE — 83735 ASSAY OF MAGNESIUM: CPT | Performed by: SURGERY

## 2022-01-17 PROCEDURE — 84100 ASSAY OF PHOSPHORUS: CPT | Performed by: SURGERY

## 2022-01-17 PROCEDURE — 80048 BASIC METABOLIC PNL TOTAL CA: CPT | Performed by: SURGERY

## 2022-01-17 PROCEDURE — 94799 UNLISTED PULMONARY SVC/PX: CPT

## 2022-01-17 PROCEDURE — 99222 1ST HOSP IP/OBS MODERATE 55: CPT | Performed by: INTERNAL MEDICINE

## 2022-01-17 PROCEDURE — 85025 COMPLETE CBC W/AUTO DIFF WBC: CPT | Performed by: SURGERY

## 2022-01-17 PROCEDURE — 71045 X-RAY EXAM CHEST 1 VIEW: CPT

## 2022-01-17 PROCEDURE — 94761 N-INVAS EAR/PLS OXIMETRY MLT: CPT

## 2022-01-17 PROCEDURE — 25010000002 FUROSEMIDE PER 20 MG: Performed by: INTERNAL MEDICINE

## 2022-01-17 RX ORDER — FUROSEMIDE 10 MG/ML
60 INJECTION INTRAMUSCULAR; INTRAVENOUS ONCE
Status: COMPLETED | OUTPATIENT
Start: 2022-01-17 | End: 2022-01-17

## 2022-01-17 RX ORDER — IPRATROPIUM BROMIDE AND ALBUTEROL SULFATE 2.5; .5 MG/3ML; MG/3ML
3 SOLUTION RESPIRATORY (INHALATION)
Status: DISCONTINUED | OUTPATIENT
Start: 2022-01-17 | End: 2022-01-23

## 2022-01-17 RX ADMIN — ARFORMOTEROL TARTRATE: 15 SOLUTION RESPIRATORY (INHALATION) at 20:31

## 2022-01-17 RX ADMIN — METOPROLOL TARTRATE 2.5 MG: 5 INJECTION INTRAVENOUS at 22:26

## 2022-01-17 RX ADMIN — FUROSEMIDE 60 MG: 10 INJECTION, SOLUTION INTRAMUSCULAR; INTRAVENOUS at 14:02

## 2022-01-17 RX ADMIN — ALBUTEROL SULFATE 2.5 MG: 2.5 SOLUTION RESPIRATORY (INHALATION) at 04:10

## 2022-01-17 RX ADMIN — METRONIDAZOLE 500 MG: 500 INJECTION, SOLUTION INTRAVENOUS at 17:06

## 2022-01-17 RX ADMIN — PANTOPRAZOLE SODIUM 8 MG/HR: 40 INJECTION, POWDER, FOR SOLUTION INTRAVENOUS at 12:49

## 2022-01-17 RX ADMIN — ARFORMOTEROL TARTRATE: 15 SOLUTION RESPIRATORY (INHALATION) at 09:33

## 2022-01-17 RX ADMIN — PANTOPRAZOLE SODIUM 8 MG/HR: 40 INJECTION, POWDER, FOR SOLUTION INTRAVENOUS at 01:51

## 2022-01-17 RX ADMIN — HYDROMORPHONE HYDROCHLORIDE 0.5 MG: 1 INJECTION, SOLUTION INTRAMUSCULAR; INTRAVENOUS; SUBCUTANEOUS at 14:17

## 2022-01-17 RX ADMIN — IPRATROPIUM BROMIDE AND ALBUTEROL SULFATE 3 ML: 2.5; .5 SOLUTION RESPIRATORY (INHALATION) at 20:21

## 2022-01-17 RX ADMIN — HYDROMORPHONE HYDROCHLORIDE 0.5 MG: 1 INJECTION, SOLUTION INTRAMUSCULAR; INTRAVENOUS; SUBCUTANEOUS at 01:06

## 2022-01-17 RX ADMIN — METOPROLOL TARTRATE 2.5 MG: 5 INJECTION INTRAVENOUS at 02:50

## 2022-01-17 RX ADMIN — ALBUTEROL SULFATE 2.5 MG: 2.5 SOLUTION RESPIRATORY (INHALATION) at 14:43

## 2022-01-17 RX ADMIN — METRONIDAZOLE 500 MG: 500 INJECTION, SOLUTION INTRAVENOUS at 09:33

## 2022-01-17 RX ADMIN — METRONIDAZOLE 500 MG: 500 INJECTION, SOLUTION INTRAVENOUS at 00:57

## 2022-01-17 RX ADMIN — MORPHINE SULFATE 4 MG: 4 INJECTION, SOLUTION INTRAMUSCULAR; INTRAVENOUS at 17:56

## 2022-01-17 RX ADMIN — SODIUM CHLORIDE 100 ML/HR: 9 INJECTION, SOLUTION INTRAVENOUS at 04:38

## 2022-01-17 RX ADMIN — METOPROLOL TARTRATE 2.5 MG: 5 INJECTION INTRAVENOUS at 09:33

## 2022-01-17 RX ADMIN — IPRATROPIUM BROMIDE AND ALBUTEROL SULFATE 3 ML: 2.5; .5 SOLUTION RESPIRATORY (INHALATION) at 17:06

## 2022-01-17 RX ADMIN — ENOXAPARIN SODIUM 30 MG: 30 INJECTION SUBCUTANEOUS at 09:33

## 2022-01-18 ENCOUNTER — APPOINTMENT (OUTPATIENT)
Dept: CARDIOLOGY | Facility: HOSPITAL | Age: 57
End: 2022-01-18

## 2022-01-18 LAB
ANION GAP SERPL CALCULATED.3IONS-SCNC: 10.3 MMOL/L (ref 5–15)
ARTERIAL PATENCY WRIST A: POSITIVE
BASE EXCESS BLDA CALC-SCNC: 1.3 MMOL/L (ref -2–2)
BDY SITE: ABNORMAL
BUN SERPL-MCNC: 64 MG/DL (ref 6–20)
BUN/CREAT SERPL: 31.2 (ref 7–25)
CALCIUM SPEC-SCNC: 8.1 MG/DL (ref 8.6–10.5)
CHLORIDE SERPL-SCNC: 96 MMOL/L (ref 98–107)
CO2 SERPL-SCNC: 25.7 MMOL/L (ref 22–29)
COHGB MFR BLD: 0.1 % (ref 0–1.5)
CREAT SERPL-MCNC: 2.05 MG/DL (ref 0.76–1.27)
DEPRECATED RDW RBC AUTO: 46.5 FL (ref 37–54)
ERYTHROCYTE [DISTWIDTH] IN BLOOD BY AUTOMATED COUNT: 13.7 % (ref 12.3–15.4)
FHHB: 8.6 % (ref 0–5)
GAS FLOW AIRWAY: 10 LPM
GFR SERPL CREATININE-BSD FRML MDRD: 33 ML/MIN/1.73
GLUCOSE SERPL-MCNC: 90 MG/DL (ref 65–99)
HCO3 BLDA-SCNC: 26.5 MMOL/L (ref 22–26)
HCT VFR BLD AUTO: 30.1 % (ref 37.5–51)
HGB BLD-MCNC: 10.3 G/DL (ref 13–17.7)
HGB BLDA-MCNC: 8.9 G/DL (ref 13.8–16.4)
LYMPHOCYTES # BLD MANUAL: 0.63 10*3/MM3 (ref 0.7–3.1)
LYMPHOCYTES NFR BLD MANUAL: 3 % (ref 5–12)
MCH RBC QN AUTO: 31.3 PG (ref 26.6–33)
MCHC RBC AUTO-ENTMCNC: 34.2 G/DL (ref 31.5–35.7)
MCV RBC AUTO: 91.5 FL (ref 79–97)
METHGB BLD QL: 0 % (ref 0–1.5)
MODALITY: ABNORMAL
MONOCYTES # BLD: 0.47 10*3/MM3 (ref 0.1–0.9)
NEUTROPHILS # BLD AUTO: 14.59 10*3/MM3 (ref 1.7–7)
NEUTROPHILS NFR BLD MANUAL: 74 % (ref 42.7–76)
NEUTS BAND NFR BLD MANUAL: 19 % (ref 0–5)
OXYHGB MFR BLDV: 91.3 % (ref 94–99)
PCO2 BLDA: 44.7 MM HG (ref 35–45)
PH BLDA: 7.39 PH UNITS (ref 7.35–7.45)
PLAT MORPH BLD: NORMAL
PLATELET # BLD AUTO: 311 10*3/MM3 (ref 140–450)
PMV BLD AUTO: 10.4 FL (ref 6–12)
PO2 BLDA: 66 MM HG (ref 80–100)
POTASSIUM SERPL-SCNC: 4.5 MMOL/L (ref 3.5–5.2)
RBC # BLD AUTO: 3.29 10*6/MM3 (ref 4.14–5.8)
RBC MORPH BLD: NORMAL
SAO2 % BLDCOA: 91.4 % (ref 95–99)
SCAN SLIDE: NORMAL
SODIUM SERPL-SCNC: 132 MMOL/L (ref 136–145)
VARIANT LYMPHS NFR BLD MANUAL: 4 % (ref 19.6–45.3)
WBC MORPH BLD: NORMAL
WBC NRBC COR # BLD: 15.69 10*3/MM3 (ref 3.4–10.8)

## 2022-01-18 PROCEDURE — 94761 N-INVAS EAR/PLS OXIMETRY MLT: CPT

## 2022-01-18 PROCEDURE — 99024 POSTOP FOLLOW-UP VISIT: CPT | Performed by: SURGERY

## 2022-01-18 PROCEDURE — 25010000002 LEVOFLOXACIN PER 250 MG: Performed by: SURGERY

## 2022-01-18 PROCEDURE — 99223 1ST HOSP IP/OBS HIGH 75: CPT | Performed by: INTERNAL MEDICINE

## 2022-01-18 PROCEDURE — 94799 UNLISTED PULMONARY SVC/PX: CPT

## 2022-01-18 PROCEDURE — 25010000002 MORPHINE PER 10 MG: Performed by: SURGERY

## 2022-01-18 PROCEDURE — 36600 WITHDRAWAL OF ARTERIAL BLOOD: CPT | Performed by: INTERNAL MEDICINE

## 2022-01-18 PROCEDURE — 99233 SBSQ HOSP IP/OBS HIGH 50: CPT | Performed by: INTERNAL MEDICINE

## 2022-01-18 PROCEDURE — 85025 COMPLETE CBC W/AUTO DIFF WBC: CPT | Performed by: NURSE PRACTITIONER

## 2022-01-18 PROCEDURE — 85007 BL SMEAR W/DIFF WBC COUNT: CPT | Performed by: NURSE PRACTITIONER

## 2022-01-18 PROCEDURE — 82805 BLOOD GASES W/O2 SATURATION: CPT | Performed by: INTERNAL MEDICINE

## 2022-01-18 PROCEDURE — 80048 BASIC METABOLIC PNL TOTAL CA: CPT | Performed by: NURSE PRACTITIONER

## 2022-01-18 PROCEDURE — 25010000002 LORAZEPAM PER 2 MG: Performed by: SURGERY

## 2022-01-18 PROCEDURE — 94640 AIRWAY INHALATION TREATMENT: CPT

## 2022-01-18 PROCEDURE — 82375 ASSAY CARBOXYHB QUANT: CPT | Performed by: INTERNAL MEDICINE

## 2022-01-18 PROCEDURE — 25010000002 ENOXAPARIN PER 10 MG: Performed by: SURGERY

## 2022-01-18 PROCEDURE — 83050 HGB METHEMOGLOBIN QUAN: CPT | Performed by: INTERNAL MEDICINE

## 2022-01-18 RX ORDER — LORAZEPAM 2 MG/ML
0.5 INJECTION INTRAMUSCULAR EVERY 6 HOURS PRN
Status: DISPENSED | OUTPATIENT
Start: 2022-01-18 | End: 2022-02-06

## 2022-01-18 RX ADMIN — METRONIDAZOLE 500 MG: 500 INJECTION, SOLUTION INTRAVENOUS at 08:17

## 2022-01-18 RX ADMIN — ALBUTEROL SULFATE 2.5 MG: 2.5 SOLUTION RESPIRATORY (INHALATION) at 08:47

## 2022-01-18 RX ADMIN — METOPROLOL TARTRATE 2.5 MG: 5 INJECTION INTRAVENOUS at 20:22

## 2022-01-18 RX ADMIN — PANTOPRAZOLE SODIUM 8 MG/HR: 40 INJECTION, POWDER, FOR SOLUTION INTRAVENOUS at 22:17

## 2022-01-18 RX ADMIN — LEVOFLOXACIN 750 MG: 750 INJECTION, SOLUTION INTRAVENOUS at 08:18

## 2022-01-18 RX ADMIN — ENOXAPARIN SODIUM 30 MG: 30 INJECTION SUBCUTANEOUS at 08:17

## 2022-01-18 RX ADMIN — LORAZEPAM 0.5 MG: 2 INJECTION INTRAMUSCULAR; INTRAVENOUS at 13:09

## 2022-01-18 RX ADMIN — IPRATROPIUM BROMIDE AND ALBUTEROL SULFATE 3 ML: 2.5; .5 SOLUTION RESPIRATORY (INHALATION) at 03:52

## 2022-01-18 RX ADMIN — IPRATROPIUM BROMIDE AND ALBUTEROL SULFATE 3 ML: 2.5; .5 SOLUTION RESPIRATORY (INHALATION) at 12:32

## 2022-01-18 RX ADMIN — METOPROLOL TARTRATE 2.5 MG: 5 INJECTION INTRAVENOUS at 12:32

## 2022-01-18 RX ADMIN — MORPHINE SULFATE 4 MG: 4 INJECTION, SOLUTION INTRAMUSCULAR; INTRAVENOUS at 06:44

## 2022-01-18 RX ADMIN — METRONIDAZOLE 500 MG: 500 INJECTION, SOLUTION INTRAVENOUS at 00:06

## 2022-01-18 RX ADMIN — PANTOPRAZOLE SODIUM 8 MG/HR: 40 INJECTION, POWDER, FOR SOLUTION INTRAVENOUS at 16:06

## 2022-01-18 RX ADMIN — IPRATROPIUM BROMIDE AND ALBUTEROL SULFATE 3 ML: 2.5; .5 SOLUTION RESPIRATORY (INHALATION) at 18:54

## 2022-01-18 RX ADMIN — ARFORMOTEROL TARTRATE: 15 SOLUTION RESPIRATORY (INHALATION) at 08:17

## 2022-01-18 RX ADMIN — IPRATROPIUM BROMIDE AND ALBUTEROL SULFATE 3 ML: 2.5; .5 SOLUTION RESPIRATORY (INHALATION) at 08:50

## 2022-01-18 RX ADMIN — IPRATROPIUM BROMIDE AND ALBUTEROL SULFATE 3 ML: 2.5; .5 SOLUTION RESPIRATORY (INHALATION) at 14:32

## 2022-01-18 RX ADMIN — MORPHINE SULFATE 4 MG: 4 INJECTION, SOLUTION INTRAMUSCULAR; INTRAVENOUS at 08:49

## 2022-01-18 RX ADMIN — ARFORMOTEROL TARTRATE: 15 SOLUTION RESPIRATORY (INHALATION) at 18:54

## 2022-01-18 RX ADMIN — METRONIDAZOLE 500 MG: 500 INJECTION, SOLUTION INTRAVENOUS at 16:05

## 2022-01-18 RX ADMIN — PANTOPRAZOLE SODIUM 8 MG/HR: 40 INJECTION, POWDER, FOR SOLUTION INTRAVENOUS at 08:54

## 2022-01-18 RX ADMIN — METOPROLOL TARTRATE 2.5 MG: 5 INJECTION INTRAVENOUS at 04:14

## 2022-01-19 LAB
ANION GAP SERPL CALCULATED.3IONS-SCNC: 12.4 MMOL/L (ref 5–15)
BACTERIA SPEC RESP CULT: NORMAL
BUN SERPL-MCNC: 49 MG/DL (ref 6–20)
BUN/CREAT SERPL: 35.5 (ref 7–25)
CALCIUM SPEC-SCNC: 8.2 MG/DL (ref 8.6–10.5)
CHLORIDE SERPL-SCNC: 101 MMOL/L (ref 98–107)
CO2 SERPL-SCNC: 26.6 MMOL/L (ref 22–29)
CREAT SERPL-MCNC: 1.38 MG/DL (ref 0.76–1.27)
GFR SERPL CREATININE-BSD FRML MDRD: 53 ML/MIN/1.73
GLUCOSE SERPL-MCNC: 128 MG/DL (ref 65–99)
GRAM STN SPEC: NORMAL
POTASSIUM SERPL-SCNC: 4.3 MMOL/L (ref 3.5–5.2)
SODIUM SERPL-SCNC: 140 MMOL/L (ref 136–145)

## 2022-01-19 PROCEDURE — 80048 BASIC METABOLIC PNL TOTAL CA: CPT | Performed by: SURGERY

## 2022-01-19 PROCEDURE — 25010000002 MORPHINE PER 10 MG: Performed by: SURGERY

## 2022-01-19 PROCEDURE — 97161 PT EVAL LOW COMPLEX 20 MIN: CPT

## 2022-01-19 PROCEDURE — 99232 SBSQ HOSP IP/OBS MODERATE 35: CPT | Performed by: INTERNAL MEDICINE

## 2022-01-19 PROCEDURE — 94799 UNLISTED PULMONARY SVC/PX: CPT

## 2022-01-19 PROCEDURE — 99024 POSTOP FOLLOW-UP VISIT: CPT | Performed by: SURGERY

## 2022-01-19 PROCEDURE — 25010000002 HYDROMORPHONE 1 MG/ML SOLUTION: Performed by: NURSE ANESTHETIST, CERTIFIED REGISTERED

## 2022-01-19 PROCEDURE — 97165 OT EVAL LOW COMPLEX 30 MIN: CPT

## 2022-01-19 PROCEDURE — 25010000002 ENOXAPARIN PER 10 MG: Performed by: SURGERY

## 2022-01-19 RX ADMIN — METRONIDAZOLE 500 MG: 500 INJECTION, SOLUTION INTRAVENOUS at 00:43

## 2022-01-19 RX ADMIN — IPRATROPIUM BROMIDE AND ALBUTEROL SULFATE 3 ML: 2.5; .5 SOLUTION RESPIRATORY (INHALATION) at 01:03

## 2022-01-19 RX ADMIN — HYDROMORPHONE HYDROCHLORIDE 0.5 MG: 1 INJECTION, SOLUTION INTRAMUSCULAR; INTRAVENOUS; SUBCUTANEOUS at 18:42

## 2022-01-19 RX ADMIN — PANTOPRAZOLE SODIUM 8 MG/HR: 40 INJECTION, POWDER, FOR SOLUTION INTRAVENOUS at 13:17

## 2022-01-19 RX ADMIN — PANTOPRAZOLE SODIUM 8 MG/HR: 40 INJECTION, POWDER, FOR SOLUTION INTRAVENOUS at 20:07

## 2022-01-19 RX ADMIN — ARFORMOTEROL TARTRATE: 15 SOLUTION RESPIRATORY (INHALATION) at 08:59

## 2022-01-19 RX ADMIN — MORPHINE SULFATE 4 MG: 4 INJECTION, SOLUTION INTRAMUSCULAR; INTRAVENOUS at 22:30

## 2022-01-19 RX ADMIN — METOPROLOL TARTRATE 2.5 MG: 5 INJECTION INTRAVENOUS at 15:18

## 2022-01-19 RX ADMIN — PANTOPRAZOLE SODIUM 8 MG/HR: 40 INJECTION, POWDER, FOR SOLUTION INTRAVENOUS at 06:55

## 2022-01-19 RX ADMIN — ARFORMOTEROL TARTRATE: 15 SOLUTION RESPIRATORY (INHALATION) at 18:42

## 2022-01-19 RX ADMIN — METOPROLOL TARTRATE 2.5 MG: 5 INJECTION INTRAVENOUS at 05:47

## 2022-01-19 RX ADMIN — METOPROLOL TARTRATE 2.5 MG: 5 INJECTION INTRAVENOUS at 20:08

## 2022-01-19 RX ADMIN — IPRATROPIUM BROMIDE AND ALBUTEROL SULFATE 3 ML: 2.5; .5 SOLUTION RESPIRATORY (INHALATION) at 18:42

## 2022-01-19 RX ADMIN — ENOXAPARIN SODIUM 30 MG: 30 INJECTION SUBCUTANEOUS at 08:58

## 2022-01-19 RX ADMIN — MORPHINE SULFATE 4 MG: 4 INJECTION, SOLUTION INTRAMUSCULAR; INTRAVENOUS at 10:35

## 2022-01-19 RX ADMIN — METRONIDAZOLE 500 MG: 500 INJECTION, SOLUTION INTRAVENOUS at 17:21

## 2022-01-19 RX ADMIN — METRONIDAZOLE 500 MG: 500 INJECTION, SOLUTION INTRAVENOUS at 08:58

## 2022-01-19 RX ADMIN — PANTOPRAZOLE SODIUM 8 MG/HR: 40 INJECTION, POWDER, FOR SOLUTION INTRAVENOUS at 01:58

## 2022-01-19 RX ADMIN — IPRATROPIUM BROMIDE AND ALBUTEROL SULFATE 3 ML: 2.5; .5 SOLUTION RESPIRATORY (INHALATION) at 08:59

## 2022-01-19 RX ADMIN — IPRATROPIUM BROMIDE AND ALBUTEROL SULFATE 3 ML: 2.5; .5 SOLUTION RESPIRATORY (INHALATION) at 15:31

## 2022-01-20 LAB
ALBUMIN SERPL-MCNC: 2.6 G/DL (ref 3.5–5.2)
ANION GAP SERPL CALCULATED.3IONS-SCNC: 8.6 MMOL/L (ref 5–15)
BASOPHILS # BLD MANUAL: 0.16 10*3/MM3 (ref 0–0.2)
BASOPHILS NFR BLD MANUAL: 1 % (ref 0–1.5)
BUN SERPL-MCNC: 32 MG/DL (ref 6–20)
BUN/CREAT SERPL: 26.7 (ref 7–25)
CALCIUM SPEC-SCNC: 8.6 MG/DL (ref 8.6–10.5)
CHLORIDE SERPL-SCNC: 102 MMOL/L (ref 98–107)
CO2 SERPL-SCNC: 27.4 MMOL/L (ref 22–29)
CREAT SERPL-MCNC: 1.2 MG/DL (ref 0.76–1.27)
DEPRECATED RDW RBC AUTO: 50 FL (ref 37–54)
EOSINOPHIL # BLD MANUAL: 0.16 10*3/MM3 (ref 0–0.4)
EOSINOPHIL NFR BLD MANUAL: 1 % (ref 0.3–6.2)
ERYTHROCYTE [DISTWIDTH] IN BLOOD BY AUTOMATED COUNT: 14.3 % (ref 12.3–15.4)
GFR SERPL CREATININE-BSD FRML MDRD: 62 ML/MIN/1.73
GLUCOSE BLDC GLUCOMTR-MCNC: 129 MG/DL (ref 70–99)
GLUCOSE SERPL-MCNC: 126 MG/DL (ref 65–99)
HCT VFR BLD AUTO: 31.4 % (ref 37.5–51)
HGB BLD-MCNC: 10.3 G/DL (ref 13–17.7)
LYMPHOCYTES # BLD MANUAL: 1.11 10*3/MM3 (ref 0.7–3.1)
LYMPHOCYTES NFR BLD MANUAL: 4 % (ref 5–12)
MAGNESIUM SERPL-MCNC: 2.9 MG/DL (ref 1.6–2.6)
MCH RBC QN AUTO: 31.3 PG (ref 26.6–33)
MCHC RBC AUTO-ENTMCNC: 32.8 G/DL (ref 31.5–35.7)
MCV RBC AUTO: 95.4 FL (ref 79–97)
METAMYELOCYTES NFR BLD MANUAL: 1 % (ref 0–0)
MONOCYTES # BLD: 0.63 10*3/MM3 (ref 0.1–0.9)
NEUTROPHILS # BLD AUTO: 13.58 10*3/MM3 (ref 1.7–7)
NEUTROPHILS NFR BLD MANUAL: 62 % (ref 42.7–76)
NEUTS BAND NFR BLD MANUAL: 24 % (ref 0–5)
PHOSPHATE SERPL-MCNC: 2.9 MG/DL (ref 2.5–4.5)
PLATELET # BLD AUTO: 319 10*3/MM3 (ref 140–450)
PMV BLD AUTO: 9.5 FL (ref 6–12)
POTASSIUM SERPL-SCNC: 4.3 MMOL/L (ref 3.5–5.2)
RBC # BLD AUTO: 3.29 10*6/MM3 (ref 4.14–5.8)
RBC MORPH BLD: NORMAL
SCAN SLIDE: NORMAL
SMALL PLATELETS BLD QL SMEAR: ADEQUATE
SODIUM SERPL-SCNC: 138 MMOL/L (ref 136–145)
TOXIC GRANULATION: ABNORMAL
VARIANT LYMPHS NFR BLD MANUAL: 7 % (ref 19.6–45.3)
WBC NRBC COR # BLD: 15.79 10*3/MM3 (ref 3.4–10.8)

## 2022-01-20 PROCEDURE — 25010000002 LEVOFLOXACIN PER 250 MG: Performed by: SURGERY

## 2022-01-20 PROCEDURE — 99232 SBSQ HOSP IP/OBS MODERATE 35: CPT | Performed by: INTERNAL MEDICINE

## 2022-01-20 PROCEDURE — 82962 GLUCOSE BLOOD TEST: CPT

## 2022-01-20 PROCEDURE — 83735 ASSAY OF MAGNESIUM: CPT | Performed by: PHYSICIAN ASSISTANT

## 2022-01-20 PROCEDURE — 99024 POSTOP FOLLOW-UP VISIT: CPT | Performed by: SURGERY

## 2022-01-20 PROCEDURE — 85007 BL SMEAR W/DIFF WBC COUNT: CPT | Performed by: PHYSICIAN ASSISTANT

## 2022-01-20 PROCEDURE — 94799 UNLISTED PULMONARY SVC/PX: CPT

## 2022-01-20 PROCEDURE — 25010000002 MORPHINE PER 10 MG: Performed by: SURGERY

## 2022-01-20 PROCEDURE — 25010000002 ONDANSETRON PER 1 MG: Performed by: SURGERY

## 2022-01-20 PROCEDURE — 85025 COMPLETE CBC W/AUTO DIFF WBC: CPT | Performed by: PHYSICIAN ASSISTANT

## 2022-01-20 PROCEDURE — 25010000002 ENOXAPARIN PER 10 MG: Performed by: SURGERY

## 2022-01-20 PROCEDURE — 80069 RENAL FUNCTION PANEL: CPT | Performed by: PHYSICIAN ASSISTANT

## 2022-01-20 RX ORDER — METOPROLOL SUCCINATE 25 MG/1
25 TABLET, EXTENDED RELEASE ORAL
Status: DISCONTINUED | OUTPATIENT
Start: 2022-01-20 | End: 2022-01-31

## 2022-01-20 RX ORDER — LEVOFLOXACIN 5 MG/ML
750 INJECTION, SOLUTION INTRAVENOUS EVERY 24 HOURS
Status: COMPLETED | OUTPATIENT
Start: 2022-01-20 | End: 2022-01-23

## 2022-01-20 RX ADMIN — MORPHINE SULFATE 4 MG: 4 INJECTION, SOLUTION INTRAMUSCULAR; INTRAVENOUS at 15:15

## 2022-01-20 RX ADMIN — ONDANSETRON 4 MG: 2 INJECTION INTRAMUSCULAR; INTRAVENOUS at 15:15

## 2022-01-20 RX ADMIN — METOPROLOL SUCCINATE 25 MG: 25 TABLET, FILM COATED, EXTENDED RELEASE ORAL at 12:40

## 2022-01-20 RX ADMIN — MORPHINE SULFATE 4 MG: 4 INJECTION, SOLUTION INTRAMUSCULAR; INTRAVENOUS at 18:18

## 2022-01-20 RX ADMIN — MORPHINE SULFATE 4 MG: 4 INJECTION, SOLUTION INTRAMUSCULAR; INTRAVENOUS at 20:19

## 2022-01-20 RX ADMIN — PANTOPRAZOLE SODIUM 8 MG/HR: 40 INJECTION, POWDER, FOR SOLUTION INTRAVENOUS at 04:36

## 2022-01-20 RX ADMIN — ARFORMOTEROL TARTRATE: 15 SOLUTION RESPIRATORY (INHALATION) at 05:49

## 2022-01-20 RX ADMIN — IPRATROPIUM BROMIDE AND ALBUTEROL SULFATE 3 ML: 2.5; .5 SOLUTION RESPIRATORY (INHALATION) at 12:40

## 2022-01-20 RX ADMIN — MORPHINE SULFATE 4 MG: 4 INJECTION, SOLUTION INTRAMUSCULAR; INTRAVENOUS at 04:37

## 2022-01-20 RX ADMIN — METRONIDAZOLE 500 MG: 500 INJECTION, SOLUTION INTRAVENOUS at 07:54

## 2022-01-20 RX ADMIN — MORPHINE SULFATE 4 MG: 4 INJECTION, SOLUTION INTRAMUSCULAR; INTRAVENOUS at 10:14

## 2022-01-20 RX ADMIN — MORPHINE SULFATE 4 MG: 4 INJECTION, SOLUTION INTRAMUSCULAR; INTRAVENOUS at 07:54

## 2022-01-20 RX ADMIN — ENOXAPARIN SODIUM 30 MG: 30 INJECTION SUBCUTANEOUS at 07:54

## 2022-01-20 RX ADMIN — METOPROLOL TARTRATE 2.5 MG: 5 INJECTION INTRAVENOUS at 04:36

## 2022-01-20 RX ADMIN — IPRATROPIUM BROMIDE AND ALBUTEROL SULFATE 3 ML: 2.5; .5 SOLUTION RESPIRATORY (INHALATION) at 05:49

## 2022-01-20 RX ADMIN — METRONIDAZOLE 500 MG: 500 INJECTION, SOLUTION INTRAVENOUS at 00:24

## 2022-01-20 RX ADMIN — LEVOFLOXACIN 750 MG: 750 INJECTION, SOLUTION INTRAVENOUS at 09:02

## 2022-01-20 RX ADMIN — IPRATROPIUM BROMIDE AND ALBUTEROL SULFATE 3 ML: 2.5; .5 SOLUTION RESPIRATORY (INHALATION) at 00:23

## 2022-01-20 RX ADMIN — ARFORMOTEROL TARTRATE: 15 SOLUTION RESPIRATORY (INHALATION) at 18:21

## 2022-01-20 RX ADMIN — METRONIDAZOLE 500 MG: 500 INJECTION, SOLUTION INTRAVENOUS at 18:21

## 2022-01-20 RX ADMIN — MORPHINE SULFATE 4 MG: 4 INJECTION, SOLUTION INTRAMUSCULAR; INTRAVENOUS at 22:19

## 2022-01-20 RX ADMIN — PANTOPRAZOLE SODIUM 8 MG/HR: 40 INJECTION, POWDER, FOR SOLUTION INTRAVENOUS at 13:02

## 2022-01-20 RX ADMIN — MORPHINE SULFATE 4 MG: 4 INJECTION, SOLUTION INTRAMUSCULAR; INTRAVENOUS at 12:40

## 2022-01-20 RX ADMIN — IPRATROPIUM BROMIDE AND ALBUTEROL SULFATE 3 ML: 2.5; .5 SOLUTION RESPIRATORY (INHALATION) at 18:32

## 2022-01-21 ENCOUNTER — APPOINTMENT (OUTPATIENT)
Dept: CT IMAGING | Facility: HOSPITAL | Age: 57
End: 2022-01-21

## 2022-01-21 ENCOUNTER — APPOINTMENT (OUTPATIENT)
Dept: ULTRASOUND IMAGING | Facility: HOSPITAL | Age: 57
End: 2022-01-21

## 2022-01-21 LAB
ANION GAP SERPL CALCULATED.3IONS-SCNC: 10.4 MMOL/L (ref 5–15)
BACTERIA SPEC AEROBE CULT: NORMAL
BACTERIA SPEC AEROBE CULT: NORMAL
BUN SERPL-MCNC: 26 MG/DL (ref 6–20)
BUN/CREAT SERPL: 24.3 (ref 7–25)
CALCIUM SPEC-SCNC: 8.9 MG/DL (ref 8.6–10.5)
CHLORIDE SERPL-SCNC: 100 MMOL/L (ref 98–107)
CO2 SERPL-SCNC: 25.6 MMOL/L (ref 22–29)
CREAT SERPL-MCNC: 1.07 MG/DL (ref 0.76–1.27)
DEPRECATED RDW RBC AUTO: 50.6 FL (ref 37–54)
EOSINOPHIL # BLD MANUAL: 0.2 10*3/MM3 (ref 0–0.4)
EOSINOPHIL NFR BLD MANUAL: 1 % (ref 0.3–6.2)
ERYTHROCYTE [DISTWIDTH] IN BLOOD BY AUTOMATED COUNT: 14.5 % (ref 12.3–15.4)
GFR SERPL CREATININE-BSD FRML MDRD: 71 ML/MIN/1.73
GLUCOSE BLDC GLUCOMTR-MCNC: 135 MG/DL (ref 70–99)
GLUCOSE SERPL-MCNC: 109 MG/DL (ref 65–99)
HCT VFR BLD AUTO: 30.8 % (ref 37.5–51)
HGB BLD-MCNC: 10.1 G/DL (ref 13–17.7)
HYPOCHROMIA BLD QL: ABNORMAL
LARGE PLATELETS: ABNORMAL
LYMPHOCYTES # BLD MANUAL: 1.22 10*3/MM3 (ref 0.7–3.1)
LYMPHOCYTES NFR BLD MANUAL: 4 % (ref 5–12)
MCH RBC QN AUTO: 31.4 PG (ref 26.6–33)
MCHC RBC AUTO-ENTMCNC: 32.8 G/DL (ref 31.5–35.7)
MCV RBC AUTO: 95.7 FL (ref 79–97)
MONOCYTES # BLD: 0.81 10*3/MM3 (ref 0.1–0.9)
NEUTROPHILS # BLD AUTO: 18.02 10*3/MM3 (ref 1.7–7)
NEUTROPHILS NFR BLD MANUAL: 74 % (ref 42.7–76)
NEUTS BAND NFR BLD MANUAL: 15 % (ref 0–5)
PLATELET # BLD AUTO: 331 10*3/MM3 (ref 140–450)
PMV BLD AUTO: 9.4 FL (ref 6–12)
POTASSIUM SERPL-SCNC: 4.1 MMOL/L (ref 3.5–5.2)
PROCALCITONIN SERPL-MCNC: 2.73 NG/ML (ref 0–0.25)
RBC # BLD AUTO: 3.22 10*6/MM3 (ref 4.14–5.8)
SCAN SLIDE: NORMAL
SMALL PLATELETS BLD QL SMEAR: ADEQUATE
SODIUM SERPL-SCNC: 136 MMOL/L (ref 136–145)
TOXIC GRANULATION: ABNORMAL
VARIANT LYMPHS NFR BLD MANUAL: 6 % (ref 19.6–45.3)
WBC NRBC COR # BLD: 20.25 10*3/MM3 (ref 3.4–10.8)

## 2022-01-21 PROCEDURE — 25010000002 MORPHINE PER 10 MG: Performed by: SURGERY

## 2022-01-21 PROCEDURE — C1751 CATH, INF, PER/CENT/MIDLINE: HCPCS

## 2022-01-21 PROCEDURE — 85007 BL SMEAR W/DIFF WBC COUNT: CPT | Performed by: PHYSICIAN ASSISTANT

## 2022-01-21 PROCEDURE — 82962 GLUCOSE BLOOD TEST: CPT

## 2022-01-21 PROCEDURE — 99232 SBSQ HOSP IP/OBS MODERATE 35: CPT | Performed by: PHYSICIAN ASSISTANT

## 2022-01-21 PROCEDURE — 85025 COMPLETE CBC W/AUTO DIFF WBC: CPT | Performed by: PHYSICIAN ASSISTANT

## 2022-01-21 PROCEDURE — 25010000002 CALCIUM GLUCONATE PER 10 ML: Performed by: SURGERY

## 2022-01-21 PROCEDURE — 97110 THERAPEUTIC EXERCISES: CPT

## 2022-01-21 PROCEDURE — 25010000002 LEVOFLOXACIN PER 250 MG: Performed by: PHYSICIAN ASSISTANT

## 2022-01-21 PROCEDURE — 99232 SBSQ HOSP IP/OBS MODERATE 35: CPT | Performed by: INTERNAL MEDICINE

## 2022-01-21 PROCEDURE — 80048 BASIC METABOLIC PNL TOTAL CA: CPT | Performed by: SURGERY

## 2022-01-21 PROCEDURE — 76775 US EXAM ABDO BACK WALL LIM: CPT

## 2022-01-21 PROCEDURE — 74176 CT ABD & PELVIS W/O CONTRAST: CPT

## 2022-01-21 PROCEDURE — 02HV33Z INSERTION OF INFUSION DEVICE INTO SUPERIOR VENA CAVA, PERCUTANEOUS APPROACH: ICD-10-PCS | Performed by: SURGERY

## 2022-01-21 PROCEDURE — 25010000002 ENOXAPARIN PER 10 MG: Performed by: SURGERY

## 2022-01-21 PROCEDURE — 94799 UNLISTED PULMONARY SVC/PX: CPT

## 2022-01-21 PROCEDURE — 25010000002 ONDANSETRON PER 1 MG: Performed by: SURGERY

## 2022-01-21 PROCEDURE — 84145 PROCALCITONIN (PCT): CPT | Performed by: PHYSICIAN ASSISTANT

## 2022-01-21 RX ORDER — DEXTROSE MONOHYDRATE 100 MG/ML
50 INJECTION, SOLUTION INTRAVENOUS CONTINUOUS
Status: DISCONTINUED | OUTPATIENT
Start: 2022-01-21 | End: 2022-01-21

## 2022-01-21 RX ADMIN — MORPHINE SULFATE 4 MG: 4 INJECTION, SOLUTION INTRAMUSCULAR; INTRAVENOUS at 20:25

## 2022-01-21 RX ADMIN — ONDANSETRON 4 MG: 2 INJECTION INTRAMUSCULAR; INTRAVENOUS at 04:03

## 2022-01-21 RX ADMIN — METRONIDAZOLE 500 MG: 500 INJECTION, SOLUTION INTRAVENOUS at 00:14

## 2022-01-21 RX ADMIN — METRONIDAZOLE 500 MG: 500 INJECTION, SOLUTION INTRAVENOUS at 08:08

## 2022-01-21 RX ADMIN — MORPHINE SULFATE 4 MG: 4 INJECTION, SOLUTION INTRAMUSCULAR; INTRAVENOUS at 02:03

## 2022-01-21 RX ADMIN — IPRATROPIUM BROMIDE AND ALBUTEROL SULFATE 3 ML: 2.5; .5 SOLUTION RESPIRATORY (INHALATION) at 06:18

## 2022-01-21 RX ADMIN — MORPHINE SULFATE 4 MG: 4 INJECTION, SOLUTION INTRAMUSCULAR; INTRAVENOUS at 09:19

## 2022-01-21 RX ADMIN — IPRATROPIUM BROMIDE AND ALBUTEROL SULFATE 3 ML: 2.5; .5 SOLUTION RESPIRATORY (INHALATION) at 00:14

## 2022-01-21 RX ADMIN — METRONIDAZOLE 500 MG: 500 INJECTION, SOLUTION INTRAVENOUS at 17:51

## 2022-01-21 RX ADMIN — MORPHINE SULFATE 4 MG: 4 INJECTION, SOLUTION INTRAMUSCULAR; INTRAVENOUS at 04:03

## 2022-01-21 RX ADMIN — ENOXAPARIN SODIUM 30 MG: 30 INJECTION SUBCUTANEOUS at 09:19

## 2022-01-21 RX ADMIN — MORPHINE SULFATE 4 MG: 4 INJECTION, SOLUTION INTRAMUSCULAR; INTRAVENOUS at 18:24

## 2022-01-21 RX ADMIN — PANTOPRAZOLE SODIUM 8 MG/HR: 40 INJECTION, POWDER, FOR SOLUTION INTRAVENOUS at 19:29

## 2022-01-21 RX ADMIN — MORPHINE SULFATE 4 MG: 4 INJECTION, SOLUTION INTRAMUSCULAR; INTRAVENOUS at 23:14

## 2022-01-21 RX ADMIN — MORPHINE SULFATE 4 MG: 4 INJECTION, SOLUTION INTRAMUSCULAR; INTRAVENOUS at 12:14

## 2022-01-21 RX ADMIN — MORPHINE SULFATE 4 MG: 4 INJECTION, SOLUTION INTRAMUSCULAR; INTRAVENOUS at 06:17

## 2022-01-21 RX ADMIN — IPRATROPIUM BROMIDE AND ALBUTEROL SULFATE 3 ML: 2.5; .5 SOLUTION RESPIRATORY (INHALATION) at 14:41

## 2022-01-21 RX ADMIN — MORPHINE SULFATE 4 MG: 4 INJECTION, SOLUTION INTRAMUSCULAR; INTRAVENOUS at 00:13

## 2022-01-21 RX ADMIN — ARFORMOTEROL TARTRATE: 15 SOLUTION RESPIRATORY (INHALATION) at 06:18

## 2022-01-21 RX ADMIN — PANTOPRAZOLE SODIUM 8 MG/HR: 40 INJECTION, POWDER, FOR SOLUTION INTRAVENOUS at 00:14

## 2022-01-21 RX ADMIN — CALCIUM GLUCONATE: 98 INJECTION, SOLUTION INTRAVENOUS at 21:03

## 2022-01-21 RX ADMIN — METOPROLOL SUCCINATE 25 MG: 25 TABLET, FILM COATED, EXTENDED RELEASE ORAL at 09:19

## 2022-01-21 RX ADMIN — PANTOPRAZOLE SODIUM 8 MG/HR: 40 INJECTION, POWDER, FOR SOLUTION INTRAVENOUS at 06:17

## 2022-01-21 RX ADMIN — ONDANSETRON 4 MG: 2 INJECTION INTRAMUSCULAR; INTRAVENOUS at 09:41

## 2022-01-21 RX ADMIN — PANTOPRAZOLE SODIUM 8 MG/HR: 40 INJECTION, POWDER, FOR SOLUTION INTRAVENOUS at 14:42

## 2022-01-21 RX ADMIN — MORPHINE SULFATE 4 MG: 4 INJECTION, SOLUTION INTRAMUSCULAR; INTRAVENOUS at 15:46

## 2022-01-21 RX ADMIN — LEVOFLOXACIN 750 MG: 750 INJECTION, SOLUTION INTRAVENOUS at 09:41

## 2022-01-22 LAB
ALBUMIN SERPL-MCNC: 2.3 G/DL (ref 3.5–5.2)
ALBUMIN/GLOB SERPL: 0.7 G/DL
ALP SERPL-CCNC: 85 U/L (ref 39–117)
ALT SERPL W P-5'-P-CCNC: 10 U/L (ref 1–41)
ANION GAP SERPL CALCULATED.3IONS-SCNC: 9.5 MMOL/L (ref 5–15)
AST SERPL-CCNC: 22 U/L (ref 1–40)
BILIRUB SERPL-MCNC: 0.4 MG/DL (ref 0–1.2)
BUN SERPL-MCNC: 30 MG/DL (ref 6–20)
BUN/CREAT SERPL: 27.8 (ref 7–25)
CALCIUM SPEC-SCNC: 8.5 MG/DL (ref 8.6–10.5)
CHLORIDE SERPL-SCNC: 101 MMOL/L (ref 98–107)
CO2 SERPL-SCNC: 26.5 MMOL/L (ref 22–29)
CREAT SERPL-MCNC: 1.08 MG/DL (ref 0.76–1.27)
DEPRECATED RDW RBC AUTO: 50.7 FL (ref 37–54)
EOSINOPHIL # BLD MANUAL: 0.2 10*3/MM3 (ref 0–0.4)
EOSINOPHIL NFR BLD MANUAL: 1 % (ref 0.3–6.2)
ERYTHROCYTE [DISTWIDTH] IN BLOOD BY AUTOMATED COUNT: 14.3 % (ref 12.3–15.4)
GFR SERPL CREATININE-BSD FRML MDRD: 70 ML/MIN/1.73
GLOBULIN UR ELPH-MCNC: 3.5 GM/DL
GLUCOSE BLDC GLUCOMTR-MCNC: 149 MG/DL (ref 70–99)
GLUCOSE SERPL-MCNC: 128 MG/DL (ref 65–99)
HCT VFR BLD AUTO: 31 % (ref 37.5–51)
HGB BLD-MCNC: 9.9 G/DL (ref 13–17.7)
LYMPHOCYTES # BLD MANUAL: 1.4 10*3/MM3 (ref 0.7–3.1)
LYMPHOCYTES NFR BLD MANUAL: 5 % (ref 5–12)
MAGNESIUM SERPL-MCNC: 2.4 MG/DL (ref 1.6–2.6)
MCH RBC QN AUTO: 30.9 PG (ref 26.6–33)
MCHC RBC AUTO-ENTMCNC: 31.9 G/DL (ref 31.5–35.7)
MCV RBC AUTO: 96.9 FL (ref 79–97)
MONOCYTES # BLD: 1 10*3/MM3 (ref 0.1–0.9)
MYELOCYTES NFR BLD MANUAL: 1 % (ref 0–0)
NEUTROPHILS # BLD AUTO: 17.23 10*3/MM3 (ref 1.7–7)
NEUTROPHILS NFR BLD MANUAL: 81 % (ref 42.7–76)
NEUTS BAND NFR BLD MANUAL: 5 % (ref 0–5)
PHOSPHATE SERPL-MCNC: 3.3 MG/DL (ref 2.5–4.5)
PLAT MORPH BLD: NORMAL
PLATELET # BLD AUTO: 338 10*3/MM3 (ref 140–450)
PMV BLD AUTO: 9.6 FL (ref 6–12)
POTASSIUM SERPL-SCNC: 3.8 MMOL/L (ref 3.5–5.2)
PROT SERPL-MCNC: 5.8 G/DL (ref 6–8.5)
RBC # BLD AUTO: 3.2 10*6/MM3 (ref 4.14–5.8)
RBC MORPH BLD: NORMAL
SCAN SLIDE: NORMAL
SODIUM SERPL-SCNC: 137 MMOL/L (ref 136–145)
TRIGL SERPL-MCNC: 175 MG/DL (ref 0–150)
VARIANT LYMPHS NFR BLD MANUAL: 7 % (ref 19.6–45.3)
WBC MORPH BLD: NORMAL
WBC NRBC COR # BLD: 20.03 10*3/MM3 (ref 3.4–10.8)

## 2022-01-22 PROCEDURE — 25010000002 LEVOFLOXACIN PER 250 MG: Performed by: PHYSICIAN ASSISTANT

## 2022-01-22 PROCEDURE — 25010000002 ENOXAPARIN PER 10 MG: Performed by: SURGERY

## 2022-01-22 PROCEDURE — 99024 POSTOP FOLLOW-UP VISIT: CPT | Performed by: SURGERY

## 2022-01-22 PROCEDURE — 94761 N-INVAS EAR/PLS OXIMETRY MLT: CPT

## 2022-01-22 PROCEDURE — 99233 SBSQ HOSP IP/OBS HIGH 50: CPT | Performed by: INTERNAL MEDICINE

## 2022-01-22 PROCEDURE — 84478 ASSAY OF TRIGLYCERIDES: CPT | Performed by: SURGERY

## 2022-01-22 PROCEDURE — 80053 COMPREHEN METABOLIC PANEL: CPT | Performed by: SURGERY

## 2022-01-22 PROCEDURE — 94799 UNLISTED PULMONARY SVC/PX: CPT

## 2022-01-22 PROCEDURE — 25010000002 MORPHINE PER 10 MG: Performed by: SURGERY

## 2022-01-22 PROCEDURE — 85025 COMPLETE CBC W/AUTO DIFF WBC: CPT | Performed by: PHYSICIAN ASSISTANT

## 2022-01-22 PROCEDURE — 99232 SBSQ HOSP IP/OBS MODERATE 35: CPT | Performed by: INTERNAL MEDICINE

## 2022-01-22 PROCEDURE — 83735 ASSAY OF MAGNESIUM: CPT | Performed by: PHYSICIAN ASSISTANT

## 2022-01-22 PROCEDURE — 84100 ASSAY OF PHOSPHORUS: CPT | Performed by: SURGERY

## 2022-01-22 PROCEDURE — 82962 GLUCOSE BLOOD TEST: CPT

## 2022-01-22 PROCEDURE — 25010000002 CALCIUM GLUCONATE PER 10 ML: Performed by: SURGERY

## 2022-01-22 PROCEDURE — 85007 BL SMEAR W/DIFF WBC COUNT: CPT | Performed by: PHYSICIAN ASSISTANT

## 2022-01-22 RX ADMIN — MORPHINE SULFATE 4 MG: 4 INJECTION, SOLUTION INTRAMUSCULAR; INTRAVENOUS at 17:56

## 2022-01-22 RX ADMIN — CALCIUM GLUCONATE: 98 INJECTION, SOLUTION INTRAVENOUS at 17:58

## 2022-01-22 RX ADMIN — METOPROLOL SUCCINATE 25 MG: 25 TABLET, FILM COATED, EXTENDED RELEASE ORAL at 08:29

## 2022-01-22 RX ADMIN — METRONIDAZOLE 500 MG: 500 INJECTION, SOLUTION INTRAVENOUS at 00:11

## 2022-01-22 RX ADMIN — PANTOPRAZOLE SODIUM 8 MG/HR: 40 INJECTION, POWDER, FOR SOLUTION INTRAVENOUS at 10:57

## 2022-01-22 RX ADMIN — MORPHINE SULFATE 4 MG: 4 INJECTION, SOLUTION INTRAMUSCULAR; INTRAVENOUS at 06:01

## 2022-01-22 RX ADMIN — LEVOFLOXACIN 750 MG: 750 INJECTION, SOLUTION INTRAVENOUS at 08:29

## 2022-01-22 RX ADMIN — IPRATROPIUM BROMIDE AND ALBUTEROL SULFATE 3 ML: 2.5; .5 SOLUTION RESPIRATORY (INHALATION) at 13:10

## 2022-01-22 RX ADMIN — MORPHINE SULFATE 4 MG: 4 INJECTION, SOLUTION INTRAMUSCULAR; INTRAVENOUS at 15:22

## 2022-01-22 RX ADMIN — I.V. FAT EMULSION 100 G: 20 EMULSION INTRAVENOUS at 08:29

## 2022-01-22 RX ADMIN — MORPHINE SULFATE 4 MG: 4 INJECTION, SOLUTION INTRAMUSCULAR; INTRAVENOUS at 11:12

## 2022-01-22 RX ADMIN — MORPHINE SULFATE 4 MG: 4 INJECTION, SOLUTION INTRAMUSCULAR; INTRAVENOUS at 01:51

## 2022-01-22 RX ADMIN — MORPHINE SULFATE 4 MG: 4 INJECTION, SOLUTION INTRAMUSCULAR; INTRAVENOUS at 13:10

## 2022-01-22 RX ADMIN — METRONIDAZOLE 500 MG: 500 INJECTION, SOLUTION INTRAVENOUS at 08:30

## 2022-01-22 RX ADMIN — ARFORMOTEROL TARTRATE: 15 SOLUTION RESPIRATORY (INHALATION) at 19:20

## 2022-01-22 RX ADMIN — MORPHINE SULFATE 4 MG: 4 INJECTION, SOLUTION INTRAMUSCULAR; INTRAVENOUS at 08:44

## 2022-01-22 RX ADMIN — IPRATROPIUM BROMIDE AND ALBUTEROL SULFATE 3 ML: 2.5; .5 SOLUTION RESPIRATORY (INHALATION) at 00:11

## 2022-01-22 RX ADMIN — MORPHINE SULFATE 4 MG: 4 INJECTION, SOLUTION INTRAMUSCULAR; INTRAVENOUS at 20:15

## 2022-01-22 RX ADMIN — IPRATROPIUM BROMIDE AND ALBUTEROL SULFATE 3 ML: 2.5; .5 SOLUTION RESPIRATORY (INHALATION) at 19:20

## 2022-01-22 RX ADMIN — ENOXAPARIN SODIUM 30 MG: 30 INJECTION SUBCUTANEOUS at 08:30

## 2022-01-22 RX ADMIN — PANTOPRAZOLE SODIUM 8 MG/HR: 40 INJECTION, POWDER, FOR SOLUTION INTRAVENOUS at 01:47

## 2022-01-22 RX ADMIN — PANTOPRAZOLE SODIUM 8 MG/HR: 40 INJECTION, POWDER, FOR SOLUTION INTRAVENOUS at 22:11

## 2022-01-22 RX ADMIN — MORPHINE SULFATE 4 MG: 4 INJECTION, SOLUTION INTRAMUSCULAR; INTRAVENOUS at 03:55

## 2022-01-22 RX ADMIN — ARFORMOTEROL TARTRATE: 15 SOLUTION RESPIRATORY (INHALATION) at 06:03

## 2022-01-22 RX ADMIN — PANTOPRAZOLE SODIUM 8 MG/HR: 40 INJECTION, POWDER, FOR SOLUTION INTRAVENOUS at 17:00

## 2022-01-22 RX ADMIN — MORPHINE SULFATE 4 MG: 4 INJECTION, SOLUTION INTRAMUSCULAR; INTRAVENOUS at 22:12

## 2022-01-22 RX ADMIN — METRONIDAZOLE 500 MG: 500 INJECTION, SOLUTION INTRAVENOUS at 15:59

## 2022-01-22 RX ADMIN — IPRATROPIUM BROMIDE AND ALBUTEROL SULFATE 3 ML: 2.5; .5 SOLUTION RESPIRATORY (INHALATION) at 06:04

## 2022-01-23 ENCOUNTER — APPOINTMENT (OUTPATIENT)
Dept: GENERAL RADIOLOGY | Facility: HOSPITAL | Age: 57
End: 2022-01-23

## 2022-01-23 LAB
ALBUMIN SERPL-MCNC: 2.3 G/DL (ref 3.5–5.2)
ALBUMIN/GLOB SERPL: 0.7 G/DL
ALP SERPL-CCNC: 74 U/L (ref 39–117)
ALT SERPL W P-5'-P-CCNC: 8 U/L (ref 1–41)
ANION GAP SERPL CALCULATED.3IONS-SCNC: 9.4 MMOL/L (ref 5–15)
AST SERPL-CCNC: 16 U/L (ref 1–40)
BILIRUB SERPL-MCNC: 0.2 MG/DL (ref 0–1.2)
BUN SERPL-MCNC: 25 MG/DL (ref 6–20)
BUN/CREAT SERPL: 27.2 (ref 7–25)
CALCIUM SPEC-SCNC: 8.6 MG/DL (ref 8.6–10.5)
CHLORIDE SERPL-SCNC: 102 MMOL/L (ref 98–107)
CO2 SERPL-SCNC: 24.6 MMOL/L (ref 22–29)
CREAT SERPL-MCNC: 0.92 MG/DL (ref 0.76–1.27)
DEPRECATED RDW RBC AUTO: 49.8 FL (ref 37–54)
ERYTHROCYTE [DISTWIDTH] IN BLOOD BY AUTOMATED COUNT: 14.1 % (ref 12.3–15.4)
GFR SERPL CREATININE-BSD FRML MDRD: 84 ML/MIN/1.73
GLOBULIN UR ELPH-MCNC: 3.1 GM/DL
GLUCOSE BLDC GLUCOMTR-MCNC: 142 MG/DL (ref 70–99)
GLUCOSE SERPL-MCNC: 138 MG/DL (ref 65–99)
HCT VFR BLD AUTO: 28.4 % (ref 37.5–51)
HGB BLD-MCNC: 9.1 G/DL (ref 13–17.7)
LYMPHOCYTES # BLD MANUAL: 1.15 10*3/MM3 (ref 0.7–3.1)
LYMPHOCYTES NFR BLD MANUAL: 5 % (ref 5–12)
MAGNESIUM SERPL-MCNC: 2.1 MG/DL (ref 1.6–2.6)
MCH RBC QN AUTO: 30.8 PG (ref 26.6–33)
MCHC RBC AUTO-ENTMCNC: 32 G/DL (ref 31.5–35.7)
MCV RBC AUTO: 96.3 FL (ref 79–97)
MONOCYTES # BLD: 0.96 10*3/MM3 (ref 0.1–0.9)
NEUTROPHILS # BLD AUTO: 17.04 10*3/MM3 (ref 1.7–7)
NEUTROPHILS NFR BLD MANUAL: 74 % (ref 42.7–76)
NEUTS BAND NFR BLD MANUAL: 15 % (ref 0–5)
NT-PROBNP SERPL-MCNC: 212.4 PG/ML (ref 0–900)
PLATELET # BLD AUTO: 328 10*3/MM3 (ref 140–450)
PMV BLD AUTO: 9.8 FL (ref 6–12)
POTASSIUM SERPL-SCNC: 3.7 MMOL/L (ref 3.5–5.2)
PROCALCITONIN SERPL-MCNC: 1.2 NG/ML (ref 0–0.25)
PROT SERPL-MCNC: 5.4 G/DL (ref 6–8.5)
RBC # BLD AUTO: 2.95 10*6/MM3 (ref 4.14–5.8)
RBC MORPH BLD: NORMAL
SCAN SLIDE: NORMAL
SMALL PLATELETS BLD QL SMEAR: ADEQUATE
SODIUM SERPL-SCNC: 136 MMOL/L (ref 136–145)
TOXIC GRANULATION: ABNORMAL
VARIANT LYMPHS NFR BLD MANUAL: 6 % (ref 19.6–45.3)
WBC NRBC COR # BLD: 19.15 10*3/MM3 (ref 3.4–10.8)

## 2022-01-23 PROCEDURE — 99232 SBSQ HOSP IP/OBS MODERATE 35: CPT | Performed by: INTERNAL MEDICINE

## 2022-01-23 PROCEDURE — 80053 COMPREHEN METABOLIC PANEL: CPT | Performed by: SURGERY

## 2022-01-23 PROCEDURE — 25010000002 LEVOFLOXACIN PER 250 MG: Performed by: PHYSICIAN ASSISTANT

## 2022-01-23 PROCEDURE — 83735 ASSAY OF MAGNESIUM: CPT | Performed by: PHYSICIAN ASSISTANT

## 2022-01-23 PROCEDURE — 83880 ASSAY OF NATRIURETIC PEPTIDE: CPT | Performed by: PHYSICIAN ASSISTANT

## 2022-01-23 PROCEDURE — 85025 COMPLETE CBC W/AUTO DIFF WBC: CPT | Performed by: PHYSICIAN ASSISTANT

## 2022-01-23 PROCEDURE — 82962 GLUCOSE BLOOD TEST: CPT

## 2022-01-23 PROCEDURE — 97110 THERAPEUTIC EXERCISES: CPT

## 2022-01-23 PROCEDURE — 25010000002 MORPHINE PER 10 MG: Performed by: SURGERY

## 2022-01-23 PROCEDURE — 25010000002 ENOXAPARIN PER 10 MG: Performed by: SURGERY

## 2022-01-23 PROCEDURE — 85007 BL SMEAR W/DIFF WBC COUNT: CPT | Performed by: PHYSICIAN ASSISTANT

## 2022-01-23 PROCEDURE — 25010000002 LORAZEPAM PER 2 MG: Performed by: SURGERY

## 2022-01-23 PROCEDURE — 94799 UNLISTED PULMONARY SVC/PX: CPT

## 2022-01-23 PROCEDURE — 71045 X-RAY EXAM CHEST 1 VIEW: CPT

## 2022-01-23 PROCEDURE — 84145 PROCALCITONIN (PCT): CPT | Performed by: INTERNAL MEDICINE

## 2022-01-23 RX ORDER — NALOXONE HCL 0.4 MG/ML
0.4 VIAL (ML) INJECTION
Status: DISCONTINUED | OUTPATIENT
Start: 2022-01-23 | End: 2022-01-29

## 2022-01-23 RX ORDER — PANTOPRAZOLE SODIUM 40 MG/10ML
40 INJECTION, POWDER, LYOPHILIZED, FOR SOLUTION INTRAVENOUS EVERY 12 HOURS SCHEDULED
Status: DISCONTINUED | OUTPATIENT
Start: 2022-01-23 | End: 2022-01-30

## 2022-01-23 RX ORDER — MORPHINE SULFATE 2 MG/ML
1 INJECTION, SOLUTION INTRAMUSCULAR; INTRAVENOUS
Status: DISCONTINUED | OUTPATIENT
Start: 2022-01-23 | End: 2022-01-24

## 2022-01-23 RX ORDER — IPRATROPIUM BROMIDE AND ALBUTEROL SULFATE 2.5; .5 MG/3ML; MG/3ML
3 SOLUTION RESPIRATORY (INHALATION) EVERY 4 HOURS PRN
Status: DISCONTINUED | OUTPATIENT
Start: 2022-01-23 | End: 2022-02-08 | Stop reason: HOSPADM

## 2022-01-23 RX ORDER — LEVOFLOXACIN 5 MG/ML
750 INJECTION, SOLUTION INTRAVENOUS EVERY 24 HOURS
Status: DISCONTINUED | OUTPATIENT
Start: 2022-01-24 | End: 2022-01-30

## 2022-01-23 RX ADMIN — I.V. FAT EMULSION 50 G: 20 EMULSION INTRAVENOUS at 09:03

## 2022-01-23 RX ADMIN — MORPHINE SULFATE 4 MG: 4 INJECTION, SOLUTION INTRAMUSCULAR; INTRAVENOUS at 16:17

## 2022-01-23 RX ADMIN — ARFORMOTEROL TARTRATE: 15 SOLUTION RESPIRATORY (INHALATION) at 19:59

## 2022-01-23 RX ADMIN — ARFORMOTEROL TARTRATE: 15 SOLUTION RESPIRATORY (INHALATION) at 06:03

## 2022-01-23 RX ADMIN — IPRATROPIUM BROMIDE AND ALBUTEROL SULFATE 3 ML: 2.5; .5 SOLUTION RESPIRATORY (INHALATION) at 06:03

## 2022-01-23 RX ADMIN — MORPHINE SULFATE 4 MG: 4 INJECTION, SOLUTION INTRAMUSCULAR; INTRAVENOUS at 00:14

## 2022-01-23 RX ADMIN — MORPHINE SULFATE 4 MG: 4 INJECTION, SOLUTION INTRAMUSCULAR; INTRAVENOUS at 02:03

## 2022-01-23 RX ADMIN — METRONIDAZOLE 500 MG: 500 INJECTION, SOLUTION INTRAVENOUS at 00:14

## 2022-01-23 RX ADMIN — LEVOFLOXACIN 750 MG: 750 INJECTION, SOLUTION INTRAVENOUS at 11:52

## 2022-01-23 RX ADMIN — MORPHINE SULFATE 4 MG: 4 INJECTION, SOLUTION INTRAMUSCULAR; INTRAVENOUS at 06:03

## 2022-01-23 RX ADMIN — PANTOPRAZOLE SODIUM 40 MG: 40 INJECTION, POWDER, FOR SOLUTION INTRAVENOUS at 20:00

## 2022-01-23 RX ADMIN — IPRATROPIUM BROMIDE AND ALBUTEROL SULFATE 3 ML: 2.5; .5 SOLUTION RESPIRATORY (INHALATION) at 02:26

## 2022-01-23 RX ADMIN — ENOXAPARIN SODIUM 30 MG: 30 INJECTION SUBCUTANEOUS at 08:37

## 2022-01-23 RX ADMIN — PANTOPRAZOLE SODIUM 8 MG/HR: 40 INJECTION, POWDER, FOR SOLUTION INTRAVENOUS at 02:46

## 2022-01-23 RX ADMIN — MORPHINE SULFATE 4 MG: 4 INJECTION, SOLUTION INTRAMUSCULAR; INTRAVENOUS at 08:36

## 2022-01-23 RX ADMIN — LORAZEPAM 0.5 MG: 2 INJECTION INTRAMUSCULAR; INTRAVENOUS at 19:59

## 2022-01-23 RX ADMIN — PANTOPRAZOLE SODIUM 8 MG/HR: 40 INJECTION, POWDER, FOR SOLUTION INTRAVENOUS at 08:44

## 2022-01-23 RX ADMIN — METRONIDAZOLE 500 MG: 500 INJECTION, SOLUTION INTRAVENOUS at 16:17

## 2022-01-23 RX ADMIN — METOPROLOL SUCCINATE 25 MG: 25 TABLET, FILM COATED, EXTENDED RELEASE ORAL at 08:36

## 2022-01-23 RX ADMIN — MORPHINE SULFATE 4 MG: 4 INJECTION, SOLUTION INTRAMUSCULAR; INTRAVENOUS at 04:04

## 2022-01-23 RX ADMIN — ASCORBIC ACID, VITAMIN A PALMITATE, CHOLECALCIFEROL, THIAMINE HYDROCHLORIDE, RIBOFLAVIN-5 PHOSPHATE SODIUM, PYRIDOXINE HYDROCHLORIDE, NIACINAMIDE, DEXPANTHENOL, ALPHA-TOCOPHEROL ACETATE, VITAMIN K1, FOLIC ACID, BIOTIN, CYANOCOBALAMIN: 200; 3300; 200; 6; 3.6; 6; 40; 15; 10; 150; 600; 60; 5 INJECTION, SOLUTION INTRAVENOUS at 18:12

## 2022-01-23 RX ADMIN — METRONIDAZOLE 500 MG: 500 INJECTION, SOLUTION INTRAVENOUS at 09:07

## 2022-01-23 RX ADMIN — MORPHINE SULFATE 4 MG: 4 INJECTION, SOLUTION INTRAMUSCULAR; INTRAVENOUS at 13:24

## 2022-01-24 LAB
ALBUMIN SERPL-MCNC: 2 G/DL (ref 3.5–5.2)
ALBUMIN/GLOB SERPL: 0.7 G/DL
ALP SERPL-CCNC: 62 U/L (ref 39–117)
ALT SERPL W P-5'-P-CCNC: 7 U/L (ref 1–41)
ANION GAP SERPL CALCULATED.3IONS-SCNC: 8.1 MMOL/L (ref 5–15)
AST SERPL-CCNC: 17 U/L (ref 1–40)
BASOPHILS # BLD AUTO: 0.08 10*3/MM3 (ref 0–0.2)
BASOPHILS NFR BLD AUTO: 0.6 % (ref 0–1.5)
BILIRUB SERPL-MCNC: 0.2 MG/DL (ref 0–1.2)
BUN SERPL-MCNC: 21 MG/DL (ref 6–20)
BUN/CREAT SERPL: 26.3 (ref 7–25)
CALCIUM SPEC-SCNC: 8.5 MG/DL (ref 8.6–10.5)
CHLORIDE SERPL-SCNC: 100 MMOL/L (ref 98–107)
CO2 SERPL-SCNC: 21.9 MMOL/L (ref 22–29)
CREAT SERPL-MCNC: 0.8 MG/DL (ref 0.76–1.27)
DEPRECATED RDW RBC AUTO: 48.5 FL (ref 37–54)
EOSINOPHIL # BLD AUTO: 0.11 10*3/MM3 (ref 0–0.4)
EOSINOPHIL NFR BLD AUTO: 0.8 % (ref 0.3–6.2)
ERYTHROCYTE [DISTWIDTH] IN BLOOD BY AUTOMATED COUNT: 14.1 % (ref 12.3–15.4)
GFR SERPL CREATININE-BSD FRML MDRD: 99 ML/MIN/1.73
GLOBULIN UR ELPH-MCNC: 3 GM/DL
GLUCOSE BLDC GLUCOMTR-MCNC: 106 MG/DL (ref 70–99)
GLUCOSE BLDC GLUCOMTR-MCNC: 125 MG/DL (ref 70–99)
GLUCOSE BLDC GLUCOMTR-MCNC: 137 MG/DL (ref 70–99)
GLUCOSE SERPL-MCNC: 711 MG/DL (ref 65–99)
HCT VFR BLD AUTO: 25.4 % (ref 37.5–51)
HGB BLD-MCNC: 8.4 G/DL (ref 13–17.7)
IMM GRANULOCYTES # BLD AUTO: 0.84 10*3/MM3 (ref 0–0.05)
IMM GRANULOCYTES NFR BLD AUTO: 5.8 % (ref 0–0.5)
LYMPHOCYTES # BLD AUTO: 1.48 10*3/MM3 (ref 0.7–3.1)
LYMPHOCYTES NFR BLD AUTO: 10.2 % (ref 19.6–45.3)
MAGNESIUM SERPL-MCNC: 2 MG/DL (ref 1.6–2.6)
MCH RBC QN AUTO: 31.5 PG (ref 26.6–33)
MCHC RBC AUTO-ENTMCNC: 33.1 G/DL (ref 31.5–35.7)
MCV RBC AUTO: 95.1 FL (ref 79–97)
MONOCYTES # BLD AUTO: 0.89 10*3/MM3 (ref 0.1–0.9)
MONOCYTES NFR BLD AUTO: 6.1 % (ref 5–12)
NEUTROPHILS NFR BLD AUTO: 11.13 10*3/MM3 (ref 1.7–7)
NEUTROPHILS NFR BLD AUTO: 76.5 % (ref 42.7–76)
NRBC BLD AUTO-RTO: 0 /100 WBC (ref 0–0.2)
PHOSPHATE SERPL-MCNC: 5.7 MG/DL (ref 2.5–4.5)
PLATELET # BLD AUTO: 349 10*3/MM3 (ref 140–450)
PMV BLD AUTO: 10.7 FL (ref 6–12)
POTASSIUM SERPL-SCNC: 4.8 MMOL/L (ref 3.5–5.2)
PROT SERPL-MCNC: 5 G/DL (ref 6–8.5)
QT INTERVAL: 348 MS
RBC # BLD AUTO: 2.67 10*6/MM3 (ref 4.14–5.8)
SODIUM SERPL-SCNC: 130 MMOL/L (ref 136–145)
WBC NRBC COR # BLD: 14.53 10*3/MM3 (ref 3.4–10.8)

## 2022-01-24 PROCEDURE — 99232 SBSQ HOSP IP/OBS MODERATE 35: CPT | Performed by: INTERNAL MEDICINE

## 2022-01-24 PROCEDURE — 84100 ASSAY OF PHOSPHORUS: CPT | Performed by: SURGERY

## 2022-01-24 PROCEDURE — 85025 COMPLETE CBC W/AUTO DIFF WBC: CPT | Performed by: PHYSICIAN ASSISTANT

## 2022-01-24 PROCEDURE — 25010000002 LORAZEPAM PER 2 MG: Performed by: SURGERY

## 2022-01-24 PROCEDURE — 25010000002 MORPHINE PER 10 MG: Performed by: HOSPITALIST

## 2022-01-24 PROCEDURE — 82962 GLUCOSE BLOOD TEST: CPT

## 2022-01-24 PROCEDURE — 94760 N-INVAS EAR/PLS OXIMETRY 1: CPT

## 2022-01-24 PROCEDURE — 25010000002 LEVOFLOXACIN PER 250 MG: Performed by: PHYSICIAN ASSISTANT

## 2022-01-24 PROCEDURE — 25010000002 MORPHINE PER 10 MG: Performed by: FAMILY MEDICINE

## 2022-01-24 PROCEDURE — 63710000001 INSULIN LISPRO (HUMAN) PER 5 UNITS: Performed by: HOSPITALIST

## 2022-01-24 PROCEDURE — 99233 SBSQ HOSP IP/OBS HIGH 50: CPT | Performed by: FAMILY MEDICINE

## 2022-01-24 PROCEDURE — 25010000002 ENOXAPARIN PER 10 MG: Performed by: SURGERY

## 2022-01-24 PROCEDURE — 83735 ASSAY OF MAGNESIUM: CPT | Performed by: PHYSICIAN ASSISTANT

## 2022-01-24 PROCEDURE — 80053 COMPREHEN METABOLIC PANEL: CPT | Performed by: SURGERY

## 2022-01-24 RX ORDER — SODIUM CHLORIDE 0.9 % (FLUSH) 0.9 %
10 SYRINGE (ML) INJECTION AS NEEDED
Status: DISCONTINUED | OUTPATIENT
Start: 2022-01-24 | End: 2022-02-08 | Stop reason: HOSPADM

## 2022-01-24 RX ORDER — NICOTINE POLACRILEX 4 MG
15 LOZENGE BUCCAL
Status: DISCONTINUED | OUTPATIENT
Start: 2022-01-24 | End: 2022-01-24

## 2022-01-24 RX ORDER — MORPHINE SULFATE 2 MG/ML
2 INJECTION, SOLUTION INTRAMUSCULAR; INTRAVENOUS EVERY 4 HOURS PRN
Status: DISCONTINUED | OUTPATIENT
Start: 2022-01-24 | End: 2022-01-29

## 2022-01-24 RX ORDER — DEXTROSE MONOHYDRATE 100 MG/ML
25 INJECTION, SOLUTION INTRAVENOUS
Status: DISCONTINUED | OUTPATIENT
Start: 2022-01-24 | End: 2022-02-08 | Stop reason: HOSPADM

## 2022-01-24 RX ORDER — SODIUM CHLORIDE 0.9 % (FLUSH) 0.9 %
3 SYRINGE (ML) INJECTION EVERY 12 HOURS SCHEDULED
Status: DISCONTINUED | OUTPATIENT
Start: 2022-01-24 | End: 2022-02-08 | Stop reason: HOSPADM

## 2022-01-24 RX ORDER — DEXTROSE MONOHYDRATE 100 MG/ML
50-250 INJECTION, SOLUTION INTRAVENOUS
Status: DISCONTINUED | OUTPATIENT
Start: 2022-01-24 | End: 2022-01-24

## 2022-01-24 RX ORDER — NICOTINE POLACRILEX 4 MG
15 LOZENGE BUCCAL
Status: DISCONTINUED | OUTPATIENT
Start: 2022-01-24 | End: 2022-02-08 | Stop reason: HOSPADM

## 2022-01-24 RX ORDER — DEXTROSE MONOHYDRATE 100 MG/ML
25 INJECTION, SOLUTION INTRAVENOUS
Status: DISCONTINUED | OUTPATIENT
Start: 2022-01-24 | End: 2022-01-24 | Stop reason: ALTCHOICE

## 2022-01-24 RX ORDER — NICOTINE POLACRILEX 4 MG
15 LOZENGE BUCCAL
Status: DISCONTINUED | OUTPATIENT
Start: 2022-01-24 | End: 2022-01-24 | Stop reason: ALTCHOICE

## 2022-01-24 RX ADMIN — ENOXAPARIN SODIUM 30 MG: 30 INJECTION SUBCUTANEOUS at 09:00

## 2022-01-24 RX ADMIN — METOPROLOL SUCCINATE 25 MG: 25 TABLET, FILM COATED, EXTENDED RELEASE ORAL at 09:00

## 2022-01-24 RX ADMIN — METRONIDAZOLE 500 MG: 500 INJECTION, SOLUTION INTRAVENOUS at 09:01

## 2022-01-24 RX ADMIN — LEVOFLOXACIN 750 MG: 5 INJECTION, SOLUTION INTRAVENOUS at 10:20

## 2022-01-24 RX ADMIN — MORPHINE SULFATE 4 MG: 4 INJECTION INTRAVENOUS at 20:51

## 2022-01-24 RX ADMIN — ARFORMOTEROL TARTRATE: 15 SOLUTION RESPIRATORY (INHALATION) at 20:59

## 2022-01-24 RX ADMIN — METRONIDAZOLE 500 MG: 500 INJECTION, SOLUTION INTRAVENOUS at 16:03

## 2022-01-24 RX ADMIN — MORPHINE SULFATE 1 MG: 2 INJECTION, SOLUTION INTRAMUSCULAR; INTRAVENOUS at 15:42

## 2022-01-24 RX ADMIN — I.V. FAT EMULSION 50 G: 20 EMULSION INTRAVENOUS at 09:00

## 2022-01-24 RX ADMIN — ASCORBIC ACID, VITAMIN A PALMITATE, CHOLECALCIFEROL, THIAMINE HYDROCHLORIDE, RIBOFLAVIN-5 PHOSPHATE SODIUM, PYRIDOXINE HYDROCHLORIDE, NIACINAMIDE, DEXPANTHENOL, ALPHA-TOCOPHEROL ACETATE, VITAMIN K1, FOLIC ACID, BIOTIN, CYANOCOBALAMIN: 200; 3300; 200; 6; 3.6; 6; 40; 15; 10; 150; 600; 60; 5 INJECTION, SOLUTION INTRAVENOUS at 18:24

## 2022-01-24 RX ADMIN — ARFORMOTEROL TARTRATE: 15 SOLUTION RESPIRATORY (INHALATION) at 06:16

## 2022-01-24 RX ADMIN — MORPHINE SULFATE 1 MG: 2 INJECTION, SOLUTION INTRAMUSCULAR; INTRAVENOUS at 00:11

## 2022-01-24 RX ADMIN — INSULIN LISPRO 14 UNITS: 100 INJECTION, SOLUTION INTRAVENOUS; SUBCUTANEOUS at 06:38

## 2022-01-24 RX ADMIN — PANTOPRAZOLE SODIUM 40 MG: 40 INJECTION, POWDER, FOR SOLUTION INTRAVENOUS at 20:51

## 2022-01-24 RX ADMIN — SODIUM CHLORIDE, PRESERVATIVE FREE 3 ML: 5 INJECTION INTRAVENOUS at 09:01

## 2022-01-24 RX ADMIN — PANTOPRAZOLE SODIUM 40 MG: 40 INJECTION, POWDER, FOR SOLUTION INTRAVENOUS at 09:00

## 2022-01-24 RX ADMIN — METRONIDAZOLE 500 MG: 500 INJECTION, SOLUTION INTRAVENOUS at 00:11

## 2022-01-24 RX ADMIN — LORAZEPAM 0.5 MG: 2 INJECTION INTRAMUSCULAR; INTRAVENOUS at 01:56

## 2022-01-25 LAB
ALBUMIN SERPL-MCNC: 2.2 G/DL (ref 3.5–5.2)
ANION GAP SERPL CALCULATED.3IONS-SCNC: 10 MMOL/L (ref 5–15)
BUN SERPL-MCNC: 20 MG/DL (ref 6–20)
BUN/CREAT SERPL: 27 (ref 7–25)
CALCIUM SPEC-SCNC: 8.6 MG/DL (ref 8.6–10.5)
CHLORIDE SERPL-SCNC: 104 MMOL/L (ref 98–107)
CO2 SERPL-SCNC: 22 MMOL/L (ref 22–29)
CREAT SERPL-MCNC: 0.74 MG/DL (ref 0.76–1.27)
DEPRECATED RDW RBC AUTO: 45.1 FL (ref 37–54)
EOSINOPHIL # BLD MANUAL: 0.13 10*3/MM3 (ref 0–0.4)
EOSINOPHIL NFR BLD MANUAL: 1 % (ref 0.3–6.2)
ERYTHROCYTE [DISTWIDTH] IN BLOOD BY AUTOMATED COUNT: 13.5 % (ref 12.3–15.4)
GFR SERPL CREATININE-BSD FRML MDRD: 108 ML/MIN/1.73
GIANT PLATELETS: ABNORMAL
GLUCOSE BLDC GLUCOMTR-MCNC: 149 MG/DL (ref 70–99)
GLUCOSE BLDC GLUCOMTR-MCNC: 152 MG/DL (ref 70–99)
GLUCOSE BLDC GLUCOMTR-MCNC: 154 MG/DL (ref 70–99)
GLUCOSE BLDC GLUCOMTR-MCNC: 157 MG/DL (ref 70–99)
GLUCOSE BLDC GLUCOMTR-MCNC: 165 MG/DL (ref 70–99)
GLUCOSE SERPL-MCNC: 159 MG/DL (ref 65–99)
HBA1C MFR BLD: 5.8 % (ref 4.8–5.6)
HCT VFR BLD AUTO: 27.8 % (ref 37.5–51)
HGB BLD-MCNC: 9.5 G/DL (ref 13–17.7)
LYMPHOCYTES # BLD MANUAL: 0.51 10*3/MM3 (ref 0.7–3.1)
LYMPHOCYTES NFR BLD MANUAL: 6 % (ref 5–12)
MAGNESIUM SERPL-MCNC: 1.8 MG/DL (ref 1.6–2.6)
MCH RBC QN AUTO: 31.1 PG (ref 26.6–33)
MCHC RBC AUTO-ENTMCNC: 34.2 G/DL (ref 31.5–35.7)
MCV RBC AUTO: 91.1 FL (ref 79–97)
MONOCYTES # BLD: 0.77 10*3/MM3 (ref 0.1–0.9)
MYELOCYTES NFR BLD MANUAL: 3 % (ref 0–0)
NEUTROPHILS # BLD AUTO: 10.94 10*3/MM3 (ref 1.7–7)
NEUTROPHILS NFR BLD MANUAL: 84 % (ref 42.7–76)
NEUTS BAND NFR BLD MANUAL: 1 % (ref 0–5)
PHOSPHATE SERPL-MCNC: 3.6 MG/DL (ref 2.5–4.5)
PLATELET # BLD AUTO: 427 10*3/MM3 (ref 140–450)
PMV BLD AUTO: 10.4 FL (ref 6–12)
POTASSIUM SERPL-SCNC: 3.7 MMOL/L (ref 3.5–5.2)
PROMYELOCYTES NFR BLD MANUAL: 1 % (ref 0–0)
RBC # BLD AUTO: 3.05 10*6/MM3 (ref 4.14–5.8)
SMALL PLATELETS BLD QL SMEAR: ADEQUATE
SODIUM SERPL-SCNC: 136 MMOL/L (ref 136–145)
STOMATOCYTES BLD QL SMEAR: ABNORMAL
VARIANT LYMPHS NFR BLD MANUAL: 4 % (ref 19.6–45.3)
WBC MORPH BLD: NORMAL
WBC NRBC COR # BLD: 12.87 10*3/MM3 (ref 3.4–10.8)

## 2022-01-25 PROCEDURE — 94799 UNLISTED PULMONARY SVC/PX: CPT

## 2022-01-25 PROCEDURE — 25010000002 MORPHINE PER 10 MG: Performed by: FAMILY MEDICINE

## 2022-01-25 PROCEDURE — 83735 ASSAY OF MAGNESIUM: CPT | Performed by: PHYSICIAN ASSISTANT

## 2022-01-25 PROCEDURE — 94760 N-INVAS EAR/PLS OXIMETRY 1: CPT

## 2022-01-25 PROCEDURE — 63710000001 INSULIN LISPRO (HUMAN) PER 5 UNITS: Performed by: HOSPITALIST

## 2022-01-25 PROCEDURE — 25010000002 ENOXAPARIN PER 10 MG: Performed by: SURGERY

## 2022-01-25 PROCEDURE — 85025 COMPLETE CBC W/AUTO DIFF WBC: CPT | Performed by: NURSE PRACTITIONER

## 2022-01-25 PROCEDURE — 25010000002 LEVOFLOXACIN PER 250 MG: Performed by: PHYSICIAN ASSISTANT

## 2022-01-25 PROCEDURE — 99232 SBSQ HOSP IP/OBS MODERATE 35: CPT | Performed by: INTERNAL MEDICINE

## 2022-01-25 PROCEDURE — 97110 THERAPEUTIC EXERCISES: CPT

## 2022-01-25 PROCEDURE — 97530 THERAPEUTIC ACTIVITIES: CPT

## 2022-01-25 PROCEDURE — 85007 BL SMEAR W/DIFF WBC COUNT: CPT | Performed by: NURSE PRACTITIONER

## 2022-01-25 PROCEDURE — 80069 RENAL FUNCTION PANEL: CPT | Performed by: PHYSICIAN ASSISTANT

## 2022-01-25 PROCEDURE — 99233 SBSQ HOSP IP/OBS HIGH 50: CPT | Performed by: FAMILY MEDICINE

## 2022-01-25 PROCEDURE — 83036 HEMOGLOBIN GLYCOSYLATED A1C: CPT | Performed by: PHYSICIAN ASSISTANT

## 2022-01-25 PROCEDURE — 82962 GLUCOSE BLOOD TEST: CPT

## 2022-01-25 RX ORDER — AMLODIPINE BESYLATE 2.5 MG/1
2.5 TABLET ORAL
Status: DISCONTINUED | OUTPATIENT
Start: 2022-01-25 | End: 2022-01-31

## 2022-01-25 RX ADMIN — AMLODIPINE BESYLATE 2.5 MG: 2.5 TABLET ORAL at 10:17

## 2022-01-25 RX ADMIN — SODIUM CHLORIDE, PRESERVATIVE FREE 3 ML: 5 INJECTION INTRAVENOUS at 20:44

## 2022-01-25 RX ADMIN — PANTOPRAZOLE SODIUM 40 MG: 40 INJECTION, POWDER, FOR SOLUTION INTRAVENOUS at 10:08

## 2022-01-25 RX ADMIN — MORPHINE SULFATE 2 MG: 2 INJECTION, SOLUTION INTRAMUSCULAR; INTRAVENOUS at 01:27

## 2022-01-25 RX ADMIN — MORPHINE SULFATE 4 MG: 4 INJECTION INTRAVENOUS at 05:33

## 2022-01-25 RX ADMIN — SODIUM CHLORIDE, PRESERVATIVE FREE 3 ML: 5 INJECTION INTRAVENOUS at 10:17

## 2022-01-25 RX ADMIN — ASCORBIC ACID, VITAMIN A PALMITATE, CHOLECALCIFEROL, THIAMINE HYDROCHLORIDE, RIBOFLAVIN-5 PHOSPHATE SODIUM, PYRIDOXINE HYDROCHLORIDE, NIACINAMIDE, DEXPANTHENOL, ALPHA-TOCOPHEROL ACETATE, VITAMIN K1, FOLIC ACID, BIOTIN, CYANOCOBALAMIN: 200; 3300; 200; 6; 3.6; 6; 40; 15; 10; 150; 600; 60; 5 INJECTION, SOLUTION INTRAVENOUS at 18:22

## 2022-01-25 RX ADMIN — ARFORMOTEROL TARTRATE: 15 SOLUTION RESPIRATORY (INHALATION) at 06:35

## 2022-01-25 RX ADMIN — I.V. FAT EMULSION 50 G: 20 EMULSION INTRAVENOUS at 10:19

## 2022-01-25 RX ADMIN — METOPROLOL SUCCINATE 25 MG: 25 TABLET, FILM COATED, EXTENDED RELEASE ORAL at 10:07

## 2022-01-25 RX ADMIN — ARFORMOTEROL TARTRATE: 15 SOLUTION RESPIRATORY (INHALATION) at 18:52

## 2022-01-25 RX ADMIN — SODIUM CHLORIDE, PRESERVATIVE FREE 3 ML: 5 INJECTION INTRAVENOUS at 01:30

## 2022-01-25 RX ADMIN — INSULIN LISPRO 3 UNITS: 100 INJECTION, SOLUTION INTRAVENOUS; SUBCUTANEOUS at 06:35

## 2022-01-25 RX ADMIN — LEVOFLOXACIN 750 MG: 5 INJECTION, SOLUTION INTRAVENOUS at 10:59

## 2022-01-25 RX ADMIN — MORPHINE SULFATE 4 MG: 4 INJECTION INTRAVENOUS at 10:08

## 2022-01-25 RX ADMIN — METRONIDAZOLE 500 MG: 500 INJECTION, SOLUTION INTRAVENOUS at 18:22

## 2022-01-25 RX ADMIN — MORPHINE SULFATE 4 MG: 4 INJECTION INTRAVENOUS at 18:23

## 2022-01-25 RX ADMIN — PANTOPRAZOLE SODIUM 40 MG: 40 INJECTION, POWDER, FOR SOLUTION INTRAVENOUS at 20:44

## 2022-01-25 RX ADMIN — INSULIN LISPRO 3 UNITS: 100 INJECTION, SOLUTION INTRAVENOUS; SUBCUTANEOUS at 18:51

## 2022-01-25 RX ADMIN — INSULIN LISPRO 3 UNITS: 100 INJECTION, SOLUTION INTRAVENOUS; SUBCUTANEOUS at 12:24

## 2022-01-25 RX ADMIN — METRONIDAZOLE 500 MG: 500 INJECTION, SOLUTION INTRAVENOUS at 01:32

## 2022-01-25 RX ADMIN — METRONIDAZOLE 500 MG: 500 INJECTION, SOLUTION INTRAVENOUS at 10:19

## 2022-01-25 RX ADMIN — ENOXAPARIN SODIUM 30 MG: 30 INJECTION SUBCUTANEOUS at 10:08

## 2022-01-25 RX ADMIN — MORPHINE SULFATE 4 MG: 4 INJECTION INTRAVENOUS at 14:23

## 2022-01-25 RX ADMIN — MORPHINE SULFATE 4 MG: 4 INJECTION INTRAVENOUS at 23:00

## 2022-01-26 LAB
ALBUMIN SERPL-MCNC: 2.1 G/DL (ref 3.5–5.2)
ALBUMIN SERPL-MCNC: 2.2 G/DL (ref 3.5–5.2)
ALP SERPL-CCNC: 55 U/L (ref 39–117)
ALP SERPL-CCNC: 61 U/L (ref 39–117)
ALT SERPL W P-5'-P-CCNC: 6 U/L (ref 1–41)
ALT SERPL W P-5'-P-CCNC: 7 U/L (ref 1–41)
ANION GAP SERPL CALCULATED.3IONS-SCNC: 10.1 MMOL/L (ref 5–15)
ANION GAP SERPL CALCULATED.3IONS-SCNC: 8.9 MMOL/L (ref 5–15)
AST SERPL-CCNC: 14 U/L (ref 1–40)
AST SERPL-CCNC: 16 U/L (ref 1–40)
BASOPHILS # BLD AUTO: 0.08 10*3/MM3 (ref 0–0.2)
BASOPHILS # BLD MANUAL: 0.2 10*3/MM3 (ref 0–0.2)
BASOPHILS NFR BLD AUTO: 0.7 % (ref 0–1.5)
BASOPHILS NFR BLD MANUAL: 2 % (ref 0–1.5)
BILIRUB CONJ SERPL-MCNC: 0.2 MG/DL (ref 0–0.3)
BILIRUB CONJ SERPL-MCNC: <0.2 MG/DL (ref 0–0.3)
BILIRUB INDIRECT SERPL-MCNC: 0.1 MG/DL
BILIRUB INDIRECT SERPL-MCNC: ABNORMAL MG/DL
BILIRUB SERPL-MCNC: 0.3 MG/DL (ref 0–1.2)
BILIRUB SERPL-MCNC: 0.3 MG/DL (ref 0–1.2)
BUN SERPL-MCNC: 20 MG/DL (ref 6–20)
BUN SERPL-MCNC: 21 MG/DL (ref 6–20)
BUN/CREAT SERPL: 25.9 (ref 7–25)
BUN/CREAT SERPL: 26.3 (ref 7–25)
CALCIUM SPEC-SCNC: 8.1 MG/DL (ref 8.6–10.5)
CALCIUM SPEC-SCNC: 8.5 MG/DL (ref 8.6–10.5)
CHLORIDE SERPL-SCNC: 102 MMOL/L (ref 98–107)
CHLORIDE SERPL-SCNC: 105 MMOL/L (ref 98–107)
CO2 SERPL-SCNC: 19.9 MMOL/L (ref 22–29)
CO2 SERPL-SCNC: 20.1 MMOL/L (ref 22–29)
CREAT SERPL-MCNC: 0.76 MG/DL (ref 0.76–1.27)
CREAT SERPL-MCNC: 0.81 MG/DL (ref 0.76–1.27)
D-LACTATE SERPL-SCNC: 1.2 MMOL/L (ref 0.5–2)
DEPRECATED RDW RBC AUTO: 44.1 FL (ref 37–54)
DEPRECATED RDW RBC AUTO: 46.3 FL (ref 37–54)
EOSINOPHIL # BLD AUTO: 0.17 10*3/MM3 (ref 0–0.4)
EOSINOPHIL NFR BLD AUTO: 1.5 % (ref 0.3–6.2)
ERYTHROCYTE [DISTWIDTH] IN BLOOD BY AUTOMATED COUNT: 13.6 % (ref 12.3–15.4)
ERYTHROCYTE [DISTWIDTH] IN BLOOD BY AUTOMATED COUNT: 13.6 % (ref 12.3–15.4)
GFR SERPL CREATININE-BSD FRML MDRD: 105 ML/MIN/1.73
GFR SERPL CREATININE-BSD FRML MDRD: 98 ML/MIN/1.73
GLUCOSE BLDC GLUCOMTR-MCNC: 148 MG/DL (ref 70–99)
GLUCOSE BLDC GLUCOMTR-MCNC: 164 MG/DL (ref 70–99)
GLUCOSE BLDC GLUCOMTR-MCNC: 182 MG/DL (ref 70–99)
GLUCOSE SERPL-MCNC: 164 MG/DL (ref 65–99)
GLUCOSE SERPL-MCNC: 185 MG/DL (ref 65–99)
HCT VFR BLD AUTO: 23.9 % (ref 37.5–51)
HCT VFR BLD AUTO: 28.8 % (ref 37.5–51)
HGB BLD-MCNC: 8.1 G/DL (ref 13–17.7)
HGB BLD-MCNC: 9.6 G/DL (ref 13–17.7)
IMM GRANULOCYTES # BLD AUTO: 0.74 10*3/MM3 (ref 0–0.05)
IMM GRANULOCYTES NFR BLD AUTO: 6.4 % (ref 0–0.5)
LYMPHOCYTES # BLD AUTO: 1.46 10*3/MM3 (ref 0.7–3.1)
LYMPHOCYTES # BLD MANUAL: 0.9 10*3/MM3 (ref 0.7–3.1)
LYMPHOCYTES NFR BLD AUTO: 12.6 % (ref 19.6–45.3)
LYMPHOCYTES NFR BLD MANUAL: 5 % (ref 5–12)
MAGNESIUM SERPL-MCNC: 1.6 MG/DL (ref 1.6–2.6)
MAGNESIUM SERPL-MCNC: 1.7 MG/DL (ref 1.6–2.6)
MCH RBC QN AUTO: 30.2 PG (ref 26.6–33)
MCH RBC QN AUTO: 30.8 PG (ref 26.6–33)
MCHC RBC AUTO-ENTMCNC: 33.3 G/DL (ref 31.5–35.7)
MCHC RBC AUTO-ENTMCNC: 33.9 G/DL (ref 31.5–35.7)
MCV RBC AUTO: 89.2 FL (ref 79–97)
MCV RBC AUTO: 92.3 FL (ref 79–97)
MONOCYTES # BLD AUTO: 1.3 10*3/MM3 (ref 0.1–0.9)
MONOCYTES # BLD: 0.5 10*3/MM3 (ref 0.1–0.9)
MONOCYTES NFR BLD AUTO: 11.2 % (ref 5–12)
MYELOCYTES NFR BLD MANUAL: 1 % (ref 0–0)
NEUTROPHILS # BLD AUTO: 8.28 10*3/MM3 (ref 1.7–7)
NEUTROPHILS NFR BLD AUTO: 67.6 % (ref 42.7–76)
NEUTROPHILS NFR BLD AUTO: 7.85 10*3/MM3 (ref 1.7–7)
NEUTROPHILS NFR BLD MANUAL: 76 % (ref 42.7–76)
NEUTS BAND NFR BLD MANUAL: 7 % (ref 0–5)
NRBC BLD AUTO-RTO: 0 /100 WBC (ref 0–0.2)
PHOSPHATE SERPL-MCNC: 3.7 MG/DL (ref 2.5–4.5)
PHOSPHATE SERPL-MCNC: 3.9 MG/DL (ref 2.5–4.5)
PLAT MORPH BLD: NORMAL
PLATELET # BLD AUTO: 450 10*3/MM3 (ref 140–450)
PLATELET # BLD AUTO: 463 10*3/MM3 (ref 140–450)
PMV BLD AUTO: 10.3 FL (ref 6–12)
PMV BLD AUTO: 10.4 FL (ref 6–12)
POTASSIUM SERPL-SCNC: 3.7 MMOL/L (ref 3.5–5.2)
POTASSIUM SERPL-SCNC: 3.9 MMOL/L (ref 3.5–5.2)
PROT SERPL-MCNC: 5.4 G/DL (ref 6–8.5)
PROT SERPL-MCNC: 5.7 G/DL (ref 6–8.5)
RBC # BLD AUTO: 2.68 10*6/MM3 (ref 4.14–5.8)
RBC # BLD AUTO: 3.12 10*6/MM3 (ref 4.14–5.8)
RBC MORPH BLD: NORMAL
SCAN SLIDE: NORMAL
SODIUM SERPL-SCNC: 132 MMOL/L (ref 136–145)
SODIUM SERPL-SCNC: 134 MMOL/L (ref 136–145)
VARIANT LYMPHS NFR BLD MANUAL: 9 % (ref 19.6–45.3)
WBC MORPH BLD: NORMAL
WBC NRBC COR # BLD: 11.6 10*3/MM3 (ref 3.4–10.8)
WBC NRBC COR # BLD: 9.97 10*3/MM3 (ref 3.4–10.8)

## 2022-01-26 PROCEDURE — 25010000002 ENOXAPARIN PER 10 MG: Performed by: SURGERY

## 2022-01-26 PROCEDURE — 25010000002 LEVOFLOXACIN PER 250 MG: Performed by: PHYSICIAN ASSISTANT

## 2022-01-26 PROCEDURE — 83605 ASSAY OF LACTIC ACID: CPT | Performed by: SURGERY

## 2022-01-26 PROCEDURE — 63710000001 INSULIN LISPRO (HUMAN) PER 5 UNITS: Performed by: HOSPITALIST

## 2022-01-26 PROCEDURE — 83735 ASSAY OF MAGNESIUM: CPT | Performed by: FAMILY MEDICINE

## 2022-01-26 PROCEDURE — 85025 COMPLETE CBC W/AUTO DIFF WBC: CPT | Performed by: SURGERY

## 2022-01-26 PROCEDURE — 80299 QUANTITATIVE ASSAY DRUG: CPT | Performed by: SURGERY

## 2022-01-26 PROCEDURE — 85007 BL SMEAR W/DIFF WBC COUNT: CPT | Performed by: SURGERY

## 2022-01-26 PROCEDURE — 25010000002 MORPHINE PER 10 MG: Performed by: FAMILY MEDICINE

## 2022-01-26 PROCEDURE — 82962 GLUCOSE BLOOD TEST: CPT

## 2022-01-26 PROCEDURE — 80048 BASIC METABOLIC PNL TOTAL CA: CPT | Performed by: SURGERY

## 2022-01-26 PROCEDURE — 84100 ASSAY OF PHOSPHORUS: CPT | Performed by: FAMILY MEDICINE

## 2022-01-26 PROCEDURE — 80048 BASIC METABOLIC PNL TOTAL CA: CPT | Performed by: FAMILY MEDICINE

## 2022-01-26 PROCEDURE — 80076 HEPATIC FUNCTION PANEL: CPT | Performed by: FAMILY MEDICINE

## 2022-01-26 PROCEDURE — 97535 SELF CARE MNGMENT TRAINING: CPT

## 2022-01-26 PROCEDURE — 97530 THERAPEUTIC ACTIVITIES: CPT

## 2022-01-26 PROCEDURE — 99233 SBSQ HOSP IP/OBS HIGH 50: CPT | Performed by: FAMILY MEDICINE

## 2022-01-26 PROCEDURE — 97110 THERAPEUTIC EXERCISES: CPT

## 2022-01-26 PROCEDURE — 85025 COMPLETE CBC W/AUTO DIFF WBC: CPT | Performed by: FAMILY MEDICINE

## 2022-01-26 RX ADMIN — PANTOPRAZOLE SODIUM 40 MG: 40 INJECTION, POWDER, FOR SOLUTION INTRAVENOUS at 08:37

## 2022-01-26 RX ADMIN — METRONIDAZOLE 500 MG: 500 INJECTION, SOLUTION INTRAVENOUS at 00:39

## 2022-01-26 RX ADMIN — QUETIAPINE FUMARATE 300 MG: 100 TABLET ORAL at 21:08

## 2022-01-26 RX ADMIN — MORPHINE SULFATE 4 MG: 4 INJECTION INTRAVENOUS at 08:37

## 2022-01-26 RX ADMIN — ARFORMOTEROL TARTRATE: 15 SOLUTION RESPIRATORY (INHALATION) at 06:43

## 2022-01-26 RX ADMIN — LEVOFLOXACIN 750 MG: 5 INJECTION, SOLUTION INTRAVENOUS at 10:49

## 2022-01-26 RX ADMIN — ARFORMOTEROL TARTRATE: 15 SOLUTION RESPIRATORY (INHALATION) at 20:46

## 2022-01-26 RX ADMIN — METRONIDAZOLE 500 MG: 500 INJECTION, SOLUTION INTRAVENOUS at 18:19

## 2022-01-26 RX ADMIN — METRONIDAZOLE 500 MG: 500 INJECTION, SOLUTION INTRAVENOUS at 08:37

## 2022-01-26 RX ADMIN — MORPHINE SULFATE 4 MG: 4 INJECTION INTRAVENOUS at 17:37

## 2022-01-26 RX ADMIN — ENOXAPARIN SODIUM 30 MG: 30 INJECTION SUBCUTANEOUS at 08:37

## 2022-01-26 RX ADMIN — MORPHINE SULFATE 2 MG: 2 INJECTION, SOLUTION INTRAMUSCULAR; INTRAVENOUS at 03:02

## 2022-01-26 RX ADMIN — ASCORBIC ACID, VITAMIN A PALMITATE, CHOLECALCIFEROL, THIAMINE HYDROCHLORIDE, RIBOFLAVIN-5 PHOSPHATE SODIUM, PYRIDOXINE HYDROCHLORIDE, NIACINAMIDE, DEXPANTHENOL, ALPHA-TOCOPHEROL ACETATE, VITAMIN K1, FOLIC ACID, BIOTIN, CYANOCOBALAMIN: 200; 3300; 200; 6; 3.6; 6; 40; 15; 10; 150; 600; 60; 5 INJECTION, SOLUTION INTRAVENOUS at 18:19

## 2022-01-26 RX ADMIN — PANTOPRAZOLE SODIUM 40 MG: 40 INJECTION, POWDER, FOR SOLUTION INTRAVENOUS at 20:55

## 2022-01-26 RX ADMIN — INSULIN LISPRO 3 UNITS: 100 INJECTION, SOLUTION INTRAVENOUS; SUBCUTANEOUS at 17:37

## 2022-01-26 RX ADMIN — SODIUM CHLORIDE, PRESERVATIVE FREE 3 ML: 5 INJECTION INTRAVENOUS at 20:56

## 2022-01-26 RX ADMIN — MORPHINE SULFATE 4 MG: 4 INJECTION INTRAVENOUS at 12:26

## 2022-01-26 RX ADMIN — INSULIN LISPRO 3 UNITS: 100 INJECTION, SOLUTION INTRAVENOUS; SUBCUTANEOUS at 13:15

## 2022-01-26 RX ADMIN — METOPROLOL SUCCINATE 25 MG: 25 TABLET, FILM COATED, EXTENDED RELEASE ORAL at 08:37

## 2022-01-26 RX ADMIN — MORPHINE SULFATE 4 MG: 4 INJECTION INTRAVENOUS at 21:45

## 2022-01-26 RX ADMIN — SODIUM CHLORIDE 1000 ML: 9 INJECTION, SOLUTION INTRAVENOUS at 22:48

## 2022-01-26 RX ADMIN — I.V. FAT EMULSION 50 G: 20 EMULSION INTRAVENOUS at 08:36

## 2022-01-26 RX ADMIN — AMLODIPINE BESYLATE 2.5 MG: 2.5 TABLET ORAL at 08:39

## 2022-01-26 RX ADMIN — SODIUM CHLORIDE, PRESERVATIVE FREE 3 ML: 5 INJECTION INTRAVENOUS at 08:39

## 2022-01-27 ENCOUNTER — APPOINTMENT (OUTPATIENT)
Dept: GENERAL RADIOLOGY | Facility: HOSPITAL | Age: 57
End: 2022-01-27

## 2022-01-27 ENCOUNTER — APPOINTMENT (OUTPATIENT)
Dept: CT IMAGING | Facility: HOSPITAL | Age: 57
End: 2022-01-27

## 2022-01-27 LAB
ALBUMIN FLD-MCNC: 0.8 G/DL
APPEARANCE FLD: ABNORMAL
COLOR FLD: ABNORMAL
GLUCOSE BLDC GLUCOMTR-MCNC: 137 MG/DL (ref 70–99)
GLUCOSE BLDC GLUCOMTR-MCNC: 146 MG/DL (ref 70–99)
GLUCOSE BLDC GLUCOMTR-MCNC: 161 MG/DL (ref 70–99)
GLUCOSE BLDC GLUCOMTR-MCNC: 166 MG/DL (ref 70–99)
GLUCOSE FLD-MCNC: 124 MG/DL
LDH FLD-CCNC: 349 U/L
LYMPHOCYTES NFR FLD MANUAL: 25 %
MACROPHAGE FLUID: 2 %
MESOTHL CELL NFR FLD MANUAL: 9 %
MONOCYTES NFR FLD: 4 %
NEUTROPHILS NFR FLD MANUAL: 60 %
NUC CELL # FLD: 2186 /MM3
PH FLD: 8 [PH]
PROT FLD-MCNC: 1.9 G/DL
RBC # FLD AUTO: <2000 /MM3

## 2022-01-27 PROCEDURE — 88108 CYTOPATH CONCENTRATE TECH: CPT | Performed by: INTERNAL MEDICINE

## 2022-01-27 PROCEDURE — 32555 ASPIRATE PLEURA W/ IMAGING: CPT | Performed by: INTERNAL MEDICINE

## 2022-01-27 PROCEDURE — 25010000002 MORPHINE PER 10 MG: Performed by: NURSE PRACTITIONER

## 2022-01-27 PROCEDURE — 74176 CT ABD & PELVIS W/O CONTRAST: CPT

## 2022-01-27 PROCEDURE — 0W9B3ZX DRAINAGE OF LEFT PLEURAL CAVITY, PERCUTANEOUS APPROACH, DIAGNOSTIC: ICD-10-PCS | Performed by: INTERNAL MEDICINE

## 2022-01-27 PROCEDURE — 89051 BODY FLUID CELL COUNT: CPT | Performed by: INTERNAL MEDICINE

## 2022-01-27 PROCEDURE — 93010 ELECTROCARDIOGRAM REPORT: CPT | Performed by: INTERNAL MEDICINE

## 2022-01-27 PROCEDURE — 84478 ASSAY OF TRIGLYCERIDES: CPT | Performed by: INTERNAL MEDICINE

## 2022-01-27 PROCEDURE — 87070 CULTURE OTHR SPECIMN AEROBIC: CPT | Performed by: INTERNAL MEDICINE

## 2022-01-27 PROCEDURE — 99233 SBSQ HOSP IP/OBS HIGH 50: CPT | Performed by: FAMILY MEDICINE

## 2022-01-27 PROCEDURE — 87040 BLOOD CULTURE FOR BACTERIA: CPT | Performed by: INTERNAL MEDICINE

## 2022-01-27 PROCEDURE — 93005 ELECTROCARDIOGRAM TRACING: CPT | Performed by: SURGERY

## 2022-01-27 PROCEDURE — 87102 FUNGUS ISOLATION CULTURE: CPT | Performed by: INTERNAL MEDICINE

## 2022-01-27 PROCEDURE — 83986 ASSAY PH BODY FLUID NOS: CPT | Performed by: INTERNAL MEDICINE

## 2022-01-27 PROCEDURE — 97110 THERAPEUTIC EXERCISES: CPT

## 2022-01-27 PROCEDURE — 84157 ASSAY OF PROTEIN OTHER: CPT | Performed by: INTERNAL MEDICINE

## 2022-01-27 PROCEDURE — 87206 SMEAR FLUORESCENT/ACID STAI: CPT | Performed by: INTERNAL MEDICINE

## 2022-01-27 PROCEDURE — 82962 GLUCOSE BLOOD TEST: CPT

## 2022-01-27 PROCEDURE — 25010000002 MORPHINE PER 10 MG: Performed by: FAMILY MEDICINE

## 2022-01-27 PROCEDURE — 87040 BLOOD CULTURE FOR BACTERIA: CPT | Performed by: SURGERY

## 2022-01-27 PROCEDURE — 99233 SBSQ HOSP IP/OBS HIGH 50: CPT | Performed by: INTERNAL MEDICINE

## 2022-01-27 PROCEDURE — 87205 SMEAR GRAM STAIN: CPT | Performed by: INTERNAL MEDICINE

## 2022-01-27 PROCEDURE — 87116 MYCOBACTERIA CULTURE: CPT | Performed by: INTERNAL MEDICINE

## 2022-01-27 PROCEDURE — 87075 CULTR BACTERIA EXCEPT BLOOD: CPT | Performed by: INTERNAL MEDICINE

## 2022-01-27 PROCEDURE — 63710000001 INSULIN LISPRO (HUMAN) PER 5 UNITS: Performed by: HOSPITALIST

## 2022-01-27 PROCEDURE — 71045 X-RAY EXAM CHEST 1 VIEW: CPT

## 2022-01-27 PROCEDURE — 25010000002 ENOXAPARIN PER 10 MG: Performed by: SURGERY

## 2022-01-27 PROCEDURE — 82042 OTHER SOURCE ALBUMIN QUAN EA: CPT | Performed by: INTERNAL MEDICINE

## 2022-01-27 PROCEDURE — 83615 LACTATE (LD) (LDH) ENZYME: CPT | Performed by: INTERNAL MEDICINE

## 2022-01-27 PROCEDURE — 25010000002 LEVOFLOXACIN PER 250 MG: Performed by: PHYSICIAN ASSISTANT

## 2022-01-27 PROCEDURE — 84311 SPECTROPHOTOMETRY: CPT | Performed by: INTERNAL MEDICINE

## 2022-01-27 PROCEDURE — 82945 GLUCOSE OTHER FLUID: CPT | Performed by: INTERNAL MEDICINE

## 2022-01-27 RX ADMIN — MORPHINE SULFATE 4 MG: 4 INJECTION INTRAVENOUS at 03:32

## 2022-01-27 RX ADMIN — INSULIN LISPRO 3 UNITS: 100 INJECTION, SOLUTION INTRAVENOUS; SUBCUTANEOUS at 00:32

## 2022-01-27 RX ADMIN — SODIUM CHLORIDE, PRESERVATIVE FREE 3 ML: 5 INJECTION INTRAVENOUS at 11:28

## 2022-01-27 RX ADMIN — ENOXAPARIN SODIUM 30 MG: 30 INJECTION SUBCUTANEOUS at 11:27

## 2022-01-27 RX ADMIN — PANTOPRAZOLE SODIUM 40 MG: 40 INJECTION, POWDER, FOR SOLUTION INTRAVENOUS at 11:28

## 2022-01-27 RX ADMIN — QUETIAPINE FUMARATE 300 MG: 100 TABLET ORAL at 21:25

## 2022-01-27 RX ADMIN — INSULIN LISPRO 3 UNITS: 100 INJECTION, SOLUTION INTRAVENOUS; SUBCUTANEOUS at 06:18

## 2022-01-27 RX ADMIN — AMLODIPINE BESYLATE 2.5 MG: 2.5 TABLET ORAL at 11:28

## 2022-01-27 RX ADMIN — METRONIDAZOLE 500 MG: 500 INJECTION, SOLUTION INTRAVENOUS at 07:48

## 2022-01-27 RX ADMIN — MORPHINE SULFATE 4 MG: 4 INJECTION INTRAVENOUS at 11:52

## 2022-01-27 RX ADMIN — METRONIDAZOLE 500 MG: 500 INJECTION, SOLUTION INTRAVENOUS at 00:27

## 2022-01-27 RX ADMIN — SODIUM CHLORIDE, PRESERVATIVE FREE 3 ML: 5 INJECTION INTRAVENOUS at 21:25

## 2022-01-27 RX ADMIN — I.V. FAT EMULSION 50 G: 20 EMULSION INTRAVENOUS at 13:29

## 2022-01-27 RX ADMIN — ASCORBIC ACID, VITAMIN A PALMITATE, CHOLECALCIFEROL, THIAMINE HYDROCHLORIDE, RIBOFLAVIN-5 PHOSPHATE SODIUM, PYRIDOXINE HYDROCHLORIDE, NIACINAMIDE, DEXPANTHENOL, ALPHA-TOCOPHEROL ACETATE, VITAMIN K1, FOLIC ACID, BIOTIN, CYANOCOBALAMIN: 200; 3300; 200; 6; 3.6; 6; 40; 15; 10; 150; 600; 60; 5 INJECTION, SOLUTION INTRAVENOUS at 19:10

## 2022-01-27 RX ADMIN — MORPHINE SULFATE 4 MG: 4 INJECTION INTRAVENOUS at 20:33

## 2022-01-27 RX ADMIN — PANTOPRAZOLE SODIUM 40 MG: 40 INJECTION, POWDER, FOR SOLUTION INTRAVENOUS at 21:25

## 2022-01-27 RX ADMIN — SODIUM CHLORIDE 1000 ML: 9 INJECTION, SOLUTION INTRAVENOUS at 02:30

## 2022-01-27 RX ADMIN — ARFORMOTEROL TARTRATE: 15 SOLUTION RESPIRATORY (INHALATION) at 20:34

## 2022-01-27 RX ADMIN — MORPHINE SULFATE 4 MG: 4 INJECTION INTRAVENOUS at 07:47

## 2022-01-27 RX ADMIN — LEVOFLOXACIN 750 MG: 5 INJECTION, SOLUTION INTRAVENOUS at 11:29

## 2022-01-27 RX ADMIN — METRONIDAZOLE 500 MG: 500 INJECTION, SOLUTION INTRAVENOUS at 17:22

## 2022-01-27 RX ADMIN — METOPROLOL SUCCINATE 25 MG: 25 TABLET, FILM COATED, EXTENDED RELEASE ORAL at 11:28

## 2022-01-27 RX ADMIN — MORPHINE SULFATE 4 MG: 4 INJECTION INTRAVENOUS at 15:46

## 2022-01-28 LAB
ALBUMIN SERPL-MCNC: 1.9 G/DL (ref 3.5–5.2)
ALP SERPL-CCNC: 48 U/L (ref 39–117)
ALT SERPL W P-5'-P-CCNC: 5 U/L (ref 1–41)
ANION GAP SERPL CALCULATED.3IONS-SCNC: 6.8 MMOL/L (ref 5–15)
AST SERPL-CCNC: 12 U/L (ref 1–40)
BASOPHILS # BLD AUTO: 0.07 10*3/MM3 (ref 0–0.2)
BASOPHILS NFR BLD AUTO: 0.8 % (ref 0–1.5)
BILIRUB CONJ SERPL-MCNC: <0.2 MG/DL (ref 0–0.3)
BILIRUB INDIRECT SERPL-MCNC: ABNORMAL MG/DL
BILIRUB SERPL-MCNC: 0.2 MG/DL (ref 0–1.2)
BUN SERPL-MCNC: 20 MG/DL (ref 6–20)
BUN/CREAT SERPL: 22.2 (ref 7–25)
CALCIUM SPEC-SCNC: 8.3 MG/DL (ref 8.6–10.5)
CHLORIDE SERPL-SCNC: 106 MMOL/L (ref 98–107)
CO2 SERPL-SCNC: 20.2 MMOL/L (ref 22–29)
CREAT SERPL-MCNC: 0.9 MG/DL (ref 0.76–1.27)
DEPRECATED RDW RBC AUTO: 49.4 FL (ref 37–54)
EOSINOPHIL # BLD AUTO: 0.12 10*3/MM3 (ref 0–0.4)
EOSINOPHIL NFR BLD AUTO: 1.3 % (ref 0.3–6.2)
ERYTHROCYTE [DISTWIDTH] IN BLOOD BY AUTOMATED COUNT: 14.2 % (ref 12.3–15.4)
GFR SERPL CREATININE-BSD FRML MDRD: 86 ML/MIN/1.73
GLUCOSE BLDC GLUCOMTR-MCNC: 149 MG/DL (ref 70–99)
GLUCOSE BLDC GLUCOMTR-MCNC: 173 MG/DL (ref 70–99)
GLUCOSE SERPL-MCNC: 161 MG/DL (ref 65–99)
HCT VFR BLD AUTO: 22.5 % (ref 37.5–51)
HGB BLD-MCNC: 7.3 G/DL (ref 13–17.7)
IMM GRANULOCYTES # BLD AUTO: 0.8 10*3/MM3 (ref 0–0.05)
IMM GRANULOCYTES NFR BLD AUTO: 8.9 % (ref 0–0.5)
LYMPHOCYTES # BLD AUTO: 1.34 10*3/MM3 (ref 0.7–3.1)
LYMPHOCYTES NFR BLD AUTO: 14.9 % (ref 19.6–45.3)
MAGNESIUM SERPL-MCNC: 2.1 MG/DL (ref 1.6–2.6)
MCH RBC QN AUTO: 31.1 PG (ref 26.6–33)
MCHC RBC AUTO-ENTMCNC: 32.4 G/DL (ref 31.5–35.7)
MCV RBC AUTO: 95.7 FL (ref 79–97)
MONOCYTES # BLD AUTO: 1.07 10*3/MM3 (ref 0.1–0.9)
MONOCYTES NFR BLD AUTO: 11.9 % (ref 5–12)
NEUTROPHILS NFR BLD AUTO: 5.61 10*3/MM3 (ref 1.7–7)
NEUTROPHILS NFR BLD AUTO: 62.2 % (ref 42.7–76)
NRBC BLD AUTO-RTO: 0.2 /100 WBC (ref 0–0.2)
PHOSPHATE SERPL-MCNC: 5.6 MG/DL (ref 2.5–4.5)
PLAT MORPH BLD: NORMAL
PLATELET # BLD AUTO: 479 10*3/MM3 (ref 140–450)
PMV BLD AUTO: 10.3 FL (ref 6–12)
POTASSIUM SERPL-SCNC: 4.2 MMOL/L (ref 3.5–5.2)
PROT SERPL-MCNC: 5 G/DL (ref 6–8.5)
RBC # BLD AUTO: 2.35 10*6/MM3 (ref 4.14–5.8)
RBC MORPH BLD: NORMAL
SODIUM SERPL-SCNC: 133 MMOL/L (ref 136–145)
TRIGL FLD-MCNC: 27 MG/DL
WBC MORPH BLD: NORMAL
WBC NRBC COR # BLD: 9.01 10*3/MM3 (ref 3.4–10.8)

## 2022-01-28 PROCEDURE — 80299 QUANTITATIVE ASSAY DRUG: CPT | Performed by: SURGERY

## 2022-01-28 PROCEDURE — 84100 ASSAY OF PHOSPHORUS: CPT | Performed by: FAMILY MEDICINE

## 2022-01-28 PROCEDURE — 94799 UNLISTED PULMONARY SVC/PX: CPT

## 2022-01-28 PROCEDURE — 80048 BASIC METABOLIC PNL TOTAL CA: CPT | Performed by: FAMILY MEDICINE

## 2022-01-28 PROCEDURE — 25010000002 MAGNESIUM SULFATE PER 500 MG OF MAGNESIUM: Performed by: SURGERY

## 2022-01-28 PROCEDURE — 25010000002 CALCIUM GLUCONATE PER 10 ML: Performed by: SURGERY

## 2022-01-28 PROCEDURE — 85007 BL SMEAR W/DIFF WBC COUNT: CPT | Performed by: FAMILY MEDICINE

## 2022-01-28 PROCEDURE — 94760 N-INVAS EAR/PLS OXIMETRY 1: CPT

## 2022-01-28 PROCEDURE — 25010000002 MORPHINE PER 10 MG: Performed by: FAMILY MEDICINE

## 2022-01-28 PROCEDURE — 63710000001 INSULIN LISPRO (HUMAN) PER 5 UNITS: Performed by: HOSPITALIST

## 2022-01-28 PROCEDURE — 80076 HEPATIC FUNCTION PANEL: CPT | Performed by: FAMILY MEDICINE

## 2022-01-28 PROCEDURE — 99232 SBSQ HOSP IP/OBS MODERATE 35: CPT | Performed by: INTERNAL MEDICINE

## 2022-01-28 PROCEDURE — 85025 COMPLETE CBC W/AUTO DIFF WBC: CPT | Performed by: FAMILY MEDICINE

## 2022-01-28 PROCEDURE — 82962 GLUCOSE BLOOD TEST: CPT

## 2022-01-28 PROCEDURE — 97110 THERAPEUTIC EXERCISES: CPT

## 2022-01-28 PROCEDURE — 25010000002 LEVOFLOXACIN PER 250 MG: Performed by: PHYSICIAN ASSISTANT

## 2022-01-28 PROCEDURE — 83735 ASSAY OF MAGNESIUM: CPT | Performed by: FAMILY MEDICINE

## 2022-01-28 PROCEDURE — 99233 SBSQ HOSP IP/OBS HIGH 50: CPT | Performed by: FAMILY MEDICINE

## 2022-01-28 PROCEDURE — 97164 PT RE-EVAL EST PLAN CARE: CPT

## 2022-01-28 RX ADMIN — ARFORMOTEROL TARTRATE: 15 SOLUTION RESPIRATORY (INHALATION) at 06:29

## 2022-01-28 RX ADMIN — METRONIDAZOLE 500 MG: 500 INJECTION, SOLUTION INTRAVENOUS at 15:28

## 2022-01-28 RX ADMIN — PANTOPRAZOLE SODIUM 40 MG: 40 INJECTION, POWDER, FOR SOLUTION INTRAVENOUS at 21:12

## 2022-01-28 RX ADMIN — QUETIAPINE FUMARATE 300 MG: 100 TABLET ORAL at 21:14

## 2022-01-28 RX ADMIN — MORPHINE SULFATE 2 MG: 2 INJECTION, SOLUTION INTRAMUSCULAR; INTRAVENOUS at 09:33

## 2022-01-28 RX ADMIN — INSULIN LISPRO 3 UNITS: 100 INJECTION, SOLUTION INTRAVENOUS; SUBCUTANEOUS at 13:13

## 2022-01-28 RX ADMIN — AMLODIPINE BESYLATE 2.5 MG: 2.5 TABLET ORAL at 09:33

## 2022-01-28 RX ADMIN — SODIUM CHLORIDE, PRESERVATIVE FREE 3 ML: 5 INJECTION INTRAVENOUS at 21:14

## 2022-01-28 RX ADMIN — I.V. FAT EMULSION 50 G: 20 EMULSION INTRAVENOUS at 08:03

## 2022-01-28 RX ADMIN — CALCIUM GLUCONATE: 98 INJECTION, SOLUTION INTRAVENOUS at 18:36

## 2022-01-28 RX ADMIN — ARFORMOTEROL TARTRATE: 15 SOLUTION RESPIRATORY (INHALATION) at 05:09

## 2022-01-28 RX ADMIN — MORPHINE SULFATE 2 MG: 2 INJECTION, SOLUTION INTRAMUSCULAR; INTRAVENOUS at 15:27

## 2022-01-28 RX ADMIN — METOPROLOL SUCCINATE 25 MG: 25 TABLET, FILM COATED, EXTENDED RELEASE ORAL at 09:33

## 2022-01-28 RX ADMIN — MORPHINE SULFATE 2 MG: 2 INJECTION, SOLUTION INTRAMUSCULAR; INTRAVENOUS at 21:13

## 2022-01-28 RX ADMIN — LEVOFLOXACIN 750 MG: 5 INJECTION, SOLUTION INTRAVENOUS at 09:34

## 2022-01-28 RX ADMIN — INSULIN LISPRO 3 UNITS: 100 INJECTION, SOLUTION INTRAVENOUS; SUBCUTANEOUS at 06:11

## 2022-01-28 RX ADMIN — MORPHINE SULFATE 2 MG: 2 INJECTION, SOLUTION INTRAMUSCULAR; INTRAVENOUS at 01:29

## 2022-01-28 RX ADMIN — MORPHINE SULFATE 2 MG: 2 INJECTION, SOLUTION INTRAMUSCULAR; INTRAVENOUS at 05:24

## 2022-01-28 RX ADMIN — PANTOPRAZOLE SODIUM 40 MG: 40 INJECTION, POWDER, FOR SOLUTION INTRAVENOUS at 09:33

## 2022-01-28 RX ADMIN — METRONIDAZOLE 500 MG: 500 INJECTION, SOLUTION INTRAVENOUS at 01:29

## 2022-01-28 RX ADMIN — ARFORMOTEROL TARTRATE: 15 SOLUTION RESPIRATORY (INHALATION) at 21:12

## 2022-01-28 RX ADMIN — METRONIDAZOLE 500 MG: 500 INJECTION, SOLUTION INTRAVENOUS at 08:03

## 2022-01-29 LAB
ALBUMIN SERPL-MCNC: 2 G/DL (ref 3.5–5.2)
ALP SERPL-CCNC: 55 U/L (ref 39–117)
ALT SERPL W P-5'-P-CCNC: 6 U/L (ref 1–41)
ANION GAP SERPL CALCULATED.3IONS-SCNC: 8.5 MMOL/L (ref 5–15)
AST SERPL-CCNC: 19 U/L (ref 1–40)
BASOPHILS # BLD AUTO: 0.09 10*3/MM3 (ref 0–0.2)
BASOPHILS NFR BLD AUTO: 0.9 % (ref 0–1.5)
BILIRUB CONJ SERPL-MCNC: <0.2 MG/DL (ref 0–0.3)
BILIRUB INDIRECT SERPL-MCNC: ABNORMAL MG/DL
BILIRUB SERPL-MCNC: 0.2 MG/DL (ref 0–1.2)
BUN SERPL-MCNC: 16 MG/DL (ref 6–20)
BUN/CREAT SERPL: 23.5 (ref 7–25)
CALCIUM SPEC-SCNC: 8.2 MG/DL (ref 8.6–10.5)
CHLORIDE SERPL-SCNC: 106 MMOL/L (ref 98–107)
CO2 SERPL-SCNC: 18.5 MMOL/L (ref 22–29)
CREAT SERPL-MCNC: 0.68 MG/DL (ref 0.76–1.27)
DEPRECATED RDW RBC AUTO: 45.9 FL (ref 37–54)
EOSINOPHIL # BLD AUTO: 0.18 10*3/MM3 (ref 0–0.4)
EOSINOPHIL NFR BLD AUTO: 1.8 % (ref 0.3–6.2)
ERYTHROCYTE [DISTWIDTH] IN BLOOD BY AUTOMATED COUNT: 13.9 % (ref 12.3–15.4)
GFR SERPL CREATININE-BSD FRML MDRD: 119 ML/MIN/1.73
GLUCOSE BLDC GLUCOMTR-MCNC: 129 MG/DL (ref 70–99)
GLUCOSE BLDC GLUCOMTR-MCNC: 151 MG/DL (ref 70–99)
GLUCOSE BLDC GLUCOMTR-MCNC: 151 MG/DL (ref 70–99)
GLUCOSE BLDC GLUCOMTR-MCNC: 160 MG/DL (ref 70–99)
GLUCOSE BLDC GLUCOMTR-MCNC: 184 MG/DL (ref 70–99)
GLUCOSE SERPL-MCNC: 149 MG/DL (ref 65–99)
HCT VFR BLD AUTO: 22.3 % (ref 37.5–51)
HGB BLD-MCNC: 7.6 G/DL (ref 13–17.7)
IMM GRANULOCYTES # BLD AUTO: 1.01 10*3/MM3 (ref 0–0.05)
IMM GRANULOCYTES NFR BLD AUTO: 9.9 % (ref 0–0.5)
LARGE PLATELETS: NORMAL
LYMPHOCYTES # BLD AUTO: 1.47 10*3/MM3 (ref 0.7–3.1)
LYMPHOCYTES NFR BLD AUTO: 14.4 % (ref 19.6–45.3)
MAGNESIUM SERPL-MCNC: 1.7 MG/DL (ref 1.6–2.6)
MCH RBC QN AUTO: 30.5 PG (ref 26.6–33)
MCHC RBC AUTO-ENTMCNC: 34.1 G/DL (ref 31.5–35.7)
MCV RBC AUTO: 89.6 FL (ref 79–97)
MONOCYTES # BLD AUTO: 1.23 10*3/MM3 (ref 0.1–0.9)
MONOCYTES NFR BLD AUTO: 12 % (ref 5–12)
NEUTROPHILS NFR BLD AUTO: 6.23 10*3/MM3 (ref 1.7–7)
NEUTROPHILS NFR BLD AUTO: 61 % (ref 42.7–76)
NRBC BLD AUTO-RTO: 0 /100 WBC (ref 0–0.2)
PHOSPHATE SERPL-MCNC: 3.4 MG/DL (ref 2.5–4.5)
PLATELET # BLD AUTO: 522 10*3/MM3 (ref 140–450)
PMV BLD AUTO: 10.1 FL (ref 6–12)
POTASSIUM SERPL-SCNC: 4 MMOL/L (ref 3.5–5.2)
PROT SERPL-MCNC: 5.6 G/DL (ref 6–8.5)
QT INTERVAL: 303 MS
QT INTERVAL: 308 MS
RBC # BLD AUTO: 2.49 10*6/MM3 (ref 4.14–5.8)
RBC MORPH BLD: NORMAL
SMALL PLATELETS BLD QL SMEAR: NORMAL
SODIUM SERPL-SCNC: 133 MMOL/L (ref 136–145)
WBC MORPH BLD: NORMAL
WBC NRBC COR # BLD: 10.21 10*3/MM3 (ref 3.4–10.8)

## 2022-01-29 PROCEDURE — 85007 BL SMEAR W/DIFF WBC COUNT: CPT | Performed by: FAMILY MEDICINE

## 2022-01-29 PROCEDURE — 85025 COMPLETE CBC W/AUTO DIFF WBC: CPT | Performed by: FAMILY MEDICINE

## 2022-01-29 PROCEDURE — 63710000001 INSULIN LISPRO (HUMAN) PER 5 UNITS: Performed by: HOSPITALIST

## 2022-01-29 PROCEDURE — 25010000002 MORPHINE PER 10 MG: Performed by: FAMILY MEDICINE

## 2022-01-29 PROCEDURE — 99232 SBSQ HOSP IP/OBS MODERATE 35: CPT | Performed by: NURSE PRACTITIONER

## 2022-01-29 PROCEDURE — 83735 ASSAY OF MAGNESIUM: CPT | Performed by: FAMILY MEDICINE

## 2022-01-29 PROCEDURE — 99233 SBSQ HOSP IP/OBS HIGH 50: CPT | Performed by: FAMILY MEDICINE

## 2022-01-29 PROCEDURE — 84100 ASSAY OF PHOSPHORUS: CPT | Performed by: FAMILY MEDICINE

## 2022-01-29 PROCEDURE — 80299 QUANTITATIVE ASSAY DRUG: CPT | Performed by: SURGERY

## 2022-01-29 PROCEDURE — 80076 HEPATIC FUNCTION PANEL: CPT | Performed by: FAMILY MEDICINE

## 2022-01-29 PROCEDURE — 80048 BASIC METABOLIC PNL TOTAL CA: CPT | Performed by: FAMILY MEDICINE

## 2022-01-29 PROCEDURE — 82962 GLUCOSE BLOOD TEST: CPT

## 2022-01-29 PROCEDURE — 25010000002 LEVOFLOXACIN PER 250 MG: Performed by: SURGERY

## 2022-01-29 PROCEDURE — 25010000002 ENOXAPARIN PER 10 MG: Performed by: SURGERY

## 2022-01-29 RX ORDER — OXYCODONE HYDROCHLORIDE AND ACETAMINOPHEN 5; 325 MG/1; MG/1
1 TABLET ORAL EVERY 6 HOURS PRN
Status: DISCONTINUED | OUTPATIENT
Start: 2022-01-29 | End: 2022-01-29

## 2022-01-29 RX ORDER — HYDROCODONE BITARTRATE AND ACETAMINOPHEN 5; 325 MG/1; MG/1
1 TABLET ORAL EVERY 6 HOURS PRN
Status: DISCONTINUED | OUTPATIENT
Start: 2022-01-29 | End: 2022-01-29

## 2022-01-29 RX ORDER — POLYETHYLENE GLYCOL 3350 17 G/17G
17 POWDER, FOR SOLUTION ORAL DAILY
Status: DISCONTINUED | OUTPATIENT
Start: 2022-01-29 | End: 2022-02-08 | Stop reason: HOSPADM

## 2022-01-29 RX ORDER — OXYCODONE HYDROCHLORIDE AND ACETAMINOPHEN 5; 325 MG/1; MG/1
1 TABLET ORAL EVERY 6 HOURS PRN
Status: DISCONTINUED | OUTPATIENT
Start: 2022-02-05 | End: 2022-01-29

## 2022-01-29 RX ORDER — OXYCODONE HYDROCHLORIDE AND ACETAMINOPHEN 5; 325 MG/1; MG/1
1 TABLET ORAL EVERY 6 HOURS PRN
Status: DISCONTINUED | OUTPATIENT
Start: 2022-01-29 | End: 2022-02-04

## 2022-01-29 RX ADMIN — I.V. FAT EMULSION 50 G: 20 EMULSION INTRAVENOUS at 10:58

## 2022-01-29 RX ADMIN — METRONIDAZOLE 500 MG: 500 INJECTION, SOLUTION INTRAVENOUS at 00:08

## 2022-01-29 RX ADMIN — INSULIN LISPRO 3 UNITS: 100 INJECTION, SOLUTION INTRAVENOUS; SUBCUTANEOUS at 12:51

## 2022-01-29 RX ADMIN — INSULIN LISPRO 3 UNITS: 100 INJECTION, SOLUTION INTRAVENOUS; SUBCUTANEOUS at 00:18

## 2022-01-29 RX ADMIN — AMLODIPINE BESYLATE 2.5 MG: 2.5 TABLET ORAL at 09:57

## 2022-01-29 RX ADMIN — INSULIN LISPRO 3 UNITS: 100 INJECTION, SOLUTION INTRAVENOUS; SUBCUTANEOUS at 07:28

## 2022-01-29 RX ADMIN — QUETIAPINE FUMARATE 300 MG: 100 TABLET ORAL at 20:55

## 2022-01-29 RX ADMIN — LEVOFLOXACIN 750 MG: 5 INJECTION, SOLUTION INTRAVENOUS at 10:58

## 2022-01-29 RX ADMIN — OXYCODONE HYDROCHLORIDE AND ACETAMINOPHEN 1 TABLET: 5; 325 TABLET ORAL at 20:53

## 2022-01-29 RX ADMIN — MORPHINE SULFATE 2 MG: 2 INJECTION, SOLUTION INTRAMUSCULAR; INTRAVENOUS at 09:59

## 2022-01-29 RX ADMIN — SODIUM CHLORIDE, PRESERVATIVE FREE 3 ML: 5 INJECTION INTRAVENOUS at 09:57

## 2022-01-29 RX ADMIN — METRONIDAZOLE 500 MG: 500 INJECTION, SOLUTION INTRAVENOUS at 09:58

## 2022-01-29 RX ADMIN — POLYETHYLENE GLYCOL 3350 17 G: 17 POWDER, FOR SOLUTION ORAL at 12:51

## 2022-01-29 RX ADMIN — MORPHINE SULFATE 2 MG: 2 INJECTION, SOLUTION INTRAMUSCULAR; INTRAVENOUS at 02:05

## 2022-01-29 RX ADMIN — METRONIDAZOLE 500 MG: 500 INJECTION, SOLUTION INTRAVENOUS at 16:47

## 2022-01-29 RX ADMIN — PANTOPRAZOLE SODIUM 40 MG: 40 INJECTION, POWDER, FOR SOLUTION INTRAVENOUS at 11:03

## 2022-01-29 RX ADMIN — OXYCODONE HYDROCHLORIDE AND ACETAMINOPHEN 1 TABLET: 5; 325 TABLET ORAL at 14:47

## 2022-01-29 RX ADMIN — ARFORMOTEROL TARTRATE: 15 SOLUTION RESPIRATORY (INHALATION) at 09:58

## 2022-01-29 RX ADMIN — METOPROLOL TARTRATE 5 MG: 5 INJECTION INTRAVENOUS at 00:10

## 2022-01-29 RX ADMIN — ENOXAPARIN SODIUM 30 MG: 30 INJECTION SUBCUTANEOUS at 09:57

## 2022-01-29 RX ADMIN — ARFORMOTEROL TARTRATE: 15 SOLUTION RESPIRATORY (INHALATION) at 20:53

## 2022-01-29 RX ADMIN — SODIUM CHLORIDE, PRESERVATIVE FREE 3 ML: 5 INJECTION INTRAVENOUS at 20:53

## 2022-01-29 RX ADMIN — PANTOPRAZOLE SODIUM 40 MG: 40 INJECTION, POWDER, FOR SOLUTION INTRAVENOUS at 20:53

## 2022-01-29 RX ADMIN — METOPROLOL SUCCINATE 25 MG: 25 TABLET, FILM COATED, EXTENDED RELEASE ORAL at 09:57

## 2022-01-30 LAB
ABO GROUP BLD: NORMAL
ABO GROUP BLD: NORMAL
ALBUMIN SERPL-MCNC: 2 G/DL (ref 3.5–5.2)
ALP SERPL-CCNC: 62 U/L (ref 39–117)
ALT SERPL W P-5'-P-CCNC: 9 U/L (ref 1–41)
ANION GAP SERPL CALCULATED.3IONS-SCNC: 7.9 MMOL/L (ref 5–15)
AST SERPL-CCNC: 20 U/L (ref 1–40)
BACTERIA FLD CULT: NORMAL
BILIRUB CONJ SERPL-MCNC: <0.2 MG/DL (ref 0–0.3)
BILIRUB INDIRECT SERPL-MCNC: ABNORMAL MG/DL
BILIRUB SERPL-MCNC: 0.2 MG/DL (ref 0–1.2)
BLD GP AB SCN SERPL QL: NEGATIVE
BUN SERPL-MCNC: 14 MG/DL (ref 6–20)
BUN/CREAT SERPL: 18.2 (ref 7–25)
CALCIUM SPEC-SCNC: 8.2 MG/DL (ref 8.6–10.5)
CHLORIDE SERPL-SCNC: 105 MMOL/L (ref 98–107)
CHOLEST FLD-MCNC: 26 MG/DL
CO2 SERPL-SCNC: 19.1 MMOL/L (ref 22–29)
CREAT SERPL-MCNC: 0.77 MG/DL (ref 0.76–1.27)
DEPRECATED RDW RBC AUTO: 44.8 FL (ref 37–54)
EOSINOPHIL # BLD MANUAL: 0.33 10*3/MM3 (ref 0–0.4)
EOSINOPHIL NFR BLD MANUAL: 3 % (ref 0.3–6.2)
ERYTHROCYTE [DISTWIDTH] IN BLOOD BY AUTOMATED COUNT: 14 % (ref 12.3–15.4)
GFR SERPL CREATININE-BSD FRML MDRD: 103 ML/MIN/1.73
GLUCOSE BLDC GLUCOMTR-MCNC: 109 MG/DL (ref 70–99)
GLUCOSE BLDC GLUCOMTR-MCNC: 95 MG/DL (ref 70–99)
GLUCOSE BLDC GLUCOMTR-MCNC: 96 MG/DL (ref 70–99)
GLUCOSE SERPL-MCNC: 105 MG/DL (ref 65–99)
GRAM STN SPEC: NORMAL
HCT VFR BLD AUTO: 22.1 % (ref 37.5–51)
HCT VFR BLD AUTO: 26.5 % (ref 37.5–51)
HGB BLD-MCNC: 7.5 G/DL (ref 13–17.7)
HGB BLD-MCNC: 9.1 G/DL (ref 13–17.7)
LYMPHOCYTES # BLD MANUAL: 1.1 10*3/MM3 (ref 0.7–3.1)
LYMPHOCYTES NFR BLD MANUAL: 7 % (ref 5–12)
MAGNESIUM SERPL-MCNC: 1.7 MG/DL (ref 1.6–2.6)
MCH RBC QN AUTO: 29.8 PG (ref 26.6–33)
MCHC RBC AUTO-ENTMCNC: 33.9 G/DL (ref 31.5–35.7)
MCV RBC AUTO: 87.7 FL (ref 79–97)
METAMYELOCYTES NFR BLD MANUAL: 1 % (ref 0–0)
MONOCYTES # BLD: 0.77 10*3/MM3 (ref 0.1–0.9)
MYELOCYTES NFR BLD MANUAL: 5 % (ref 0–0)
NEUTROPHILS # BLD AUTO: 8.11 10*3/MM3 (ref 1.7–7)
NEUTROPHILS NFR BLD MANUAL: 70 % (ref 42.7–76)
NEUTS BAND NFR BLD MANUAL: 4 % (ref 0–5)
PHOSPHATE SERPL-MCNC: 3.5 MG/DL (ref 2.5–4.5)
PLATELET # BLD AUTO: 522 10*3/MM3 (ref 140–450)
PMV BLD AUTO: 9.7 FL (ref 6–12)
POTASSIUM SERPL-SCNC: 4.3 MMOL/L (ref 3.5–5.2)
PROT SERPL-MCNC: 5.7 G/DL (ref 6–8.5)
RBC # BLD AUTO: 2.52 10*6/MM3 (ref 4.14–5.8)
RBC MORPH BLD: NORMAL
RH BLD: POSITIVE
RH BLD: POSITIVE
SCAN SLIDE: NORMAL
SMALL PLATELETS BLD QL SMEAR: ABNORMAL
SODIUM SERPL-SCNC: 132 MMOL/L (ref 136–145)
T&S EXPIRATION DATE: NORMAL
VARIANT LYMPHS NFR BLD MANUAL: 10 % (ref 19.6–45.3)
WBC MORPH BLD: NORMAL
WBC NRBC COR # BLD: 10.96 10*3/MM3 (ref 3.4–10.8)

## 2022-01-30 PROCEDURE — 99233 SBSQ HOSP IP/OBS HIGH 50: CPT | Performed by: FAMILY MEDICINE

## 2022-01-30 PROCEDURE — 86900 BLOOD TYPING SEROLOGIC ABO: CPT

## 2022-01-30 PROCEDURE — 86900 BLOOD TYPING SEROLOGIC ABO: CPT | Performed by: FAMILY MEDICINE

## 2022-01-30 PROCEDURE — 80048 BASIC METABOLIC PNL TOTAL CA: CPT | Performed by: FAMILY MEDICINE

## 2022-01-30 PROCEDURE — 82962 GLUCOSE BLOOD TEST: CPT

## 2022-01-30 PROCEDURE — 86901 BLOOD TYPING SEROLOGIC RH(D): CPT

## 2022-01-30 PROCEDURE — 85014 HEMATOCRIT: CPT | Performed by: FAMILY MEDICINE

## 2022-01-30 PROCEDURE — 25010000002 LEVOFLOXACIN PER 250 MG: Performed by: SURGERY

## 2022-01-30 PROCEDURE — 80076 HEPATIC FUNCTION PANEL: CPT | Performed by: FAMILY MEDICINE

## 2022-01-30 PROCEDURE — 83735 ASSAY OF MAGNESIUM: CPT | Performed by: FAMILY MEDICINE

## 2022-01-30 PROCEDURE — P9016 RBC LEUKOCYTES REDUCED: HCPCS

## 2022-01-30 PROCEDURE — 86923 COMPATIBILITY TEST ELECTRIC: CPT

## 2022-01-30 PROCEDURE — 84100 ASSAY OF PHOSPHORUS: CPT | Performed by: FAMILY MEDICINE

## 2022-01-30 PROCEDURE — 36430 TRANSFUSION BLD/BLD COMPNT: CPT

## 2022-01-30 PROCEDURE — 86901 BLOOD TYPING SEROLOGIC RH(D): CPT | Performed by: FAMILY MEDICINE

## 2022-01-30 PROCEDURE — 85025 COMPLETE CBC W/AUTO DIFF WBC: CPT | Performed by: FAMILY MEDICINE

## 2022-01-30 PROCEDURE — 80299 QUANTITATIVE ASSAY DRUG: CPT | Performed by: SURGERY

## 2022-01-30 PROCEDURE — 86850 RBC ANTIBODY SCREEN: CPT | Performed by: FAMILY MEDICINE

## 2022-01-30 PROCEDURE — 85007 BL SMEAR W/DIFF WBC COUNT: CPT | Performed by: FAMILY MEDICINE

## 2022-01-30 PROCEDURE — 94799 UNLISTED PULMONARY SVC/PX: CPT

## 2022-01-30 PROCEDURE — 85018 HEMOGLOBIN: CPT | Performed by: FAMILY MEDICINE

## 2022-01-30 RX ORDER — PANTOPRAZOLE SODIUM 40 MG/10ML
40 INJECTION, POWDER, LYOPHILIZED, FOR SOLUTION INTRAVENOUS
Status: DISCONTINUED | OUTPATIENT
Start: 2022-01-30 | End: 2022-01-31

## 2022-01-30 RX ADMIN — QUETIAPINE FUMARATE 300 MG: 100 TABLET ORAL at 20:22

## 2022-01-30 RX ADMIN — METOPROLOL SUCCINATE 25 MG: 25 TABLET, FILM COATED, EXTENDED RELEASE ORAL at 08:29

## 2022-01-30 RX ADMIN — OXYCODONE HYDROCHLORIDE AND ACETAMINOPHEN 1 TABLET: 5; 325 TABLET ORAL at 20:23

## 2022-01-30 RX ADMIN — ARFORMOTEROL TARTRATE: 15 SOLUTION RESPIRATORY (INHALATION) at 22:36

## 2022-01-30 RX ADMIN — OXYCODONE HYDROCHLORIDE AND ACETAMINOPHEN 1 TABLET: 5; 325 TABLET ORAL at 03:42

## 2022-01-30 RX ADMIN — METRONIDAZOLE 500 MG: 500 INJECTION, SOLUTION INTRAVENOUS at 08:29

## 2022-01-30 RX ADMIN — PANTOPRAZOLE SODIUM 40 MG: 40 INJECTION, POWDER, FOR SOLUTION INTRAVENOUS at 08:29

## 2022-01-30 RX ADMIN — ARFORMOTEROL TARTRATE: 15 SOLUTION RESPIRATORY (INHALATION) at 08:56

## 2022-01-30 RX ADMIN — METRONIDAZOLE 500 MG: 500 INJECTION, SOLUTION INTRAVENOUS at 00:10

## 2022-01-30 RX ADMIN — AMLODIPINE BESYLATE 2.5 MG: 2.5 TABLET ORAL at 08:29

## 2022-01-30 RX ADMIN — LEVOFLOXACIN 750 MG: 5 INJECTION, SOLUTION INTRAVENOUS at 08:29

## 2022-01-30 RX ADMIN — OXYCODONE HYDROCHLORIDE AND ACETAMINOPHEN 1 TABLET: 5; 325 TABLET ORAL at 13:54

## 2022-01-30 RX ADMIN — SODIUM CHLORIDE, PRESERVATIVE FREE 3 ML: 5 INJECTION INTRAVENOUS at 08:34

## 2022-01-30 RX ADMIN — POLYETHYLENE GLYCOL 3350 17 G: 17 POWDER, FOR SOLUTION ORAL at 08:29

## 2022-01-30 RX ADMIN — SODIUM CHLORIDE, PRESERVATIVE FREE 3 ML: 5 INJECTION INTRAVENOUS at 20:24

## 2022-01-31 ENCOUNTER — APPOINTMENT (OUTPATIENT)
Dept: GENERAL RADIOLOGY | Facility: HOSPITAL | Age: 57
End: 2022-01-31

## 2022-01-31 LAB
ARTERIAL PATENCY WRIST A: POSITIVE
BASE EXCESS BLDA CALC-SCNC: -3.6 MMOL/L (ref -2–2)
BASOPHILS # BLD AUTO: 0.09 10*3/MM3 (ref 0–0.2)
BASOPHILS NFR BLD AUTO: 0.9 % (ref 0–1.5)
BDY SITE: ABNORMAL
BH BB BLOOD EXPIRATION DATE: NORMAL
BH BB BLOOD TYPE BARCODE: 5100
BH BB DISPENSE STATUS: NORMAL
BH BB PRODUCT CODE: NORMAL
BH BB UNIT NUMBER: NORMAL
CA-I BLDA-SCNC: 1.12 MMOL/L (ref 1.13–1.32)
CHLORIDE BLDA-SCNC: 106 MMOL/L (ref 98–106)
COHGB MFR BLD: 0.4 % (ref 0–1.5)
CROSSMATCH INTERPRETATION: NORMAL
CYTO UR: NORMAL
DEPRECATED RDW RBC AUTO: 46.7 FL (ref 37–54)
EOSINOPHIL # BLD AUTO: 0.25 10*3/MM3 (ref 0–0.4)
EOSINOPHIL NFR BLD AUTO: 2.4 % (ref 0.3–6.2)
ERYTHROCYTE [DISTWIDTH] IN BLOOD BY AUTOMATED COUNT: 14.3 % (ref 12.3–15.4)
FHHB: 9.4 % (ref 0–5)
GAS FLOW AIRWAY: ABNORMAL L/MIN
GLUCOSE BLDA-MCNC: 118 MMOL/L (ref 70–99)
HCO3 BLDA-SCNC: 19.6 MMOL/L (ref 22–26)
HCT VFR BLD AUTO: 25.9 % (ref 37.5–51)
HGB BLD-MCNC: 8.6 G/DL (ref 13–17.7)
HGB BLDA-MCNC: 9.1 G/DL (ref 13.8–16.4)
IMM GRANULOCYTES # BLD AUTO: 1.45 10*3/MM3 (ref 0–0.05)
IMM GRANULOCYTES NFR BLD AUTO: 13.9 % (ref 0–0.5)
INHALED O2 CONCENTRATION: 21 %
LAB AP CASE REPORT: NORMAL
LAB AP CLINICAL INFORMATION: NORMAL
LACTATE BLDA-SCNC: 0.86 MMOL/L (ref 0.5–2)
LYMPHOCYTES # BLD AUTO: 1.52 10*3/MM3 (ref 0.7–3.1)
LYMPHOCYTES NFR BLD AUTO: 14.6 % (ref 19.6–45.3)
MCH RBC QN AUTO: 29.9 PG (ref 26.6–33)
MCHC RBC AUTO-ENTMCNC: 33.2 G/DL (ref 31.5–35.7)
MCV RBC AUTO: 89.9 FL (ref 79–97)
METHGB BLD QL: 0.3 % (ref 0–1.5)
MODALITY: ABNORMAL
MONOCYTES # BLD AUTO: 1.07 10*3/MM3 (ref 0.1–0.9)
MONOCYTES NFR BLD AUTO: 10.3 % (ref 5–12)
NEUTROPHILS NFR BLD AUTO: 57.9 % (ref 42.7–76)
NEUTROPHILS NFR BLD AUTO: 6.04 10*3/MM3 (ref 1.7–7)
NOTE: ABNORMAL
NRBC BLD AUTO-RTO: 0 /100 WBC (ref 0–0.2)
OXYHGB MFR BLDV: 89.9 % (ref 94–99)
PATH REPORT.FINAL DX SPEC: NORMAL
PATH REPORT.GROSS SPEC: NORMAL
PCO2 BLDA: 28.9 MM HG (ref 35–45)
PH BLDA: 7.45 PH UNITS (ref 7.35–7.45)
PLATELET # BLD AUTO: 498 10*3/MM3 (ref 140–450)
PMV BLD AUTO: 9.8 FL (ref 6–12)
PO2 BLD: 280 MM[HG] (ref 0–500)
PO2 BLDA: 58.8 MM HG (ref 80–100)
POTASSIUM BLDA-SCNC: 3.77 MMOL/L (ref 3.5–5)
RBC # BLD AUTO: 2.88 10*6/MM3 (ref 4.14–5.8)
SAO2 % BLDCOA: 90.5 % (ref 95–99)
SODIUM BLDA-SCNC: 129.9 MMOL/L (ref 136–146)
STAT OF ADQ CVX/VAG CYTO-IMP: NORMAL
UNIT  ABO: NORMAL
UNIT  RH: NORMAL
WBC NRBC COR # BLD: 10.42 10*3/MM3 (ref 3.4–10.8)

## 2022-01-31 PROCEDURE — 25010000002 MEROPENEM PER 100 MG: Performed by: SURGERY

## 2022-01-31 PROCEDURE — 80299 QUANTITATIVE ASSAY DRUG: CPT | Performed by: SURGERY

## 2022-01-31 PROCEDURE — 82375 ASSAY CARBOXYHB QUANT: CPT | Performed by: SURGERY

## 2022-01-31 PROCEDURE — 99233 SBSQ HOSP IP/OBS HIGH 50: CPT | Performed by: INTERNAL MEDICINE

## 2022-01-31 PROCEDURE — 36600 WITHDRAWAL OF ARTERIAL BLOOD: CPT | Performed by: SURGERY

## 2022-01-31 PROCEDURE — 82962 GLUCOSE BLOOD TEST: CPT

## 2022-01-31 PROCEDURE — 94799 UNLISTED PULMONARY SVC/PX: CPT

## 2022-01-31 PROCEDURE — 71045 X-RAY EXAM CHEST 1 VIEW: CPT

## 2022-01-31 PROCEDURE — 25010000002 ENOXAPARIN PER 10 MG: Performed by: SURGERY

## 2022-01-31 PROCEDURE — 74018 RADEX ABDOMEN 1 VIEW: CPT

## 2022-01-31 PROCEDURE — 83050 HGB METHEMOGLOBIN QUAN: CPT | Performed by: SURGERY

## 2022-01-31 PROCEDURE — 82805 BLOOD GASES W/O2 SATURATION: CPT | Performed by: SURGERY

## 2022-01-31 PROCEDURE — 85025 COMPLETE CBC W/AUTO DIFF WBC: CPT | Performed by: SURGERY

## 2022-01-31 PROCEDURE — 99024 POSTOP FOLLOW-UP VISIT: CPT | Performed by: SURGERY

## 2022-01-31 RX ORDER — FAMOTIDINE 20 MG/1
20 TABLET, FILM COATED ORAL
Status: DISCONTINUED | OUTPATIENT
Start: 2022-01-31 | End: 2022-01-31

## 2022-01-31 RX ORDER — PANTOPRAZOLE SODIUM 40 MG/1
40 TABLET, DELAYED RELEASE ORAL
Status: DISCONTINUED | OUTPATIENT
Start: 2022-01-31 | End: 2022-02-08 | Stop reason: HOSPADM

## 2022-01-31 RX ORDER — METOPROLOL SUCCINATE 50 MG/1
50 TABLET, EXTENDED RELEASE ORAL
Status: DISCONTINUED | OUTPATIENT
Start: 2022-02-01 | End: 2022-02-03

## 2022-01-31 RX ORDER — GABAPENTIN 400 MG/1
800 CAPSULE ORAL 3 TIMES DAILY
Status: DISCONTINUED | OUTPATIENT
Start: 2022-01-31 | End: 2022-02-01

## 2022-01-31 RX ADMIN — ARFORMOTEROL TARTRATE: 15 SOLUTION RESPIRATORY (INHALATION) at 09:25

## 2022-01-31 RX ADMIN — MEROPENEM 500 MG: 500 INJECTION INTRAVENOUS at 16:36

## 2022-01-31 RX ADMIN — ENOXAPARIN SODIUM 30 MG: 30 INJECTION SUBCUTANEOUS at 09:25

## 2022-01-31 RX ADMIN — GABAPENTIN 800 MG: 400 CAPSULE ORAL at 16:37

## 2022-01-31 RX ADMIN — FAMOTIDINE 20 MG: 20 TABLET, FILM COATED ORAL at 16:37

## 2022-01-31 RX ADMIN — ARFORMOTEROL TARTRATE: 15 SOLUTION RESPIRATORY (INHALATION) at 20:01

## 2022-01-31 RX ADMIN — AMLODIPINE BESYLATE 2.5 MG: 2.5 TABLET ORAL at 09:24

## 2022-01-31 RX ADMIN — MEROPENEM 500 MG: 500 INJECTION INTRAVENOUS at 11:47

## 2022-01-31 RX ADMIN — GABAPENTIN 800 MG: 400 CAPSULE ORAL at 20:00

## 2022-01-31 RX ADMIN — QUETIAPINE FUMARATE 300 MG: 100 TABLET ORAL at 20:01

## 2022-01-31 RX ADMIN — SODIUM CHLORIDE, PRESERVATIVE FREE 3 ML: 5 INJECTION INTRAVENOUS at 09:25

## 2022-01-31 RX ADMIN — OXYCODONE HYDROCHLORIDE AND ACETAMINOPHEN 1 TABLET: 5; 325 TABLET ORAL at 11:48

## 2022-01-31 RX ADMIN — METOPROLOL SUCCINATE 25 MG: 25 TABLET, FILM COATED, EXTENDED RELEASE ORAL at 09:24

## 2022-01-31 RX ADMIN — GABAPENTIN 800 MG: 400 CAPSULE ORAL at 11:47

## 2022-01-31 RX ADMIN — OXYCODONE HYDROCHLORIDE AND ACETAMINOPHEN 1 TABLET: 5; 325 TABLET ORAL at 03:04

## 2022-01-31 RX ADMIN — SODIUM CHLORIDE, PRESERVATIVE FREE 3 ML: 5 INJECTION INTRAVENOUS at 20:00

## 2022-01-31 RX ADMIN — OXYCODONE HYDROCHLORIDE AND ACETAMINOPHEN 1 TABLET: 5; 325 TABLET ORAL at 19:29

## 2022-01-31 RX ADMIN — PANTOPRAZOLE SODIUM 40 MG: 40 INJECTION, POWDER, FOR SOLUTION INTRAVENOUS at 05:43

## 2022-01-31 RX ADMIN — SODIUM CHLORIDE 250 ML: 9 INJECTION, SOLUTION INTRAVENOUS at 23:33

## 2022-01-31 RX ADMIN — POLYETHYLENE GLYCOL 3350 17 G: 17 POWDER, FOR SOLUTION ORAL at 09:25

## 2022-02-01 ENCOUNTER — APPOINTMENT (OUTPATIENT)
Dept: CARDIOLOGY | Facility: HOSPITAL | Age: 57
End: 2022-02-01

## 2022-02-01 ENCOUNTER — APPOINTMENT (OUTPATIENT)
Dept: CT IMAGING | Facility: HOSPITAL | Age: 57
End: 2022-02-01

## 2022-02-01 LAB
ANION GAP SERPL CALCULATED.3IONS-SCNC: 9 MMOL/L (ref 5–15)
BACTERIA SPEC AEROBE CULT: NORMAL
BACTERIA SPEC AEROBE CULT: NORMAL
BACTERIA SPEC ANAEROBE CULT: NORMAL
BASOPHILS # BLD AUTO: 0.13 10*3/MM3 (ref 0–0.2)
BASOPHILS NFR BLD AUTO: 1.1 % (ref 0–1.5)
BUN SERPL-MCNC: 9 MG/DL (ref 6–20)
BUN/CREAT SERPL: 11.8 (ref 7–25)
CALCIUM SPEC-SCNC: 7.9 MG/DL (ref 8.6–10.5)
CHLORIDE SERPL-SCNC: 105 MMOL/L (ref 98–107)
CO2 SERPL-SCNC: 21 MMOL/L (ref 22–29)
CREAT SERPL-MCNC: 0.76 MG/DL (ref 0.76–1.27)
CREAT UR-MCNC: 36.6 MG/DL
DEPRECATED RDW RBC AUTO: 46.5 FL (ref 37–54)
EOSINOPHIL # BLD AUTO: 0.25 10*3/MM3 (ref 0–0.4)
EOSINOPHIL NFR BLD AUTO: 2.1 % (ref 0.3–6.2)
ERYTHROCYTE [DISTWIDTH] IN BLOOD BY AUTOMATED COUNT: 14.6 % (ref 12.3–15.4)
GFR SERPL CREATININE-BSD FRML MDRD: 105 ML/MIN/1.73
GLUCOSE BLDC GLUCOMTR-MCNC: 118 MG/DL (ref 70–99)
GLUCOSE SERPL-MCNC: 106 MG/DL (ref 65–99)
HCT VFR BLD AUTO: 22.7 % (ref 37.5–51)
HGB BLD-MCNC: 7.6 G/DL (ref 13–17.7)
IMM GRANULOCYTES # BLD AUTO: 1.78 10*3/MM3 (ref 0–0.05)
IMM GRANULOCYTES NFR BLD AUTO: 14.7 % (ref 0–0.5)
LYMPHOCYTES # BLD AUTO: 1.95 10*3/MM3 (ref 0.7–3.1)
LYMPHOCYTES NFR BLD AUTO: 16.1 % (ref 19.6–45.3)
MCH RBC QN AUTO: 29.2 PG (ref 26.6–33)
MCHC RBC AUTO-ENTMCNC: 33.5 G/DL (ref 31.5–35.7)
MCV RBC AUTO: 87.3 FL (ref 79–97)
MONOCYTES # BLD AUTO: 1.26 10*3/MM3 (ref 0.1–0.9)
MONOCYTES NFR BLD AUTO: 10.4 % (ref 5–12)
NEUTROPHILS NFR BLD AUTO: 55.6 % (ref 42.7–76)
NEUTROPHILS NFR BLD AUTO: 6.72 10*3/MM3 (ref 1.7–7)
NRBC BLD AUTO-RTO: 0.2 /100 WBC (ref 0–0.2)
PLATELET # BLD AUTO: 481 10*3/MM3 (ref 140–450)
PMV BLD AUTO: 9.6 FL (ref 6–12)
POTASSIUM SERPL-SCNC: 3.8 MMOL/L (ref 3.5–5.2)
RBC # BLD AUTO: 2.6 10*6/MM3 (ref 4.14–5.8)
SODIUM SERPL-SCNC: 135 MMOL/L (ref 136–145)
SODIUM UR-SCNC: 46 MMOL/L
TSH SERPL DL<=0.05 MIU/L-ACNC: 2.61 UIU/ML (ref 0.27–4.2)
UUN 24H UR-MCNC: 217 MG/DL
WBC NRBC COR # BLD: 12.09 10*3/MM3 (ref 3.4–10.8)

## 2022-02-01 PROCEDURE — 25010000002 MEROPENEM PER 100 MG: Performed by: SURGERY

## 2022-02-01 PROCEDURE — 94799 UNLISTED PULMONARY SVC/PX: CPT

## 2022-02-01 PROCEDURE — 84540 ASSAY OF URINE/UREA-N: CPT | Performed by: INTERNAL MEDICINE

## 2022-02-01 PROCEDURE — 80048 BASIC METABOLIC PNL TOTAL CA: CPT | Performed by: NURSE PRACTITIONER

## 2022-02-01 PROCEDURE — 84443 ASSAY THYROID STIM HORMONE: CPT | Performed by: INTERNAL MEDICINE

## 2022-02-01 PROCEDURE — 80299 QUANTITATIVE ASSAY DRUG: CPT | Performed by: SURGERY

## 2022-02-01 PROCEDURE — 82570 ASSAY OF URINE CREATININE: CPT | Performed by: INTERNAL MEDICINE

## 2022-02-01 PROCEDURE — 84300 ASSAY OF URINE SODIUM: CPT | Performed by: INTERNAL MEDICINE

## 2022-02-01 PROCEDURE — 0 IOHEXOL 12 MG/ML SOLUTION: Performed by: NURSE PRACTITIONER

## 2022-02-01 PROCEDURE — 74176 CT ABD & PELVIS W/O CONTRAST: CPT

## 2022-02-01 PROCEDURE — 97530 THERAPEUTIC ACTIVITIES: CPT

## 2022-02-01 PROCEDURE — 25010000002 ENOXAPARIN PER 10 MG: Performed by: SURGERY

## 2022-02-01 PROCEDURE — 71250 CT THORAX DX C-: CPT

## 2022-02-01 PROCEDURE — 99233 SBSQ HOSP IP/OBS HIGH 50: CPT | Performed by: INTERNAL MEDICINE

## 2022-02-01 PROCEDURE — 99024 POSTOP FOLLOW-UP VISIT: CPT | Performed by: SURGERY

## 2022-02-01 PROCEDURE — 93306 TTE W/DOPPLER COMPLETE: CPT

## 2022-02-01 PROCEDURE — 85025 COMPLETE CBC W/AUTO DIFF WBC: CPT | Performed by: NURSE PRACTITIONER

## 2022-02-01 RX ORDER — GABAPENTIN 300 MG/1
300 CAPSULE ORAL 3 TIMES DAILY
Status: DISCONTINUED | OUTPATIENT
Start: 2022-02-01 | End: 2022-02-03

## 2022-02-01 RX ADMIN — GABAPENTIN 800 MG: 400 CAPSULE ORAL at 09:42

## 2022-02-01 RX ADMIN — IOHEXOL 250 ML: 12 SOLUTION ORAL at 12:25

## 2022-02-01 RX ADMIN — IOHEXOL 250 ML: 12 SOLUTION ORAL at 15:35

## 2022-02-01 RX ADMIN — MEROPENEM 500 MG: 500 INJECTION INTRAVENOUS at 05:20

## 2022-02-01 RX ADMIN — MEROPENEM 500 MG: 500 INJECTION INTRAVENOUS at 17:59

## 2022-02-01 RX ADMIN — ARFORMOTEROL TARTRATE: 15 SOLUTION RESPIRATORY (INHALATION) at 20:38

## 2022-02-01 RX ADMIN — SODIUM CHLORIDE, PRESERVATIVE FREE 3 ML: 5 INJECTION INTRAVENOUS at 20:38

## 2022-02-01 RX ADMIN — MEROPENEM 500 MG: 500 INJECTION INTRAVENOUS at 12:27

## 2022-02-01 RX ADMIN — METOPROLOL SUCCINATE 50 MG: 50 TABLET, FILM COATED, EXTENDED RELEASE ORAL at 09:42

## 2022-02-01 RX ADMIN — PANTOPRAZOLE SODIUM 40 MG: 40 TABLET, DELAYED RELEASE ORAL at 09:42

## 2022-02-01 RX ADMIN — POLYETHYLENE GLYCOL 3350 17 G: 17 POWDER, FOR SOLUTION ORAL at 09:45

## 2022-02-01 RX ADMIN — QUETIAPINE FUMARATE 300 MG: 100 TABLET ORAL at 20:38

## 2022-02-01 RX ADMIN — ENOXAPARIN SODIUM 30 MG: 30 INJECTION SUBCUTANEOUS at 09:42

## 2022-02-01 RX ADMIN — SODIUM CHLORIDE, PRESERVATIVE FREE 3 ML: 5 INJECTION INTRAVENOUS at 09:43

## 2022-02-01 RX ADMIN — PANTOPRAZOLE SODIUM 40 MG: 40 TABLET, DELAYED RELEASE ORAL at 17:59

## 2022-02-01 RX ADMIN — MEROPENEM 500 MG: 500 INJECTION INTRAVENOUS at 00:50

## 2022-02-01 NOTE — PROGRESS NOTES
[facility]     Progress Note    Patient Name: Atilio Recinos  : 1965  MRN: [id number]  Primary Care Physician:  William Mcdonald MD  Date of admission: 2022    Subjective   Subjective     HPI:  Patient Reports feeling well today.  No nausea or vomiting.  Had some somnolence overnight.  Seems better this morning.  Accidentally pulled out his lower GIOVANY drain last night.    Objective   Objective     Vitals:   Temp:  [97.9 °F (36.6 °C)-99.7 °F (37.6 °C)] (P) 99.1 °F (37.3 °C)  Heart Rate:  [107-138] 132  Resp:  [16-22] (P) 18  BP: (109-152)/(57-97) (P) 110/83  Flow (L/min):  [2] 2    Physical Exam  Constitutional:       Appearance: Normal appearance.   Cardiovascular:      Rate and Rhythm: Normal rate.   Pulmonary:      Effort: Pulmonary effort is normal.   Abdominal:      General: There is distension.      Palpations: Abdomen is soft.         Result Review    Result Review:  I have personally reviewed the results from the time of this admission to 2022 10:48 EST and agree with these findings:  [x]  Laboratory  []  Microbiology  []  Radiology  []  EKG/Telemetry   []  Cardiology/Vascular   []  Pathology  []  Old records  []  Other:  Most notable findings include: Mild increase in leukocytosis    Assessment/Plan   Assessment / Plan     Brief Patient Summary:  Atilio Recinos is a 57 y.o. male who status post repair of perforated ulcer    Active Hospital Problems:  Active Hospital Problems    Diagnosis    • S/P exploratory laparotomy, oversew of perforated ulcer, placement of [place] patch     • Pneumoperitoneum of unknown etiology    • Bowel perforation (HCC)      Plan:  Given the concern overnight, the findings and the increased leukocytosis will repeat CT today       DVT prophylaxis:  Medical and mechanical DVT prophylaxis orders are present.    CODE STATUS:   Level Of Support Discussed With: Patient  Code Status (Patient has no pulse and is not breathing): CPR (Attempt to Resuscitate)  Medical  Interventions (Patient has pulse or is breathing): Full Support        Electronically signed by Atilio Lindsay MD, 02/01/22, 10:48 AM EST.

## 2022-02-01 NOTE — NURSING NOTE
Pt very drowsy, following commands, sometimes will respond to voice stimuli, other times requiring painful stimuli to arouse. Sometimes answering orientation questions appropriately, other times not. Incomprehensible speech. Hand  strong, equal bilateral. Able to stick hands/arms out straight in front of him. Dorsiflexion/plantarflexion +. Having tremors/restless. Pupils size 2-3mm, brisk reaction to light. Pt did pull out his lower GIOVANY drain about 20ish minutes prior to RRT being called, was not having but minimal drainage out of it prior to. RRT was called. FSBS 118. VSS, HR elevated running 120-130's but appears to have been running tachy all day. ICU nurse, house sup, RT responded. ABG's drawn. Pt was given pain pill at 1930 by day shift nurse, given gabapentin and seroquel at 2000. ICU nurse called and notified Dr. Lindsay of above, chest x-ray, KUB, and 250ml bolus of NS ordered, did not feel narcan was appropriate at this time given Pt's ability to be aroused/following commands.

## 2022-02-01 NOTE — CODE DOCUMENTATION
Paged and spoke to Dr Lindsay on the phone about his pt Mr Jorje.  RRT paged about pts level of responsiveness.  Pt drowsy responds to voice and stimulation after receiving Percocet, Gabapentin, and Seroquel.  Pt maintaining airway.  Informed Dr Lindsay about pts vitals which where WNL ex heart rate in the 130s sinus tach.  NS 250ml bolus ordered.  ABG was drawn.  Chest xray and KUB ordered.  Primary nurse informed to call Dr. Lindsay with abnormal results.

## 2022-02-01 NOTE — THERAPY TREATMENT NOTE
Acute Care - Physical Therapy Treatment Note   [facility]     Patient Name: Atilio Recinos  : 1965  MRN: [id number]  Today's Date: 2022      Visit Dx:     ICD-10-CM ICD-9-CM   1. Pneumoperitoneum  K66.8 568.89   2. Bowel perforation (HCC)  K63.1 569.83   3. Sepsis, due to unspecified organism, unspecified whether acute organ dysfunction present (HCC)  A41.9 038.9     995.91   4. Decreased activities of daily living (ADL)  Z78.9 V49.89   5. Difficulty walking  R26.2 719.7     Patient Active Problem List   Diagnosis   • Anxiety   • Bipolar 1 disorder (HCC)   • COPD (chronic obstructive pulmonary disease) (HCC)   • Depression   • High blood pressure   • High cholesterol   • Other acute sinusitis   • Obesity   • Cigarette nicotine dependence without complication   • Pneumoperitoneum of unknown etiology   • Bowel perforation (HCC)   • S/P exploratory laparotomy, oversew of perforated ulcer, placement of Chaparro patch      Past Medical History:   Diagnosis Date   • Allergies    • Anxiety    • Bipolar 1 disorder (HCC)    • COPD (chronic obstructive pulmonary disease) (HCC)    • Depression    • Forgetfulness    • Head injury    • Hepatitis    • High blood pressure    • High cholesterol    • Psychiatric illness    • S/P exploratory laparotomy, oversew of perforated ulcer, placement of Chaparro patch  2022   • Shortness of breath    • Sinus trouble      Past Surgical History:   Procedure Laterality Date   • EXPLORATORY LAPAROTOMY N/A 2022    Procedure: EXPLORATORY LAPAROTOMY, OVERSEW OF PERFORATED GASTRIC ULCER WITH CHAPARRO PATCH;  Surgeon: Atilio Lindsay MD;  Location: [facility];  Service: General;  Laterality: N/A;   • PLEURAL SCARIFICATION     • SPHINCTEROTOMY       PT Assessment (last 12 hours)     PT Evaluation and Treatment     Row Name 22 1100          Physical Therapy Time and Intention    Document Type therapy note (daily note)  -AV     Mode of Treatment individual therapy; physical  therapy  -AV     Row Name 02/01/22 1100          Bed Mobility    Bed Mobility supine-sit-supine  -AV     Supine-Sit-Supine [place] (Bed Mobility) standby assist  -AV     Assistive Device (Bed Mobility) bed rails  -AV     Row Name 02/01/22 1100          Transfers    Transfers sit-stand transfer  -AV     Comment (Transfers) Patient completed 5 sit-stand transfers with contact guard assist and rolling walker and verbal cues for appropriate hand placement.  -AV     Sit-Stand [place] (Transfers) contact guard; verbal cues  -AV     Row Name 02/01/22 1100          Sit-Stand Transfer    Assistive Device (Sit-Stand Transfers) walker, front-wheeled  -AV     Row Name 02/01/22 1100          Gait/Stairs (Locomotion)    Gait/Stairs Locomotion gait/ambulation independence; gait/ambulation assistive device; distance ambulated  -AV     [place] Level (Gait) contact guard  -AV     Assistive Device (Gait) walker, front-wheeled  -AV     Distance in Feet (Gait) MIPx10 with each stand. 5 sidesteps right for repositioning in bed.  -AV     Row Name 02/01/22 1100          Safety Issues, Functional Mobility    Safety Issues Affecting Function (Mobility) impulsivity; judgment; safety precaution awareness; sequencing abilities  -AV     Row Name 02/01/22 1100          Balance    Balance Assessment standing dynamic balance  -AV     Dynamic Standing Balance mild impairment; supported; standing  -AV     Row Name             Wound 01/16/22 1412 midline abdomen Incision    Wound - Properties Group Placement Date: 01/16/22  -RB Placement Time: 1412  -RB Present on Hospital Admission: N  -RB Orientation: midline  -RB Location: abdomen  -RB Primary Wound Type: Incision  -RB     Retired Wound - Properties Group Date first assessed: 01/16/22  -RB Time first assessed: 1412  -RB Present on Hospital Admission: N  -RB Location: abdomen  -RB Primary Wound Type: Incision  -RB     Row Name 02/01/22 1100          Progress Summary (PT)    Daily  Progress Summary (PT) Patient completed 5 sit-stand transfers with contact guard assist and rolling walker and verbal cues for appropriate hand placement. With each stand patient completed MIPx10. Upon becoming fatigued and fearing his legs were going to give out, patient sits impulsively. Continue POC.  -AV           User Key  (r) = Recorded By, (t) = Taken By, (c) = Cosigned By    Initials Name Provider Type    Mckenzie Cordon, RN Registered Nurse    Nathan Blas, PT Physical Therapist                  PT Recommendation and Plan     Progress Summary (PT)  Daily Progress Summary (PT): Patient completed 5 sit-stand transfers with contact guard assist and rolling walker and verbal cues for appropriate hand placement. With each stand patient completed MIPx10. Upon becoming fatigued and fearing his legs were going to give out, patient sits impulsively. Continue POC.   Outcome Measures     Row Name 02/01/22 1100             How much help from another person do you currently need...    Turning from your back to your side while in flat bed without using bedrails? 4  -AV      Moving from lying on back to sitting on the side of a flat bed without bedrails? 3  -AV      Moving to and from a bed to a chair (including a wheelchair)? 3  -AV      Standing up from a chair using your arms (e.g., wheelchair, bedside chair)? 3  -AV      Climbing 3-5 steps with a railing? 2  -AV      To walk in hospital room? 3  -AV      AM-PAC 6 Clicks Score (PT) 18  -AV              Functional Assessment    Outcome Measure Options AM-PAC 6 Clicks Basic Mobility (PT)  -AV            User Key  (r) = Recorded By, (t) = Taken By, (c) = Cosigned By    Initials Name Provider Type    Nathan Blas, PT Physical Therapist                 Time Calculation:    PT Charges     Row Name 02/01/22 1145             Time Calculation    PT Received On 02/01/22  -AV      PT Goal Re-Cert Due Date 02/06/22  -AV              Timed Charges    76445 - Gait  Training Minutes  4  -AV      93461 - PT Therapeutic Activity Minutes 8  -AV              Total Minutes    Timed Charges Total Minutes 12  -AV       Total Minutes 12  -AV            User Key  (r) = Recorded By, (t) = Taken By, (c) = Cosigned By    Initials Name Provider Type    AV Nathan Lawson, PT Physical Therapist              Therapy Charges for Today     Code Description Service Date Service Provider Modifiers Qty    95583092960 HC PT THERAPEUTIC ACT EA 15 MIN 2/1/2022 Nathan Lawson, PT GP 1          PT G-Codes  Outcome Measure Options: AM-PAC 6 Clicks Basic Mobility (PT)  AM-PAC 6 Clicks Score (PT): 18  AM-PAC 6 Clicks Score (OT): 18    Nathan Lawson PT  2/1/2022

## 2022-02-01 NOTE — PLAN OF CARE
Problem: Adult Inpatient Plan of Care  Goal: Plan of Care Review  Outcome: Ongoing, Progressing  Flowsheets  Taken 2/1/2022 0630 by Staci Blevins, HUGO  Progress: declining  Outcome Summary: Pt very drowsy still. Has remained asleep throughout night, aroused with painful stimuli. 2L NC, O2 sat has been in the 90's. RRT called last night, see previous two notes. Wound care completed this AM. Pt removed his LLQ GIOVANY drain last night. LUQ GIOVANY drain has been having green/tan/creamy drainage coming from around insertion site, had 2.5ml out of drain this shift. Midline incision with staples, redness noted around incision, having tan/yellow/creamy slough in incision. Call light within reach.  Taken 1/31/2022 1644 by Kirk Fernandez RN  Plan of Care Reviewed With: patient   Goal Outcome Evaluation:           Progress: declining  Outcome Summary: Pt very drowsy still. Has remained asleep throughout night, aroused with painful stimuli. 2L NC, O2 sat has been in the 90's. RRT called last night, see previous two notes. Wound care completed this AM. Pt removed his LLQ GIOVANY drain last night. LUQ GIOVANY drain has been having green/tan/creamy drainage coming from around insertion site, had 2.5ml out of drain this shift. Midline incision with staples, redness noted around incision, having tan/yellow/creamy slough in incision. Call light within reach.

## 2022-02-01 NOTE — PLAN OF CARE
Goal Outcome Evaluation:  Plan of Care Reviewed With: patient    Patient had an abdominal ct scan done and an echo. Patient not confused today is able to tell me where he is at and answer all orientation questions appropriately. Patients gabapentin was decreased but he requested that I hold this medication for the evening dose. Patient is being encouraged to get up and move he directed the conversation to other topics during this time.  Lupis Bautista RN          Progress: improving

## 2022-02-02 ENCOUNTER — APPOINTMENT (OUTPATIENT)
Dept: GENERAL RADIOLOGY | Facility: HOSPITAL | Age: 57
End: 2022-02-02

## 2022-02-02 LAB
ALBUMIN FLD-MCNC: 1 G/DL
ALBUMIN SERPL-MCNC: 2.2 G/DL (ref 3.5–5.2)
ANION GAP SERPL CALCULATED.3IONS-SCNC: 9.2 MMOL/L (ref 5–15)
AORTIC DIMENSIONLESS INDEX: 0.8 (DI)
APPEARANCE FLD: ABNORMAL
ASCENDING AORTA: 2.7 CM
BASOPHILS # BLD MANUAL: 0.12 10*3/MM3 (ref 0–0.2)
BASOPHILS NFR BLD MANUAL: 1 % (ref 0–1.5)
BH CV ECHO MEAS - AO MAX PG: 10 MMHG
BH CV ECHO MEAS - AO MEAN PG: 5 MMHG
BH CV ECHO MEAS - AO ROOT DIAM: 2.9 CM
BH CV ECHO MEAS - AO V2 MAX: 156 CM/SEC
BH CV ECHO MEAS - AO V2 VTI: 24.4 CM
BH CV ECHO MEAS - AVA PLANIMETRY TRACED: 2.2 CM2
BH CV ECHO MEAS - EDV(MOD-SP2): 59.9 ML
BH CV ECHO MEAS - EDV(MOD-SP4): 84.8 ML
BH CV ECHO MEAS - EF(MOD-BP): 65 %
BH CV ECHO MEAS - ESV(MOD-SP2): 19.7 ML
BH CV ECHO MEAS - ESV(MOD-SP4): 27.7 ML
BH CV ECHO MEAS - IVSD: 1 CM
BH CV ECHO MEAS - LA A2CS (ATRIAL LENGTH): 5.6 CM
BH CV ECHO MEAS - LA DIMENSION(2D): 3.3 CM
BH CV ECHO MEAS - LAT PEAK E' VEL: 8.4 CM/SEC
BH CV ECHO MEAS - LV MAX PG: 6 MMHG
BH CV ECHO MEAS - LV MEAN PG: 3 MMHG
BH CV ECHO MEAS - LV V1 MAX: 120 CM/SEC
BH CV ECHO MEAS - LV V1 VTI: 18.5 CM
BH CV ECHO MEAS - LVIDD: 3.7 CM
BH CV ECHO MEAS - LVIDS: 2.4 CM
BH CV ECHO MEAS - LVOT DIAM: 1.9 CM
BH CV ECHO MEAS - LVPWD: 1 CM
BH CV ECHO MEAS - MED PEAK E' VEL: 8.2 CM/SEC
BH CV ECHO MEAS - MV A MAX VEL: 110 CM/SEC
BH CV ECHO MEAS - MV DEC SLOPE: 466 CM/SEC2
BH CV ECHO MEAS - MV DEC TIME: 244 MSEC
BH CV ECHO MEAS - MV E MAX VEL: 114 CM/SEC
BH CV ECHO MEAS - MV E/A: 1
BH CV ECHO MEAS - MV MAX PG: 6 MMHG
BH CV ECHO MEAS - MV MEAN PG: 4 MMHG
BH CV ECHO MEAS - MV P1/2T: 46 MSEC
BH CV ECHO MEAS - MV V2 VTI: 22 CM
BH CV ECHO MEAS - MVA(P1/2T): 4.8 CM2
BH CV ECHO MEAS - PA V2 MAX: 114 CM/SEC
BH CV ECHO MEAS - RAP SYSTOLE: 3 MMHG
BH CV ECHO MEAS - RVDD: 2.5 CM
BH CV ECHO MEAS - RVSP: 27 MMHG
BH CV ECHO MEAS - TAPSE (>1.6): 2.5 CM
BH CV ECHO MEAS - TR MAX PG: 24 MMHG
BH CV ECHO MEAS - TR MAX VEL: 247 CM/SEC
BH CV ECHO MEASUREMENTS AVERAGE E/E' RATIO: 13.73
BUN SERPL-MCNC: 8 MG/DL (ref 6–20)
BUN/CREAT SERPL: 10.5 (ref 7–25)
CALCIUM SPEC-SCNC: 8.3 MG/DL (ref 8.6–10.5)
CHLORIDE SERPL-SCNC: 104 MMOL/L (ref 98–107)
CLUMPED PLATELETS: PRESENT
CO2 SERPL-SCNC: 21.8 MMOL/L (ref 22–29)
COLOR FLD: ABNORMAL
CREAT SERPL-MCNC: 0.76 MG/DL (ref 0.76–1.27)
D-LACTATE SERPL-SCNC: 0.7 MMOL/L (ref 0.5–2)
DEPRECATED RDW RBC AUTO: 47 FL (ref 37–54)
DOHLE BODIES: PRESENT
EOSINOPHIL # BLD MANUAL: 0.23 10*3/MM3 (ref 0–0.4)
EOSINOPHIL NFR BLD MANUAL: 2 % (ref 0.3–6.2)
ERYTHROCYTE [DISTWIDTH] IN BLOOD BY AUTOMATED COUNT: 14.4 % (ref 12.3–15.4)
GFR SERPL CREATININE-BSD FRML MDRD: 105 ML/MIN/1.73
GLUCOSE FLD-MCNC: 118 MG/DL
GLUCOSE SERPL-MCNC: 110 MG/DL (ref 65–99)
HCT VFR BLD AUTO: 25.1 % (ref 37.5–51)
HGB BLD-MCNC: 8.3 G/DL (ref 13–17.7)
IVRT: 81 MSEC
LARGE PLATELETS: ABNORMAL
LDH FLD-CCNC: 292 U/L
LEFT ATRIUM VOLUME INDEX: 25.1 ML/M2
LEFT ATRIUM VOLUME: 33.7 CM3
LYMPHOCYTES # BLD MANUAL: 0.92 10*3/MM3 (ref 0.7–3.1)
LYMPHOCYTES NFR BLD MANUAL: 5 % (ref 5–12)
MAXIMAL PREDICTED HEART RATE: 161 BPM
MCH RBC QN AUTO: 29.6 PG (ref 26.6–33)
MCHC RBC AUTO-ENTMCNC: 33.1 G/DL (ref 31.5–35.7)
MCV RBC AUTO: 89.6 FL (ref 79–97)
METAMYELOCYTES NFR BLD MANUAL: 3 % (ref 0–0)
MONOCYTES # BLD: 0.58 10*3/MM3 (ref 0.1–0.9)
MRSA DNA SPEC QL NAA+PROBE: NORMAL
MYELOCYTES NFR BLD MANUAL: 5 % (ref 0–0)
NEUTROPHILS # BLD AUTO: 8.29 10*3/MM3 (ref 1.7–7)
NEUTROPHILS NFR BLD MANUAL: 67 % (ref 42.7–76)
NEUTS BAND NFR BLD MANUAL: 5 % (ref 0–5)
NRBC SPEC MANUAL: 1 /100 WBC (ref 0–0.2)
NUC CELL # FLD: 320 /MM3
PH FLD: 8 [PH]
PHOSPHATE SERPL-MCNC: 3.5 MG/DL (ref 2.5–4.5)
PLATELET # BLD AUTO: 464 10*3/MM3 (ref 140–450)
PMV BLD AUTO: 9.6 FL (ref 6–12)
POTASSIUM SERPL-SCNC: 3.7 MMOL/L (ref 3.5–5.2)
PROMYELOCYTES NFR BLD MANUAL: 4 % (ref 0–0)
PROT FLD-MCNC: 2.4 G/DL
RBC # BLD AUTO: 2.8 10*6/MM3 (ref 4.14–5.8)
RBC # FLD AUTO: 2000 /MM3
SCAN SLIDE: NORMAL
SMALL PLATELETS BLD QL SMEAR: ABNORMAL
SODIUM SERPL-SCNC: 135 MMOL/L (ref 136–145)
STOMATOCYTES BLD QL SMEAR: ABNORMAL
STRESS TARGET HR: 137 BPM
VARIANT LYMPHS NFR BLD MANUAL: 8 % (ref 19.6–45.3)
WBC NRBC COR # BLD: 11.52 10*3/MM3 (ref 3.4–10.8)

## 2022-02-02 PROCEDURE — 87116 MYCOBACTERIA CULTURE: CPT | Performed by: INTERNAL MEDICINE

## 2022-02-02 PROCEDURE — 0W9B3ZZ DRAINAGE OF LEFT PLEURAL CAVITY, PERCUTANEOUS APPROACH: ICD-10-PCS | Performed by: INTERNAL MEDICINE

## 2022-02-02 PROCEDURE — 25010000002 MEROPENEM PER 100 MG: Performed by: SURGERY

## 2022-02-02 PROCEDURE — 87075 CULTR BACTERIA EXCEPT BLOOD: CPT | Performed by: INTERNAL MEDICINE

## 2022-02-02 PROCEDURE — 85007 BL SMEAR W/DIFF WBC COUNT: CPT | Performed by: INTERNAL MEDICINE

## 2022-02-02 PROCEDURE — 97168 OT RE-EVAL EST PLAN CARE: CPT

## 2022-02-02 PROCEDURE — 83615 LACTATE (LD) (LDH) ENZYME: CPT | Performed by: INTERNAL MEDICINE

## 2022-02-02 PROCEDURE — 87070 CULTURE OTHR SPECIMN AEROBIC: CPT | Performed by: INTERNAL MEDICINE

## 2022-02-02 PROCEDURE — 76604 US EXAM CHEST: CPT | Performed by: INTERNAL MEDICINE

## 2022-02-02 PROCEDURE — 99233 SBSQ HOSP IP/OBS HIGH 50: CPT | Performed by: INTERNAL MEDICINE

## 2022-02-02 PROCEDURE — 94799 UNLISTED PULMONARY SVC/PX: CPT

## 2022-02-02 PROCEDURE — 32555 ASPIRATE PLEURA W/ IMAGING: CPT | Performed by: INTERNAL MEDICINE

## 2022-02-02 PROCEDURE — 87206 SMEAR FLUORESCENT/ACID STAI: CPT | Performed by: INTERNAL MEDICINE

## 2022-02-02 PROCEDURE — 82042 OTHER SOURCE ALBUMIN QUAN EA: CPT | Performed by: INTERNAL MEDICINE

## 2022-02-02 PROCEDURE — 80069 RENAL FUNCTION PANEL: CPT | Performed by: INTERNAL MEDICINE

## 2022-02-02 PROCEDURE — 84157 ASSAY OF PROTEIN OTHER: CPT | Performed by: INTERNAL MEDICINE

## 2022-02-02 PROCEDURE — 71045 X-RAY EXAM CHEST 1 VIEW: CPT

## 2022-02-02 PROCEDURE — 97110 THERAPEUTIC EXERCISES: CPT

## 2022-02-02 PROCEDURE — 92610 EVALUATE SWALLOWING FUNCTION: CPT

## 2022-02-02 PROCEDURE — 25010000002 ENOXAPARIN PER 10 MG: Performed by: INTERNAL MEDICINE

## 2022-02-02 PROCEDURE — 83605 ASSAY OF LACTIC ACID: CPT | Performed by: INTERNAL MEDICINE

## 2022-02-02 PROCEDURE — 85025 COMPLETE CBC W/AUTO DIFF WBC: CPT | Performed by: INTERNAL MEDICINE

## 2022-02-02 PROCEDURE — 25010000002 FUROSEMIDE PER 20 MG: Performed by: INTERNAL MEDICINE

## 2022-02-02 PROCEDURE — 99024 POSTOP FOLLOW-UP VISIT: CPT | Performed by: SURGERY

## 2022-02-02 PROCEDURE — 84478 ASSAY OF TRIGLYCERIDES: CPT | Performed by: INTERNAL MEDICINE

## 2022-02-02 PROCEDURE — 87205 SMEAR GRAM STAIN: CPT | Performed by: INTERNAL MEDICINE

## 2022-02-02 PROCEDURE — 87102 FUNGUS ISOLATION CULTURE: CPT | Performed by: INTERNAL MEDICINE

## 2022-02-02 PROCEDURE — 97530 THERAPEUTIC ACTIVITIES: CPT

## 2022-02-02 PROCEDURE — 87641 MR-STAPH DNA AMP PROBE: CPT | Performed by: INTERNAL MEDICINE

## 2022-02-02 PROCEDURE — 88108 CYTOPATH CONCENTRATE TECH: CPT | Performed by: INTERNAL MEDICINE

## 2022-02-02 PROCEDURE — 25010000002 VANCOMYCIN 5 G RECONSTITUTED SOLUTION: Performed by: INTERNAL MEDICINE

## 2022-02-02 PROCEDURE — 84311 SPECTROPHOTOMETRY: CPT | Performed by: INTERNAL MEDICINE

## 2022-02-02 PROCEDURE — 83986 ASSAY PH BODY FLUID NOS: CPT | Performed by: INTERNAL MEDICINE

## 2022-02-02 PROCEDURE — 89051 BODY FLUID CELL COUNT: CPT | Performed by: INTERNAL MEDICINE

## 2022-02-02 PROCEDURE — 82945 GLUCOSE OTHER FLUID: CPT | Performed by: INTERNAL MEDICINE

## 2022-02-02 RX ORDER — FUROSEMIDE 10 MG/ML
20 INJECTION INTRAMUSCULAR; INTRAVENOUS DAILY
Status: DISCONTINUED | OUTPATIENT
Start: 2022-02-02 | End: 2022-02-08

## 2022-02-02 RX ADMIN — METOPROLOL SUCCINATE 50 MG: 50 TABLET, FILM COATED, EXTENDED RELEASE ORAL at 08:28

## 2022-02-02 RX ADMIN — ARFORMOTEROL TARTRATE: 15 SOLUTION RESPIRATORY (INHALATION) at 19:37

## 2022-02-02 RX ADMIN — QUETIAPINE FUMARATE 300 MG: 100 TABLET ORAL at 19:39

## 2022-02-02 RX ADMIN — SODIUM CHLORIDE, PRESERVATIVE FREE 3 ML: 5 INJECTION INTRAVENOUS at 19:38

## 2022-02-02 RX ADMIN — SODIUM CHLORIDE, PRESERVATIVE FREE 3 ML: 5 INJECTION INTRAVENOUS at 21:00

## 2022-02-02 RX ADMIN — OXYCODONE HYDROCHLORIDE AND ACETAMINOPHEN 1 TABLET: 5; 325 TABLET ORAL at 16:54

## 2022-02-02 RX ADMIN — MEROPENEM 500 MG: 500 INJECTION INTRAVENOUS at 10:44

## 2022-02-02 RX ADMIN — ENOXAPARIN SODIUM 40 MG: 40 INJECTION SUBCUTANEOUS at 08:28

## 2022-02-02 RX ADMIN — QUETIAPINE FUMARATE 300 MG: 100 TABLET ORAL at 21:00

## 2022-02-02 RX ADMIN — VANCOMYCIN HYDROCHLORIDE 2000 MG: 5 INJECTION, POWDER, LYOPHILIZED, FOR SOLUTION INTRAVENOUS at 14:02

## 2022-02-02 RX ADMIN — MEROPENEM 500 MG: 500 INJECTION INTRAVENOUS at 02:58

## 2022-02-02 RX ADMIN — MEROPENEM 500 MG: 500 INJECTION INTRAVENOUS at 16:54

## 2022-02-02 RX ADMIN — PANTOPRAZOLE SODIUM 40 MG: 40 TABLET, DELAYED RELEASE ORAL at 16:53

## 2022-02-02 RX ADMIN — ARFORMOTEROL TARTRATE: 15 SOLUTION RESPIRATORY (INHALATION) at 21:00

## 2022-02-02 RX ADMIN — ARFORMOTEROL TARTRATE: 15 SOLUTION RESPIRATORY (INHALATION) at 07:37

## 2022-02-02 RX ADMIN — VANCOMYCIN HYDROCHLORIDE 1250 MG: 5 INJECTION, POWDER, LYOPHILIZED, FOR SOLUTION INTRAVENOUS at 18:15

## 2022-02-02 RX ADMIN — PANTOPRAZOLE SODIUM 40 MG: 40 TABLET, DELAYED RELEASE ORAL at 08:28

## 2022-02-02 RX ADMIN — FUROSEMIDE 20 MG: 10 INJECTION, SOLUTION INTRAMUSCULAR; INTRAVENOUS at 10:44

## 2022-02-02 RX ADMIN — SODIUM CHLORIDE, PRESERVATIVE FREE 3 ML: 5 INJECTION INTRAVENOUS at 08:29

## 2022-02-02 NOTE — PROCEDURES
Bedside thoracic ultrasound:    Right showed B lines, curtain sign seen.    Left side showed moderate effusion with anechoic space between lung and diaphragm.     Site marked for thoracentesis.    Images stored online.

## 2022-02-02 NOTE — THERAPY EVALUATION
Acute Care - Speech Language Pathology   Swallow Initial Evaluation  [facility]     Patient Name: Atilio Recinos  : 1965  MRN: [id number]  Today's Date: 2022               Admit Date: 2022    Visit Dx:     ICD-10-CM ICD-9-CM   1. Pneumoperitoneum  K66.8 568.89   2. Bowel perforation (HCC)  K63.1 569.83   3. Sepsis, due to unspecified organism, unspecified whether acute organ dysfunction present (HCC)  A41.9 038.9     995.91   4. Decreased activities of daily living (ADL)  Z78.9 V49.89   5. Difficulty walking  R26.2 719.7     Patient Active Problem List   Diagnosis   • Anxiety   • Bipolar 1 disorder (HCC)   • COPD (chronic obstructive pulmonary disease) ([facility])   • Depression   • High blood pressure   • High cholesterol   • Other acute sinusitis   • Obesity   • Cigarette nicotine dependence without complication   • Pneumoperitoneum of unknown etiology   • Bowel perforation (HCC)   • S/P exploratory laparotomy, oversew of perforated ulcer, placement of Chaparro patch      Past Medical History:   Diagnosis Date   • Allergies    • Anxiety    • Bipolar 1 disorder (HCC)    • COPD (chronic obstructive pulmonary disease) ([facility])    • Depression    • Forgetfulness    • Head injury    • Hepatitis    • High blood pressure    • High cholesterol    • Psychiatric illness    • S/P exploratory laparotomy, oversew of perforated ulcer, placement of Chaparro patch  2022   • Shortness of breath    • Sinus trouble      Past Surgical History:   Procedure Laterality Date   • EXPLORATORY LAPAROTOMY N/A 2022    Procedure: EXPLORATORY LAPAROTOMY, OVERSEW OF PERFORATED GASTRIC ULCER WITH CHAPARRO PATCH;  Surgeon: Atilio Lindsay MD;  Location: [facility];  Service: General;  Laterality: N/A;   • PLEURAL SCARIFICATION     • SPHINCTEROTOMY     PAIN SCALE: None noted    PRECAUTIONS/CONTRAINDICATIONS: None noted    SUSPECTED ABUSE/NEGLECT/EXPLOITATION: None noted    SOCIAL/PSYCHOLOGICAL NEEDS/BARRIERS: None noted    PAST  SOCIAL HISTORY: Lives at home    PRIOR FUNCTION: Independent    PATIENT GOALS/EXPECTATIONS: Return home    HISTORY: Patient is a 57-year-old status post perforated ulcer repair.  Patient referred for clinical swallowing evaluation due to concern for aspiration.    CURRENT DIET LEVEL: Mechanical soft diet diet thin liquids    OBJECTIVE:    TEST ADMINISTERED: Clinical dysphagia evaluation    COGNITION/SAFETY AWARENESS: Alert and oriented    BEHAVIORAL OBSERVATIONS: Pleasant cooperative    ORAL MOTOR EXAM: Lingual and labial strength range of motion within functional limits    VOICE QUALITY: Within normal limits    REFLEX EXAM: Deferred    POSTURE: 90 degrees upright    FEEDING/SWALLOWING FUNCTION: Assessed with thin liquids, purée consistencies, soft and regular solids    CLINICAL OBSERVATIONS: Oral stage is characterized by mildly prolonged mastication with soft solids due to limited dentition. Otherwise bolus preparation and control was within functional limits. Pharyngeal phase appears timely with good laryngeal elevation per palpation here. No clinical signs of aspiration were noted. Vocal quality remained clear to auscultation. 1 episode of hard throat clear with regular solids with patient reporting globus sensation. Double swallow and liquid wash were effective in clearing. Patient states he does not eat hard solids at home.    DYSPHAGIA CRITERIA: Dysphagia    FUNCTIONAL ASSESSMENT INSTRUMENT: Patient currently scored a level 6 of 7 on Functional Communication Measures for swallowing indicating a 1-16% limitation in function.    ASSESSMENT/ PLAN OF CARE:  Pt presents with limitations, noted below, that impede his ability to swallow safely. The skills of a therapist will be required to safely and effectively implement the following treatment plan to restore maximal level of function.    PROBLEMS:  1. Dysphagia   LTG 1: (30 days) patient will increase ability to tolerate least restrictive diet and improve  functional communication measure for swallowing to a 7 of 7   STG 1a: (14 days) patient tolerate mechanical soft diet and thin liquids without clinical sign or symptom of aspiration with 8 of 10 trials   STG 1b: (14 days) patient will utilize compensatory swallow strategies independently   STG 1c: (14 days) patient/family teaching regarding diet recommendations, safe swallow strategies and signs and symptoms of aspiration.     TREATMENT: Dysphagia therapy to ensure diet tolerance, advance to least restrictive diet and analyze for aspiration risk.    FREQUENCY/DURATION: Daily 5 times a week    REHAB POTENTIAL:  Pt has good rehab potential.  The following limitations may influence improvement/ length of tx medical status.    RECOMMENDATIONS:   1.   DIET: Mechanical soft diet-whole foods-thin liquids    2.  POSITION: 90 degrees upright    3.  COMPENSATORY STRATEGIES: Alternate solids and liquids, double swallow, small bites/drinks    YES: Pt/responsible party agrees with plan of care and has been informed of all alternatives, risks and benefits.    EDUCATION  The patient has been educated in the following areas:   Dysphagia (Swallowing Impairment).          Cindy Lee, SLP  2/2/2022

## 2022-02-02 NOTE — PROGRESS NOTES
[facility]   Hospitalist Progress Note  Date: 2022  Patient Name: Atilio Recinos  : 1965  MRN: [id number]  Date of admission: 2022      Subjective   Subjective     Chief complaint: Increasing oxygen requirements     Summary:  57-year-old male hospitalized on 2022 2 the service of general surgery with perforated ulcer status post ex lap and Chaparro patch placement, medicine service consulted for increasing oxygen requirements postoperatively, has been stable on room air, concerning for bilious output in GIOVANY drain, CT abdomen and pelvis showing oral contrast leakage, patient remains n.p.o. with TPN ongoing, continues to have bilious drainage from his GIOVANY drain     Interval follow-up:   Report of visual hallucination by nursing staff.  Patient saw his  in the room when there was no one in the room.  Patient is awake alert oriented x3 during my round.  Off oxygen  remains tachycardic, no prior cardiac work-up.  Denies chest pain or palpitations. Denies fevers, chills, sweats.   Tolerating oral diet well.  Abdominal pain well controlled.  Having regular BM.  Good urine output  Small drainage from 1 GIOVANY drain.  Copious creamy drainage around drainage insertion site.  Surgical wound appears to be dehiscing with erythema around staples.  Scant drainage from lower end of the wound.    Review of systems:  All systems reviewed and negative except for cough, shortness of breath, generalized fatigue.    Objective   Objective     Vitals:   Temp:  [98.3 °F (36.8 °C)-100.1 °F (37.8 °C)] 99.3 °F (37.4 °C)  Heart Rate:  [] 100  Resp:  [16-21] 16  BP: (125-151)/(63-68) 151/66  Physical Exam    Constitutional: Resting, no acute distress, sitting on edge of the bed              Eyes: Pupils equal, sclerae anicteric, no conjunctival injection, pallor              HENT: NCAT, mucous membranes moist              Neck: Supple, no thyromegaly, no lymphadenopathy, trachea midline              Respiratory:  Diminished breath sounds, no wheezes              Cardiovascular: RRR, no murmurs              Gastrointestinal: Appropriate postoperative tenderness, GIOVANY drain in place dressing in place, some swelling of drainage at the lower end of surgical wound with wound dehiscence and redness at Staples insertion site copious creamy drainage noted in GIOVANY drains              musculoskeletal: No edema              Psychiatric: Flat affect               neurologic: Speech clear, no gross deficits              Skin: No rashes    Result Review    Result Review:  I have personally reviewed the results from the time of this admission to 2/2/2022 17:48 EST and agree with these findings:  [x]  Laboratory   CBC    CBC 1/31/22 2/1/22 2/2/22   WBC 10.42 12.09 (A) 11.52 (A)   RBC 2.88 (A) 2.60 (A) 2.80 (A)   Hemoglobin 8.6 (A) 7.6 (A) 8.3 (A)   Hematocrit 25.9 (A) 22.7 (A) 25.1 (A)   MCV 89.9 87.3 89.6   MCH 29.9 29.2 29.6   MCHC 33.2 33.5 33.1   RDW 14.3 14.6 14.4   Platelets 498 (A) 481 (A) 464 (A)   (A) Abnormal value            BMP    BMP 1/31/22 2/1/22 2/2/22   BUN  9 8   Creatinine  0.76 0.76   Sodium 129.9 (A) 135 (A) 135 (A)   Potassium  3.8 3.7   Chloride  105 104   CO2  21.0 (A) 21.8 (A)   Calcium  7.9 (A) 8.3 (A)   (A) Abnormal value            LIVER FUNCTION TESTS:      Lab 02/02/22  0256 01/30/22  0442 01/29/22  0606 01/28/22  0407 01/26/22  2251   TOTAL PROTEIN  --  5.7* 5.6* 5.0* 5.4*   ALBUMIN 2.20* 2.00* 2.00* 1.90* 2.10*   ALT (SGPT)  --  9 6 5 6   AST (SGOT)  --  20 19 12 14   BILIRUBIN  --  0.2 0.2 0.2 0.3   BILIRUBIN DIRECT  --  <0.2 <0.2 <0.2 <0.2   ALK PHOS  --  62 55 48 55       [x]  Microbiology   Blood Culture   Date Value Ref Range Status   01/27/2022 No growth at 2 days  Preliminary     No results found for: BCIDPCR, CXREFLEX, CSFCX, CULTURETIS  No results found for: CULTURES, HSVCX, URCX  No results found for: EYECULTURE, GCCX, HSVCULTURE, LABHSV  No results found for: LEGIONELLA, MRSACX, MUMPSCX, MYCOPLASCX  No  results found for: NOCARDIACX, STOOLCX  No results found for: THROATCX, UNSTIMCULT, URINECX, CULTURE, VZVCULTUR  No results found for: VIRALCULTU, WOUNDCX    [x]  Radiology CT Abdomen Pelvis Without Contrast    Result Date: 1/27/2022  PROCEDURE: CT ABDOMEN PELVIS WO CONTRAST  COMPARISON: [facility], CT, CT ABDOMEN PELVIS WO CONTRAST, 1/21/2022, 15:05.  INDICATIONS: tachycardia, decreased GIOVANY drain output, recent perf ulcer and leaking Chaparro patch  TECHNIQUE: CT images were created without intravenous contrast.   PROTOCOL:   Standard imaging protocol performed    RADIATION:   DLP: 1678mGy*cm   Automated exposure control was utilized to minimize radiation dose.  FINDINGS:  Within the lung bases is a moderate left pleural effusion with left basilar atelectasis.  There is a stable small cyst in the left hepatic lobe.  The unenhanced gallbladder, adrenal glands, kidneys, spleen, and pancreas are unremarkable.  The stomach is mildly distended.  There is pneumoperitoneum in the upper abdomen.  An abdominal drain enters through the left upper abdomen with its tip just underneath the right inferior hepatic lobe.  There are inflammatory changes within the abdomen, with a 10.4 x 3.7 cm fluid collection within the left mesentery on axial image 103 that appear slightly increased, previously 8.5 x 3.3 cm.  There is another abdominal drain enters through the left lower abdomen with its tip in the right mid pelvis.  The previously identified extravasated contrast is no longer present.  The small bowel appears normal in caliber.  The colon appears normal.  The appendix appears normal.  No abnormally enlarged lymph nodes are identified.  The rectum, prostate, and urinary bladder are unremarkable.  No aggressive osseous lesions are identified.         1. Pneumoperitoneum within upper abdomen with two abdominal drains as described above.  There is a 10.4 cm fluid collection within the left mesentery that has slightly  increased in size from prior CT.  Of note, the two abdominal drains do not traverse through this collection. 2. Moderate left pleural effusion with left basilar atelectasis. 3. Previously identified extravasated contrast is no longer present.     ANDERSON REYES MD       Electronically Signed and Approved By: ANDERSON REYES MD on 1/27/2022 at 11:19             CT Abdomen Pelvis Without Contrast    Result Date: 1/21/2022  PROCEDURE: CT ABDOMEN PELVIS WO CONTRAST  COMPARISON: [facility], CT, CT ABDOMEN PELVIS WO CONTRAST, 1/16/2022, 10:49.  INDICATIONS: repair of perforated ulcer, mid abdominal pain, increased GIOVANY drainage  TECHNIQUE: CT images were created without intravenous contrast.   PROTOCOL:   Standard imaging protocol performed    RADIATION:   DLP: 496 mGy*cm   Automated exposure control was utilized to minimize radiation dose.  FINDINGS:  Small left pleural effusion, unchanged.  Adjacent atelectasis.  There is new ground-glass opacity within the right middle lobe and right lower lobe, only partially imaged.  No left-sided ground-glass opacity however.  There is new oral contrast in the peritoneal cavity along the posterior margin of the left hepatic lobe, between the liver and stomach.  There is a small amount oral contrast in the lumen of the stomach.  There also is oral contrast within small bowel loops as well as the colon.  No bowel obstruction.    There is an 8.7 x 4.0 cm collection of fluid amongst the mesentery in the left mid abdomen axial image 99. This measures 4.1 cm on cranial caudad measurement, coronal imaging.  This is unchanged.  There has been near complete evacuation of the previous free fluid and free air of the peritoneal cavity elsewhere.  No new fluid collections.  Two separate drains are present, 1 terminating in the posterior right-sided pelvis and the other in the right pericolic gutter, adjacent to the inferior tip of the liver.  Bladder is collapsed around a Robledo  catheter.  A few small indeterminate low-density lesions of the liver again noted       There is extraluminal oral contrast along the posterior margin of the left hepatic lobe.  This raises concern for persistent perforation although the oral contrast may have extravasated prior to the surgery; it is unknown whether oral contrast was given after the comparison CT examination but before the surgery.  There appears to have been a very small amount of oral contrast in the stomach lumen at the time of the comparison CT.  There is a unchanged 8.7 by 4.0 by 4.1 cm fluid collection within the left-sided small bowel mesentery.      TYLER KENT MD       Electronically Signed and Approved By: TYLER KENT MD on 1/21/2022 at 15:35             CT Abdomen Pelvis Without Contrast    Result Date: 1/16/2022  PROCEDURE: CT ABDOMEN PELVIS WO CONTRAST  COMPARISON: [facility], CR, XR CHEST 1 VW, 1/16/2022, 8:26.  [facility], CT, ABD PEL W/O CONTRAST, 11/22/2020, 7:03.  INDICATIONS: Abdominal distention and pain, pneumoperitoneum on chest radiograph.  TECHNIQUE: CT images were created without intravenous contrast.   PROTOCOL:   Standard imaging protocol performed    RADIATION:   DLP: 751.9 mGy*cm   Automated exposure control was utilized to minimize radiation dose.  FINDINGS:  There are small pleural effusions, left greater than right.  There is mild adjacent dependent lower lobe consolidation, also left greater than right.  No significant pericardial effusion.  Heart size appears within normal limits.  There is a small to moderate amount of pneumoperitoneum.  There is also a small to moderate amount of low attenuation free fluid in the abdomen and pelvis.  Small hypodensities are seen in the left hepatic lobe on axial image 50 and in the right hepatic lobe on images 42 and 41 which appear similar to the prior CT and are therefore favored to be related to hemangiomas or cysts.  No definite new hepatic lesion  is seen.  No suspicious splenic abnormality is seen.  Adrenal glands appear unremarkable.  Gallbladder is mildly distended.  No definite surrounding inflammation or biliary ductal dilatation is seen.  No acute pancreatic abnormality is identified.  There is a suspected left posterior mid pole renal cyst, as before.  There is mild nonspecific bilateral perinephric fat stranding.  No significant renal calculi.  No hydronephrosis or ureteral calculus is identified.  Bladder is decompressed with Robledo catheter present.  There is calcific atherosclerosis of the aorta without definite aneurysm.  Abdominal and pelvic lymph nodes do not appear enlarged.  There are fat containing bilateral inguinal hernias.  The prostate does not appear enlarged.  There is a small umbilical hernia containing fat and pneumoperitoneum.  The stomach is minimally distended.  There is some pneumoperitoneum along the stomach but gastric perforation cannot be definitively confirmed.  There is a small amount of hyperdense material in the posterior-superior gastric lumen.  This could be related to ingested hyperdense material, but blood products cannot be excluded.  There are some fluid distended small bowel loops without definite small bowel dilatation.  A focal small bowel perforation is not identified but again cannot be excluded.  There is a moderate amount of formed stool in the proximal colon.  The visualized appendix appears partially gas-filled without definite findings to suggest inflammation.  There is mild colonic diverticulosis.  No definite focal colonic or rectal inflammation is identified, but again colonic perforation cannot be completely excluded.  There is a vascular necrosis of the bilateral femoral heads.  There appears to be mild to moderate bilateral hip osteoarthritis with left calcified intra-articular bodies.  There are degenerative changes in the thoracolumbar spine.  No definite acute osseous abnormality is seen.           1. Small to moderate amount of pneumoperitoneum and low-attenuation free fluid consistent with perforated viscus.  The source of perforation cannot be definitively visualized. 2. Questionable small amount of hyperdense ingested material in the stomach versus a small amount of blood products. 3. Small pleural effusions and mild adjacent lower lobe consolidation, left greater than right. 4. Probable renal and hepatic cysts, as before. 5. Avascular necrosis of the femoral heads.    This report was communicated by telephone to Dr. Ham at the dictation time shown below.     KRISTOPHER ALBERTO MD       Electronically Signed and Approved By: KRISTOPHER ALBERTO MD on 1/16/2022 at 11:20             XR Chest 1 View    Result Date: 1/27/2022  PROCEDURE: XR CHEST 1 VW  COMPARISON: [facility], , XR CHEST 1 VW, 1/23/2022, 11:32.  INDICATIONS: s/p left Thoracentesis  FINDINGS:  The heart is normal in size.  The lungs are well-expanded.  There is no evidence of pneumothorax.  The patient is post left thoracentesis.  Right subclavian PICC line tip is at the cavoatrial junction.        Post left thoracentesis.  No pneumothorax is seen.  Right subclavian PICC line tip is at the cavoatrial junction.       LI NICOLAS MD       Electronically Signed and Approved By: LI NICOLAS MD on 1/27/2022 at 16:29             XR Chest 1 View    Result Date: 1/23/2022  PROCEDURE: XR CHEST 1 VW  COMPARISON: [facility], CR, XR CHEST 1 VW, 1/17/2022, 12:38.  INDICATIONS: HYPOXIA  FINDINGS:  Right upper extremity PICC line tip at the cavoatrial junction.  Esophagogastric tube is been removed.  Heart size normal.  There is a small left-sided pleural effusion which appears slightly decreased from the prior study.  No significant effusion on the right.  No pneumothorax.        1. Removal of esophagogastric tube. 2. Right upper extremity PICC line tip at the cavoatrial junction. 3. Small left pleural effusion slightly decreased  from the prior study with mild left basilar airspace disease likely atelectasis. 4. Clear right lung.      SHAHID STEEL MD       Electronically Signed and Approved By: SHAHID STEEL MD on 1/23/2022 at 16:52             XR Chest 1 View    Result Date: 1/17/2022  PROCEDURE: XR CHEST 1 VW  COMPARISON: [facility], CT, CT ABDOMEN PELVIS WO CONTRAST, 1/16/2022, 10:49.  [facility], CR, XR CHEST 1 VW, 1/16/2022, 8:26.  INDICATIONS: hypoxia, cough  FINDINGS:  Enteric tube is present extending into the left quadrant.  Previously seen pneumoperitoneum decreased in the interval.  There has been interval development of perihilar and bibasilar opacities.  There is a small left effusion with mild left basilar consolidation.  The heart appears mildly enlarged.  No pneumothorax is seen.        1. New perihilar and bibasilar opacities suspicious for pulmonary edema.  Pneumonia and/or atelectasis are also included in the differential diagnosis. 2. Small left effusion with mild left basilar consolidation. 3. Mild cardiomegaly. 4. Enteric tube extends into the left upper quadrant. 5. Previously seen pneumoperitoneum appears decreased in the interval.       KRISTOPHER ALBERTO MD       Electronically Signed and Approved By: KRISTOPHER ALBERTO MD on 1/17/2022 at 13:20             XR Chest 1 View    Result Date: 1/16/2022  PROCEDURE: XR CHEST 1 VW  COMPARISON: [facility], CR, CHEST AP/PA 1 VIEW, 11/22/2020, 6:50.  INDICATIONS: SOA Triage Protocol  FINDINGS:  If there is suspicion for pneumoperitoneum with gaseous lucencies seen below the right and left diaphragm.  Lung volumes are low with mild left basilar consolidation.  Small left effusion cannot be excluded.  No definite right effusion or pneumothorax.  The heart appears mildly enlarged.  No acute osseous abnormality is seen.        1. Findings suspicious for pneumoperitoneum.  Recommend further assessment with CT abdomen pelvis. 2. Left basilar  consolidation which could represent atelectasis or infiltrate.  3. Mild cardiomegaly.  This report was communicated by telephone to Dr. Ham in the ED at the dictation time shown below.        KRISTOPHER ALBERTO MD       Electronically Signed and Approved By: KRISTOPHER ALBERTO MD on 1/16/2022 at 8:43             Peripheral Block    Result Date: 1/16/2022  Gonzalez Lyons MD     1/16/2022  2:00 PM Peripheral Block Patient reassessed immediately prior to procedure Patient location during procedure: OR Start time: 1/16/2022 1:46 PM Stop time: 1/16/2022 1:50 PM Reason for block: at surgeon's request and post-op pain management Performed by Anesthesiologist: Gonzalez Lyons MD Preanesthetic Checklist Completed: patient identified, IV checked, site marked, risks and benefits discussed, surgical consent, monitors and equipment checked, pre-op evaluation and timeout performed Prep: Pt Position: supine Sterile barriers:alcohol skin prep, partial drape, cap, washed/disinfected hands, gloves and mask Prep: ChloraPrep Patient monitoring: blood pressure monitoring, continuous pulse oximetry and EKG Procedure Guidance:ultrasound guided ULTRASOUND INTERPRETATION. Using ultrasound guidance a 20 G gauge needle was placed in close proximity to the nerve, at which point, under ultrasound guidance anesthetic was injected in the area of the nerve and spread of the anesthesia was seen on ultrasound in close proximity thereto.  There were no abnormalities seen on ultrasound; a digital image was taken; and the patient tolerated the procedure with no complications. Images:still images obtained, printed/placed on chart Laterality:Bilateral Block Type:TAP Injection Technique:single-shot Needle Type:echogenic Needle Gauge:20 G (4in) Resistance on Injection: none Medications Used: bupivacaine-EPINEPHrine PF (MARCAINE w/EPI) 0.25% -1:[id number] injection, 60 mL Post Assessment Injection Assessment: no paresthesia on injection, incremental injection  and negative aspiration for heme Patient Tolerance:comfortable throughout block Complications:no Additional Notes The block or continuous infusion is requested by the referring physician for management of postoperative pain, or pain related to a procedure. Ultrasound guidance (deemed medically necessary). Painless injection, pt was awake and conversant during the procedure without complications. Needle and surrounding structures visualized throughout procedure. No adverse reactions or complications seen during this period. Post-procedure image showed no signs of complication, and anatomy was consistent with an uncomplicated nerve blockade.     US Renal Bilateral    Result Date: 1/21/2022  PROCEDURE: US RENAL BILATERAL  COMPARISON: [facility], CT, CT ABDOMEN PELVIS WO CONTRAST, 1/16/2022, 10:49.  INDICATIONS: INcrease in creatinine  FINDINGS:  The right kidney measures 10.4 x 6.1 x 5.3 cm.  The left kidney measures 9.9 x 5.8 x 5.6 cm.  The cortical thickness and echogenicity is normal.  There is no hydronephrosis, mass, or calculus.  The urinary bladder was not seen well as the patient has a Robledo catheter in place.        1. Both kidneys are normal. 2. The urinary bladder which was not seen well as there is a Robledo catheter in place.      LIANE PEDROZA MD       Electronically Signed and Approved By: LIANE PEDROZA MD on 1/21/2022 at 12:27               [x]  EKG/Telemetry   []  Cardiology/Vascular   []  Pathology  [x]  Old records  []  Other:    Assessment/Plan   Assessment / Plan     Assessment/Plan:  Assessment:  Acute hypoxemic respiratory failure requiring up to 15 L of oxygen resolved now on room air  Left lower and new right middle lobe lobe pneumonia with worsening of left pleural effusion with possible loculation.  S/p repeat thoracentesis February 2  Bilateral pleural effusions.  S/p left Thora by pulmonary  Perforated ulcer status post laparotomy and placement of Chaparro patch   Postop pain  control  Postoperative anemia s/p blood transfusion.  Sinus tachycardia  Acute metabolic encephalopathy.  Resolved likely from medications  Infected GIOVANY drain site with superficial infection of lower end of surgical wound.     Plan:   CT scan abdomen pelvis.  Noted with improvement in fluid collection size.  No acute or worsening  Finding   CT of the chest noted discussed with patient and pulmonary  IV Lasix for anasarca.  Obtain wound culture from GIOVANY drain insertion site  Check BNP and respiratory culture  Lactic acid within normal range  Hemoglobin stable after blood transfusion  Diet per general surgery  Pulmonary to evaluate patient again  General surgery remains primary, reviewed documentation  Following labs, blood pressure and heart rate.  Check TSH.  Noted  Check 2D echo.  Pending  DC'd decreased home Neurontin dose.  Patient not needing it   continue Toprol-XL.  Dose doubled   DC'd home Norvasc to make room for increased Toprol  IV meropenem as per general surgery  IV vancomycin started on February 2 for left lower lobe infiltrate  Speech therapy to evaluate for aspiration  Sliding-scale insulin per protocol   Continue telemetry monitoring  P.o. Protonix  Continue with spirometry use  Lovenox for DVT prophylaxis  PT OT evaluation  Encourage ambulation  Discussed with RN.  Disposition as per general surgery likely home  Continue right upper extremity PICC line for now.  Clinical course to dictate further management    DVT prophylaxis:  Medical and mechanical DVT prophylaxis orders are present.    CODE STATUS:   Level Of Support Discussed With: Patient  Code Status (Patient has no pulse and is not breathing): CPR (Attempt to Resuscitate)  Medical Interventions (Patient has pulse or is breathing): Full Support          Electronically signed by Ameya Cortez MD, 02/02/22, 5:53 PM EST.      .

## 2022-02-02 NOTE — PROGRESS NOTES
[facility]     Progress Note    Patient Name: Atilio Recinos  : 1965  MRN: [id number]  Primary Care Physician:  William Mcdonald MD  Date of admission: 2022    Subjective   Subjective     HPI:  Patient Reports feeling well.  No nausea vomiting.  Reports passing flatus and bowel movements      Objective   Objective     Vitals:   Temp:  [98.3 °F (36.8 °C)-100.1 °F (37.8 °C)] 99.1 °F (37.3 °C)  Heart Rate:  [] 82  Resp:  [16-21] 16  BP: (125-150)/(63-68) 137/68    Physical Exam  Constitutional:       Appearance: Normal appearance.   Cardiovascular:      Rate and Rhythm: Normal rate.   Pulmonary:      Effort: Pulmonary effort is normal.   Abdominal:      Palpations: Abdomen is soft.         Result Review    Result Review:  I have personally reviewed the results from the time of this admission to 2022 14:08 EST and agree with these findings:  []  Laboratory  []  Microbiology  []  Radiology  []  EKG/Telemetry   []  Cardiology/Vascular   []  Pathology  []  Old records  []  Other:      Assessment/Plan   Assessment / Plan     Brief Patient Summary:  Atilio Recinos is a 57 y.o. male who status post aborted laparotomy for perforated ulcer    Active Hospital Problems:  Active Hospital Problems    Diagnosis    • S/P exploratory laparotomy, oversew of perforated ulcer, placement of Chaparro patch     • Pneumoperitoneum of unknown etiology    • Bowel perforation (HCC)      Plan:  CT yesterday was encouraging, decreased size of fluid collection  Continue antibiotics  Continue diet  Possible plans for thoracentesis per pulmonology-we will review their note       DVT prophylaxis:  Medical and mechanical DVT prophylaxis orders are present.    CODE STATUS:   Level Of Support Discussed With: Patient  Code Status (Patient has no pulse and is not breathing): CPR (Attempt to Resuscitate)  Medical Interventions (Patient has pulse or is breathing): Full Support      Electronically signed by Atilio Lindsay MD,  02/02/22, 2:08 PM EST.

## 2022-02-02 NOTE — NURSING NOTE
Patient got a thoracentesis done today by MD at the bedside, samples sent to the lab. Patient has IV antibiotics infusing. Dressings changed and patient tolerated well.

## 2022-02-02 NOTE — PROGRESS NOTES
"Pharmacy to Dose Vancomycin Day: 1    Atilio Recinos is a 57 y.o.male admitted with increasing oxygen needs. Pharmacy has been consulted to dose IV Vancomycin     Consulting Provider: VEL  Clinical Indication: PNA  Pertinent Past Medical History: RECENT SURGERY- PERFORATED ULCER STATUS POST EX LAP  Goal -600 mg/L.hr  Duration of therapy: 7 DAYS    167.6 cm (66\")       01/29/22  0549      Weight: 95.7 kg (210 lb 15.7 oz)         Estimated Creatinine Clearance: 113.4 mL/min (by C-G formula based on SCr of 0.76 mg/dL).  Results from last 7 days  Lab  Units  02/02/22  0256  02/01/22  0358  01/30/22  0442  01/29/22  0606  BUN  mg/dL  8  9  14  16  CREATININE  mg/dL  0.76  0.76  0.77  0.68*    HD/PD/CRRT?:  NO    Lab Results   Component Value Date    WBC 11.52 (H) 02/02/2022      Temperature    02/01/22 2215 02/02/22 0258 02/02/22 0811   Temp: 98.4 °F (36.9 °C) 98.3 °F (36.8 °C) 100.1 °F (37.8 °C)      Contrast Administered:  NO    Relevant Micro:   Microbiology Results (last 10 days)       Procedure Component Value - Date/Time    Blood Culture - Blood, Arm, Left [[id number]]  (Normal) Collected: 01/27/22 1806    Lab Status: Final result Specimen: Blood from Arm, Left Updated: 02/01/22 1815     Blood Culture No growth at 5 days    Anaerobic Culture - Pleural Fluid, Pleural Cavity [[id number]] Collected: 01/27/22 1612    Lab Status: Final result Specimen: Pleural Fluid from Pleural Cavity Updated: 02/01/22 0630     Anaerobic Culture No anaerobes isolated at 5 days    AFB Culture - Body Fluid, Pleural Cavity [[id number]] Collected: 01/27/22 1611    Lab Status: Preliminary result Specimen: Body Fluid from Pleural Cavity Updated: 02/01/22 1630     AFB Culture No AFB isolated at less than 1 week     AFB Stain No acid fast bacilli seen on direct smear    Fungus Culture - Body Fluid, Pleural Cavity [[id number]] Collected: 01/27/22 1611    Lab Status: Preliminary result Specimen: Body Fluid from Pleural Cavity Updated: " 02/01/22 1630     Fungus Culture No fungus isolated at less than 1 week    Body Fluid Culture - Body Fluid, Pleural Cavity [[id number]] Collected: 01/27/22 1610    Lab Status: Final result Specimen: Body Fluid from Pleural Cavity Updated: 01/30/22 1106     Body Fluid Culture No growth at 3 days     Gram Stain No organisms seen    Blood Culture - Blood, Blood, Central Line [[id number]]  (Normal) Collected: 01/27/22 0315    Lab Status: Final result Specimen: Blood, Central Line Updated: 02/01/22 0345     Blood Culture No growth at 5 days           Relevant Radiology:   ABDOMEN CT:  IMPRESSION:   1. Interval removal of the more inferiorly placed 2nd surgical drain since the prior 1/27/2022 CT   study.     2. Decreased pneumoperitoneum is seen since 1/27/2022.     3. There is a small amount of ascites.     4. Motion artifact obscures detail.     5. Probably no mechanical bowel obstruction is seen.     6. Interval slight decrease in size the extraluminal intraperitoneal fluid collection centered in   the left upper quadrant mesentery, as discussed.  It has an estimated volume of about 87 mL on the   current CT study.  Previously, it had an estimated volume of about 147 mL (as measured by this   ).  It has a CT number on the current study of about 7 Hounsfield units.  The fluid   collection does not contain gas.  It is probably contiguous with the distal duodenum and proximal   jejunal bowel loops.   7. Please see above comments for further detail.       CHEST CT:  IMPRESSION:  Increased left pleural effusion and left basilar (predominantly left lower lobe)   airspace disease since 1/27/2022 is noted.  There is infiltrate in the right middle lobe, as well.    Infectious multifocal pneumonia is possible.  Aspiration pneumonia cannot be excluded.  Motion   artifact obscures detail.  Please see above comments for further detail.       Other Antimicrobial Therapy:  MERREM    Assessment/Plan  Loading dose:  2GRAMS  ONCE ONE  Regimen: 1250 mg IV every 12 hours.  Start time: 10:13 on 02/02/2022  Exposure target: AUC24 (range)400-600 mg/L.hr   AUC24,ss: 484 mg/L.hr  PAUC*: 69 %  Ctrough,ss: 14.8 mg/L  Pconc*: 27 %  Tox.: 10 %    Level due: VANCOMYCIN LEVEL TOMORROW AT 0500  Labs ordered: BMP

## 2022-02-02 NOTE — PROGRESS NOTES
[facility]     Progress Note    Patient Name: Atilio Recinos  : 1965  MRN: [id number]  Primary Care Physician:  William Mcdonald MD  Date of admission: 2022    Subjective   Subjective   Follow up on post-operative hypoxia, pleural effusion.    Reconsulted for pleural effusion.    Over the last 24 hours, remained on IV meropenem and vancomycin.  CT chest and abdomen and pelvis was done.  Continued on Lasix.  Was found to have recurrent pleural effusion on the right side.  We will reconsulted for pleural effusion.    Patient has some shortness of breath.  Has minimal cough.  Using incentive spirometry.  Able to move around some in room.  No nausea or vomiting.  Abdominal pain is better.  Not able to bring up much phlegm.  I reviewed CT scan with him today.  He is agreeable for thoracentesis.        Review of Systems:  General:  Fatigue, No Fever  HEENT: No dysphagia, No Visual Changes, no rhinorrhea  Respiratory:  + cough,+Dyspnea, scant phlegm, No Pleuritic Pain  Cardiovascular: Denies chest pain, denies palpitations,+MACHUCA, No Chest Pressure  Gastrointestinal:  No Abdominal Pain, No Nausea, No Vomiting, No Diarrhea  Genitourinary:  No Dysuria, No Frequency, No Hesitancy  Musculoskeletal: No muscle pain or swelling  Endocrine:  No Heat Intolerance, No Cold Intolerance, Fatigue  Hematologic:  No Bleeding, No Bruising  Neurologic:  No Confusion, No Headaches  Skin:  No Rash, No Open Wounds      Objective   Objective     Vitals:   Temp:  [98.3 °F (36.8 °C)-100.1 °F (37.8 °C)] 99.3 °F (37.4 °C)  Heart Rate:  [] 100  Resp:  [16-21] 16  BP: (125-151)/(63-68) 151/66    Physical exam  Vital Signs Reviewed  General: WDWN male, Awake and Alert, NAD on room air    HEENT:  PERRL, EOMI.  OP, nares clear  Chest: Good aeration, diminished left base with bibasilar rhonchi, dull to percussion left base, no work of breathing noted   CV: RRR, no MGR, pulses 2+, equal  Abd: dressing D/I to abdomen with GIOVANY intact,  abdomen less distended  EXT:  no clubbing, no cyanosis, no edema  Neuro:  A&Ox3, CN grossly intact, no focal deficits  Skin: No rashes or lesions noted    Result Review    Result Review:  I have personally reviewed the results from the time of this admission to 2/2/2022 17:20 EST and agree with these findings:  [x]  Laboratory  [x]  Microbiology  [x]  Radiology  [x]  EKG/Telemetry   []  Cardiology/Vascular   []  Pathology  []  Old records  []  Other:  Still with intermittent persistent white blood cell count.  Abdominal CT with postoperative changes but moderate left lower effusion    CT chest with moderate left-sided pleural effusion.      Assessment/Plan   Assessment / Plan     Brief Patient Summary:  Atilio Recinos is a 57 y.o. male who admitted with hypoxia    Active Hospital Problems:  Active Hospital Problems    Diagnosis    • S/P exploratory laparotomy, oversew of perforated ulcer, placement of Chaparro patch     • Pneumoperitoneum of unknown etiology    • Bowel perforation (HCC)    Acute hypoxic respiratory failure post op  Post op atelectasis  Moderate left pleural effusion  Leukocytosis    Plan:   Continue IV meropenem and vancomycin.   Discussed thoracentesis with the patient.  He was agreeable to procedure.  I have discussed the risks of the procedure with the patient including pneumothorax, hemothorax, bleeding, hypoxia and death. The patient recognizes these findings, acknowledges these findings and is agreeable to the procedure.  Thoracentesis done at bedside with drainage of 700 cc dark yellow pleural fluid.  Sent for tests.  On room air.  Continue nebulizers and bronchopulmonary hygiene.   Encouraged incentive spirometer and flutter valve as much as possible.  Encourage activity.  Ambulate and up to chair as tolerated.  Diet and TPN per surgery  Antibiotics per surgery    DVT prophylaxis:  Medical and mechanical DVT prophylaxis orders are present.    CODE STATUS:    Level Of Support Discussed With:  Patient  Code Status (Patient has no pulse and is not breathing): CPR (Attempt to Resuscitate)  Medical Interventions (Patient has pulse or is breathing): Full Support    Labs, radiology, microbiology and provider notes were personally reviewed.  Patient's case was discussed with the primary service as well as the bedside RN.    Electronically signed by Nikita Grider MD, 02/02/22, 5:20 PM EST.

## 2022-02-02 NOTE — PLAN OF CARE
Goal Outcome Evaluation:   FUNCTIONAL ASSESSMENT INSTRUMENT: Patient currently scored a level 6 of 7 on Functional Communication Measures for swallowing indicating a 1-16% limitation in function.    ASSESSMENT/ PLAN OF CARE:  Pt presents with limitations, noted below, that impede his ability to swallow safely. The skills of a therapist will be required to safely and effectively implement the following treatment plan to restore maximal level of function.    PROBLEMS:  1. Dysphagia   TREATMENT: Dysphagia therapy to ensure diet tolerance, advance to least restrictive diet and analyze for aspiration risk.    FREQUENCY/DURATION: Daily 5 times a week    REHAB POTENTIAL:  Pt has good rehab potential.  The following limitations may influence improvement/ length of tx medical status.    RECOMMENDATIONS:   1.   DIET: Mechanical soft diet-whole foods-thin liquids    2.  POSITION: 90 degrees upright    3.  COMPENSATORY STRATEGIES: Alternate solids and liquids, double swallow, small bites/drinks    YES: Pt/responsible party agrees with plan of care and has been informed of all alternatives, risks and benefits.    EDUCATION  The patient has been educated in the following areas:   Dysphagia (Swallowing Impairment).

## 2022-02-02 NOTE — PROGRESS NOTES
[facility]   Hospitalist Progress Note  Date: 2022  Patient Name: Atilio Recinos  : 1965  MRN: [id number]  Date of admission: 2022      Subjective   Subjective     Chief complaint: Increasing oxygen requirements     Summary:  57-year-old male hospitalized on 2022 2 the service of general surgery with perforated ulcer status post ex lap and Chaparro patch placement, medicine service consulted for increasing oxygen requirements postoperatively, has been stable on room air, concerning for bilious output in GIOVANY drain, CT abdomen and pelvis showing oral contrast leakage, patient remains n.p.o. with TPN ongoing, continues to have bilious drainage from his GIOVANY drain     Interval follow-up:   Patient got lethargic and sleepy not able to wake up last night.  Lower GIOVANY drain came out last night.  Stat labs and ABG obtained.  Patient was started on the home Neurontin yesterday.  Patient is awake alert oriented x3 during my round.  Off oxygen  remains tachycardic, no prior cardiac work-up.  Denies chest pain or palpitations. Denies fevers, chills, sweats.   Tolerating oral diet well.  Abdominal pain well controlled.  Having regular BM.  Small drainage from 1 GIOVANY drain.  Creamy drainage around drainage insertion site    Review of systems:  All systems reviewed and negative except for cough, shortness of breath, generalized fatigue.    Objective   Objective     Vitals:   Temp:  [97.9 °F (36.6 °C)-99.7 °F (37.6 °C)] (P) 98.6 °F (37 °C)  Heart Rate:  [104-138] (P) 104  Resp:  [18-22] (P) 18  BP: (109-150)/(57-97) (P) 138/68  Flow (L/min):  [2] 2  Physical Exam    Constitutional: Resting, no acute distress, sitting on edge of the bed              Eyes: Pupils equal, sclerae anicteric, no conjunctival injection, pallor              HENT: NCAT, mucous membranes moist              Neck: Supple, no thyromegaly, no lymphadenopathy, trachea midline              Respiratory: Diminished breath sounds, no wheezes               Cardiovascular: RRR, no murmurs              Gastrointestinal: Appropriate postoperative tenderness, GIOVANY drain in place dressing in place, dry clean and intact creamy drainage noted in GIOVANY drains              musculoskeletal: No edema              Psychiatric: Flat affect               neurologic: Speech clear, no gross deficits              Skin: No rashes    Result Review    Result Review:  I have personally reviewed the results from the time of this admission to 2/1/2022 19:03 EST and agree with these findings:  [x]  Laboratory   CBC    CBC 1/30/22 1/30/22 1/31/22 2/1/22 0442 1822     WBC 10.96 (A)  10.42 12.09 (A)   RBC 2.52 (A)  2.88 (A) 2.60 (A)   Hemoglobin 7.5 (A) 9.1 (A) 8.6 (A) 7.6 (A)   Hematocrit 22.1 (A) 26.5 (A) 25.9 (A) 22.7 (A)   MCV 87.7  89.9 87.3   MCH 29.8  29.9 29.2   MCHC 33.9  33.2 33.5   RDW 14.0  14.3 14.6   Platelets 522 (A)  498 (A) 481 (A)   (A) Abnormal value            BMP    BMP 1/30/22 1/31/22 2/1/22   BUN 14  9   Creatinine 0.77  0.76   Sodium 132 (A) 129.9 (A) 135 (A)   Potassium 4.3  3.8   Chloride 105  105   CO2 19.1 (A)  21.0 (A)   Calcium 8.2 (A)  7.9 (A)   (A) Abnormal value            LIVER FUNCTION TESTS:      Lab 01/30/22  0442 01/29/22  0606 01/28/22  0407 01/26/22  2251 01/26/22  0434   TOTAL PROTEIN 5.7* 5.6* 5.0* 5.4* 5.7*   ALBUMIN 2.00* 2.00* 1.90* 2.10* 2.20*   ALT (SGPT) 9 6 5 6 7   AST (SGOT) 20 19 12 14 16   BILIRUBIN 0.2 0.2 0.2 0.3 0.3   INDIRECT BILIRUBIN  --   --   --   --  0.1   BILIRUBIN DIRECT <0.2 <0.2 <0.2 <0.2 0.2   ALK PHOS 62 55 48 55 61       [x]  Microbiology   Blood Culture   Date Value Ref Range Status   01/27/2022 No growth at 2 days  Preliminary     No results found for: BCIDPCR, CXREFLEX, CSFCX, CULTURETIS  No results found for: CULTURES, HSVCX, URCX  No results found for: EYECULTURE, GCCX, HSVCULTURE, LABHSV  No results found for: LEGIONELLA, MRSACX, MUMPSCX, MYCOPLASCX  No results found for: NOCARDIACX, STOOLCX  No results found for:  THROATCX, UNSTIMCULT, URINECX, CULTURE, VZVCULTUR  No results found for: VIRALCULTU, WOUNDCX    [x]  Radiology CT Abdomen Pelvis Without Contrast    Result Date: 1/27/2022  PROCEDURE: CT ABDOMEN PELVIS WO CONTRAST  COMPARISON: [facility], CT, CT ABDOMEN PELVIS WO CONTRAST, 1/21/2022, 15:05.  INDICATIONS: tachycardia, decreased GIOVANY drain output, recent perf ulcer and leaking Chaparro patch  TECHNIQUE: CT images were created without intravenous contrast.   PROTOCOL:   Standard imaging protocol performed    RADIATION:   DLP: 1678mGy*cm   Automated exposure control was utilized to minimize radiation dose.  FINDINGS:  Within the lung bases is a moderate left pleural effusion with left basilar atelectasis.  There is a stable small cyst in the left hepatic lobe.  The unenhanced gallbladder, adrenal glands, kidneys, spleen, and pancreas are unremarkable.  The stomach is mildly distended.  There is pneumoperitoneum in the upper abdomen.  An abdominal drain enters through the left upper abdomen with its tip just underneath the right inferior hepatic lobe.  There are inflammatory changes within the abdomen, with a 10.4 x 3.7 cm fluid collection within the left mesentery on axial image 103 that appear slightly increased, previously 8.5 x 3.3 cm.  There is another abdominal drain enters through the left lower abdomen with its tip in the right mid pelvis.  The previously identified extravasated contrast is no longer present.  The small bowel appears normal in caliber.  The colon appears normal.  The appendix appears normal.  No abnormally enlarged lymph nodes are identified.  The rectum, prostate, and urinary bladder are unremarkable.  No aggressive osseous lesions are identified.         1. Pneumoperitoneum within upper abdomen with two abdominal drains as described above.  There is a 10.4 cm fluid collection within the left mesentery that has slightly increased in size from prior CT.  Of note, the two abdominal  drains do not traverse through this collection. 2. Moderate left pleural effusion with left basilar atelectasis. 3. Previously identified extravasated contrast is no longer present.     ANDERSON REYES MD       Electronically Signed and Approved By: ANDERSON REYES MD on 1/27/2022 at 11:19             CT Abdomen Pelvis Without Contrast    Result Date: 1/21/2022  PROCEDURE: CT ABDOMEN PELVIS WO CONTRAST  COMPARISON: [facility], CT, CT ABDOMEN PELVIS WO CONTRAST, 1/16/2022, 10:49.  INDICATIONS: repair of perforated ulcer, mid abdominal pain, increased GIOVANY drainage  TECHNIQUE: CT images were created without intravenous contrast.   PROTOCOL:   Standard imaging protocol performed    RADIATION:   DLP: 496 mGy*cm   Automated exposure control was utilized to minimize radiation dose.  FINDINGS:  Small left pleural effusion, unchanged.  Adjacent atelectasis.  There is new ground-glass opacity within the right middle lobe and right lower lobe, only partially imaged.  No left-sided ground-glass opacity however.  There is new oral contrast in the peritoneal cavity along the posterior margin of the left hepatic lobe, between the liver and stomach.  There is a small amount oral contrast in the lumen of the stomach.  There also is oral contrast within small bowel loops as well as the colon.  No bowel obstruction.    There is an 8.7 x 4.0 cm collection of fluid amongst the mesentery in the left mid abdomen axial image 99. This measures 4.1 cm on cranial caudad measurement, coronal imaging.  This is unchanged.  There has been near complete evacuation of the previous free fluid and free air of the peritoneal cavity elsewhere.  No new fluid collections.  Two separate drains are present, 1 terminating in the posterior right-sided pelvis and the other in the right pericolic gutter, adjacent to the inferior tip of the liver.  Bladder is collapsed around a Robledo catheter.  A few small indeterminate low-density lesions of the  liver again noted       There is extraluminal oral contrast along the posterior margin of the left hepatic lobe.  This raises concern for persistent perforation although the oral contrast may have extravasated prior to the surgery; it is unknown whether oral contrast was given after the comparison CT examination but before the surgery.  There appears to have been a very small amount of oral contrast in the stomach lumen at the time of the comparison CT.  There is a unchanged 8.7 by 4.0 by 4.1 cm fluid collection within the left-sided small bowel mesentery.      TYLER KENT MD       Electronically Signed and Approved By: TYLER KENT MD on 1/21/2022 at 15:35             CT Abdomen Pelvis Without Contrast    Result Date: 1/16/2022  PROCEDURE: CT ABDOMEN PELVIS WO CONTRAST  COMPARISON: [facility], CR, XR CHEST 1 VW, 1/16/2022, 8:26.  [facility], CT, ABD PEL W/O CONTRAST, 11/22/2020, 7:03.  INDICATIONS: Abdominal distention and pain, pneumoperitoneum on chest radiograph.  TECHNIQUE: CT images were created without intravenous contrast.   PROTOCOL:   Standard imaging protocol performed    RADIATION:   DLP: 751.9 mGy*cm   Automated exposure control was utilized to minimize radiation dose.  FINDINGS:  There are small pleural effusions, left greater than right.  There is mild adjacent dependent lower lobe consolidation, also left greater than right.  No significant pericardial effusion.  Heart size appears within normal limits.  There is a small to moderate amount of pneumoperitoneum.  There is also a small to moderate amount of low attenuation free fluid in the abdomen and pelvis.  Small hypodensities are seen in the left hepatic lobe on axial image 50 and in the right hepatic lobe on images 42 and 41 which appear similar to the prior CT and are therefore favored to be related to hemangiomas or cysts.  No definite new hepatic lesion is seen.  No suspicious splenic abnormality is seen.  Adrenal  glands appear unremarkable.  Gallbladder is mildly distended.  No definite surrounding inflammation or biliary ductal dilatation is seen.  No acute pancreatic abnormality is identified.  There is a suspected left posterior mid pole renal cyst, as before.  There is mild nonspecific bilateral perinephric fat stranding.  No significant renal calculi.  No hydronephrosis or ureteral calculus is identified.  Bladder is decompressed with Robledo catheter present.  There is calcific atherosclerosis of the aorta without definite aneurysm.  Abdominal and pelvic lymph nodes do not appear enlarged.  There are fat containing bilateral inguinal hernias.  The prostate does not appear enlarged.  There is a small umbilical hernia containing fat and pneumoperitoneum.  The stomach is minimally distended.  There is some pneumoperitoneum along the stomach but gastric perforation cannot be definitively confirmed.  There is a small amount of hyperdense material in the posterior-superior gastric lumen.  This could be related to ingested hyperdense material, but blood products cannot be excluded.  There are some fluid distended small bowel loops without definite small bowel dilatation.  A focal small bowel perforation is not identified but again cannot be excluded.  There is a moderate amount of formed stool in the proximal colon.  The visualized appendix appears partially gas-filled without definite findings to suggest inflammation.  There is mild colonic diverticulosis.  No definite focal colonic or rectal inflammation is identified, but again colonic perforation cannot be completely excluded.  There is a vascular necrosis of the bilateral femoral heads.  There appears to be mild to moderate bilateral hip osteoarthritis with left calcified intra-articular bodies.  There are degenerative changes in the thoracolumbar spine.  No definite acute osseous abnormality is seen.          1. Small to moderate amount of pneumoperitoneum and  low-attenuation free fluid consistent with perforated viscus.  The source of perforation cannot be definitively visualized. 2. Questionable small amount of hyperdense ingested material in the stomach versus a small amount of blood products. 3. Small pleural effusions and mild adjacent lower lobe consolidation, left greater than right. 4. Probable renal and hepatic cysts, as before. 5. Avascular necrosis of the femoral heads.    This report was communicated by telephone to Dr. Ham at the dictation time shown below.     KRISTOPHER ALBERTO MD       Electronically Signed and Approved By: KRISTOPHER ALBERTO MD on 1/16/2022 at 11:20             XR Chest 1 View    Result Date: 1/27/2022  PROCEDURE: XR CHEST 1 VW  COMPARISON: [facility], CR, XR CHEST 1 VW, 1/23/2022, 11:32.  INDICATIONS: s/p left Thoracentesis  FINDINGS:  The heart is normal in size.  The lungs are well-expanded.  There is no evidence of pneumothorax.  The patient is post left thoracentesis.  Right subclavian PICC line tip is at the cavoatrial junction.        Post left thoracentesis.  No pneumothorax is seen.  Right subclavian PICC line tip is at the cavoatrial junction.       LI NICOLAS MD       Electronically Signed and Approved By: LI NICOLAS MD on 1/27/2022 at 16:29             XR Chest 1 View    Result Date: 1/23/2022  PROCEDURE: XR CHEST 1 VW  COMPARISON: [facility], CR, XR CHEST 1 VW, 1/17/2022, 12:38.  INDICATIONS: HYPOXIA  FINDINGS:  Right upper extremity PICC line tip at the cavoatrial junction.  Esophagogastric tube is been removed.  Heart size normal.  There is a small left-sided pleural effusion which appears slightly decreased from the prior study.  No significant effusion on the right.  No pneumothorax.        1. Removal of esophagogastric tube. 2. Right upper extremity PICC line tip at the cavoatrial junction. 3. Small left pleural effusion slightly decreased from the prior study with mild left basilar airspace  disease likely atelectasis. 4. Clear right lung.      SHAHID STEEL MD       Electronically Signed and Approved By: SHAHID STEEL MD on 1/23/2022 at 16:52             XR Chest 1 View    Result Date: 1/17/2022  PROCEDURE: XR CHEST 1 VW  COMPARISON: [facility], CT, CT ABDOMEN PELVIS WO CONTRAST, 1/16/2022, 10:49.  [facility], CR, XR CHEST 1 VW, 1/16/2022, 8:26.  INDICATIONS: hypoxia, cough  FINDINGS:  Enteric tube is present extending into the left quadrant.  Previously seen pneumoperitoneum decreased in the interval.  There has been interval development of perihilar and bibasilar opacities.  There is a small left effusion with mild left basilar consolidation.  The heart appears mildly enlarged.  No pneumothorax is seen.        1. New perihilar and bibasilar opacities suspicious for pulmonary edema.  Pneumonia and/or atelectasis are also included in the differential diagnosis. 2. Small left effusion with mild left basilar consolidation. 3. Mild cardiomegaly. 4. Enteric tube extends into the left upper quadrant. 5. Previously seen pneumoperitoneum appears decreased in the interval.       KRISTOPHER ALBERTO MD       Electronically Signed and Approved By: KRISTOPHER ALBERTO MD on 1/17/2022 at 13:20             XR Chest 1 View    Result Date: 1/16/2022  PROCEDURE: XR CHEST 1 VW  COMPARISON: [facility], CR, CHEST AP/PA 1 VIEW, 11/22/2020, 6:50.  INDICATIONS: SOA Triage Protocol  FINDINGS:  If there is suspicion for pneumoperitoneum with gaseous lucencies seen below the right and left diaphragm.  Lung volumes are low with mild left basilar consolidation.  Small left effusion cannot be excluded.  No definite right effusion or pneumothorax.  The heart appears mildly enlarged.  No acute osseous abnormality is seen.        1. Findings suspicious for pneumoperitoneum.  Recommend further assessment with CT abdomen pelvis. 2. Left basilar consolidation which could represent atelectasis or infiltrate.   3. Mild cardiomegaly.  This report was communicated by telephone to Dr. Ham in the ED at the dictation time shown below.        KRISTOPHER ALBERTO MD       Electronically Signed and Approved By: KRISTOPHER ALBERTO MD on 1/16/2022 at 8:43             Peripheral Block    Result Date: 1/16/2022  Gonzalez Lyons MD     1/16/2022  2:00 PM Peripheral Block Patient reassessed immediately prior to procedure Patient location during procedure: OR Start time: 1/16/2022 1:46 PM Stop time: 1/16/2022 1:50 PM Reason for block: at surgeon's request and post-op pain management Performed by Anesthesiologist: Gonzalez Lyons MD Preanesthetic Checklist Completed: patient identified, IV checked, site marked, risks and benefits discussed, surgical consent, monitors and equipment checked, pre-op evaluation and timeout performed Prep: Pt Position: supine Sterile barriers:alcohol skin prep, partial drape, cap, washed/disinfected hands, gloves and mask Prep: ChloraPrep Patient monitoring: blood pressure monitoring, continuous pulse oximetry and EKG Procedure Guidance:ultrasound guided ULTRASOUND INTERPRETATION. Using ultrasound guidance a 20 G gauge needle was placed in close proximity to the nerve, at which point, under ultrasound guidance anesthetic was injected in the area of the nerve and spread of the anesthesia was seen on ultrasound in close proximity thereto.  There were no abnormalities seen on ultrasound; a digital image was taken; and the patient tolerated the procedure with no complications. Images:still images obtained, printed/placed on chart Laterality:Bilateral Block Type:TAP Injection Technique:single-shot Needle Type:echogenic Needle Gauge:20 G (4in) Resistance on Injection: none Medications Used: bupivacaine-EPINEPHrine PF (MARCAINE w/EPI) 0.25% -[id number] injection, 60 mL Post Assessment Injection Assessment: no paresthesia on injection, incremental injection and negative aspiration for heme Patient Tolerance:comfortable  throughout block Complications:no Additional Notes The block or continuous infusion is requested by the referring physician for management of postoperative pain, or pain related to a procedure. Ultrasound guidance (deemed medically necessary). Painless injection, pt was awake and conversant during the procedure without complications. Needle and surrounding structures visualized throughout procedure. No adverse reactions or complications seen during this period. Post-procedure image showed no signs of complication, and anatomy was consistent with an uncomplicated nerve blockade.     US Renal Bilateral    Result Date: 1/21/2022  PROCEDURE: US RENAL BILATERAL  COMPARISON: [facility], CT, CT ABDOMEN PELVIS WO CONTRAST, 1/16/2022, 10:49.  INDICATIONS: INcrease in creatinine  FINDINGS:  The right kidney measures 10.4 x 6.1 x 5.3 cm.  The left kidney measures 9.9 x 5.8 x 5.6 cm.  The cortical thickness and echogenicity is normal.  There is no hydronephrosis, mass, or calculus.  The urinary bladder was not seen well as the patient has a Robledo catheter in place.        1. Both kidneys are normal. 2. The urinary bladder which was not seen well as there is a Robledo catheter in place.      LIANE PEDROZA MD       Electronically Signed and Approved By: LIANE PEDROZA MD on 1/21/2022 at 12:27               [x]  EKG/Telemetry   []  Cardiology/Vascular   []  Pathology  [x]  Old records  []  Other:    Assessment/Plan   Assessment / Plan     Assessment/Plan:  Assessment:  Acute hypoxemic respiratory failure requiring up to 15 L of oxygen resolved now on room air  Left lower lobe pneumonia  Bilateral pleural effusions.  S/p left Thora by pulmonary  Perforated ulcer status post laparotomy and placement of Chaparro patch   Postop pain control  Postoperative anemia s/p blood transfusion.  Sinus tachycardia  Acute metabolic encephalopathy.  Resolved likely from medications     Plan:  Getting CT scan abdomen pelvis.  Will add CT of the  chest  Check BNP, lactic acid, procalcitonin and respiratory culture  Hemoglobin stable after blood transfusion  Diet per general surgery   Pulmonary signed off  General surgery remains primary, reviewed documentation  Following labs, blood pressure and heart rate.  Check TSH.  Noted  Check 2D echo  Resume home Neurontin.  Dose decreased to 300 mg instead of 800 mg  Continue Toprol-XL.  Dose doubled   DC'd home Norvasc to make room for increased Toprol  IV meropenem as per general surgery   off TPN  Sliding-scale insulin per protocol   Continue telemetry monitoring  P.o. Protonix  Continue with spirometry use  Lovenox for DVT prophylaxis  PT OT evaluation  Encourage ambulation  Discussed with RN.  Disposition as per general surgery likely home  Continue right upper extremity PICC line for now.  Clinical course to dictate further management    DVT prophylaxis:  Medical and mechanical DVT prophylaxis orders are present.    CODE STATUS:   Level Of Support Discussed With: Patient  Code Status (Patient has no pulse and is not breathing): CPR (Attempt to Resuscitate)  Medical Interventions (Patient has pulse or is breathing): Full Support            Electronically signed by Ameya Cortez MD, 02/01/22, 7:07 PM EST.    .

## 2022-02-02 NOTE — THERAPY RE-EVALUATION
Patient Name: Atilio Recinos  : 1965    MRN: [id number]                              Today's Date: 2022       Admit Date: 2022    Visit Dx:     ICD-10-CM ICD-9-CM   1. Pneumoperitoneum  K66.8 568.89   2. Bowel perforation (HCC)  K63.1 569.83   3. Sepsis, due to unspecified organism, unspecified whether acute organ dysfunction present (HCC)  A41.9 038.9     995.91   4. Decreased activities of daily living (ADL)  Z78.9 V49.89   5. Difficulty walking  R26.2 719.7     Patient Active Problem List   Diagnosis   • Anxiety   • Bipolar 1 disorder (HCC)   • COPD (chronic obstructive pulmonary disease) (HCC)   • Depression   • High blood pressure   • High cholesterol   • Other acute sinusitis   • Obesity   • Cigarette nicotine dependence without complication   • Pneumoperitoneum of unknown etiology   • Bowel perforation (HCC)   • S/P exploratory laparotomy, oversew of perforated ulcer, placement of Chaparro patch      Past Medical History:   Diagnosis Date   • Allergies    • Anxiety    • Bipolar 1 disorder (HCC)    • COPD (chronic obstructive pulmonary disease) (HCC)    • Depression    • Forgetfulness    • Head injury    • Hepatitis    • High blood pressure    • High cholesterol    • Psychiatric illness    • S/P exploratory laparotomy, oversew of perforated ulcer, placement of Chap[place] patch  2022   • Shortness of breath    • Sinus trouble      Past Surgical History:   Procedure Laterality Date   • EXPLORATORY LAPAROTOMY N/A 2022    Procedure: EXPLORATORY LAPAROTOMY, OVERSEW OF PERFORATED GASTRIC ULCER WITH CHAPARRO PATCH;  Surgeon: Atilio Lindsay MD;  Location: [facility];  Service: General;  Laterality: N/A;   • PLEURAL SCARIFICATION     • SPHINCTEROTOMY        General Information     Row Name 22 1531 22 1523       OT Time and Intention    Document Type therapy note (daily note)  -LF re-evaluation  -LF    Mode of Treatment individual therapy; occupational therapy  -LF individual  therapy; occupational therapy  -    Row Name 02/02/22 1523          General Information    Patient Profile Reviewed yes  -     Existing Precautions/Restrictions fall; oxygen therapy device and L/min; other (see comments)  GIOVANY drain x1  -LF     Barriers to Rehab none identified  -     Row Name 02/02/22 1523          Cognition    Orientation Status (Cognition) oriented x 3  -LF           User Key  (r) = Recorded By, (t) = Taken By, (c) = Cosigned By    Initials Name Provider Type     Leticia Mcnair OT Occupational Therapist                 Mobility/ADL's     Row Name 02/02/22 1531 02/02/22 1524       Bed Mobility    Bed Mobility supine-sit  -LF supine-sit  -LF    Supine-Sit [place] (Bed Mobility) standby assist  - standby assist  -    Bed Mobility, Safety Issues decreased use of arms for pushing/pulling; decreased use of legs for bridging/pushing  - decreased use of arms for pushing/pulling; decreased use of legs for bridging/pushing  -    Assistive Device (Bed Mobility) bed rails; head of bed elevated  - bed rails; head of bed elevated  -    Row Name 02/02/22 1531 02/02/22 1524       Transfers    Transfers sit-stand transfer; toilet transfer  - sit-stand transfer; toilet transfer  -    Sit-Stand [place] (Transfers) contact guard; verbal cues  - contact guard; verbal cues  -    Row Name 02/02/22 1531 02/02/22 1524       Sit-Stand Transfer    Assistive Device (Sit-Stand Transfers) walker, front-wheeled  - walker, front-wheeled  -    Row Name 02/02/22 1524          Activities of Daily Living    BADL Assessment/Intervention bathing; upper body dressing; lower body dressing; grooming; feeding  -     Row Name 02/02/22 1524          Toileting Assessment/Training    [place] Level (Toileting) toileting skills; standby assist  -     Row Name 02/02/22 1524          Grooming Assessment/Training    [place] Level (Grooming) grooming skills; set up  -     Row Name 02/02/22  1524          Bathing Assessment/Intervention    [place] Level (Bathing) bathing skills; upper body; set up; lower body; moderate assist (50% patient effort)  -[facility] Name 02/02/22 1524          Upper Body Dressing Assessment/Training    [place] Level (Upper Body Dressing) upper body dressing skills; set up  -[facility] Name 02/02/22 1524          Lower Body Dressing Assessment/Training    [place] Level (Lower Body Dressing) lower body dressing skills; moderate assist (50% patient effort)  -[facility] Name 02/02/22 1524          Self-Feeding Assessment/Training    [place] Level (Feeding) feeding skills; set up  -           User Key  (r) = Recorded By, (t) = Taken By, (c) = Cosigned By    Initials Name Provider Type     Leticia Mcnair OT Occupational Therapist               Obj/Interventions     [facility] Name 02/02/22 1525          Sensory Assessment (Somatosensory)    Sensory Assessment (Somatosensory) UE sensation intact  -[facility] Name 02/02/22 1525          Vision Assessment/Intervention    Visual Impairment/Limitations WFL  -[facility] Name 02/02/22 1525          Range of Motion Comprehensive    General Range of Motion bilateral upper extremity ROM WFL  -[facility] Name 02/02/22 1525          Strength Comprehensive (MMT)    General Manual Muscle Testing (MMT) Assessment no strength deficits identified  -[facility] Name 02/02/22 1532 02/02/22 1525       Shoulder (Therapeutic Exercise)    Shoulder (Therapeutic Exercise) AROM (active range of motion)  - AROM (active range of motion)  -    Shoulder AROM (Therapeutic Exercise) bilateral  Shoulder flexion/extension, foreward reach, rotation, and horizontal abduction/adduction 10 reps x 2 sets.  - bilateral  Shoulder flexion/extension, foreward reach, rotation, and horizontal abduction/adduction 10 reps x 1 set.  -[facility] Name 02/02/22 1532 02/02/22 1525       Elbow/Forearm (Therapeutic Exercise)    Elbow/Forearm (Therapeutic  Exercise) AROM (active range of motion)  -LF AROM (active range of motion)  -LF    Elbow/Forearm AROM (Therapeutic Exercise) bilateral; flexion; extension  Shoulder flexion/extension, foreward reach, rotation, and horizontal abduction/adduction 10 reps x 2 sets.  -LF bilateral; flexion; extension; 10 repetitions  -LF    Row Name 02/02/22 1532 02/02/22 1525       Motor Skills    Motor Skills functional endurance  -LF coordination; functional endurance  -LF    Coordination -- WFL  -LF    Functional Endurance Fair/fair-  -LF Fair/fair-  -LF    Row Name 02/02/22 1532 02/02/22 1525       Balance    Balance Assessment sitting dynamic balance; standing dynamic balance  -LF sitting dynamic balance; standing dynamic balance  -LF    Dynamic Sitting Balance WFL; unsupported; sitting in chair; sitting, edge of bed  -LF WFL; unsupported; sitting in chair; sitting, edge of bed  -LF    Dynamic Standing Balance mild impairment; supported; standing  -LF mild impairment; supported; standing  -LF    Balance Interventions sitting; standing; sit to stand; supported; dynamic; occupation based/functional task; UE activity with balance activity  -LF --    Row Name 02/02/22 1532 02/02/22 1525       Therapeutic Exercise    Therapeutic Exercise shoulder; elbow/forearm  -LF shoulder; elbow/forearm  -LF          User Key  (r) = Recorded By, (t) = Taken By, (c) = Cosigned By    Initials Name Provider Type    LF Leticia Mcnair OT Occupational Therapist               Goals/Plan     Row Name 02/02/22 1529          Transfer Goal 1 (OT)    Activity/Assistive Device (Transfer Goal 1, OT) transfers, all  -LF     Hand Level/Cues Needed (Transfer Goal 1, OT) modified independence  -LF     Time Frame (Transfer Goal 1, OT) long term goal (LTG); 10 days  -LF     Progress/Outcome (Transfer Goal 1, OT) continuing progress toward goal  -LF     Row Name 02/02/22 1529          Bathing Goal 1 (OT)    Activity/Device (Bathing Goal 1, OT) bathing skills,  all  -LF     [place] Level/Cues Needed (Bathing Goal 1, OT) modified independence  -LF     Time Frame (Bathing Goal 1, OT) long term goal (LTG); 10 days  -LF     Progress/Outcomes (Bathing Goal 1, OT) continuing progress toward goal  -LF     Row Name 02/02/22 1529          Dressing Goal 1 (OT)    Activity/Device (Dressing Goal 1, OT) dressing skills, all  -LF     [place]/Cues Needed (Dressing Goal 1, OT) modified independence  -LF     Time Frame (Dressing Goal 1, OT) long term goal (LTG); 10 days  -LF     Progress/Outcome (Dressing Goal 1, OT) continuing progress toward goal  -LF     Row Name 02/02/22 1529          Toileting Goal 1 (OT)    Activity/Device (Toileting Goal 1, OT) toileting skills, all  -LF     [place] Level/Cues Needed (Toileting Goal 1, OT) modified independence  -LF     Time Frame (Toileting Goal 1, OT) long term goal (LTG); 10 days  -LF     Progress/Outcome (Toileting Goal 1, OT) continuing progress toward goal  -LF     Row Name 02/02/22 1529          Grooming Goal 1 (OT)    Activity/Device (Grooming Goal 1, OT) grooming skills, all  -LF     [place] (Grooming Goal 1, OT) modified independence  -LF     Time Frame (Grooming Goal 1, OT) long term goal (LTG); 10 days  -LF     Progress/Outcome (Grooming Goal 1, OT) continuing progress toward goal  -LF     Row Name 02/02/22 1529          Therapy Assessment/Plan (OT)    Planned Therapy Interventions (OT) activity tolerance training; patient/caregiver education/training; BADL retraining; functional balance retraining; occupation/activity based interventions; transfer/mobility retraining  -           User Key  (r) = Recorded By, (t) = Taken By, (c) = Cosigned By    Initials Name Provider Type    LF Leticia Mcnair OT Occupational Therapist               Clinical Impression     Row Name 02/02/22 1527          Pain Assessment    Additional Documentation Pain Scale: FACES Pre/Post-Treatment (Group)  -LF     Row Name 02/02/22 1527           Pain Scale: FACES Pre/Post-Treatment    Pain: FACES Scale, Pretreatment 2-->hurts little bit  -LF     Posttreatment Pain Rating 2-->hurts little bit  -LF     Pain Location abdomen  -     Row Name 02/02/22 1527          Plan of Care Review    Plan of Care Reviewed With patient  -     Progress improving  -     Outcome Summary Patient continues to present with limitations in self-care, functional transfes, balance, and endurance. He would benefit from continued skilled occupational therapy services to maximize ADL performance and return home safely and independently. Continue with established plan of care and goals.  -     Row Name 02/02/22 1527          Therapy Assessment/Plan (OT)    Patient/Family Therapy Goal Statement (OT) To go to rehab.  -     Rehab Potential (OT) good, to achieve stated therapy goals  -     Criteria for Skilled Therapeutic Interventions Met (OT) yes; meets criteria; skilled treatment is necessary  -     Therapy Frequency (OT) 5 times/wk  -     Row Name 02/02/22 1527          Therapy Plan Review/Discharge Plan (OT)    Equipment Needs Upon Discharge (OT) walker, rolling; commode chair; tub bench  -     Anticipated Discharge Disposition (OT) inpatient rehabilitation facility; sub acute care setting  -     Row Name 02/02/22 1527          Vital Signs    O2 Delivery Pre Treatment nasal cannula  -     O2 Delivery Intra Treatment nasal cannula  -     O2 Delivery Post Treatment nasal cannula  -           User Key  (r) = Recorded By, (t) = Taken By, (c) = Cosigned By    Initials Name Provider Type     Leticia Mcnair, OT Occupational Therapist               Outcome Measures     Row Name 02/02/22 1529          How much help from another is currently needed...    Putting on and taking off regular lower body clothing? 2  -LF     Bathing (including washing, rinsing, and drying) 2  -LF     Toileting (which includes using toilet bed pan or urinal) 3  -LF     Putting on and  taking off regular upper body clothing 3  -LF     Taking care of personal grooming (such as brushing teeth) 4  -LF     Eating meals 4  -LF     AM-PAC 6 Clicks Score (OT) 18  -LF     Row Name 02/02/22 0900          How much help from another person do you currently need...    Turning from your back to your side while in flat bed without using bedrails? 3  -SS     Moving from lying on back to sitting on the side of a flat bed without bedrails? 3  -SS     Moving to and from a bed to a chair (including a wheelchair)? 3  -SS     Standing up from a chair using your arms (e.g., wheelchair, bedside chair)? 3  -SS     Climbing 3-5 steps with a railing? 3  -SS     To walk in hospital room? 3  -SS     AM-PAC 6 Clicks Score (PT) 18  -SS     Row Name 02/02/22 1529          Functional Assessment    Outcome Measure Options AM-PAC 6 Clicks Daily Activity (OT); Optimal Instrument  -LF     Row Name 02/02/22 1529          Optimal Instrument    Optimal Instrument Optimal - 3  -LF     Bending/Stooping 4  -LF     Standing 2  -LF     Reaching 1  -LF     From the list, choose the 3 activities you would most like to be able to do without any difficulty Bending/stooping; Standing; Reaching  -LF     Total Score Optimal - 3 7  -LF           User Key  (r) = Recorded By, (t) = Taken By, (c) = Cosigned By    Initials Name Provider Type    LF Leticia Mcnair OT Occupational Therapist    Serena Delgado, RN Registered Nurse                Occupational Therapy Education                 Title: PT OT SLP Therapies (Done)     Topic: Occupational Therapy (Done)     Point: ADL training (Done)     Description:   Instruct learner(s) on proper safety adaptation and remediation techniques during self care or transfers.   Instruct in proper use of assistive devices.              Learning Progress Summary           Patient Acceptance, E,TB, VU by  at 2/2/2022 1530      Show all documentation for this point (3)                 Point: Home exercise program  (Done)     Description:   Instruct learner(s) on appropriate technique for monitoring, assisting and/or progressing therapeutic exercises/activities.              Learning Progress Summary           Patient Acceptance, E, VU,NR by  at 1/26/2022 1543      Show all documentation for this point (2)                 Point: Precautions (Done)     Description:   Instruct learner(s) on prescribed precautions during self-care and functional transfers.              Learning Progress Summary           Patient Acceptance, E,TB, VU by  at 2/2/2022 1530      Show all documentation for this point (3)                 Point: Body mechanics (Done)     Description:   Instruct learner(s) on proper positioning and spine alignment during self-care, functional mobility activities and/or exercises.              Learning Progress Summary           Patient Acceptance, E,TB, VU by  at 2/2/2022 1530      Show all documentation for this point (3)                             User Key     Initials Effective Dates Name Provider Type Discipline     06/16/21 -  Terrence Menendez RN Registered Nurse Nurse     06/16/21 -  Leticia Mcnair OT Occupational Therapist OT              OT Recommendation and Plan  Planned Therapy Interventions (OT): activity tolerance training, patient/caregiver education/training, BADL retraining, functional balance retraining, occupation/activity based interventions, transfer/mobility retraining  Therapy Frequency (OT): 5 times/wk  Plan of Care Review  Plan of Care Reviewed With: patient  Progress: improving  Outcome Summary: Patient continues to present with limitations in self-care, functional transfes, balance, and endurance. He would benefit from continued skilled occupational therapy services to maximize ADL performance and return home safely and independently. Continue with established plan of care and goals.     Time Calculation:    Time Calculation- OT     Row Name 02/02/22 1530             Time  Calculation- OT    OT Received On 02/02/22  -LF      OT Goal Re-Cert Due Date 02/11/22  -LF              Timed Charges    91333 - OT Therapeutic Exercise Minutes 13  -LF      69997 - OT Therapeutic Activity Minutes 10  -LF              Untimed Charges    OT Eval/Re-eval Minutes 15  -LF              Total Minutes    Timed Charges Total Minutes 23  -LF      Untimed Charges Total Minutes 15  -LF       Total Minutes 38  -LF            User Key  (r) = Recorded By, (t) = Taken By, (c) = Cosigned By    Initials Name Provider Type     Leticia Mcnair OT Occupational Therapist              Therapy Charges for Today     Code Description Service Date Service Provider Modifiers Qty    46808668551 HC OT THER PROC EA 15 MIN 2/2/2022 Leticia Mcnair OT GO 1    58139728725 HC OT THERAPEUTIC ACT EA 15 MIN 2/2/2022 Leticia Mcnair OT GO 1    97674128473 HC OT RE-EVAL 2 2/2/2022 Leticia Mcnair OT GO 1               Leticia Mcnair OT  2/2/2022

## 2022-02-02 NOTE — PROCEDURES
Thoracentesis Procedure Note    Indication: Moderate left sided pleural effusion    Consent: Yes, placed in chart.    Procedure: The patient was placed in the sitting position and the appropriate landmarks were identified. The skin over the puncture site in the left posterior chest wall was prepped with chlorprep and draped in sterile fashion. Local anesthesia was applied with 1% lidocaine. Thoracentesis catheter was inserted with needle guidance. Pleural fluid was dark yellow, drained 700 cc fluid. The catheter was then withdrawn and a sterile dressing was placed over the site. A post procedure film is pending at this time.    The patient tolerated the procedure well.    Complications: None

## 2022-02-02 NOTE — PLAN OF CARE
Goal Outcome Evaluation:  Plan of Care Reviewed With: patient        Progress: improving  Outcome Summary: Patient continues to present with limitations in self-care, functional transfes, balance, and endurance. He would benefit from continued skilled occupational therapy services to maximize ADL performance and return home safely and independently. Continue with established plan of care and goals.

## 2022-02-03 LAB
ALBUMIN SERPL-MCNC: 2.2 G/DL (ref 3.5–5.2)
ANION GAP SERPL CALCULATED.3IONS-SCNC: 7.2 MMOL/L (ref 5–15)
BASOPHILS # BLD AUTO: 0.05 10*3/MM3 (ref 0–0.2)
BASOPHILS NFR BLD AUTO: 0.5 % (ref 0–1.5)
BUN SERPL-MCNC: 8 MG/DL (ref 6–20)
BUN/CREAT SERPL: 11 (ref 7–25)
CALCIUM SPEC-SCNC: 8 MG/DL (ref 8.6–10.5)
CHLORIDE SERPL-SCNC: 105 MMOL/L (ref 98–107)
CO2 SERPL-SCNC: 22.8 MMOL/L (ref 22–29)
CREAT SERPL-MCNC: 0.73 MG/DL (ref 0.76–1.27)
DEPRECATED RDW RBC AUTO: 47.2 FL (ref 37–54)
EOSINOPHIL # BLD AUTO: 0.22 10*3/MM3 (ref 0–0.4)
EOSINOPHIL NFR BLD AUTO: 2.2 % (ref 0.3–6.2)
EOSINOPHIL NFR FLD MANUAL: 2 %
ERYTHROCYTE [DISTWIDTH] IN BLOOD BY AUTOMATED COUNT: 14.6 % (ref 12.3–15.4)
GFR SERPL CREATININE-BSD FRML MDRD: 110 ML/MIN/1.73
GLUCOSE SERPL-MCNC: 115 MG/DL (ref 65–99)
HCT VFR BLD AUTO: 24.7 % (ref 37.5–51)
HGB BLD-MCNC: 8.2 G/DL (ref 13–17.7)
IMM GRANULOCYTES # BLD AUTO: 1.35 10*3/MM3 (ref 0–0.05)
IMM GRANULOCYTES NFR BLD AUTO: 13.3 % (ref 0–0.5)
LYMPHOCYTES # BLD AUTO: 1.68 10*3/MM3 (ref 0.7–3.1)
LYMPHOCYTES NFR BLD AUTO: 16.6 % (ref 19.6–45.3)
LYMPHOCYTES NFR FLD MANUAL: 57 %
MACROPHAGE FLUID: 1 %
MCH RBC QN AUTO: 29.7 PG (ref 26.6–33)
MCHC RBC AUTO-ENTMCNC: 33.2 G/DL (ref 31.5–35.7)
MCV RBC AUTO: 89.5 FL (ref 79–97)
MESOTHL CELL NFR FLD MANUAL: 3 %
MONOCYTES # BLD AUTO: 0.9 10*3/MM3 (ref 0.1–0.9)
MONOCYTES NFR BLD AUTO: 8.9 % (ref 5–12)
MONOCYTES NFR FLD: 1 %
MRSA DNA SPEC QL NAA+PROBE: NORMAL
NEUTROPHILS NFR BLD AUTO: 5.94 10*3/MM3 (ref 1.7–7)
NEUTROPHILS NFR BLD AUTO: 58.5 % (ref 42.7–76)
NEUTROPHILS NFR FLD MANUAL: 36 %
NRBC BLD AUTO-RTO: 0.2 /100 WBC (ref 0–0.2)
NT-PROBNP SERPL-MCNC: 384.6 PG/ML (ref 0–900)
PHOSPHATE SERPL-MCNC: 3.4 MG/DL (ref 2.5–4.5)
PLATELET # BLD AUTO: 428 10*3/MM3 (ref 140–450)
PMV BLD AUTO: 9.9 FL (ref 6–12)
POTASSIUM SERPL-SCNC: 3.4 MMOL/L (ref 3.5–5.2)
PROCALCITONIN SERPL-MCNC: 0.21 NG/ML (ref 0–0.25)
PROT SERPL-MCNC: 5.9 G/DL (ref 6–8.5)
RBC # BLD AUTO: 2.76 10*6/MM3 (ref 4.14–5.8)
SODIUM SERPL-SCNC: 135 MMOL/L (ref 136–145)
TRIGL FLD-MCNC: 35 MG/DL
VANCOMYCIN TROUGH SERPL-MCNC: 12.82 MCG/ML (ref 5–20)
WBC NRBC COR # BLD: 10.14 10*3/MM3 (ref 3.4–10.8)

## 2022-02-03 PROCEDURE — 80202 ASSAY OF VANCOMYCIN: CPT | Performed by: INTERNAL MEDICINE

## 2022-02-03 PROCEDURE — 84145 PROCALCITONIN (PCT): CPT | Performed by: INTERNAL MEDICINE

## 2022-02-03 PROCEDURE — 99024 POSTOP FOLLOW-UP VISIT: CPT | Performed by: SURGERY

## 2022-02-03 PROCEDURE — 94799 UNLISTED PULMONARY SVC/PX: CPT

## 2022-02-03 PROCEDURE — 80069 RENAL FUNCTION PANEL: CPT | Performed by: INTERNAL MEDICINE

## 2022-02-03 PROCEDURE — 85025 COMPLETE CBC W/AUTO DIFF WBC: CPT | Performed by: INTERNAL MEDICINE

## 2022-02-03 PROCEDURE — 25010000002 FUROSEMIDE PER 20 MG: Performed by: INTERNAL MEDICINE

## 2022-02-03 PROCEDURE — 25010000002 MEROPENEM PER 100 MG: Performed by: SURGERY

## 2022-02-03 PROCEDURE — 99233 SBSQ HOSP IP/OBS HIGH 50: CPT | Performed by: INTERNAL MEDICINE

## 2022-02-03 PROCEDURE — 25010000002 VANCOMYCIN 5 G RECONSTITUTED SOLUTION: Performed by: INTERNAL MEDICINE

## 2022-02-03 PROCEDURE — 84155 ASSAY OF PROTEIN SERUM: CPT | Performed by: INTERNAL MEDICINE

## 2022-02-03 PROCEDURE — 83880 ASSAY OF NATRIURETIC PEPTIDE: CPT | Performed by: INTERNAL MEDICINE

## 2022-02-03 PROCEDURE — 87641 MR-STAPH DNA AMP PROBE: CPT | Performed by: INTERNAL MEDICINE

## 2022-02-03 PROCEDURE — 25010000002 ENOXAPARIN PER 10 MG: Performed by: INTERNAL MEDICINE

## 2022-02-03 RX ORDER — POTASSIUM CHLORIDE 750 MG/1
40 CAPSULE, EXTENDED RELEASE ORAL DAILY
Status: DISCONTINUED | OUTPATIENT
Start: 2022-02-03 | End: 2022-02-08

## 2022-02-03 RX ADMIN — QUETIAPINE FUMARATE 300 MG: 100 TABLET ORAL at 20:40

## 2022-02-03 RX ADMIN — METOPROLOL SUCCINATE 50 MG: 50 TABLET, FILM COATED, EXTENDED RELEASE ORAL at 08:46

## 2022-02-03 RX ADMIN — ARFORMOTEROL TARTRATE: 15 SOLUTION RESPIRATORY (INHALATION) at 08:16

## 2022-02-03 RX ADMIN — MEROPENEM 500 MG: 500 INJECTION INTRAVENOUS at 00:47

## 2022-02-03 RX ADMIN — SODIUM CHLORIDE, PRESERVATIVE FREE 3 ML: 5 INJECTION INTRAVENOUS at 08:46

## 2022-02-03 RX ADMIN — MEROPENEM 1 G: 1 INJECTION, POWDER, FOR SOLUTION INTRAVENOUS at 13:50

## 2022-02-03 RX ADMIN — ENOXAPARIN SODIUM 40 MG: 40 INJECTION SUBCUTANEOUS at 08:45

## 2022-02-03 RX ADMIN — SODIUM CHLORIDE, PRESERVATIVE FREE 3 ML: 5 INJECTION INTRAVENOUS at 20:44

## 2022-02-03 RX ADMIN — MEROPENEM 500 MG: 500 INJECTION INTRAVENOUS at 05:22

## 2022-02-03 RX ADMIN — VANCOMYCIN HYDROCHLORIDE 1500 MG: 5 INJECTION, POWDER, LYOPHILIZED, FOR SOLUTION INTRAVENOUS at 18:23

## 2022-02-03 RX ADMIN — PANTOPRAZOLE SODIUM 40 MG: 40 TABLET, DELAYED RELEASE ORAL at 08:53

## 2022-02-03 RX ADMIN — OXYCODONE HYDROCHLORIDE AND ACETAMINOPHEN 1 TABLET: 5; 325 TABLET ORAL at 06:00

## 2022-02-03 RX ADMIN — PANTOPRAZOLE SODIUM 40 MG: 40 TABLET, DELAYED RELEASE ORAL at 16:33

## 2022-02-03 RX ADMIN — ARFORMOTEROL TARTRATE: 15 SOLUTION RESPIRATORY (INHALATION) at 21:09

## 2022-02-03 RX ADMIN — OXYCODONE HYDROCHLORIDE AND ACETAMINOPHEN 1 TABLET: 5; 325 TABLET ORAL at 16:33

## 2022-02-03 RX ADMIN — POLYETHYLENE GLYCOL 3350 17 G: 17 POWDER, FOR SOLUTION ORAL at 08:46

## 2022-02-03 RX ADMIN — MEROPENEM 1 G: 1 INJECTION, POWDER, FOR SOLUTION INTRAVENOUS at 20:40

## 2022-02-03 RX ADMIN — PANTOPRAZOLE SODIUM 40 MG: 40 TABLET, DELAYED RELEASE ORAL at 06:00

## 2022-02-03 RX ADMIN — POTASSIUM CHLORIDE 40 MEQ: 750 CAPSULE, EXTENDED RELEASE ORAL at 11:28

## 2022-02-03 RX ADMIN — FUROSEMIDE 20 MG: 10 INJECTION, SOLUTION INTRAMUSCULAR; INTRAVENOUS at 08:46

## 2022-02-03 RX ADMIN — VANCOMYCIN HYDROCHLORIDE 1250 MG: 5 INJECTION, POWDER, LYOPHILIZED, FOR SOLUTION INTRAVENOUS at 06:00

## 2022-02-03 NOTE — PROGRESS NOTES
"Pharmacy to Dose Vancomycin Day: 2    Atilio Recinos is a 57 y.o.male with intra-abdominal fluid collection now with concern for pneumonia.     Consulting Provider: VEL  Clinical Indication: PNA  Pertinent Past Medical History: RECENT SURGERY- PERFORATED ULCER STATUS POST EX LAP  Goal -600 mg/L.hr  Duration of therapy: 7 DAYS    167.6 cm (66\")       01/29/22  0549      Weight: 95.7 kg (210 lb 15.7 oz)         Estimated Creatinine Clearance: 118 mL/min (A) (by C-G formula based on SCr of 0.73 mg/dL (L)).  Results from last 7 days  Lab  Units  02/03/22  0339  02/02/22  0256  02/01/22  0358  01/30/22  0442  BUN  mg/dL  8  8  9  14  CREATININE  mg/dL  0.73*  0.76  0.76  0.77    HD/PD/CRRT?:  NO    Lab Results   Component Value Date    WBC 10.14 02/03/2022      Temperature    02/02/22 2221 02/03/22 0213 02/03/22 0700   Temp: 98.3 °F (36.8 °C) 97.9 °F (36.6 °C) 98 °F (36.7 °C)      Contrast Administered:  NO    Relevant Micro:   Microbiology Results (last 10 days)       Procedure Component Value - Date/Time    Wound Culture - Wound, Abdominal Wall [[id number]] Collected: 02/02/22 2201    Lab Status: Preliminary result Specimen: Wound from Abdominal Wall Updated: 02/03/22 0233     Gram Stain No organisms seen    Body Fluid Culture - Body Fluid, Pleural Cavity [[id number]] Collected: 02/02/22 1732    Lab Status: Preliminary result Specimen: Body Fluid from Pleural Cavity Updated: 02/03/22 0609     Body Fluid Culture No growth     Gram Stain Few (2+) WBCs seen      No organisms seen    MRSA Screen, PCR (Inpatient) - Swab, Nares [[id number]]  (Normal) Collected: 02/02/22 1052    Lab Status: Final result Specimen: Swab from Nares Updated: 02/02/22 1210     MRSA PCR No MRSA Detected    Blood Culture - Blood, Arm, Left [[id number]]  (Normal) Collected: 01/27/22 1806    Lab Status: Final result Specimen: Blood from Arm, Left Updated: 02/01/22 1815     Blood Culture No growth at 5 days    Anaerobic Culture - Pleural Fluid, " Pleural Cavity [[id number]] Collected: 01/27/22 1612    Lab Status: Final result Specimen: Pleural Fluid from Pleural Cavity Updated: 02/01/22 0630     Anaerobic Culture No anaerobes isolated at 5 days    AFB Culture - Body Fluid, Pleural Cavity [[id number]] Collected: 01/27/22 1611    Lab Status: Preliminary result Specimen: Body Fluid from Pleural Cavity Updated: 02/01/22 1630     AFB Culture No AFB isolated at less than 1 week     AFB Stain No acid fast bacilli seen on direct smear    Fungus Culture - Body Fluid, Pleural Cavity [[id number]] Collected: 01/27/22 1611    Lab Status: Preliminary result Specimen: Body Fluid from Pleural Cavity Updated: 02/01/22 1630     Fungus Culture No fungus isolated at less than 1 week    Body Fluid Culture - Body Fluid, Pleural Cavity [[id number]] Collected: 01/27/22 1610    Lab Status: Final result Specimen: Body Fluid from Pleural Cavity Updated: 01/30/22 1106     Body Fluid Culture No growth at 3 days     Gram Stain No organisms seen    Blood Culture - Blood, Blood, Central Line [[id number]]  (Normal) Collected: 01/27/22 0315    Lab Status: Final result Specimen: Blood, Central Line Updated: 02/01/22 0345     Blood Culture No growth at 5 days           Relevant Radiology:     CHEST CT:  IMPRESSION:  Increased left pleural effusion and left basilar (predominantly left lower lobe)   airspace disease since 1/27/2022 is noted.  There is infiltrate in the right middle lobe, as well.    Infectious multifocal pneumonia is possible.  Aspiration pneumonia cannot be excluded.  Motion   artifact obscures detail.  Please see above comments for further detail.       Other Antimicrobial Therapy:  MERREM    Assessment/Plan    Loading dose:  2GRAMS ONCE ONE  Regimen: 1500 mg IV every 12 hours - increased from 1250mg q12h   Exposure target: AUC24 (range)400-600 mg/L.hr   AUC24,ss: 512 mg/L.hr  PAUC*: 83 %  Ctrough,ss: 15.7 mg/L  Pconc*: 29 %  Tox.: 11 %    Change to 1500mg IV q12h to better target  AUC goal.   MRSA PCR is neg - will check with team regarding continued need for vanc.   Lab Results   Component Value Date    [place] 12.82 02/03/2022     Plan for follow up level in 48 hours if vanc continues.

## 2022-02-03 NOTE — PROGRESS NOTES
[facility]   Hospitalist Progress Note  Date: 2/3/2022  Patient Name: Atilio Recinos  : 1965  MRN: [id number]  Date of admission: 2022      Subjective   Subjective     Chief complaint: Increasing oxygen requirements     Summary:  57-year-old male hospitalized on 2022 2 the service of general surgery with perforated ulcer status post ex lap and Chaparro patch placement, medicine service consulted for increasing oxygen requirements postoperatively, has been stable on room air, concerning for bilious output in GIOVANY drain, CT abdomen and pelvis showing oral contrast leakage, patient remains n.p.o. with TPN ongoing, continues to have bilious drainage from his GIOVANY drain     Interval follow-up:   Patient is awake alert oriented x3 .  Off oxygen  remains tachycardic, no prior cardiac work-up.  Denies chest pain or palpitations. Denies fevers, chills, sweats.   No more visual hallucinations  Tolerating oral diet well.  Abdominal pain well controlled.  Having regular BM.  Good urine output  Small drainage from 1 GIOVANY drain.  Copious creamy drainage around drainage insertion site.  Surgical wound appears to be dehiscing with erythema around staples.  Scant drainage from lower end of the wound.    Review of systems:  All systems reviewed and negative except for cough, shortness of breath, generalized fatigue.    Objective   Objective     Vitals:   Temp:  [97.7 °F (36.5 °C)-98.8 °F (37.1 °C)] 97.8 °F (36.6 °C)  Heart Rate:  [] 92  Resp:  [18-22] 18  BP: (136-144)/(52-69) 138/62  Physical Exam    Constitutional: Resting, no acute distress, sitting on edge of the bed              Eyes: Pupils equal, sclerae anicteric, no conjunctival injection, pallor              HENT: NCAT, mucous membranes moist              Neck: Supple, no thyromegaly, no lymphadenopathy, trachea midline              Respiratory: Diminished breath sounds, no wheezes              Cardiovascular: RRR, no murmurs               Gastrointestinal: Appropriate postoperative tenderness, GIOVANY drain in place dressing in place, some swelling of drainage at the lower end of surgical wound with wound dehiscence and redness at Staples insertion site copious creamy drainage noted in GIOVANY drains              musculoskeletal: No edema              Psychiatric: Flat affect               neurologic: Speech clear, no gross deficits              Skin: No rashes    Result Review    Result Review:  I have personally reviewed the results from the time of this admission to 2/3/2022 18:22 EST and agree with these findings:  [x]  Laboratory   CBC    CBC 2/1/22 2/2/22 2/3/22   WBC 12.09 (A) 11.52 (A) 10.14   RBC 2.60 (A) 2.80 (A) 2.76 (A)   Hemoglobin 7.6 (A) 8.3 (A) 8.2 (A)   Hematocrit 22.7 (A) 25.1 (A) 24.7 (A)   MCV 87.3 89.6 89.5   MCH 29.2 29.6 29.7   MCHC 33.5 33.1 33.2   RDW 14.6 14.4 14.6   Platelets 481 (A) 464 (A) 428   (A) Abnormal value            BMP    BMP 2/1/22 2/2/22 2/3/22   BUN 9 8 8   Creatinine 0.76 0.76 0.73 (A)   Sodium 135 (A) 135 (A) 135 (A)   Potassium 3.8 3.7 3.4 (A)   Chloride 105 104 105   CO2 21.0 (A) 21.8 (A) 22.8   Calcium 7.9 (A) 8.3 (A) 8.0 (A)   (A) Abnormal value            LIVER FUNCTION TESTS:      Lab 02/03/22  0339 02/02/22  0256 01/30/22  0442 01/29/22  0606 01/28/22  0407   TOTAL PROTEIN 5.9*  --  5.7* 5.6* 5.0*   ALBUMIN 2.20* 2.20* 2.00* 2.00* 1.90*   ALT (SGPT)  --   --  9 6 5   AST (SGOT)  --   --  20 19 12   BILIRUBIN  --   --  0.2 0.2 0.2   BILIRUBIN DIRECT  --   --  <0.2 <0.2 <0.2   ALK PHOS  --   --  62 55 48       [x]  Microbiology   Blood Culture   Date Value Ref Range Status   01/27/2022 No growth at 2 days  Preliminary     No results found for: BCIDPCR, CXREFLEX, CSFCX, CULTURETIS  No results found for: CULTURES, HSVCX, URCX  No results found for: EYECULTURE, GCCX, HSVCULTURE, LABHSV  No results found for: LEGIONELLA, MRSACX, MUMPSCX, MYCOPLASCX  No results found for: NOCARDIACX, STOOLCX  No results found for:  THROATCX, UNSTIMCULT, URINECX, CULTURE, VZVCULTUR  No results found for: VIRALCULTU, WOUNDCX    [x]  Radiology CT Abdomen Pelvis Without Contrast    Result Date: 1/27/2022  PROCEDURE: CT ABDOMEN PELVIS WO CONTRAST  COMPARISON: [facility], CT, CT ABDOMEN PELVIS WO CONTRAST, 1/21/2022, 15:05.  INDICATIONS: tachycardia, decreased GIOVANY drain output, recent perf ulcer and leaking Chaparro patch  TECHNIQUE: CT images were created without intravenous contrast.   PROTOCOL:   Standard imaging protocol performed    RADIATION:   DLP: 1678mGy*cm   Automated exposure control was utilized to minimize radiation dose.  FINDINGS:  Within the lung bases is a moderate left pleural effusion with left basilar atelectasis.  There is a stable small cyst in the left hepatic lobe.  The unenhanced gallbladder, adrenal glands, kidneys, spleen, and pancreas are unremarkable.  The stomach is mildly distended.  There is pneumoperitoneum in the upper abdomen.  An abdominal drain enters through the left upper abdomen with its tip just underneath the right inferior hepatic lobe.  There are inflammatory changes within the abdomen, with a 10.4 x 3.7 cm fluid collection within the left mesentery on axial image 103 that appear slightly increased, previously 8.5 x 3.3 cm.  There is another abdominal drain enters through the left lower abdomen with its tip in the right mid pelvis.  The previously identified extravasated contrast is no longer present.  The small bowel appears normal in caliber.  The colon appears normal.  The appendix appears normal.  No abnormally enlarged lymph nodes are identified.  The rectum, prostate, and urinary bladder are unremarkable.  No aggressive osseous lesions are identified.         1. Pneumoperitoneum within upper abdomen with two abdominal drains as described above.  There is a 10.4 cm fluid collection within the left mesentery that has slightly increased in size from prior CT.  Of note, the two abdominal  drains do not traverse through this collection. 2. Moderate left pleural effusion with left basilar atelectasis. 3. Previously identified extravasated contrast is no longer present.     ANDERSON REYES MD       Electronically Signed and Approved By: ANDERSON REYES MD on 1/27/2022 at 11:19             CT Abdomen Pelvis Without Contrast    Result Date: 1/21/2022  PROCEDURE: CT ABDOMEN PELVIS WO CONTRAST  COMPARISON: [facility], CT, CT ABDOMEN PELVIS WO CONTRAST, 1/16/2022, 10:49.  INDICATIONS: repair of perforated ulcer, mid abdominal pain, increased GIOVANY drainage  TECHNIQUE: CT images were created without intravenous contrast.   PROTOCOL:   Standard imaging protocol performed    RADIATION:   DLP: 496 mGy*cm   Automated exposure control was utilized to minimize radiation dose.  FINDINGS:  Small left pleural effusion, unchanged.  Adjacent atelectasis.  There is new ground-glass opacity within the right middle lobe and right lower lobe, only partially imaged.  No left-sided ground-glass opacity however.  There is new oral contrast in the peritoneal cavity along the posterior margin of the left hepatic lobe, between the liver and stomach.  There is a small amount oral contrast in the lumen of the stomach.  There also is oral contrast within small bowel loops as well as the colon.  No bowel obstruction.    There is an 8.7 x 4.0 cm collection of fluid amongst the mesentery in the left mid abdomen axial image 99. This measures 4.1 cm on cranial caudad measurement, coronal imaging.  This is unchanged.  There has been near complete evacuation of the previous free fluid and free air of the peritoneal cavity elsewhere.  No new fluid collections.  Two separate drains are present, 1 terminating in the posterior right-sided pelvis and the other in the right pericolic gutter, adjacent to the inferior tip of the liver.  Bladder is collapsed around a Robledo catheter.  A few small indeterminate low-density lesions of the  liver again noted       There is extraluminal oral contrast along the posterior margin of the left hepatic lobe.  This raises concern for persistent perforation although the oral contrast may have extravasated prior to the surgery; it is unknown whether oral contrast was given after the comparison CT examination but before the surgery.  There appears to have been a very small amount of oral contrast in the stomach lumen at the time of the comparison CT.  There is a unchanged 8.7 by 4.0 by 4.1 cm fluid collection within the left-sided small bowel mesentery.      TYLER KENT MD       Electronically Signed and Approved By: TYLER KENT MD on 1/21/2022 at 15:35             CT Abdomen Pelvis Without Contrast    Result Date: 1/16/2022  PROCEDURE: CT ABDOMEN PELVIS WO CONTRAST  COMPARISON: [facility], CR, XR CHEST 1 VW, 1/16/2022, 8:26.  [facility], CT, ABD PEL W/O CONTRAST, 11/22/2020, 7:03.  INDICATIONS: Abdominal distention and pain, pneumoperitoneum on chest radiograph.  TECHNIQUE: CT images were created without intravenous contrast.   PROTOCOL:   Standard imaging protocol performed    RADIATION:   DLP: 751.9 mGy*cm   Automated exposure control was utilized to minimize radiation dose.  FINDINGS:  There are small pleural effusions, left greater than right.  There is mild adjacent dependent lower lobe consolidation, also left greater than right.  No significant pericardial effusion.  Heart size appears within normal limits.  There is a small to moderate amount of pneumoperitoneum.  There is also a small to moderate amount of low attenuation free fluid in the abdomen and pelvis.  Small hypodensities are seen in the left hepatic lobe on axial image 50 and in the right hepatic lobe on images 42 and 41 which appear similar to the prior CT and are therefore favored to be related to hemangiomas or cysts.  No definite new hepatic lesion is seen.  No suspicious splenic abnormality is seen.  Adrenal  glands appear unremarkable.  Gallbladder is mildly distended.  No definite surrounding inflammation or biliary ductal dilatation is seen.  No acute pancreatic abnormality is identified.  There is a suspected left posterior mid pole renal cyst, as before.  There is mild nonspecific bilateral perinephric fat stranding.  No significant renal calculi.  No hydronephrosis or ureteral calculus is identified.  Bladder is decompressed with Robledo catheter present.  There is calcific atherosclerosis of the aorta without definite aneurysm.  Abdominal and pelvic lymph nodes do not appear enlarged.  There are fat containing bilateral inguinal hernias.  The prostate does not appear enlarged.  There is a small umbilical hernia containing fat and pneumoperitoneum.  The stomach is minimally distended.  There is some pneumoperitoneum along the stomach but gastric perforation cannot be definitively confirmed.  There is a small amount of hyperdense material in the posterior-superior gastric lumen.  This could be related to ingested hyperdense material, but blood products cannot be excluded.  There are some fluid distended small bowel loops without definite small bowel dilatation.  A focal small bowel perforation is not identified but again cannot be excluded.  There is a moderate amount of formed stool in the proximal colon.  The visualized appendix appears partially gas-filled without definite findings to suggest inflammation.  There is mild colonic diverticulosis.  No definite focal colonic or rectal inflammation is identified, but again colonic perforation cannot be completely excluded.  There is a vascular necrosis of the bilateral femoral heads.  There appears to be mild to moderate bilateral hip osteoarthritis with left calcified intra-articular bodies.  There are degenerative changes in the thoracolumbar spine.  No definite acute osseous abnormality is seen.          1. Small to moderate amount of pneumoperitoneum and  low-attenuation free fluid consistent with perforated viscus.  The source of perforation cannot be definitively visualized. 2. Questionable small amount of hyperdense ingested material in the stomach versus a small amount of blood products. 3. Small pleural effusions and mild adjacent lower lobe consolidation, left greater than right. 4. Probable renal and hepatic cysts, as before. 5. Avascular necrosis of the femoral heads.    This report was communicated by telephone to Dr. Ham at the dictation time shown below.     KRISTOPHER ALBERTO MD       Electronically Signed and Approved By: KRISTOPHER ALBERTO MD on 1/16/2022 at 11:20             XR Chest 1 View    Result Date: 1/27/2022  PROCEDURE: XR CHEST 1 VW  COMPARISON: [facility], CR, XR CHEST 1 VW, 1/23/2022, 11:32.  INDICATIONS: s/p left Thoracentesis  FINDINGS:  The heart is normal in size.  The lungs are well-expanded.  There is no evidence of pneumothorax.  The patient is post left thoracentesis.  Right subclavian PICC line tip is at the cavoatrial junction.        Post left thoracentesis.  No pneumothorax is seen.  Right subclavian PICC line tip is at the cavoatrial junction.       LI NICOLAS MD       Electronically Signed and Approved By: LI NICOLAS MD on 1/27/2022 at 16:29             XR Chest 1 View    Result Date: 1/23/2022  PROCEDURE: XR CHEST 1 VW  COMPARISON: [facility], CR, XR CHEST 1 VW, 1/17/2022, 12:38.  INDICATIONS: HYPOXIA  FINDINGS:  Right upper extremity PICC line tip at the cavoatrial junction.  Esophagogastric tube is been removed.  Heart size normal.  There is a small left-sided pleural effusion which appears slightly decreased from the prior study.  No significant effusion on the right.  No pneumothorax.        1. Removal of esophagogastric tube. 2. Right upper extremity PICC line tip at the cavoatrial junction. 3. Small left pleural effusion slightly decreased from the prior study with mild left basilar airspace  disease likely atelectasis. 4. Clear right lung.      SHAHID STEEL MD       Electronically Signed and Approved By: SHAHID STEEL MD on 1/23/2022 at 16:52             XR Chest 1 View    Result Date: 1/17/2022  PROCEDURE: XR CHEST 1 VW  COMPARISON: [facility], CT, CT ABDOMEN PELVIS WO CONTRAST, 1/16/2022, 10:49.  [facility], CR, XR CHEST 1 VW, 1/16/2022, 8:26.  INDICATIONS: hypoxia, cough  FINDINGS:  Enteric tube is present extending into the left quadrant.  Previously seen pneumoperitoneum decreased in the interval.  There has been interval development of perihilar and bibasilar opacities.  There is a small left effusion with mild left basilar consolidation.  The heart appears mildly enlarged.  No pneumothorax is seen.        1. New perihilar and bibasilar opacities suspicious for pulmonary edema.  Pneumonia and/or atelectasis are also included in the differential diagnosis. 2. Small left effusion with mild left basilar consolidation. 3. Mild cardiomegaly. 4. Enteric tube extends into the left upper quadrant. 5. Previously seen pneumoperitoneum appears decreased in the interval.       KRISTOPHER ALBERTO MD       Electronically Signed and Approved By: KRISTOPHER ALBERTO MD on 1/17/2022 at 13:20             XR Chest 1 View    Result Date: 1/16/2022  PROCEDURE: XR CHEST 1 VW  COMPARISON: [facility], CR, CHEST AP/PA 1 VIEW, 11/22/2020, 6:50.  INDICATIONS: SOA Triage Protocol  FINDINGS:  If there is suspicion for pneumoperitoneum with gaseous lucencies seen below the right and left diaphragm.  Lung volumes are low with mild left basilar consolidation.  Small left effusion cannot be excluded.  No definite right effusion or pneumothorax.  The heart appears mildly enlarged.  No acute osseous abnormality is seen.        1. Findings suspicious for pneumoperitoneum.  Recommend further assessment with CT abdomen pelvis. 2. Left basilar consolidation which could represent atelectasis or infiltrate.   3. Mild cardiomegaly.  This report was communicated by telephone to Dr. Ham in the ED at the dictation time shown below.        KRISTOPHER ALBERTO MD       Electronically Signed and Approved By: KRISTOPHER ALBERTO MD on 1/16/2022 at 8:43             Peripheral Block    Result Date: 1/16/2022  Gonzalez Lyons MD     1/16/2022  2:00 PM Peripheral Block Patient reassessed immediately prior to procedure Patient location during procedure: OR Start time: 1/16/2022 1:46 PM Stop time: 1/16/2022 1:50 PM Reason for block: at surgeon's request and post-op pain management Performed by Anesthesiologist: Gonzalez Lyons MD Preanesthetic Checklist Completed: patient identified, IV checked, site marked, risks and benefits discussed, surgical consent, monitors and equipment checked, pre-op evaluation and timeout performed Prep: Pt Position: supine Sterile barriers:alcohol skin prep, partial drape, cap, washed/disinfected hands, gloves and mask Prep: ChloraPrep Patient monitoring: blood pressure monitoring, continuous pulse oximetry and EKG Procedure Guidance:ultrasound guided ULTRASOUND INTERPRETATION. Using ultrasound guidance a 20 G gauge needle was placed in close proximity to the nerve, at which point, under ultrasound guidance anesthetic was injected in the area of the nerve and spread of the anesthesia was seen on ultrasound in close proximity thereto.  There were no abnormalities seen on ultrasound; a digital image was taken; and the patient tolerated the procedure with no complications. Images:still images obtained, printed/placed on chart Laterality:Bilateral Block Type:TAP Injection Technique:single-shot Needle Type:echogenic Needle Gauge:20 G (4in) Resistance on Injection: none Medications Used: bupivacaine-EPINEPHrine PF (MARCAINE w/EPI) 0.25% -1:[id number] injection, 60 mL Post Assessment Injection Assessment: no paresthesia on injection, incremental injection and negative aspiration for heme Patient Tolerance:comfortable  throughout block Complications:no Additional Notes The block or continuous infusion is requested by the referring physician for management of postoperative pain, or pain related to a procedure. Ultrasound guidance (deemed medically necessary). Painless injection, pt was awake and conversant during the procedure without complications. Needle and surrounding structures visualized throughout procedure. No adverse reactions or complications seen during this period. Post-procedure image showed no signs of complication, and anatomy was consistent with an uncomplicated nerve blockade.     US Renal Bilateral    Result Date: 1/21/2022  PROCEDURE: US RENAL BILATERAL  COMPARISON: [facility], CT, CT ABDOMEN PELVIS WO CONTRAST, 1/16/2022, 10:49.  INDICATIONS: INcrease in creatinine  FINDINGS:  The right kidney measures 10.4 x 6.1 x 5.3 cm.  The left kidney measures 9.9 x 5.8 x 5.6 cm.  The cortical thickness and echogenicity is normal.  There is no hydronephrosis, mass, or calculus.  The urinary bladder was not seen well as the patient has a Robledo catheter in place.        1. Both kidneys are normal. 2. The urinary bladder which was not seen well as there is a Robledo catheter in place.      LIANE PEDROZA MD       Electronically Signed and Approved By: LIANE PEDROZA MD on 1/21/2022 at 12:27               [x]  EKG/Telemetry   []  Cardiology/Vascular   []  Pathology  [x]  Old records  []  Other:    Assessment/Plan   Assessment / Plan     Assessment/Plan:  Assessment:  Acute hypoxemic respiratory failure requiring up to 15 L of oxygen resolved now on room air  Left lower and new right middle lobe lobe pneumonia with worsening of left pleural effusion with possible loculation.  S/p repeat thoracentesis February 2  Bilateral pleural effusions.  S/p left Thora by pulmonary.  Borderline exudative.  Perforated ulcer status post laparotomy and placement of Chaparro patch   Postop pain control  Postoperative anemia s/p blood  transfusion.  Sinus tachycardia  Acute metabolic encephalopathy.  Resolved likely from medications  Infected GIOVANY drain site with superficial infection of lower end of surgical wound.     Plan:   CT scan abdomen pelvis.  Noted with improvement in fluid collection size.  No acute or worsening  Finding   CT of the chest noted discussed with patient and pulmonary  IV Lasix for anasarca.   wound culture from GIOVANY drain insertion site pending  Check BNP and respiratory culture  Lactic acid within normal range  Hemoglobin stable after blood transfusion  Diet per general surgery  Discussed with pulmonary.  Appreciate input   General surgery remains primary, reviewed documentation  Following labs, blood pressure and heart rate.   TSH.  Noted  Check 2D echo.  Pending  DC'd decreased home Neurontin dose.  Patient not needing it   continue Toprol-XL.  Dose increased to 75 mg  Of home Norvasc to make room for increased Toprol  IV meropenem as per general surgery  IV vancomycin started on February 2 for left lower lobe infiltrate  Speech therapy to evaluate for aspiration  Sliding-scale insulin per protocol   Continue telemetry monitoring  P.o. Protonix  Continue with spirometry use  Lovenox for DVT prophylaxis  PT OT evaluation  Encourage ambulation  Discussed with RN.  Discussed with  rehab placement.  Continue right upper extremity PICC line for now.  Clinical course to dictate further management    DVT prophylaxis:  Medical and mechanical DVT prophylaxis orders are present.    CODE STATUS:   Level Of Support Discussed With: Patient  Code Status (Patient has no pulse and is not breathing): CPR (Attempt to Resuscitate)  Medical Interventions (Patient has pulse or is breathing): Full Support            Electronically signed by Ameya Cortez MD, 02/03/22, 6:25 PM EST.        .

## 2022-02-03 NOTE — PROGRESS NOTES
[facility]     Progress Note    Patient Name: Atilio Recinos  : 1965  MRN: [id number]  Primary Care Physician:  William cMdonald MD  Date of admission: 2022    Subjective   Subjective   Follow up on post-operative hypoxia, pleural effusion.    Over the last 24 hours, underwent repeat left thoracentesis with drainage of 700 ml.  Pending cultures and cytology.  Remains on IV meropenem and vancomycin.    No acute events overnight.    This morning,  Lying in bed on room air  Sleepy  Shortness of breath has improved since samantha  Has minimal cough  Using incentive spirometry  Able to move around some in room  No nausea or vomiting  Abdominal pain is better  Not able to bring up much phlegm  Weak and fatigued    Review of Systems:  General:  Fatigue, No Fever  HEENT: No dysphagia, No Visual Changes, no rhinorrhea  Respiratory: + Dyspnea (improved), No phlegm, No Pleuritic Pain  Cardiovascular: Denies chest pain, denies palpitations,+MACHUCA, No Chest Pressure  Gastrointestinal:  No Abdominal Pain, No Nausea, No Vomiting, No Diarrhea  Genitourinary:  No Dysuria, No Frequency, No Hesitancy  Musculoskeletal: No muscle pain or swelling    Objective   Objective     Vitals:   Temp:  [97.9 °F (36.6 °C)-100.1 °F (37.8 °C)] 97.9 °F (36.6 °C)  Heart Rate:  [] 125  Resp:  [16-21] 18  BP: (137-151)/(64-69) 141/69    Physical exam  Vital Signs Reviewed  General: WDWN male, Sleepy, NAD on room air    HEENT:  PERRL, EOMI.  OP, nares clear  Chest: Good aeration, diminished left base with bibasilar rhonchi, dull to percussion left base, no work of breathing noted   CV: RRR, no MGR, pulses 2+, equal  Abd: dressing D/I to abdomen with GIOVANY intact, abdomen less distended  EXT:  no clubbing, no cyanosis, no edema  Neuro:  A&Ox3, CN grossly intact, no focal deficits  Skin: No rashes or lesions noted    Result Review    Result Review:  I have personally reviewed the results from the time of this admission to 2/3/2022 07:18 EST  and agree with these findings:  [x]  Laboratory  [x]  Microbiology  [x]  Radiology  [x]  EKG/Telemetry   []  Cardiology/Vascular   []  Pathology  []  Old records  []  Other:  WBC 10.14, procal 0.21, Hgb 8.2, K 3.4, Cr 0.73    CT chest with moderate left-sided pleural effusion.    Assessment/Plan   Assessment / Plan     Brief Patient Summary:  Atilio Recinos is a 57 y.o. male who admitted with hypoxia    Active Hospital Problems:  Active Hospital Problems    Diagnosis    • S/P exploratory laparotomy, oversew of perforated ulcer, placement of Chaparro patch     • Pneumoperitoneum of unknown etiology    • Bowel perforation (HCC)    Acute hypoxic respiratory failure post op  Post op atelectasis  Moderate left pleural effusion: s/p thoracentesis x2  Leukocytosis    Plan:   Pleural fluid and cytology pending.  Pleural fluid analysis is compatible with borderline exudative effusion, likely related to atelectasis with intra-abdominal process.  Needs deep breathing exercises.  Continue IS use.  Continue IV meropenem and vancomycin.   Continue nebulizers and bronchopulmonary hygiene.   Encourage activity.  Ambulate and up to chair as tolerated.  Wound care per surgery.  Rest per primary.    DVT prophylaxis:  Medical and mechanical DVT prophylaxis orders are present.    CODE STATUS:    Level Of Support Discussed With: Patient  Code Status (Patient has no pulse and is not breathing): CPR (Attempt to Resuscitate)  Medical Interventions (Patient has pulse or is breathing): Full Support    Labs, radiology, microbiology and provider notes were personally reviewed.  Patient's case was discussed with the primary service as well as the bedside RN.    Electronically signed by DIRK Mcmahan, 02/03/22, 11:55 AM EST.  Electronically signed by Nikita Grider MD, 02/03/22, 7:18 AM EST.

## 2022-02-03 NOTE — CONSULTS
Nutrition Services    Patient Name:  Atilio Recinos  YOB: 1965  MRN: [id number]  Admit Date:  1/16/2022    Pt has remained off TPN since 1/28. At this time pt continues to consume ~50% of meals. Reports that his appetite can be dependent on food provided and stated that he will ask RN for sandwich or snack if needed.  He states he consumes 100% of Boost Plus TID but has grown tired of it. Was agreeable to Boost Breeze TID. Will continue to follow and monitor per protocol at this time.    Electronically signed by:  Lupis Grace RD  02/03/22 14:28 EST

## 2022-02-03 NOTE — PROGRESS NOTES
[facility]     Progress Note    Patient Name: Atilio Recinos  : 1965  MRN: [id number]  Primary Care Physician:  William Mcdonald MD  Date of admission: 2022    Subjective   Subjective     HPI:  Patient Reports feeling well today.  No nausea or vomiting.  Having regular bowel movements.  Breathing better      Objective   Objective     Vitals:   Temp:  [97.7 °F (36.5 °C)-99.3 °F (37.4 °C)] 97.7 °F (36.5 °C)  Heart Rate:  [] 94  Resp:  [16-22] 18  BP: (136-151)/(52-69) 136/52    Physical Exam  Constitutional:       Appearance: Normal appearance.   Cardiovascular:      Rate and Rhythm: Normal rate.   Pulmonary:      Effort: Pulmonary effort is normal.   Abdominal:      Palpations: Abdomen is soft.     Wound still with some openings no gross infection  Result Review    Result Review:  I have personally reviewed the results from the time of this admission to 2/3/2022 14:48 EST and agree with these findings:  []  Laboratory  []  Microbiology  []  Radiology  []  EKG/Telemetry   []  Cardiology/Vascular   []  Pathology  []  Old records  []  Other:  Most notable findings include: GIOVANY drain with some mild purulent drainage    Assessment/Plan   Assessment / Plan     Brief Patient Summary:  Atilio Recinos is a 57 y.o. male who status post exploratory laparotomy for perforated ulcer    Active Hospital Problems:  Active Hospital Problems    Diagnosis    • S/P exploratory laparotomy, oversew of perforated ulcer, placement of Chaparro patch     • Pneumoperitoneum of unknown etiology    • Bowel perforation (HCC)      Plan:  Begin looking for rehab placement       DVT prophylaxis:  Medical and mechanical DVT prophylaxis orders are present.    CODE STATUS:   Level Of Support Discussed With: Patient  Code Status (Patient has no pulse and is not breathing): CPR (Attempt to Resuscitate)  Medical Interventions (Patient has pulse or is breathing): Full Support        Electronically signed by Atilio Lindsay MD, 22,  2:48 PM EST.

## 2022-02-04 ENCOUNTER — APPOINTMENT (OUTPATIENT)
Dept: GENERAL RADIOLOGY | Facility: HOSPITAL | Age: 57
End: 2022-02-04

## 2022-02-04 LAB
ALBUMIN SERPL-MCNC: 2.4 G/DL (ref 3.5–5.2)
ANION GAP SERPL CALCULATED.3IONS-SCNC: 10.4 MMOL/L (ref 5–15)
BUN SERPL-MCNC: 8 MG/DL (ref 6–20)
BUN/CREAT SERPL: 11.3 (ref 7–25)
CALCIUM SPEC-SCNC: 8.2 MG/DL (ref 8.6–10.5)
CHLORIDE SERPL-SCNC: 103 MMOL/L (ref 98–107)
CO2 SERPL-SCNC: 21.6 MMOL/L (ref 22–29)
CREAT SERPL-MCNC: 0.71 MG/DL (ref 0.76–1.27)
CYTO UR: NORMAL
DEPRECATED RDW RBC AUTO: 46.7 FL (ref 37–54)
EOSINOPHIL # BLD MANUAL: 0.09 10*3/MM3 (ref 0–0.4)
EOSINOPHIL NFR BLD MANUAL: 1 % (ref 0.3–6.2)
ERYTHROCYTE [DISTWIDTH] IN BLOOD BY AUTOMATED COUNT: 14.4 % (ref 12.3–15.4)
GFR SERPL CREATININE-BSD FRML MDRD: 114 ML/MIN/1.73
GLUCOSE SERPL-MCNC: 105 MG/DL (ref 65–99)
HCT VFR BLD AUTO: 25.1 % (ref 37.5–51)
HGB BLD-MCNC: 8.4 G/DL (ref 13–17.7)
LAB AP CASE REPORT: NORMAL
LAB AP CLINICAL INFORMATION: NORMAL
LYMPHOCYTES # BLD MANUAL: 1.29 10*3/MM3 (ref 0.7–3.1)
LYMPHOCYTES NFR BLD MANUAL: 7 % (ref 5–12)
MCH RBC QN AUTO: 29.8 PG (ref 26.6–33)
MCHC RBC AUTO-ENTMCNC: 33.5 G/DL (ref 31.5–35.7)
MCV RBC AUTO: 89 FL (ref 79–97)
METAMYELOCYTES NFR BLD MANUAL: 3 % (ref 0–0)
MONOCYTES # BLD: 0.64 10*3/MM3 (ref 0.1–0.9)
NEUTROPHILS # BLD AUTO: 6.89 10*3/MM3 (ref 1.7–7)
NEUTROPHILS NFR BLD MANUAL: 71 % (ref 42.7–76)
NEUTS BAND NFR BLD MANUAL: 4 % (ref 0–5)
PATH REPORT.FINAL DX SPEC: NORMAL
PATH REPORT.GROSS SPEC: NORMAL
PHOSPHATE SERPL-MCNC: 3.6 MG/DL (ref 2.5–4.5)
PLATELET # BLD AUTO: 403 10*3/MM3 (ref 140–450)
PMV BLD AUTO: 9.4 FL (ref 6–12)
POLYCHROMASIA BLD QL SMEAR: ABNORMAL
POTASSIUM SERPL-SCNC: 3.7 MMOL/L (ref 3.5–5.2)
QT INTERVAL: 331 MS
RBC # BLD AUTO: 2.82 10*6/MM3 (ref 4.14–5.8)
SCAN SLIDE: NORMAL
SMALL PLATELETS BLD QL SMEAR: ABNORMAL
SODIUM SERPL-SCNC: 135 MMOL/L (ref 136–145)
STAT OF ADQ CVX/VAG CYTO-IMP: NORMAL
VARIANT LYMPHS NFR BLD MANUAL: 14 % (ref 19.6–45.3)
WBC MORPH BLD: NORMAL
WBC NRBC COR # BLD: 9.19 10*3/MM3 (ref 3.4–10.8)

## 2022-02-04 PROCEDURE — 80069 RENAL FUNCTION PANEL: CPT | Performed by: INTERNAL MEDICINE

## 2022-02-04 PROCEDURE — 99233 SBSQ HOSP IP/OBS HIGH 50: CPT | Performed by: INTERNAL MEDICINE

## 2022-02-04 PROCEDURE — 25010000002 FUROSEMIDE PER 20 MG: Performed by: INTERNAL MEDICINE

## 2022-02-04 PROCEDURE — 71045 X-RAY EXAM CHEST 1 VIEW: CPT

## 2022-02-04 PROCEDURE — 93005 ELECTROCARDIOGRAM TRACING: CPT | Performed by: PHYSICIAN ASSISTANT

## 2022-02-04 PROCEDURE — 94640 AIRWAY INHALATION TREATMENT: CPT

## 2022-02-04 PROCEDURE — 25010000002 MEROPENEM PER 100 MG: Performed by: SURGERY

## 2022-02-04 PROCEDURE — 94799 UNLISTED PULMONARY SVC/PX: CPT

## 2022-02-04 PROCEDURE — 85025 COMPLETE CBC W/AUTO DIFF WBC: CPT | Performed by: INTERNAL MEDICINE

## 2022-02-04 PROCEDURE — 85007 BL SMEAR W/DIFF WBC COUNT: CPT | Performed by: INTERNAL MEDICINE

## 2022-02-04 PROCEDURE — 25010000002 VANCOMYCIN 5 G RECONSTITUTED SOLUTION: Performed by: INTERNAL MEDICINE

## 2022-02-04 PROCEDURE — 25010000002 ENOXAPARIN PER 10 MG: Performed by: INTERNAL MEDICINE

## 2022-02-04 PROCEDURE — 99232 SBSQ HOSP IP/OBS MODERATE 35: CPT | Performed by: INTERNAL MEDICINE

## 2022-02-04 PROCEDURE — 93010 ELECTROCARDIOGRAM REPORT: CPT | Performed by: SPECIALIST

## 2022-02-04 RX ORDER — QUETIAPINE FUMARATE 200 MG/1
400 TABLET, FILM COATED ORAL NIGHTLY
Status: DISCONTINUED | OUTPATIENT
Start: 2022-02-04 | End: 2022-02-08 | Stop reason: HOSPADM

## 2022-02-04 RX ORDER — MIRTAZAPINE 15 MG/1
15 TABLET, FILM COATED ORAL NIGHTLY
Status: DISCONTINUED | OUTPATIENT
Start: 2022-02-04 | End: 2022-02-08 | Stop reason: HOSPADM

## 2022-02-04 RX ORDER — OXYCODONE HYDROCHLORIDE AND ACETAMINOPHEN 5; 325 MG/1; MG/1
1 TABLET ORAL EVERY 6 HOURS PRN
Status: DISPENSED | OUTPATIENT
Start: 2022-02-04 | End: 2022-02-07

## 2022-02-04 RX ORDER — BUDESONIDE AND FORMOTEROL FUMARATE DIHYDRATE 160; 4.5 UG/1; UG/1
2 AEROSOL RESPIRATORY (INHALATION)
Status: DISCONTINUED | OUTPATIENT
Start: 2022-02-04 | End: 2022-02-08 | Stop reason: HOSPADM

## 2022-02-04 RX ORDER — QUETIAPINE FUMARATE 25 MG/1
50 TABLET, FILM COATED ORAL DAILY
Status: DISCONTINUED | OUTPATIENT
Start: 2022-02-04 | End: 2022-02-08 | Stop reason: HOSPADM

## 2022-02-04 RX ADMIN — MEROPENEM 1 G: 1 INJECTION, POWDER, FOR SOLUTION INTRAVENOUS at 05:30

## 2022-02-04 RX ADMIN — BUDESONIDE AND FORMOTEROL FUMARATE DIHYDRATE 2 PUFF: 160; 4.5 AEROSOL RESPIRATORY (INHALATION) at 20:44

## 2022-02-04 RX ADMIN — ENOXAPARIN SODIUM 40 MG: 40 INJECTION SUBCUTANEOUS at 08:59

## 2022-02-04 RX ADMIN — OXYCODONE HYDROCHLORIDE AND ACETAMINOPHEN 1 TABLET: 5; 325 TABLET ORAL at 14:59

## 2022-02-04 RX ADMIN — QUETIAPINE FUMARATE 400 MG: 200 TABLET ORAL at 20:38

## 2022-02-04 RX ADMIN — FUROSEMIDE 20 MG: 10 INJECTION, SOLUTION INTRAMUSCULAR; INTRAVENOUS at 09:09

## 2022-02-04 RX ADMIN — PANTOPRAZOLE SODIUM 40 MG: 40 TABLET, DELAYED RELEASE ORAL at 09:09

## 2022-02-04 RX ADMIN — SODIUM CHLORIDE, PRESERVATIVE FREE 3 ML: 5 INJECTION INTRAVENOUS at 20:38

## 2022-02-04 RX ADMIN — SODIUM CHLORIDE, PRESERVATIVE FREE 3 ML: 5 INJECTION INTRAVENOUS at 09:10

## 2022-02-04 RX ADMIN — OXYCODONE HYDROCHLORIDE AND ACETAMINOPHEN 1 TABLET: 5; 325 TABLET ORAL at 08:59

## 2022-02-04 RX ADMIN — MIRTAZAPINE 15 MG: 15 TABLET, FILM COATED ORAL at 20:38

## 2022-02-04 RX ADMIN — MEROPENEM 1 G: 1 INJECTION, POWDER, FOR SOLUTION INTRAVENOUS at 20:38

## 2022-02-04 RX ADMIN — VANCOMYCIN HYDROCHLORIDE 1500 MG: 5 INJECTION, POWDER, LYOPHILIZED, FOR SOLUTION INTRAVENOUS at 05:41

## 2022-02-04 RX ADMIN — POLYETHYLENE GLYCOL 3350 17 G: 17 POWDER, FOR SOLUTION ORAL at 09:09

## 2022-02-04 RX ADMIN — POTASSIUM CHLORIDE 40 MEQ: 750 CAPSULE, EXTENDED RELEASE ORAL at 09:10

## 2022-02-04 RX ADMIN — QUETIAPINE FUMARATE 50 MG: 25 TABLET ORAL at 14:59

## 2022-02-04 RX ADMIN — MEROPENEM 1 G: 1 INJECTION, POWDER, FOR SOLUTION INTRAVENOUS at 14:59

## 2022-02-04 RX ADMIN — OXYCODONE HYDROCHLORIDE AND ACETAMINOPHEN 1 TABLET: 5; 325 TABLET ORAL at 02:56

## 2022-02-04 RX ADMIN — METOPROLOL SUCCINATE 75 MG: 50 TABLET, EXTENDED RELEASE ORAL at 09:08

## 2022-02-04 NOTE — PROGRESS NOTES
[facility]     Progress Note    Patient Name: Atilio Recinos  : 1965  MRN: [id number]  Primary Care Physician:  William Mcdonald MD  Date of admission: 2022    Subjective   Subjective     HPI:  Patient Reports feeling well.  No complaints.  Desires transfer to [facility]  Objective   Objective     Vitals:   Temp:  [97.7 °F (36.5 °C)-99.5 °F (37.5 °C)] (P) 99.5 °F (37.5 °C)  Heart Rate:  [] (P) 109  Resp:  [18] (P) 18  BP: (136-161)/(52-80) (P) 141/60    Physical Exam  Constitutional:       Appearance: Normal appearance.   Cardiovascular:      Rate and Rhythm: Normal rate.   Pulmonary:      Effort: Pulmonary effort is normal.   Abdominal:      Palpations: Abdomen is soft.     Still some thick drainage from the midline wound   Result Review    Result Review:  I have personally reviewed the results from the time of this admission to 2022 08:13 EST and agree with these findings:  []  Laboratory  []  Microbiology  []  Radiology  []  EKG/Telemetry   []  Cardiology/Vascular   []  Pathology  []  Old records  []  Other:    Assessment/Plan   Assessment / Plan     Brief Patient Summary:  Atilio Recinos is a 57 y.o. male who status post exploratory laparotomy for perforated ulcer    Active Hospital Problems:  Active Hospital Problems    Diagnosis    • S/P exploratory laparotomy, oversew of perforated ulcer, placement of Chaparro patch     • Pneumoperitoneum of unknown etiology    • Bowel perforation (HCC)      Plan:  Continue current care  Looking for rehab placement now       DVT prophylaxis:  Medical and mechanical DVT prophylaxis orders are present.    CODE STATUS:   Level Of Support Discussed With: Patient  Code Status (Patient has no pulse and is not breathing): CPR (Attempt to Resuscitate)  Medical Interventions (Patient has pulse or is breathing): Full Support    Disposition:  I expect patient to be discharged within 1 week.    Electronically signed by Atilio Lindsay MD, 22, 8:13 AM EST.

## 2022-02-04 NOTE — PROGRESS NOTES
[facility]     Progress Note    Patient Name: Atilio Recinos  : 1965  MRN: [id number]  Primary Care Physician:  William Mcdonald MD  Date of admission: 2022    Subjective   Subjective   Follow up on post-operative hypoxia, pleural effusion.    Over the last 24 hours, continued incentive spirometry.  Remains on IV meropenem and vancomycin.    No acute events overnight.    This morning,  Lying in bed on room air  Sleeping, arousable.  Breathing is better.  Has minimal cough.  Using incentive spirometry  Able to move around some in room  No nausea or vomiting  Abdominal pain is better  Not able to bring up much phlegm  Weak and fatigued    Review of Systems:  General:  Fatigue, No Fever  HEENT: No dysphagia, No Visual Changes, no rhinorrhea  Respiratory: + Dyspnea (improved), No phlegm, No Pleuritic Pain  Cardiovascular: Denies chest pain, denies palpitations,+MACHUCA, No Chest Pressure  Gastrointestinal:  No Abdominal Pain, No Nausea, No Vomiting, No Diarrhea  Genitourinary:  No Dysuria, No Frequency, No Hesitancy  Musculoskeletal: No muscle pain or swelling    Objective   Objective     Vitals:   Temp:  [97.8 °F (36.6 °C)-99.5 °F (37.5 °C)] (P) 99.5 °F (37.5 °C)  Heart Rate:  [] 120  Resp:  [18] (P) 18  BP: (138-161)/(62-80) (P) 141/60    Physical exam  Vital Signs Reviewed  General: WDWN male, Sleepy, NAD on room air    HEENT:  PERRL, EOMI.  OP, nares clear  Chest: Good aeration, diminished left base with bibasilar rhonchi, dull to percussion left base, no work of breathing noted   CV: RRR, no MGR, pulses 2+, equal  Abd: dressing D/I to abdomen with GIOVANY intact, abdomen less distended  EXT:  no clubbing, no cyanosis, no edema  Neuro:  A&Ox3, CN grossly intact, no focal deficits  Skin: No rashes or lesions noted    Result Review    Result Review:  I have personally reviewed the results from the time of this admission to 2022 11:23 EST and agree with these findings:  [x]  Laboratory  [x]   Microbiology  [x]  Radiology  [x]  EKG/Telemetry   []  Cardiology/Vascular   []  Pathology  []  Old records  []  Other:  WBC 10.14, procal 0.21, Hgb 8.2, K 3.4, Cr 0.73    CT chest with moderate left-sided pleural effusion.    Assessment/Plan   Assessment / Plan     Brief Patient Summary:  Atilio Recinos is a 57 y.o. male who admitted with hypoxia    Active Hospital Problems:  Active Hospital Problems    Diagnosis    • S/P exploratory laparotomy, oversew of perforated ulcer, placement of Chaparro patch     • Pneumoperitoneum of unknown etiology    • Bowel perforation (HCC)    Acute hypoxic respiratory failure post op  Post op atelectasis  Moderate left pleural effusion: s/p thoracentesis x2  Leukocytosis    Plan:   Pleural fluid and cytology pending.  Pleural fluid analysis is compatible with borderline exudative effusion, likely related to atelectasis with intra-abdominal process.  Needs deep breathing exercises.  Continue IS use.  Continue IV meropenem and vancomycin.  Antibiotics per primary and surgery service.  Repeat chest x-ray this morning with small pleural effusion.  Continue nebulizers and bronchopulmonary hygiene.   Encourage activity.  Ambulate and up to chair as tolerated.  Wound care per surgery.  Rest per primary.    DVT prophylaxis:  Medical and mechanical DVT prophylaxis orders are present.    CODE STATUS:    Level Of Support Discussed With: Patient  Code Status (Patient has no pulse and is not breathing): CPR (Attempt to Resuscitate)  Medical Interventions (Patient has pulse or is breathing): Full Support    Labs, radiology, microbiology and provider notes were personally reviewed.  Patient's case was discussed with the primary service as well as the bedside RN.    Electronically signed by Nikita Grider MD, 02/04/22, 11:23 AM EST.

## 2022-02-04 NOTE — PROGRESS NOTES
"Pharmacy to Dose Vancomycin Day: 3    Atilio Recinos is a 57 y.o.male with intra-abdominal fluid collection now with concern for pneumonia.     Consulting Provider: VEL  Clinical Indication: PNA  Pertinent Past Medical History: RECENT SURGERY- PERFORATED ULCER STATUS POST EX LAP  Goal -600 mg/L.hr  Duration of therapy: 7 DAYS    167.6 cm (66\")       01/29/22  0549      Weight: 95.7 kg (210 lb 15.7 oz)         Estimated Creatinine Clearance: 121.4 mL/min (A) (by C-G formula based on SCr of 0.71 mg/dL (L)).  Results from last 7 days  Lab  Units  02/04/22  0353  02/03/22  0339  02/02/22  0256  02/01/22  0358  BUN  mg/dL  8  8  8  9  CREATININE  mg/dL  0.71*  0.73*  0.76  0.76    HD/PD/CRRT?:  NO    Lab Results   Component Value Date    WBC 9.19 02/04/2022      Temperature    02/03/22 2214 02/04/22 0206 02/04/22 0724   Temp: 98 °F (36.7 °C) 98.3 °F (36.8 °C) (P) 99.5 °F (37.5 °C)      Contrast Administered:  NO    Relevant Micro:   Microbiology Results (last 10 days)       Procedure Component Value - Date/Time    MRSA Screen, PCR (Inpatient) - Swab, Nares [[id number]]  (Normal) Collected: 02/03/22 1853    Lab Status: Final result Specimen: Swab from Nares Updated: 02/03/22 2049     MRSA PCR No MRSA Detected    Wound Culture - Wound, Abdominal Wall [[id number]] Collected: 02/02/22 2201    Lab Status: Preliminary result Specimen: Wound from Abdominal Wall Updated: 02/03/22 0233     Gram Stain No organisms seen    AFB Culture - Body Fluid, Pleural Cavity [[id number]] Collected: 02/02/22 1732    Lab Status: Preliminary result Specimen: Body Fluid from Pleural Cavity Updated: 02/03/22 1338     AFB Stain No acid fast bacilli seen    Body Fluid Culture - Body Fluid, Pleural Cavity [[id number]] Collected: 02/02/22 1732    Lab Status: Preliminary result Specimen: Body Fluid from Pleural Cavity Updated: 02/03/22 0609     Body Fluid Culture No growth     Gram Stain Few (2+) WBCs seen      No organisms seen    MRSA Screen, PCR " (Inpatient) - Swab, Nares [[id number]]  (Normal) Collected: 02/02/22 1052    Lab Status: Final result Specimen: Swab from Nares Updated: 02/02/22 1210     MRSA PCR No MRSA Detected    Blood Culture - Blood, Arm, Left [[id number]]  (Normal) Collected: 01/27/22 1806    Lab Status: Final result Specimen: Blood from Arm, Left Updated: 02/01/22 1815     Blood Culture No growth at 5 days    Anaerobic Culture - Pleural Fluid, Pleural Cavity [[id number]] Collected: 01/27/22 1612    Lab Status: Final result Specimen: Pleural Fluid from Pleural Cavity Updated: 02/01/22 0630     Anaerobic Culture No anaerobes isolated at 5 days    AFB Culture - Body Fluid, Pleural Cavity [[id number]] Collected: 01/27/22 1611    Lab Status: Preliminary result Specimen: Body Fluid from Pleural Cavity Updated: 02/03/22 1630     AFB Culture No AFB isolated at 1 week     AFB Stain No acid fast bacilli seen on direct smear    Fungus Culture - Body Fluid, Pleural Cavity [[id number]] Collected: 01/27/22 1611    Lab Status: Preliminary result Specimen: Body Fluid from Pleural Cavity Updated: 02/03/22 1630     Fungus Culture No fungus isolated at 1 week    Body Fluid Culture - Body Fluid, Pleural Cavity [[id number]] Collected: 01/27/22 1610    Lab Status: Final result Specimen: Body Fluid from Pleural Cavity Updated: 01/30/22 1106     Body Fluid Culture No growth at 3 days     Gram Stain No organisms seen    Blood Culture - Blood, Blood, Central Line [[id number]]  (Normal) Collected: 01/27/22 0315    Lab Status: Final result Specimen: Blood, Central Line Updated: 02/01/22 0345     Blood Culture No growth at 5 days           Relevant Radiology:     2/4 Chest Xray IMPRESSION:            1. Left basilar consolidation.  Differential includes pneumonia and atelectasis.  2. Small left pleural effusion       Other Antimicrobial Therapy:  MERREM    Assessment/Plan  Will continue with the current dosing regimen providing the following predicted insightRX parameters:      Regimen: 1500 mg IV every 12 hours  Exposure target: AUC24 (range)400-600 mg/L.hr   AUC24,ss: 506 mg/L.hr  PAUC*: 81 %  Ctrough,ss: 15.4 mg/L  Pconc*: 28 %  Tox.: 11 %    Will obtain follow up trough level with new dosing regimen tomorrow morning at steady state.      Labs ordered: Renal panel in a.m., Vancomycin trough level @ 0500

## 2022-02-04 NOTE — PROGRESS NOTES
[facility]   Hospitalist Progress Note  Date: 2022  Patient Name: Atilio Recinos  : 1965  MRN: [id number]  Date of admission: 2022      Subjective   Subjective     Chief complaint: Increasing oxygen requirements     Summary:  57-year-old male hospitalized on 2022 2 the service of general surgery with perforated ulcer status post ex lap and Chaparro patch placement, medicine service consulted for increasing oxygen requirements postoperatively, has been stable on room air, concerning for bilious output in GIOVANY drain, CT abdomen and pelvis showing oral contrast leakage, patient remains n.p.o. with TPN ongoing, continues to have bilious drainage from his GIOVANY drain     Interval follow-up:   Patient is awake alert oriented x3 .  Off oxygen  Evaluated by psychiatry for irritability and anxiety  remains tachycardic, no prior cardiac work-up.  Denies chest pain or palpitations. Denies fevers, chills, sweats.   No more visual hallucinations  Tolerating oral diet well.  Abdominal pain well controlled.  Having regular BM.  Good urine output  Small drainage from 1 GIOVANY drain.  Copious creamy drainage around drainage insertion site.  Surgical wound appears to be dehiscing with erythema around staples.  Scant drainage from lower end of the wound.    Review of systems:  All systems reviewed and negative except for cough, shortness of breath, generalized fatigue.    Objective   Objective     Vitals:   Temp:  [97.6 °F (36.4 °C)-99.5 °F (37.5 °C)] (P) 97.6 °F (36.4 °C)  Heart Rate:  [] (P) 97  Resp:  [18] (P) 18  BP: (147-161)/(65-80) (P) 142/64  Physical Exam    Constitutional: Resting, no acute distress, sitting on edge of the bed              Eyes: Pupils equal, sclerae anicteric, no conjunctival injection, pallor              HENT: NCAT, mucous membranes moist              Neck: Supple, no thyromegaly, no lymphadenopathy, trachea midline              Respiratory: Diminished breath sounds, no wheezes               Cardiovascular: RRR, no murmurs              Gastrointestinal: Appropriate postoperative tenderness, GIOVANY drain in place dressing in place, some swelling of drainage at the lower end of surgical wound with wound dehiscence and redness at Staples insertion site copious creamy drainage noted in GIOVANY drains              musculoskeletal: No edema              Psychiatric: Flat affect               neurologic: Speech clear, no gross deficits              Skin: No rashes    Result Review    Result Review:  I have personally reviewed the results from the time of this admission to 2/4/2022 17:12 EST and agree with these findings:  [x]  Laboratory   CBC    CBC 2/2/22 2/3/22 2/4/22   WBC 11.52 (A) 10.14 9.19   RBC 2.80 (A) 2.76 (A) 2.82 (A)   Hemoglobin 8.3 (A) 8.2 (A) 8.4 (A)   Hematocrit 25.1 (A) 24.7 (A) 25.1 (A)   MCV 89.6 89.5 89.0   MCH 29.6 29.7 29.8   MCHC 33.1 33.2 33.5   RDW 14.4 14.6 14.4   Platelets 464 (A) 428 403   (A) Abnormal value            BMP    BMP 2/2/22 2/3/22 2/4/22   BUN 8 8 8   Creatinine 0.76 0.73 (A) 0.71 (A)   Sodium 135 (A) 135 (A) 135 (A)   Potassium 3.7 3.4 (A) 3.7   Chloride 104 105 103   CO2 21.8 (A) 22.8 21.6 (A)   Calcium 8.3 (A) 8.0 (A) 8.2 (A)   (A) Abnormal value            LIVER FUNCTION TESTS:      Lab 02/04/22  0353 02/03/22  0339 02/02/22  0256 01/30/22  0442 01/29/22  0606   TOTAL PROTEIN  --  5.9*  --  5.7* 5.6*   ALBUMIN 2.40* 2.20* 2.20* 2.00* 2.00*   ALT (SGPT)  --   --   --  9 6   AST (SGOT)  --   --   --  20 19   BILIRUBIN  --   --   --  0.2 0.2   BILIRUBIN DIRECT  --   --   --  <0.2 <0.2   ALK PHOS  --   --   --  62 55       [x]  Microbiology   Blood Culture   Date Value Ref Range Status   01/27/2022 No growth at 2 days  Preliminary     No results found for: BCIDPCR, CXREFLEX, CSFCX, CULTURETIS  No results found for: CULTURES, HSVCX, URCX  No results found for: EYECULTURE, GCCX, HSVCULTURE, LABHSV  No results found for: LEGIONELLA, MRSACX, MUMPSCX, MYCOPLASCX  No results  found for: NOCARDIACX, STOOLCX  No results found for: THROATCX, UNSTIMCULT, URINECX, CULTURE, VZVCULTUR  No results found for: VIRALCULTU, WOUNDCX    [x]  Radiology CT Abdomen Pelvis Without Contrast    Result Date: 1/27/2022  PROCEDURE: CT ABDOMEN PELVIS WO CONTRAST  COMPARISON: [facility], CT, CT ABDOMEN PELVIS WO CONTRAST, 1/21/2022, 15:05.  INDICATIONS: tachycardia, decreased GIOVANY drain output, recent perf ulcer and leaking Chaparro patch  TECHNIQUE: CT images were created without intravenous contrast.   PROTOCOL:   Standard imaging protocol performed    RADIATION:   DLP: 1678mGy*cm   Automated exposure control was utilized to minimize radiation dose.  FINDINGS:  Within the lung bases is a moderate left pleural effusion with left basilar atelectasis.  There is a stable small cyst in the left hepatic lobe.  The unenhanced gallbladder, adrenal glands, kidneys, spleen, and pancreas are unremarkable.  The stomach is mildly distended.  There is pneumoperitoneum in the upper abdomen.  An abdominal drain enters through the left upper abdomen with its tip just underneath the right inferior hepatic lobe.  There are inflammatory changes within the abdomen, with a 10.4 x 3.7 cm fluid collection within the left mesentery on axial image 103 that appear slightly increased, previously 8.5 x 3.3 cm.  There is another abdominal drain enters through the left lower abdomen with its tip in the right mid pelvis.  The previously identified extravasated contrast is no longer present.  The small bowel appears normal in caliber.  The colon appears normal.  The appendix appears normal.  No abnormally enlarged lymph nodes are identified.  The rectum, prostate, and urinary bladder are unremarkable.  No aggressive osseous lesions are identified.         1. Pneumoperitoneum within upper abdomen with two abdominal drains as described above.  There is a 10.4 cm fluid collection within the left mesentery that has slightly increased in  size from prior CT.  Of note, the two abdominal drains do not traverse through this collection. 2. Moderate left pleural effusion with left basilar atelectasis. 3. Previously identified extravasated contrast is no longer present.     ANDERSON REYES MD       Electronically Signed and Approved By: ANDERSON REYES MD on 1/27/2022 at 11:19             CT Abdomen Pelvis Without Contrast    Result Date: 1/21/2022  PROCEDURE: CT ABDOMEN PELVIS WO CONTRAST  COMPARISON: [facility], CT, CT ABDOMEN PELVIS WO CONTRAST, 1/16/2022, 10:49.  INDICATIONS: repair of perforated ulcer, mid abdominal pain, increased GIOVANY drainage  TECHNIQUE: CT images were created without intravenous contrast.   PROTOCOL:   Standard imaging protocol performed    RADIATION:   DLP: 496 mGy*cm   Automated exposure control was utilized to minimize radiation dose.  FINDINGS:  Small left pleural effusion, unchanged.  Adjacent atelectasis.  There is new ground-glass opacity within the right middle lobe and right lower lobe, only partially imaged.  No left-sided ground-glass opacity however.  There is new oral contrast in the peritoneal cavity along the posterior margin of the left hepatic lobe, between the liver and stomach.  There is a small amount oral contrast in the lumen of the stomach.  There also is oral contrast within small bowel loops as well as the colon.  No bowel obstruction.    There is an 8.7 x 4.0 cm collection of fluid amongst the mesentery in the left mid abdomen axial image 99. This measures 4.1 cm on cranial caudad measurement, coronal imaging.  This is unchanged.  There has been near complete evacuation of the previous free fluid and free air of the peritoneal cavity elsewhere.  No new fluid collections.  Two separate drains are present, 1 terminating in the posterior right-sided pelvis and the other in the right pericolic gutter, adjacent to the inferior tip of the liver.  Bladder is collapsed around a Robledo catheter.  A  few small indeterminate low-density lesions of the liver again noted       There is extraluminal oral contrast along the posterior margin of the left hepatic lobe.  This raises concern for persistent perforation although the oral contrast may have extravasated prior to the surgery; it is unknown whether oral contrast was given after the comparison CT examination but before the surgery.  There appears to have been a very small amount of oral contrast in the stomach lumen at the time of the comparison CT.  There is a unchanged 8.7 by 4.0 by 4.1 cm fluid collection within the left-sided small bowel mesentery.      TYLER KENT MD       Electronically Signed and Approved By: TYLER KENT MD on 1/21/2022 at 15:35             CT Abdomen Pelvis Without Contrast    Result Date: 1/16/2022  PROCEDURE: CT ABDOMEN PELVIS WO CONTRAST  COMPARISON: [facility], CR, XR CHEST 1 VW, 1/16/2022, 8:26.  [facility], CT, ABD PEL W/O CONTRAST, 11/22/2020, 7:03.  INDICATIONS: Abdominal distention and pain, pneumoperitoneum on chest radiograph.  TECHNIQUE: CT images were created without intravenous contrast.   PROTOCOL:   Standard imaging protocol performed    RADIATION:   DLP: 751.9 mGy*cm   Automated exposure control was utilized to minimize radiation dose.  FINDINGS:  There are small pleural effusions, left greater than right.  There is mild adjacent dependent lower lobe consolidation, also left greater than right.  No significant pericardial effusion.  Heart size appears within normal limits.  There is a small to moderate amount of pneumoperitoneum.  There is also a small to moderate amount of low attenuation free fluid in the abdomen and pelvis.  Small hypodensities are seen in the left hepatic lobe on axial image 50 and in the right hepatic lobe on images 42 and 41 which appear similar to the prior CT and are therefore favored to be related to hemangiomas or cysts.  No definite new hepatic lesion is seen.  No  suspicious splenic abnormality is seen.  Adrenal glands appear unremarkable.  Gallbladder is mildly distended.  No definite surrounding inflammation or biliary ductal dilatation is seen.  No acute pancreatic abnormality is identified.  There is a suspected left posterior mid pole renal cyst, as before.  There is mild nonspecific bilateral perinephric fat stranding.  No significant renal calculi.  No hydronephrosis or ureteral calculus is identified.  Bladder is decompressed with Robledo catheter present.  There is calcific atherosclerosis of the aorta without definite aneurysm.  Abdominal and pelvic lymph nodes do not appear enlarged.  There are fat containing bilateral inguinal hernias.  The prostate does not appear enlarged.  There is a small umbilical hernia containing fat and pneumoperitoneum.  The stomach is minimally distended.  There is some pneumoperitoneum along the stomach but gastric perforation cannot be definitively confirmed.  There is a small amount of hyperdense material in the posterior-superior gastric lumen.  This could be related to ingested hyperdense material, but blood products cannot be excluded.  There are some fluid distended small bowel loops without definite small bowel dilatation.  A focal small bowel perforation is not identified but again cannot be excluded.  There is a moderate amount of formed stool in the proximal colon.  The visualized appendix appears partially gas-filled without definite findings to suggest inflammation.  There is mild colonic diverticulosis.  No definite focal colonic or rectal inflammation is identified, but again colonic perforation cannot be completely excluded.  There is a vascular necrosis of the bilateral femoral heads.  There appears to be mild to moderate bilateral hip osteoarthritis with left calcified intra-articular bodies.  There are degenerative changes in the thoracolumbar spine.  No definite acute osseous abnormality is seen.          1. Small to  moderate amount of pneumoperitoneum and low-attenuation free fluid consistent with perforated viscus.  The source of perforation cannot be definitively visualized. 2. Questionable small amount of hyperdense ingested material in the stomach versus a small amount of blood products. 3. Small pleural effusions and mild adjacent lower lobe consolidation, left greater than right. 4. Probable renal and hepatic cysts, as before. 5. Avascular necrosis of the femoral heads.    This report was communicated by telephone to Dr. Ham at the dictation time shown below.     KRISTOPHER ALBERTO MD       Electronically Signed and Approved By: KRISTOPHER ALBERTO MD on 1/16/2022 at 11:20             XR Chest 1 View    Result Date: 1/27/2022  PROCEDURE: XR CHEST 1 VW  COMPARISON: [facility], CR, XR CHEST 1 VW, 1/23/2022, 11:32.  INDICATIONS: s/p left Thoracentesis  FINDINGS:  The heart is normal in size.  The lungs are well-expanded.  There is no evidence of pneumothorax.  The patient is post left thoracentesis.  Right subclavian PICC line tip is at the cavoatrial junction.        Post left thoracentesis.  No pneumothorax is seen.  Right subclavian PICC line tip is at the cavoatrial junction.       LI NICOLAS MD       Electronically Signed and Approved By: LI NICOLAS MD on 1/27/2022 at 16:29             XR Chest 1 View    Result Date: 1/23/2022  PROCEDURE: XR CHEST 1 VW  COMPARISON: [facility], CR, XR CHEST 1 VW, 1/17/2022, 12:38.  INDICATIONS: HYPOXIA  FINDINGS:  Right upper extremity PICC line tip at the cavoatrial junction.  Esophagogastric tube is been removed.  Heart size normal.  There is a small left-sided pleural effusion which appears slightly decreased from the prior study.  No significant effusion on the right.  No pneumothorax.        1. Removal of esophagogastric tube. 2. Right upper extremity PICC line tip at the cavoatrial junction. 3. Small left pleural effusion slightly decreased from the  prior study with mild left basilar airspace disease likely atelectasis. 4. Clear right lung.      SHAHID STEEL MD       Electronically Signed and Approved By: SHAHID STEEL MD on 1/23/2022 at 16:52             XR Chest 1 View    Result Date: 1/17/2022  PROCEDURE: XR CHEST 1 VW  COMPARISON: [facility], CT, CT ABDOMEN PELVIS WO CONTRAST, 1/16/2022, 10:49.  [facility], CR, XR CHEST 1 VW, 1/16/2022, 8:26.  INDICATIONS: hypoxia, cough  FINDINGS:  Enteric tube is present extending into the left quadrant.  Previously seen pneumoperitoneum decreased in the interval.  There has been interval development of perihilar and bibasilar opacities.  There is a small left effusion with mild left basilar consolidation.  The heart appears mildly enlarged.  No pneumothorax is seen.        1. New perihilar and bibasilar opacities suspicious for pulmonary edema.  Pneumonia and/or atelectasis are also included in the differential diagnosis. 2. Small left effusion with mild left basilar consolidation. 3. Mild cardiomegaly. 4. Enteric tube extends into the left upper quadrant. 5. Previously seen pneumoperitoneum appears decreased in the interval.       KRISTOPHER ALBERTO MD       Electronically Signed and Approved By: KRISTOPHER ALBERTO MD on 1/17/2022 at 13:20             XR Chest 1 View    Result Date: 1/16/2022  PROCEDURE: XR CHEST 1 VW  COMPARISON: [facility], CR, CHEST AP/PA 1 VIEW, 11/22/2020, 6:50.  INDICATIONS: SOA Triage Protocol  FINDINGS:  If there is suspicion for pneumoperitoneum with gaseous lucencies seen below the right and left diaphragm.  Lung volumes are low with mild left basilar consolidation.  Small left effusion cannot be excluded.  No definite right effusion or pneumothorax.  The heart appears mildly enlarged.  No acute osseous abnormality is seen.        1. Findings suspicious for pneumoperitoneum.  Recommend further assessment with CT abdomen pelvis. 2. Left basilar consolidation which  could represent atelectasis or infiltrate.  3. Mild cardiomegaly.  This report was communicated by telephone to Dr. Ham in the ED at the dictation time shown below.        KRISTOPHER ALBERTO MD       Electronically Signed and Approved By: KRISTOPHER ALBERTO MD on 1/16/2022 at 8:43             Peripheral Block    Result Date: 1/16/2022  Gonzalez Lyons MD     1/16/2022  2:00 PM Peripheral Block Patient reassessed immediately prior to procedure Patient location during procedure: OR Start time: 1/16/2022 1:46 PM Stop time: 1/16/2022 1:50 PM Reason for block: at surgeon's request and post-op pain management Performed by Anesthesiologist: Gonzalez Lyons MD Preanesthetic Checklist Completed: patient identified, IV checked, site marked, risks and benefits discussed, surgical consent, monitors and equipment checked, pre-op evaluation and timeout performed Prep: Pt Position: supine Sterile barriers:alcohol skin prep, partial drape, cap, washed/disinfected hands, gloves and mask Prep: ChloraPrep Patient monitoring: blood pressure monitoring, continuous pulse oximetry and EKG Procedure Guidance:ultrasound guided ULTRASOUND INTERPRETATION. Using ultrasound guidance a 20 G gauge needle was placed in close proximity to the nerve, at which point, under ultrasound guidance anesthetic was injected in the area of the nerve and spread of the anesthesia was seen on ultrasound in close proximity thereto.  There were no abnormalities seen on ultrasound; a digital image was taken; and the patient tolerated the procedure with no complications. Images:still images obtained, printed/placed on chart Laterality:Bilateral Block Type:TAP Injection Technique:single-shot Needle Type:echogenic Needle Gauge:20 G (4in) Resistance on Injection: none Medications Used: bupivacaine-EPINEPHrine PF (MARCAINE w/EPI) 0.25% -1:[id number] injection, 60 mL Post Assessment Injection Assessment: no paresthesia on injection, incremental injection and negative  aspiration for heme Patient Tolerance:comfortable throughout block Complications:no Additional Notes The block or continuous infusion is requested by the referring physician for management of postoperative pain, or pain related to a procedure. Ultrasound guidance (deemed medically necessary). Painless injection, pt was awake and conversant during the procedure without complications. Needle and surrounding structures visualized throughout procedure. No adverse reactions or complications seen during this period. Post-procedure image showed no signs of complication, and anatomy was consistent with an uncomplicated nerve blockade.     US Renal Bilateral    Result Date: 1/21/2022  PROCEDURE: US RENAL BILATERAL  COMPARISON: [facility], CT, CT ABDOMEN PELVIS WO CONTRAST, 1/16/2022, 10:49.  INDICATIONS: INcrease in creatinine  FINDINGS:  The right kidney measures 10.4 x 6.1 x 5.3 cm.  The left kidney measures 9.9 x 5.8 x 5.6 cm.  The cortical thickness and echogenicity is normal.  There is no hydronephrosis, mass, or calculus.  The urinary bladder was not seen well as the patient has a Robledo catheter in place.        1. Both kidneys are normal. 2. The urinary bladder which was not seen well as there is a Robledo catheter in place.      LIANE PEDROZA MD       Electronically Signed and Approved By: LIANE PEDROZA MD on 1/21/2022 at 12:27               [x]  EKG/Telemetry   []  Cardiology/Vascular   []  Pathology  [x]  Old records  []  Other:    Assessment/Plan   Assessment / Plan     Assessment/Plan:  Assessment:  Acute hypoxemic respiratory failure requiring up to 15 L of oxygen resolved now on room air  Left lower and new right middle lobe lobe pneumonia with worsening of left pleural effusion with possible loculation.  S/p repeat thoracentesis February 2  Bilateral pleural effusions.  S/p left Thora by pulmonary.  Borderline exudative.  Perforated ulcer status post laparotomy and placement of Chaparro patch   Postop pain  control  Postoperative anemia s/p blood transfusion.  Sinus tachycardia  Acute metabolic encephalopathy.  Resolved likely from medications  Infected GIOVANY drain site with superficial infection of lower end of surgical wound.     Plan:   CT scan abdomen pelvis noted with improvement in fluid collection size.  No acute or worsening  Finding   CT of the chest noted discussed with patient and pulmonary  IV Lasix for anasarca.   wound culture from GIOVANY drain insertion site pending  Check BNP and respiratory culture  Lactic acid within normal range  Hemoglobin stable after blood transfusion  Diet per general surgery  Discussed with pulmonary.  Appreciate input   General surgery remains primary, reviewed documentation  Following labs, blood pressure and heart rate.   TSH.  Noted  Check 2D echo.  Noted EF of 55%.  DC'd decreased home Neurontin dose.  Patient not needing it   continue Toprol-XL.  Dose increased to 75 mg  Of home Norvasc to make room for increased Toprol  IV meropenem as per general surgery  IV vancomycin started on February 2 for left lower lobe infiltrate  Speech therapy to   Sliding-scale insulin per protocol   Continue telemetry monitoring  P.o. Protonix  Continue with spirometry use  Lovenox for DVT prophylaxis  PT OT evaluation  Encourage ambulation  Discussed with RN.  Discussed with  rehab placement.  Continue right upper extremity PICC line for now.  [place] [facility] [place].  Clinical course to dictate further management    DVT prophylaxis:  Medical and mechanical DVT prophylaxis orders are present.    CODE STATUS:   Level Of Support Discussed With: Patient  Code Status (Patient has no pulse and is not breathing): CPR (Attempt to Resuscitate)  Medical Interventions (Patient has pulse or is breathing): Full Support              Electronically signed by Ameya Cortez MD, 02/04/22, 5:16 PM EST.  .        .

## 2022-02-04 NOTE — CONSULTS
[facility]   PSYCHIATRIC CONSULTATION    Patient Name: Atilio Recinos  : 1965  MRN: [id number]  Primary Care Physician:  William Mcdonald MD  Date of admission: 2022    Referring Provider: Dr. Atilio Lindsay  Reason for Consultation: Anxiety, agitation, management of bipolar disorder    Subjective   Subjective     Chief Complaint: Abdominal distention    HPI:     Atilio Recinos is a 57 y.o. male at the hospital setting complications from surgery for repair of perforated ulcer pneumoperitoneum.    Patient is had some irritability and agitation.  He has long history of bipolar disorder previously treated by an outpatient psychiatrist.  He has been maintained on gabapentin, quetiapine, mirtazapine in the past.    Patient tells me he came in for surgery.  He is sitting up in chair in his room, cooperative.  Patient reports that he has not been sleeping well.  Patient also reports he is ready to get out of the hospital and go to rehab.  Patient reports overall that his mood is not bad but he continues to feel very tired.  He is denying suicidal ideations.  He reports that he had some periods of confusion waking up not realizing where he was and takes a little bit of time to reorient it.  He denies that he is been irritable.  Reports his previous medications had him stable.  Reports he does not do well in the evenings and gets increasingly irritable with some agitation.    Staff reports patient being very restless and agitated tonight.  He was noted to rip his gown off times due to his irritability.  Also pulled drain from his surgery out.  Was also noted to have some underlying anxiety and just be a little odd or off in some of the things he says are behaviors from time to time.  No acute agitation or combativeness.    He has not required any extra medications for acute agitation.    He has been maintained on his home dose of quetiapine 300 mg at bedtime.  He was started back on gabapentin but it is been  discontinued at this time.    Review of Systems   All systems were reviewed and negative except for: States he is feeling better, no complaints    Personal History     Past Medical History:   Diagnosis Date   • Allergies    • Anxiety    • Bipolar 1 disorder (HCC)    • COPD (chronic obstructive pulmonary disease) (HCC)    • Depression    • Forgetfulness    • Head injury    • Hepatitis    • High blood pressure    • High cholesterol    • Psychiatric illness    • S/P exploratory laparotomy, oversew of perforated ulcer, placement of Chaparro patch  1/17/2022   • Shortness of breath    • Sinus trouble        Past Surgical History:   Procedure Laterality Date   • EXPLORATORY LAPAROTOMY N/A 1/16/2022    Procedure: EXPLORATORY LAPAROTOMY, OVERSEW OF PERFORATED GASTRIC ULCER WITH CHAPARRO PATCH;  Surgeon: Atilio Lindsay MD;  Location: [facility] MAIN OR;  Service: General;  Laterality: N/A;   • PLEURAL SCARIFICATION     • SPHINCTEROTOMY         Past Psychiatric History: Currently under the care of [facility].  History of bipolar disorder, panic, anxiety, depression.    Psychiatric Hospitalizations: Has not been admitted to [facility], has a previous admission to [facility].  He also reports that he was inpatient in [place] in 2009.    Suicide Attempts: Denies any history of suicide attempts    Prior Treatment and Medications Tried: Most recently been on mirtazapine, quetiapine, gabapentin.  Has been on Depakote in the past        Family History: family history includes Diabetes in an other family member; Heart disease in an other family member; Stroke in an other family member. Otherwise pertinent FHx was reviewed and not pertinent to current issue.    Social History:  reports that he has been smoking cigarettes. He has a 80.00 pack-year smoking history. He has never used smokeless tobacco. He reports previous alcohol use. He reports that he does not use drugs.    Born and raised in  [place].  He is high school graduate.  Has been in [place] for 7 years.  Came down to visit his brother who had relocated to [place] liked it and eventually moved here.  He is on disability.  He is on disability secondary to anxiety and bipolar disorder.  He is never been  and has no children.  He is in a stable relationship and lives with his current boyfriend.  He is never been in .  Is not spiritual Oriental orthodox.  Denies any history of abuse.    Substance use history: Denies use of alcohol except in social situations.  However his last drink was greater than 1 year ago.  Denies use of drugs.  Reports he does smoke cigarettes but has not smoked since he is been here in the hospital and I would like to quit and plans on not initiating using cigarettes when he leaves.    Home Medications:  Fluticasone Furoate-Vilanterol, QUEtiapine, amLODIPine, atorvastatin, diclofenac, fish oil, gabapentin, gemfibrozil, hydroCHLOROthiazide, lisinopril, metoprolol succinate XL, mirtazapine, and vitamin D      Allergies:  Allergies   Allergen Reactions   • Antihistamines, Chlorpheniramine-Type Other (See Comments)   • Amoxicillin-Pot Clavulanate Hives   • Contrast Dye Hives and Nausea Only   • Diphenhydramine Hives       Objective   Objective     Vitals:   Temp:  [97.8 °F (36.6 °C)-99.5 °F (37.5 °C)] (P) 97.9 °F (36.6 °C)  Heart Rate:  [] (P) 123  Resp:  [18] (P) 18  BP: (138-161)/(62-80) (P) 125/72  Physical Exam       Mental Status Exam:     Patient is calm and cooperative.  He is sitting in chair at bedside participates fully in exam.  He makes good eye contact.  There is no psychomotor restlessness or agitation.  Peers to be of average intelligence and reliable historian.  Makes good eye contact and has appropriate focus and attention.  Has had surgery continues to have some discomfort from wounds but is appropriate and handling it well    Hygiene:   good  Cooperation:  Cooperative  Eye Contact:   "Good  Psychomotor Behavior:  Appropriate  Mood: \"Not bad, not happy happy, but not sad\"  Affect:  Appropriate and Restricted  Speech:  Normal  Language: Appropriate, relevant  Thought Process:  Goal directed and Linear  Thought Content:  Normal  Suicidal:  None  Homicidal:  None  Hallucinations:  None  Delusion:  None  Memory:  Intact  Orientation:  Person, Place, Time and Situation  Reliability:  good  Insight:  Good  Judgement:  Good  Impulse Control:  Fair    Result Review    Result Review:  I have personally reviewed the results from the time of this admission to 2/4/2022 13:45 EST and agree with these findings:  []  Laboratory  []  Microbiology  []  Radiology  []  EKG/Telemetry   []  Cardiology/Vascular   []  Pathology  []  Old records  []  Other:  Most notable findings include:     Assessment/Plan   Assessment / Plan     Brief Patient Summary:  Atilio Recinos is a 57 y.o. male who admitted for abdominal surgery has had some complications of the protracted hospital stay.  Awaiting rehab placement    Active Hospital Problems:  Active Hospital Problems    Diagnosis    • S/P exploratory laparotomy, oversew of perforated ulcer, placement of Chaparro patch     • Pneumoperitoneum of unknown etiology    • Bowel perforation (HCC)        Plan:   1) gabapentin has been discontinued.  He is been on this medicine for a very long time but if he can tolerate being off of it feel be in his best interest to remain off of this medication and will not reinitiate at this time.  We'll continue to monitor him mood and affect and may consider reinitiating if he continues to have problems with significant anxiety or agitation  2) he does report having anxiety throughout the day is also had some irritability at times, he is on some dose of quetiapine but will increase dose to 400 mg help with mood stabilization.  We'll also add 50 mg daily to help with daytime agitation or irritability.  3) patient having some difficulty sleeping has " some irritability, possible exacerbation of anxiety and depression.  Previously stable on mirtazapine at 15 mg at bedtime and will reinitiate mirtazapine  4) does not need inpatient psychiatric care at this time may be discharged to rehab  5) will follow and make treatment recommendations as indicated    Electronically signed by Esvin Ortez MD, 02/04/22, 1:45 PM EST.

## 2022-02-05 LAB
ALBUMIN SERPL-MCNC: 2.5 G/DL (ref 3.5–5.2)
ANION GAP SERPL CALCULATED.3IONS-SCNC: 10.2 MMOL/L (ref 5–15)
BACTERIA FLD CULT: NORMAL
BACTERIA SPEC AEROBE CULT: ABNORMAL
BASOPHILS # BLD AUTO: 0.1 10*3/MM3 (ref 0–0.2)
BASOPHILS NFR BLD AUTO: 1.1 % (ref 0–1.5)
BUN SERPL-MCNC: 8 MG/DL (ref 6–20)
BUN/CREAT SERPL: 11.3 (ref 7–25)
CALCIUM SPEC-SCNC: 8.4 MG/DL (ref 8.6–10.5)
CHLORIDE SERPL-SCNC: 104 MMOL/L (ref 98–107)
CHOLEST FLD-MCNC: 30 MG/DL
CO2 SERPL-SCNC: 21.8 MMOL/L (ref 22–29)
CREAT SERPL-MCNC: 0.71 MG/DL (ref 0.76–1.27)
DEPRECATED RDW RBC AUTO: 45.9 FL (ref 37–54)
EOSINOPHIL # BLD AUTO: 0.15 10*3/MM3 (ref 0–0.4)
EOSINOPHIL NFR BLD AUTO: 1.6 % (ref 0.3–6.2)
ERYTHROCYTE [DISTWIDTH] IN BLOOD BY AUTOMATED COUNT: 14.6 % (ref 12.3–15.4)
GFR SERPL CREATININE-BSD FRML MDRD: 114 ML/MIN/1.73
GLUCOSE SERPL-MCNC: 92 MG/DL (ref 65–99)
GRAM STN SPEC: ABNORMAL
GRAM STN SPEC: NORMAL
GRAM STN SPEC: NORMAL
HCT VFR BLD AUTO: 25.1 % (ref 37.5–51)
HGB BLD-MCNC: 8.5 G/DL (ref 13–17.7)
IMM GRANULOCYTES # BLD AUTO: 1.14 10*3/MM3 (ref 0–0.05)
IMM GRANULOCYTES NFR BLD AUTO: 12.1 % (ref 0–0.5)
LYMPHOCYTES # BLD AUTO: 1.74 10*3/MM3 (ref 0.7–3.1)
LYMPHOCYTES NFR BLD AUTO: 18.5 % (ref 19.6–45.3)
MCH RBC QN AUTO: 29.1 PG (ref 26.6–33)
MCHC RBC AUTO-ENTMCNC: 33.9 G/DL (ref 31.5–35.7)
MCV RBC AUTO: 86 FL (ref 79–97)
MONOCYTES # BLD AUTO: 0.82 10*3/MM3 (ref 0.1–0.9)
MONOCYTES NFR BLD AUTO: 8.7 % (ref 5–12)
NEUTROPHILS NFR BLD AUTO: 5.46 10*3/MM3 (ref 1.7–7)
NEUTROPHILS NFR BLD AUTO: 58 % (ref 42.7–76)
NRBC BLD AUTO-RTO: 0.3 /100 WBC (ref 0–0.2)
PHOSPHATE SERPL-MCNC: 3.9 MG/DL (ref 2.5–4.5)
PLATELET # BLD AUTO: 362 10*3/MM3 (ref 140–450)
PMV BLD AUTO: 9 FL (ref 6–12)
POTASSIUM SERPL-SCNC: 4 MMOL/L (ref 3.5–5.2)
RBC # BLD AUTO: 2.92 10*6/MM3 (ref 4.14–5.8)
SODIUM SERPL-SCNC: 136 MMOL/L (ref 136–145)
WBC NRBC COR # BLD: 9.41 10*3/MM3 (ref 3.4–10.8)

## 2022-02-05 PROCEDURE — 25010000002 ENOXAPARIN PER 10 MG: Performed by: INTERNAL MEDICINE

## 2022-02-05 PROCEDURE — 99024 POSTOP FOLLOW-UP VISIT: CPT | Performed by: SURGERY

## 2022-02-05 PROCEDURE — 94799 UNLISTED PULMONARY SVC/PX: CPT

## 2022-02-05 PROCEDURE — 80069 RENAL FUNCTION PANEL: CPT | Performed by: INTERNAL MEDICINE

## 2022-02-05 PROCEDURE — 99232 SBSQ HOSP IP/OBS MODERATE 35: CPT | Performed by: INTERNAL MEDICINE

## 2022-02-05 PROCEDURE — 25010000002 MEROPENEM PER 100 MG: Performed by: SURGERY

## 2022-02-05 PROCEDURE — 85025 COMPLETE CBC W/AUTO DIFF WBC: CPT | Performed by: INTERNAL MEDICINE

## 2022-02-05 PROCEDURE — 25010000002 FUROSEMIDE PER 20 MG: Performed by: INTERNAL MEDICINE

## 2022-02-05 PROCEDURE — 99233 SBSQ HOSP IP/OBS HIGH 50: CPT | Performed by: INTERNAL MEDICINE

## 2022-02-05 RX ORDER — METOPROLOL SUCCINATE 50 MG/1
100 TABLET, EXTENDED RELEASE ORAL
Status: DISCONTINUED | OUTPATIENT
Start: 2022-02-06 | End: 2022-02-08 | Stop reason: HOSPADM

## 2022-02-05 RX ADMIN — QUETIAPINE FUMARATE 400 MG: 200 TABLET ORAL at 20:35

## 2022-02-05 RX ADMIN — ENOXAPARIN SODIUM 40 MG: 40 INJECTION SUBCUTANEOUS at 08:06

## 2022-02-05 RX ADMIN — SODIUM CHLORIDE, PRESERVATIVE FREE 3 ML: 5 INJECTION INTRAVENOUS at 08:07

## 2022-02-05 RX ADMIN — POTASSIUM CHLORIDE 40 MEQ: 750 CAPSULE, EXTENDED RELEASE ORAL at 08:06

## 2022-02-05 RX ADMIN — QUETIAPINE FUMARATE 50 MG: 25 TABLET ORAL at 08:07

## 2022-02-05 RX ADMIN — MEROPENEM 1 G: 1 INJECTION, POWDER, FOR SOLUTION INTRAVENOUS at 04:33

## 2022-02-05 RX ADMIN — MEROPENEM 1 G: 1 INJECTION, POWDER, FOR SOLUTION INTRAVENOUS at 20:35

## 2022-02-05 RX ADMIN — OXYCODONE HYDROCHLORIDE AND ACETAMINOPHEN 1 TABLET: 5; 325 TABLET ORAL at 02:44

## 2022-02-05 RX ADMIN — METOPROLOL SUCCINATE 75 MG: 50 TABLET, EXTENDED RELEASE ORAL at 08:06

## 2022-02-05 RX ADMIN — MEROPENEM 1 G: 1 INJECTION, POWDER, FOR SOLUTION INTRAVENOUS at 13:23

## 2022-02-05 RX ADMIN — PANTOPRAZOLE SODIUM 40 MG: 40 TABLET, DELAYED RELEASE ORAL at 08:07

## 2022-02-05 RX ADMIN — OXYCODONE HYDROCHLORIDE AND ACETAMINOPHEN 1 TABLET: 5; 325 TABLET ORAL at 17:40

## 2022-02-05 RX ADMIN — SODIUM CHLORIDE, PRESERVATIVE FREE 3 ML: 5 INJECTION INTRAVENOUS at 20:35

## 2022-02-05 RX ADMIN — FUROSEMIDE 20 MG: 10 INJECTION, SOLUTION INTRAMUSCULAR; INTRAVENOUS at 08:06

## 2022-02-05 RX ADMIN — BUDESONIDE AND FORMOTEROL FUMARATE DIHYDRATE 2 PUFF: 160; 4.5 AEROSOL RESPIRATORY (INHALATION) at 19:50

## 2022-02-05 RX ADMIN — BUDESONIDE AND FORMOTEROL FUMARATE DIHYDRATE 2 PUFF: 160; 4.5 AEROSOL RESPIRATORY (INHALATION) at 08:12

## 2022-02-05 RX ADMIN — PANTOPRAZOLE SODIUM 40 MG: 40 TABLET, DELAYED RELEASE ORAL at 17:41

## 2022-02-05 RX ADMIN — MIRTAZAPINE 15 MG: 15 TABLET, FILM COATED ORAL at 20:35

## 2022-02-05 NOTE — PROGRESS NOTES
[facility]     Progress Note    Patient Name: Atilio Recinos  : 1965  MRN: [id number]  Primary Care Physician:  William Mcdonald MD  Date of admission: 2022    Subjective   Subjective   Follow up on post-operative hypoxia, pleural effusion.    Over the last 24 hours, continued incentive spirometry.  Remains on IV meropenem and vancomycin.    No acute events overnight.    This morning,  Lying in bed on room air  Awake, was up to chair this am  On room air  Has minimal cough.  Using incentive spirometry  No nausea or vomiting  Abdominal pain is better  Not able to bring up much phlegm  Weak and fatigued    Review of Systems:  General:  Fatigue, No Fever  HEENT: No dysphagia, No Visual Changes, no rhinorrhea  Respiratory: + Dyspnea (improved), No phlegm, No Pleuritic Pain  Cardiovascular: Denies chest pain, denies palpitations,+MACHUCA, No Chest Pressure  Gastrointestinal:  No Abdominal Pain, No Nausea, No Vomiting, No Diarrhea  Genitourinary:  No Dysuria, No Frequency, No Hesitancy  Musculoskeletal: No muscle pain or swelling    Objective   Objective     Vitals:   Temp:  [97.6 °F (36.4 °C)-99.5 °F (37.5 °C)] 98.4 °F (36.9 °C)  Heart Rate:  [] 116  Resp:  [18] 18  BP: (148-154)/(64-73) 148/73    Physical exam  Vital Signs Reviewed  General: WDWN male, Sleepy, NAD on room air    HEENT:  PERRL, EOMI.  OP, nares clear  Chest: Good aeration, diminished left base with bibasilar rhonchi, dull to percussion left base, no work of breathing noted   CV: RRR, no MGR, pulses 2+, equal  Abd: dressing D/I to abdomen with GIOVANY intact, abdomen less distended  EXT:  no clubbing, no cyanosis, no edema  Neuro:  A&Ox3, CN grossly intact, no focal deficits  Skin: No rashes or lesions noted    Result Review    Result Review:  I have personally reviewed the results from the time of this admission to 2022 06:32 EST and agree with these findings:  [x]  Laboratory  [x]  Microbiology  [x]  Radiology  [x]  EKG/Telemetry   []   Cardiology/Vascular   []  Pathology  []  Old records  []  Other:  creatinine 0.71, wbc 9, hemoglobin 8.5    CT chest with moderate left-sided pleural effusion.    Assessment/Plan   Assessment / Plan     Brief Patient Summary:  Atilio Recinos is a 57 y.o. male who admitted with hypoxia    Active Hospital Problems:  Active Hospital Problems    Diagnosis    • S/P exploratory laparotomy, oversew of perforated ulcer, placement of Chaparro patch     • Pneumoperitoneum of unknown etiology    • Bowel perforation (HCC)    Acute hypoxic respiratory failure post op  Post op atelectasis  Moderate left pleural effusion: s/p thoracentesis x2  Leukocytosis    Plan:   Pleural fluid cultures and cytology negative.  Consistent with borderline exudative effusion from intra-abdominal issues  Repeat chest x-ray in the morning  Needs deep breathing exercises.  Continue IS use.  DC vancomycin.  Continue Merrem.  Duration of antibiotics per primary and surgery  Continue nebulizers and bronchopulmonary hygiene.   Encourage activity.  Ambulate and up to chair as tolerated.  Wound care per surgery.  Rest per primary.    DVT prophylaxis:  Medical and mechanical DVT prophylaxis orders are present.    CODE STATUS:    Level Of Support Discussed With: Patient  Code Status (Patient has no pulse and is not breathing): CPR (Attempt to Resuscitate)  Medical Interventions (Patient has pulse or is breathing): Full Support    Stable for discharge from my perspective      Labs, radiology, microbiology and provider notes were personally reviewed.  Patient's case was discussed with the primary service as well as the bedside RN.    Electronically signed by Humberto Pleitez MD, 02/05/22, 3:17 PM EST.

## 2022-02-05 NOTE — PROGRESS NOTES
[facility]   Hospitalist Progress Note  Date: 2022  Patient Name: Atilio Recinos  : 1965  MRN: [id number]  Date of admission: 2022      Subjective   Subjective     Chief complaint: Increasing oxygen requirements     Summary:  57-year-old male hospitalized on 2022 2 the service of general surgery with perforated ulcer status post ex lap and Chaparro patch placement, medicine service consulted for increasing oxygen requirements postoperatively, has been stable on room air, concerning for bilious output in GIOVANY drain, CT abdomen and pelvis showing oral contrast leakage, patient remains n.p.o. with TPN ongoing, continues to have bilious drainage from his GIOVANY drain     Interval follow-up:   Patient is awake alert oriented x3 .  Off oxygen  Evaluated by psychiatry for irritability and anxiety  remains tachycardic, no prior cardiac work-up.  Denies chest pain or palpitations. Denies fevers, chills, sweats.   No more visual hallucinations  Tolerating oral diet well.  Abdominal pain well controlled.  Having regular BM.  Good urine output  Small drainage from 1 GIOVANY drain.  Copious creamy drainage around drainage insertion site.  Surgical wound appears to be dehiscing with erythema around staples.  Scant drainage from lower end of the wound.    Review of systems:  All systems reviewed and negative except for cough, shortness of breath, generalized fatigue.    Objective   Objective     Vitals:   Temp:  [97.6 °F (36.4 °C)-98.4 °F (36.9 °C)] 98 °F (36.7 °C)  Heart Rate:  [] 97  Resp:  [18-20] 18  BP: (122-154)/(49-74) 122/49  Physical Exam    Constitutional: Resting, no acute distress, sitting on edge of the bed              Eyes: Pupils equal, sclerae anicteric, no conjunctival injection, pallor              HENT: NCAT, mucous membranes moist              Neck: Supple, no thyromegaly, no lymphadenopathy, trachea midline              Respiratory: Diminished breath sounds, no wheezes               Cardiovascular: RRR, no murmurs              Gastrointestinal: Appropriate postoperative tenderness, GIOVANY drain in place dressing in place, some swelling of drainage at the lower end of surgical wound with wound dehiscence and redness at Staples insertion site copious creamy drainage noted in GIOVANY drains              musculoskeletal: No edema              Psychiatric: Flat affect               neurologic: Speech clear, no gross deficits              Skin: No rashes    Result Review    Result Review:  I have personally reviewed the results from the time of this admission to 2/5/2022 16:30 EST and agree with these findings:  [x]  Laboratory   CBC    CBC 2/3/22 2/4/22 2/5/22   WBC 10.14 9.19 9.41   RBC 2.76 (A) 2.82 (A) 2.92 (A)   Hemoglobin 8.2 (A) 8.4 (A) 8.5 (A)   Hematocrit 24.7 (A) 25.1 (A) 25.1 (A)   MCV 89.5 89.0 86.0   MCH 29.7 29.8 29.1   MCHC 33.2 33.5 33.9   RDW 14.6 14.4 14.6   Platelets 428 403 362   (A) Abnormal value            BMP    BMP 2/3/22 2/4/22 2/5/22   BUN 8 8 8   Creatinine 0.73 (A) 0.71 (A) 0.71 (A)   Sodium 135 (A) 135 (A) 136   Potassium 3.4 (A) 3.7 4.0   Chloride 105 103 104   CO2 22.8 21.6 (A) 21.8 (A)   Calcium 8.0 (A) 8.2 (A) 8.4 (A)   (A) Abnormal value            LIVER FUNCTION TESTS:      Lab 02/05/22  0342 02/04/22  0353 02/03/22  0339 02/02/22  0256 01/30/22  0442   TOTAL PROTEIN  --   --  5.9*  --  5.7*   ALBUMIN 2.50* 2.40* 2.20* 2.20* 2.00*   ALT (SGPT)  --   --   --   --  9   AST (SGOT)  --   --   --   --  20   BILIRUBIN  --   --   --   --  0.2   BILIRUBIN DIRECT  --   --   --   --  <0.2   ALK PHOS  --   --   --   --  62       [x]  Microbiology   Blood Culture   Date Value Ref Range Status   01/27/2022 No growth at 2 days  Preliminary     No results found for: BCIDPCR, CXREFLEX, CSFCX, CULTURETIS  No results found for: CULTURES, HSVCX, URCX  No results found for: EYECULTURE, GCCX, HSVCULTURE, LABHSV  No results found for: LEGIONELLA, MRSACX, MUMPSCX, MYCOPLASCX  No results found  for: NOCARDIACX, STOOLCX  No results found for: THROATCX, UNSTIMCULT, URINECX, CULTURE, VZVCULTUR  No results found for: VIRALCULTU, WOUNDCX    [x]  Radiology CT Abdomen Pelvis Without Contrast    Result Date: 1/27/2022  PROCEDURE: CT ABDOMEN PELVIS WO CONTRAST  COMPARISON: [facility], CT, CT ABDOMEN PELVIS WO CONTRAST, 1/21/2022, 15:05.  INDICATIONS: tachycardia, decreased GIOVANY drain output, recent perf ulcer and leaking Chaparro patch  TECHNIQUE: CT images were created without intravenous contrast.   PROTOCOL:   Standard imaging protocol performed    RADIATION:   DLP: 1678mGy*cm   Automated exposure control was utilized to minimize radiation dose.  FINDINGS:  Within the lung bases is a moderate left pleural effusion with left basilar atelectasis.  There is a stable small cyst in the left hepatic lobe.  The unenhanced gallbladder, adrenal glands, kidneys, spleen, and pancreas are unremarkable.  The stomach is mildly distended.  There is pneumoperitoneum in the upper abdomen.  An abdominal drain enters through the left upper abdomen with its tip just underneath the right inferior hepatic lobe.  There are inflammatory changes within the abdomen, with a 10.4 x 3.7 cm fluid collection within the left mesentery on axial image 103 that appear slightly increased, previously 8.5 x 3.3 cm.  There is another abdominal drain enters through the left lower abdomen with its tip in the right mid pelvis.  The previously identified extravasated contrast is no longer present.  The small bowel appears normal in caliber.  The colon appears normal.  The appendix appears normal.  No abnormally enlarged lymph nodes are identified.  The rectum, prostate, and urinary bladder are unremarkable.  No aggressive osseous lesions are identified.         1. Pneumoperitoneum within upper abdomen with two abdominal drains as described above.  There is a 10.4 cm fluid collection within the left mesentery that has slightly increased in size  from prior CT.  Of note, the two abdominal drains do not traverse through this collection. 2. Moderate left pleural effusion with left basilar atelectasis. 3. Previously identified extravasated contrast is no longer present.     ANDERSON REYES MD       Electronically Signed and Approved By: ANDEROSN REYES MD on 1/27/2022 at 11:19             CT Abdomen Pelvis Without Contrast    Result Date: 1/21/2022  PROCEDURE: CT ABDOMEN PELVIS WO CONTRAST  COMPARISON: [facility], CT, CT ABDOMEN PELVIS WO CONTRAST, 1/16/2022, 10:49.  INDICATIONS: repair of perforated ulcer, mid abdominal pain, increased GIOVANY drainage  TECHNIQUE: CT images were created without intravenous contrast.   PROTOCOL:   Standard imaging protocol performed    RADIATION:   DLP: 496 mGy*cm   Automated exposure control was utilized to minimize radiation dose.  FINDINGS:  Small left pleural effusion, unchanged.  Adjacent atelectasis.  There is new ground-glass opacity within the right middle lobe and right lower lobe, only partially imaged.  No left-sided ground-glass opacity however.  There is new oral contrast in the peritoneal cavity along the posterior margin of the left hepatic lobe, between the liver and stomach.  There is a small amount oral contrast in the lumen of the stomach.  There also is oral contrast within small bowel loops as well as the colon.  No bowel obstruction.    There is an 8.7 x 4.0 cm collection of fluid amongst the mesentery in the left mid abdomen axial image 99. This measures 4.1 cm on cranial caudad measurement, coronal imaging.  This is unchanged.  There has been near complete evacuation of the previous free fluid and free air of the peritoneal cavity elsewhere.  No new fluid collections.  Two separate drains are present, 1 terminating in the posterior right-sided pelvis and the other in the right pericolic gutter, adjacent to the inferior tip of the liver.  Bladder is collapsed around a Robledo catheter.  A few  small indeterminate low-density lesions of the liver again noted       There is extraluminal oral contrast along the posterior margin of the left hepatic lobe.  This raises concern for persistent perforation although the oral contrast may have extravasated prior to the surgery; it is unknown whether oral contrast was given after the comparison CT examination but before the surgery.  There appears to have been a very small amount of oral contrast in the stomach lumen at the time of the comparison CT.  There is a unchanged 8.7 by 4.0 by 4.1 cm fluid collection within the left-sided small bowel mesentery.      TYLER KENT MD       Electronically Signed and Approved By: TYLER KENT MD on 1/21/2022 at 15:35             CT Abdomen Pelvis Without Contrast    Result Date: 1/16/2022  PROCEDURE: CT ABDOMEN PELVIS WO CONTRAST  COMPARISON: [facility], CR, XR CHEST 1 VW, 1/16/2022, 8:26.  [facility], CT, ABD PEL W/O CONTRAST, 11/22/2020, 7:03.  INDICATIONS: Abdominal distention and pain, pneumoperitoneum on chest radiograph.  TECHNIQUE: CT images were created without intravenous contrast.   PROTOCOL:   Standard imaging protocol performed    RADIATION:   DLP: 751.9 mGy*cm   Automated exposure control was utilized to minimize radiation dose.  FINDINGS:  There are small pleural effusions, left greater than right.  There is mild adjacent dependent lower lobe consolidation, also left greater than right.  No significant pericardial effusion.  Heart size appears within normal limits.  There is a small to moderate amount of pneumoperitoneum.  There is also a small to moderate amount of low attenuation free fluid in the abdomen and pelvis.  Small hypodensities are seen in the left hepatic lobe on axial image 50 and in the right hepatic lobe on images 42 and 41 which appear similar to the prior CT and are therefore favored to be related to hemangiomas or cysts.  No definite new hepatic lesion is seen.  No  suspicious splenic abnormality is seen.  Adrenal glands appear unremarkable.  Gallbladder is mildly distended.  No definite surrounding inflammation or biliary ductal dilatation is seen.  No acute pancreatic abnormality is identified.  There is a suspected left posterior mid pole renal cyst, as before.  There is mild nonspecific bilateral perinephric fat stranding.  No significant renal calculi.  No hydronephrosis or ureteral calculus is identified.  Bladder is decompressed with Robledo catheter present.  There is calcific atherosclerosis of the aorta without definite aneurysm.  Abdominal and pelvic lymph nodes do not appear enlarged.  There are fat containing bilateral inguinal hernias.  The prostate does not appear enlarged.  There is a small umbilical hernia containing fat and pneumoperitoneum.  The stomach is minimally distended.  There is some pneumoperitoneum along the stomach but gastric perforation cannot be definitively confirmed.  There is a small amount of hyperdense material in the posterior-superior gastric lumen.  This could be related to ingested hyperdense material, but blood products cannot be excluded.  There are some fluid distended small bowel loops without definite small bowel dilatation.  A focal small bowel perforation is not identified but again cannot be excluded.  There is a moderate amount of formed stool in the proximal colon.  The visualized appendix appears partially gas-filled without definite findings to suggest inflammation.  There is mild colonic diverticulosis.  No definite focal colonic or rectal inflammation is identified, but again colonic perforation cannot be completely excluded.  There is a vascular necrosis of the bilateral femoral heads.  There appears to be mild to moderate bilateral hip osteoarthritis with left calcified intra-articular bodies.  There are degenerative changes in the thoracolumbar spine.  No definite acute osseous abnormality is seen.          1. Small to  moderate amount of pneumoperitoneum and low-attenuation free fluid consistent with perforated viscus.  The source of perforation cannot be definitively visualized. 2. Questionable small amount of hyperdense ingested material in the stomach versus a small amount of blood products. 3. Small pleural effusions and mild adjacent lower lobe consolidation, left greater than right. 4. Probable renal and hepatic cysts, as before. 5. Avascular necrosis of the femoral heads.    This report was communicated by telephone to Dr. Ham at the dictation time shown below.     KRISTOPHER ALBERTO MD       Electronically Signed and Approved By: KRISTOPHER ALBERTO MD on 1/16/2022 at 11:20             XR Chest 1 View    Result Date: 1/27/2022  PROCEDURE: XR CHEST 1 VW  COMPARISON: [facility], CR, XR CHEST 1 VW, 1/23/2022, 11:32.  INDICATIONS: s/p left Thoracentesis  FINDINGS:  The heart is normal in size.  The lungs are well-expanded.  There is no evidence of pneumothorax.  The patient is post left thoracentesis.  Right subclavian PICC line tip is at the cavoatrial junction.        Post left thoracentesis.  No pneumothorax is seen.  Right subclavian PICC line tip is at the cavoatrial junction.       LI NICOLAS MD       Electronically Signed and Approved By: LI NICOLAS MD on 1/27/2022 at 16:29             XR Chest 1 View    Result Date: 1/23/2022  PROCEDURE: XR CHEST 1 VW  COMPARISON: [facility], CR, XR CHEST 1 VW, 1/17/2022, 12:38.  INDICATIONS: HYPOXIA  FINDINGS:  Right upper extremity PICC line tip at the cavoatrial junction.  Esophagogastric tube is been removed.  Heart size normal.  There is a small left-sided pleural effusion which appears slightly decreased from the prior study.  No significant effusion on the right.  No pneumothorax.        1. Removal of esophagogastric tube. 2. Right upper extremity PICC line tip at the cavoatrial junction. 3. Small left pleural effusion slightly decreased from the  prior study with mild left basilar airspace disease likely atelectasis. 4. Clear right lung.      SHAHID STEEL MD       Electronically Signed and Approved By: SHAHID STEEL MD on 1/23/2022 at 16:52             XR Chest 1 View    Result Date: 1/17/2022  PROCEDURE: XR CHEST 1 VW  COMPARISON: [facility], CT, CT ABDOMEN PELVIS WO CONTRAST, 1/16/2022, 10:49.  [facility], CR, XR CHEST 1 VW, 1/16/2022, 8:26.  INDICATIONS: hypoxia, cough  FINDINGS:  Enteric tube is present extending into the left quadrant.  Previously seen pneumoperitoneum decreased in the interval.  There has been interval development of perihilar and bibasilar opacities.  There is a small left effusion with mild left basilar consolidation.  The heart appears mildly enlarged.  No pneumothorax is seen.        1. New perihilar and bibasilar opacities suspicious for pulmonary edema.  Pneumonia and/or atelectasis are also included in the differential diagnosis. 2. Small left effusion with mild left basilar consolidation. 3. Mild cardiomegaly. 4. Enteric tube extends into the left upper quadrant. 5. Previously seen pneumoperitoneum appears decreased in the interval.       KRISTOPEHR ALBERTO MD       Electronically Signed and Approved By: KRISTOPHER ALBERTO MD on 1/17/2022 at 13:20             XR Chest 1 View    Result Date: 1/16/2022  PROCEDURE: XR CHEST 1 VW  COMPARISON: [facility], CR, CHEST AP/PA 1 VIEW, 11/22/2020, 6:50.  INDICATIONS: SOA Triage Protocol  FINDINGS:  If there is suspicion for pneumoperitoneum with gaseous lucencies seen below the right and left diaphragm.  Lung volumes are low with mild left basilar consolidation.  Small left effusion cannot be excluded.  No definite right effusion or pneumothorax.  The heart appears mildly enlarged.  No acute osseous abnormality is seen.        1. Findings suspicious for pneumoperitoneum.  Recommend further assessment with CT abdomen pelvis. 2. Left basilar consolidation which  could represent atelectasis or infiltrate.  3. Mild cardiomegaly.  This report was communicated by telephone to Dr. Ham in the ED at the dictation time shown below.        KRISTOPHER ALBERTO MD       Electronically Signed and Approved By: KRISTOPHER ALBERTO MD on 1/16/2022 at 8:43             Peripheral Block    Result Date: 1/16/2022  Gonzalez Lyons MD     1/16/2022  2:00 PM Peripheral Block Patient reassessed immediately prior to procedure Patient location during procedure: OR Start time: 1/16/2022 1:46 PM Stop time: 1/16/2022 1:50 PM Reason for block: at surgeon's request and post-op pain management Performed by Anesthesiologist: Gonzalez Lyons MD Preanesthetic Checklist Completed: patient identified, IV checked, site marked, risks and benefits discussed, surgical consent, monitors and equipment checked, pre-op evaluation and timeout performed Prep: Pt Position: supine Sterile barriers:alcohol skin prep, partial drape, cap, washed/disinfected hands, gloves and mask Prep: ChloraPrep Patient monitoring: blood pressure monitoring, continuous pulse oximetry and EKG Procedure Guidance:ultrasound guided ULTRASOUND INTERPRETATION. Using ultrasound guidance a 20 G gauge needle was placed in close proximity to the nerve, at which point, under ultrasound guidance anesthetic was injected in the area of the nerve and spread of the anesthesia was seen on ultrasound in close proximity thereto.  There were no abnormalities seen on ultrasound; a digital image was taken; and the patient tolerated the procedure with no complications. Images:still images obtained, printed/placed on chart Laterality:Bilateral Block Type:TAP Injection Technique:single-shot Needle Type:echogenic Needle Gauge:20 G (4in) Resistance on Injection: none Medications Used: bupivacaine-EPINEPHrine PF (MARCAINE w/EPI) 0.25% -1:[id number] injection, 60 mL Post Assessment Injection Assessment: no paresthesia on injection, incremental injection and negative  aspiration for heme Patient Tolerance:comfortable throughout block Complications:no Additional Notes The block or continuous infusion is requested by the referring physician for management of postoperative pain, or pain related to a procedure. Ultrasound guidance (deemed medically necessary). Painless injection, pt was awake and conversant during the procedure without complications. Needle and surrounding structures visualized throughout procedure. No adverse reactions or complications seen during this period. Post-procedure image showed no signs of complication, and anatomy was consistent with an uncomplicated nerve blockade.     US Renal Bilateral    Result Date: 1/21/2022  PROCEDURE: US RENAL BILATERAL  COMPARISON: [facility], CT, CT ABDOMEN PELVIS WO CONTRAST, 1/16/2022, 10:49.  INDICATIONS: INcrease in creatinine  FINDINGS:  The right kidney measures 10.4 x 6.1 x 5.3 cm.  The left kidney measures 9.9 x 5.8 x 5.6 cm.  The cortical thickness and echogenicity is normal.  There is no hydronephrosis, mass, or calculus.  The urinary bladder was not seen well as the patient has a Robledo catheter in place.        1. Both kidneys are normal. 2. The urinary bladder which was not seen well as there is a Robledo catheter in place.      LIANE PEDROZA MD       Electronically Signed and Approved By: LIANE PEDROZA MD on 1/21/2022 at 12:27               [x]  EKG/Telemetry   []  Cardiology/Vascular   []  Pathology  [x]  Old records  []  Other:    Assessment/Plan   Assessment / Plan     Assessment/Plan:  Assessment:  Acute hypoxemic respiratory failure requiring up to 15 L of oxygen resolved now on room air  Left lower and new right middle lobe lobe pneumonia with worsening of left pleural effusion with possible loculation.  S/p repeat thoracentesis February 2  Bilateral pleural effusions.  S/p left Thora by pulmonary.  Borderline exudative.  Perforated ulcer status post laparotomy and placement of Chaparro patch   Postop pain  control  Postoperative anemia s/p blood transfusion.  Sinus tachycardia  Acute metabolic encephalopathy.  Resolved likely from medications  Infected GIOVANY drain site with superficial infection of lower end of surgical wound.  Candida on wound culture.     Plan:   CT scan abdomen pelvis noted with improvement in fluid collection size.  No acute or worsening  Finding   CT of the chest noted discussed with patient and pulmonary  IV Lasix for anasarca.   wound culture from GIOVANY drain insertion site pending  Check BNP and respiratory culture   Lactic acid within normal range  Hemoglobin stable after blood transfusion  Diet per general surgery  Discussed with pulmonary.  Appreciate input   General surgery remains primary, reviewed documentation  Following labs, blood pressure and heart rate.   TSH.  Noted  Check 2D echo.  Noted EF of 55%.  DC'd decreased home Neurontin dose.  Patient not needing it   continue Toprol-XL.  Dose increased to  mg  Of home Norvasc to make room for increased Toprol  IV meropenem as per general surgery  IV vancomycin started on February 2 for left lower lobe infiltrate  Speech therapy    Sliding-scale insulin per protocol   Continue telemetry monitoring  P.o. Protonix  Appreciate psych input started on morning Seroquel.  Home home mirtazapine.  Started on the Seroquel for agitation and irritability.  May need to restart Neurontin if remains irritable as per psych  Continue with spirometry use  Lovenox for DVT prophylaxis  PT OT evaluation  Encourage ambulation  Discussed with RN.  Discussed with  rehab placement.  Continue right upper extremity PICC line for now.  Patient prefers [facility].  Clinical course to dictate further management    DVT prophylaxis:  Medical and mechanical DVT prophylaxis orders are present.    CODE STATUS:   Level Of Support Discussed With: Patient  Code Status (Patient has no pulse and is not breathing): CPR (Attempt to Resuscitate)  Medical  Interventions (Patient has pulse or is breathing): Full Support              Electronically signed by Ameya Cortez MD, 02/05/22, 4:36 PM EST.    .        .

## 2022-02-05 NOTE — PLAN OF CARE
Problem: Adult Inpatient Plan of Care  Goal: Plan of Care Review  2/5/2022 0504 by Staci Blevins, RN  Outcome: Ongoing, Progressing  Flowsheets  Taken 2/5/2022 0502 by Staci Blevins, HUGO  Progress: no change  Outcome Summary: Pt a&o x4, forgetful at times. GIOVANY drain in place. W/C completed last night. IV abx per MAR. Pain meds per MAR. Call light within reach.  Taken 2/2/2022 1527 by Leticia Mcnair OT  Plan of Care Reviewed With: patient  2/5/2022 0501 by Staci Blevins, HUGO  Outcome: Ongoing, Progressing  Flowsheets  Taken 2/5/2022 0501 by Staci Blevins, RN  Progress: no change  Taken 2/2/2022 1527 by Leticia Mcnair OT  Plan of Care Reviewed With: patient   Goal Outcome Evaluation:           Progress: no change  Outcome Summary: Pt a&o x4, forgetful at times. GIOVANY drain in place. W/C completed last night. IV abx per MAR. Pain meds per MAR. Call light within reach.

## 2022-02-05 NOTE — PLAN OF CARE
Goal Outcome Evaluation:  Plan of Care Reviewed With: patient        Progress: improving  Outcome Summary: Patient has been alert and oriented today without any episodes of confusion.  GIOVANY remains in place but continues to leak thick tan secretions.  Output is minimal.  HR remains elevated (100-110).  PICC line intact but does not give blood return.  Currently waiting for bed placement.  Continue to monitor.    Kirk PEREZN, RN

## 2022-02-05 NOTE — PLAN OF CARE
Problem: Adult Inpatient Plan of Care  Goal: Plan of Care Review  Outcome: Ongoing, Progressing  Goal: Patient-Specific Goal (Individualized)  Outcome: Ongoing, Progressing  Goal: Optimal Comfort and Wellbeing  Outcome: Ongoing, Progressing  Intervention: Provide Person-Centered Care  Recent Flowsheet Documentation  Taken 2/4/2022 0900 by Terrence Menendez, RN  Trust Relationship/Rapport: care explained     Problem: Fall Injury Risk  Goal: Absence of Fall and Fall-Related Injury  Outcome: Ongoing, Progressing     Problem: COPD Comorbidity  Goal: Maintenance of COPD Symptom Control  Outcome: Ongoing, Progressing     Problem: Skin Injury Risk Increased  Goal: Skin Health and Integrity  Outcome: Ongoing, Progressing   Goal Outcome Evaluation:

## 2022-02-05 NOTE — PROGRESS NOTES
[facility]     Progress Note    Patient Name: Atilio Recinos  : 1965  MRN: [id number]  Primary Care Physician:  William Mcdonald MD  Date of admission: 2022    Subjective   Subjective       HPI:  Patient Reports no new complaints, other than feeling sleepy with meds      Objective   Objective     Vitals:   Temp:  [97.6 °F (36.4 °C)-98.4 °F (36.9 °C)] 97.9 °F (36.6 °C)  Heart Rate:  [] 95  Resp:  [18-20] 20  BP: (147-154)/(64-74) 147/74    Physical Exam  Constitutional:       Appearance: Normal appearance.   Cardiovascular:      Rate and Rhythm: Normal rate.   Pulmonary:      Effort: Pulmonary effort is normal.   Abdominal:      Palpations: Abdomen is soft.     wound still driaing, mild thick secretions in GIOVANY    Result Review    Result Review:  I have personally reviewed the results from the time of this admission to 2022 10:42 EST and agree with these findings:  []  Laboratory  []  Microbiology  []  Radiology  []  EKG/Telemetry   []  Cardiology/Vascular   []  Pathology  []  Old records  []  Other:      Assessment/Plan   Assessment / Plan     Brief Patient Summary:  Atilio Recinos is a 57 y.o. male who s/p ex lap for perf ulcer    Active Hospital Problems:  Active Hospital Problems    Diagnosis    • S/P exploratory laparotomy, oversew of perforated ulcer, placement of Chaparro patch     • Pneumoperitoneum of unknown etiology    • Bowel perforation (HCC)      Plan:   Cont LWC  OOB/ambulate  Appreciate psych eval  Can cont rehab search       DVT prophylaxis:  Medical and mechanical DVT prophylaxis orders are present.    CODE STATUS:   Level Of Support Discussed With: Patient  Code Status (Patient has no pulse and is not breathing): CPR (Attempt to Resuscitate)  Medical Interventions (Patient has pulse or is breathing): Full Support    Disposition:  I expect patient to be discharged when bed avail.    Electronically signed by Atilio Lindsay MD, 22, 10:42 AM EST.

## 2022-02-06 ENCOUNTER — APPOINTMENT (OUTPATIENT)
Dept: GENERAL RADIOLOGY | Facility: HOSPITAL | Age: 57
End: 2022-02-06

## 2022-02-06 LAB — SARS-COV-2 RNA PNL SPEC NAA+PROBE: NOT DETECTED

## 2022-02-06 PROCEDURE — 25010000002 ENOXAPARIN PER 10 MG: Performed by: INTERNAL MEDICINE

## 2022-02-06 PROCEDURE — 94799 UNLISTED PULMONARY SVC/PX: CPT

## 2022-02-06 PROCEDURE — 25010000002 MEROPENEM PER 100 MG: Performed by: SURGERY

## 2022-02-06 PROCEDURE — U0004 COV-19 TEST NON-CDC HGH THRU: HCPCS | Performed by: INTERNAL MEDICINE

## 2022-02-06 PROCEDURE — 99024 POSTOP FOLLOW-UP VISIT: CPT | Performed by: SURGERY

## 2022-02-06 PROCEDURE — 71045 X-RAY EXAM CHEST 1 VIEW: CPT

## 2022-02-06 PROCEDURE — 99232 SBSQ HOSP IP/OBS MODERATE 35: CPT | Performed by: INTERNAL MEDICINE

## 2022-02-06 PROCEDURE — 99233 SBSQ HOSP IP/OBS HIGH 50: CPT | Performed by: INTERNAL MEDICINE

## 2022-02-06 PROCEDURE — 25010000002 FUROSEMIDE PER 20 MG: Performed by: INTERNAL MEDICINE

## 2022-02-06 RX ADMIN — ENOXAPARIN SODIUM 40 MG: 40 INJECTION SUBCUTANEOUS at 08:00

## 2022-02-06 RX ADMIN — SODIUM CHLORIDE, PRESERVATIVE FREE 3 ML: 5 INJECTION INTRAVENOUS at 08:00

## 2022-02-06 RX ADMIN — OXYCODONE HYDROCHLORIDE AND ACETAMINOPHEN 1 TABLET: 5; 325 TABLET ORAL at 05:33

## 2022-02-06 RX ADMIN — SODIUM CHLORIDE, PRESERVATIVE FREE 3 ML: 5 INJECTION INTRAVENOUS at 20:37

## 2022-02-06 RX ADMIN — PANTOPRAZOLE SODIUM 40 MG: 40 TABLET, DELAYED RELEASE ORAL at 17:39

## 2022-02-06 RX ADMIN — OXYCODONE HYDROCHLORIDE AND ACETAMINOPHEN 1 TABLET: 5; 325 TABLET ORAL at 20:36

## 2022-02-06 RX ADMIN — PANTOPRAZOLE SODIUM 40 MG: 40 TABLET, DELAYED RELEASE ORAL at 07:48

## 2022-02-06 RX ADMIN — BUDESONIDE AND FORMOTEROL FUMARATE DIHYDRATE 2 PUFF: 160; 4.5 AEROSOL RESPIRATORY (INHALATION) at 20:44

## 2022-02-06 RX ADMIN — MEROPENEM 1 G: 1 INJECTION, POWDER, FOR SOLUTION INTRAVENOUS at 20:37

## 2022-02-06 RX ADMIN — MEROPENEM 1 G: 1 INJECTION, POWDER, FOR SOLUTION INTRAVENOUS at 04:51

## 2022-02-06 RX ADMIN — MEROPENEM 1 G: 1 INJECTION, POWDER, FOR SOLUTION INTRAVENOUS at 14:36

## 2022-02-06 RX ADMIN — OXYCODONE HYDROCHLORIDE AND ACETAMINOPHEN 1 TABLET: 5; 325 TABLET ORAL at 14:36

## 2022-02-06 RX ADMIN — QUETIAPINE FUMARATE 50 MG: 25 TABLET ORAL at 08:01

## 2022-02-06 RX ADMIN — QUETIAPINE FUMARATE 400 MG: 200 TABLET ORAL at 20:37

## 2022-02-06 RX ADMIN — BUDESONIDE AND FORMOTEROL FUMARATE DIHYDRATE 2 PUFF: 160; 4.5 AEROSOL RESPIRATORY (INHALATION) at 08:00

## 2022-02-06 RX ADMIN — METOPROLOL SUCCINATE 100 MG: 50 TABLET, EXTENDED RELEASE ORAL at 10:25

## 2022-02-06 RX ADMIN — POTASSIUM CHLORIDE 40 MEQ: 750 CAPSULE, EXTENDED RELEASE ORAL at 08:01

## 2022-02-06 RX ADMIN — MIRTAZAPINE 15 MG: 15 TABLET, FILM COATED ORAL at 20:37

## 2022-02-06 RX ADMIN — FUROSEMIDE 20 MG: 10 INJECTION, SOLUTION INTRAMUSCULAR; INTRAVENOUS at 08:00

## 2022-02-06 NOTE — PROGRESS NOTES
[facility]   Hospitalist Progress Note  Date: 2022  Patient Name: Atilio Recinos  : 1965  MRN: [id number]  Date of admission: 2022      Subjective   Subjective     Chief complaint: Increasing oxygen requirements     Summary:  57-year-old male hospitalized on 2022 2 the service of general surgery with perforated ulcer status post ex lap and Chaparro patch placement, medicine service consulted for increasing oxygen requirements postoperatively, has been stable on room air, concerning for bilious output in GIOVANY drain, CT abdomen and pelvis showing oral contrast leakage, patient remains n.p.o. with TPN ongoing, continues to have bilious drainage from his GIOVANY drain     Interval follow-up:   Patient is awake alert oriented x3 .    Occasional tachycardic, no prior cardiac work-up.  Denies chest pain or palpitations. Denies fevers, chills, sweats.   Tolerating oral diet well.  Abdominal pain well controlled.  Having regular BM.  Good urine output  Thick tan small drainage from GIOVANY drain.  creamy drainage around drainage insertion site.  Surgical wound appears to be dehiscing with erythema around staples.  Lower wound staples out per nursing staff.  Scant drainage from lower end of the wound.    Review of systems:  All systems reviewed and negative except for cough, shortness of breath, generalized fatigue.    Objective   Objective     Vitals:   Temp:  [98.1 °F (36.7 °C)-99.7 °F (37.6 °C)] 99.7 °F (37.6 °C)  Heart Rate:  [] 95  Resp:  [18-20] 18  BP: (133-155)/(67-81) 149/67  Physical Exam    Constitutional: Resting, no acute distress, sitting on edge of the bed              Eyes: Pupils equal, sclerae anicteric, no conjunctival injection, pallor              HENT: NCAT, mucous membranes moist              Neck: Supple, no thyromegaly, no lymphadenopathy, trachea midline              Respiratory: Diminished breath sounds, no wheezes              Cardiovascular: RRR, no murmurs               Gastrointestinal: Appropriate postoperative tenderness, GIOVANY drain in place dressing in place, some swelling of drainage at the lower end of surgical wound with wound dehiscence and redness at Staples insertion site copious creamy drainage noted in GIOVANY drains              musculoskeletal: No edema              Psychiatric: Flat affect               neurologic: Speech clear, no gross deficits              Skin: No rashes    Result Review    Result Review:  I have personally reviewed the results from the time of this admission to 2/6/2022 16:08 EST and agree with these findings:  [x]  Laboratory   CBC    CBC 2/3/22 2/4/22 2/5/22   WBC 10.14 9.19 9.41   RBC 2.76 (A) 2.82 (A) 2.92 (A)   Hemoglobin 8.2 (A) 8.4 (A) 8.5 (A)   Hematocrit 24.7 (A) 25.1 (A) 25.1 (A)   MCV 89.5 89.0 86.0   MCH 29.7 29.8 29.1   MCHC 33.2 33.5 33.9   RDW 14.6 14.4 14.6   Platelets 428 403 362   (A) Abnormal value            BMP    BMP 2/3/22 2/4/22 2/5/22   BUN 8 8 8   Creatinine 0.73 (A) 0.71 (A) 0.71 (A)   Sodium 135 (A) 135 (A) 136   Potassium 3.4 (A) 3.7 4.0   Chloride 105 103 104   CO2 22.8 21.6 (A) 21.8 (A)   Calcium 8.0 (A) 8.2 (A) 8.4 (A)   (A) Abnormal value            LIVER FUNCTION TESTS:      Lab 02/05/22  0342 02/04/22  0353 02/03/22  0339 02/02/22  0256   TOTAL PROTEIN  --   --  5.9*  --    ALBUMIN 2.50* 2.40* 2.20* 2.20*       [x]  Microbiology   Blood Culture   Date Value Ref Range Status   01/27/2022 No growth at 2 days  Preliminary     No results found for: BCIDPCR, CXREFLEX, CSFCX, CULTURETIS  No results found for: CULTURES, HSVCX, URCX  No results found for: EYECULTURE, GCCX, HSVCULTURE, LABHSV  No results found for: LEGIONELLA, MRSACX, MUMPSCX, MYCOPLASCX  No results found for: NOCARDIACX, STOOLCX  No results found for: THROATCX, UNSTIMCULT, URINECX, CULTURE, VZVCULTUR  No results found for: VIRALCULTU, WOUNDCX    [x]  Radiology CT Abdomen Pelvis Without Contrast    Result Date: 1/27/2022  PROCEDURE: CT ABDOMEN PELVIS WO  CONTRAST  COMPARISON: [facility], CT, CT ABDOMEN PELVIS WO CONTRAST, 1/21/2022, 15:05.  INDICATIONS: tachycardia, decreased GIOVANY drain output, recent perf ulcer and leaking Chaparro patch  TECHNIQUE: CT images were created without intravenous contrast.   PROTOCOL:   Standard imaging protocol performed    RADIATION:   DLP: 1678mGy*cm   Automated exposure control was utilized to minimize radiation dose.  FINDINGS:  Within the lung bases is a moderate left pleural effusion with left basilar atelectasis.  There is a stable small cyst in the left hepatic lobe.  The unenhanced gallbladder, adrenal glands, kidneys, spleen, and pancreas are unremarkable.  The stomach is mildly distended.  There is pneumoperitoneum in the upper abdomen.  An abdominal drain enters through the left upper abdomen with its tip just underneath the right inferior hepatic lobe.  There are inflammatory changes within the abdomen, with a 10.4 x 3.7 cm fluid collection within the left mesentery on axial image 103 that appear slightly increased, previously 8.5 x 3.3 cm.  There is another abdominal drain enters through the left lower abdomen with its tip in the right mid pelvis.  The previously identified extravasated contrast is no longer present.  The small bowel appears normal in caliber.  The colon appears normal.  The appendix appears normal.  No abnormally enlarged lymph nodes are identified.  The rectum, prostate, and urinary bladder are unremarkable.  No aggressive osseous lesions are identified.         1. Pneumoperitoneum within upper abdomen with two abdominal drains as described above.  There is a 10.4 cm fluid collection within the left mesentery that has slightly increased in size from prior CT.  Of note, the two abdominal drains do not traverse through this collection. 2. Moderate left pleural effusion with left basilar atelectasis. 3. Previously identified extravasated contrast is no longer present.     ANDERSON REYES MD        Electronically Signed and Approved By: ANDERSON REYES MD on 1/27/2022 at 11:19             CT Abdomen Pelvis Without Contrast    Result Date: 1/21/2022  PROCEDURE: CT ABDOMEN PELVIS WO CONTRAST  COMPARISON: [facility], CT, CT ABDOMEN PELVIS WO CONTRAST, 1/16/2022, 10:49.  INDICATIONS: repair of perforated ulcer, mid abdominal pain, increased GIOVANY drainage  TECHNIQUE: CT images were created without intravenous contrast.   PROTOCOL:   Standard imaging protocol performed    RADIATION:   DLP: 496 mGy*cm   Automated exposure control was utilized to minimize radiation dose.  FINDINGS:  Small left pleural effusion, unchanged.  Adjacent atelectasis.  There is new ground-glass opacity within the right middle lobe and right lower lobe, only partially imaged.  No left-sided ground-glass opacity however.  There is new oral contrast in the peritoneal cavity along the posterior margin of the left hepatic lobe, between the liver and stomach.  There is a small amount oral contrast in the lumen of the stomach.  There also is oral contrast within small bowel loops as well as the colon.  No bowel obstruction.    There is an 8.7 x 4.0 cm collection of fluid amongst the mesentery in the left mid abdomen axial image 99. This measures 4.1 cm on cranial caudad measurement, coronal imaging.  This is unchanged.  There has been near complete evacuation of the previous free fluid and free air of the peritoneal cavity elsewhere.  No new fluid collections.  Two separate drains are present, 1 terminating in the posterior right-sided pelvis and the other in the right pericolic gutter, adjacent to the inferior tip of the liver.  Bladder is collapsed around a Robledo catheter.  A few small indeterminate low-density lesions of the liver again noted       There is extraluminal oral contrast along the posterior margin of the left hepatic lobe.  This raises concern for persistent perforation although the oral contrast may have extravasated  prior to the surgery; it is unknown whether oral contrast was given after the comparison CT examination but before the surgery.  There appears to have been a very small amount of oral contrast in the stomach lumen at the time of the comparison CT.  There is a unchanged 8.7 by 4.0 by 4.1 cm fluid collection within the left-sided small bowel mesentery.      TYLER KENT MD       Electronically Signed and Approved By: TYLER KENT MD on 1/21/2022 at 15:35             CT Abdomen Pelvis Without Contrast    Result Date: 1/16/2022  PROCEDURE: CT ABDOMEN PELVIS WO CONTRAST  COMPARISON: [facility], CR, XR CHEST 1 VW, 1/16/2022, 8:26.  [facility], CT, ABD PEL W/O CONTRAST, 11/22/2020, 7:03.  INDICATIONS: Abdominal distention and pain, pneumoperitoneum on chest radiograph.  TECHNIQUE: CT images were created without intravenous contrast.   PROTOCOL:   Standard imaging protocol performed    RADIATION:   DLP: 751.9 mGy*cm   Automated exposure control was utilized to minimize radiation dose.  FINDINGS:  There are small pleural effusions, left greater than right.  There is mild adjacent dependent lower lobe consolidation, also left greater than right.  No significant pericardial effusion.  Heart size appears within normal limits.  There is a small to moderate amount of pneumoperitoneum.  There is also a small to moderate amount of low attenuation free fluid in the abdomen and pelvis.  Small hypodensities are seen in the left hepatic lobe on axial image 50 and in the right hepatic lobe on images 42 and 41 which appear similar to the prior CT and are therefore favored to be related to hemangiomas or cysts.  No definite new hepatic lesion is seen.  No suspicious splenic abnormality is seen.  Adrenal glands appear unremarkable.  Gallbladder is mildly distended.  No definite surrounding inflammation or biliary ductal dilatation is seen.  No acute pancreatic abnormality is identified.  There is a suspected left  posterior mid pole renal cyst, as before.  There is mild nonspecific bilateral perinephric fat stranding.  No significant renal calculi.  No hydronephrosis or ureteral calculus is identified.  Bladder is decompressed with Robledo catheter present.  There is calcific atherosclerosis of the aorta without definite aneurysm.  Abdominal and pelvic lymph nodes do not appear enlarged.  There are fat containing bilateral inguinal hernias.  The prostate does not appear enlarged.  There is a small umbilical hernia containing fat and pneumoperitoneum.  The stomach is minimally distended.  There is some pneumoperitoneum along the stomach but gastric perforation cannot be definitively confirmed.  There is a small amount of hyperdense material in the posterior-superior gastric lumen.  This could be related to ingested hyperdense material, but blood products cannot be excluded.  There are some fluid distended small bowel loops without definite small bowel dilatation.  A focal small bowel perforation is not identified but again cannot be excluded.  There is a moderate amount of formed stool in the proximal colon.  The visualized appendix appears partially gas-filled without definite findings to suggest inflammation.  There is mild colonic diverticulosis.  No definite focal colonic or rectal inflammation is identified, but again colonic perforation cannot be completely excluded.  There is a vascular necrosis of the bilateral femoral heads.  There appears to be mild to moderate bilateral hip osteoarthritis with left calcified intra-articular bodies.  There are degenerative changes in the thoracolumbar spine.  No definite acute osseous abnormality is seen.          1. Small to moderate amount of pneumoperitoneum and low-attenuation free fluid consistent with perforated viscus.  The source of perforation cannot be definitively visualized. 2. Questionable small amount of hyperdense ingested material in the stomach versus a small amount of  blood products. 3. Small pleural effusions and mild adjacent lower lobe consolidation, left greater than right. 4. Probable renal and hepatic cysts, as before. 5. Avascular necrosis of the femoral heads.    This report was communicated by telephone to Dr. Ham at the dictation time shown below.     KRISTOPHER ALBERTO MD       Electronically Signed and Approved By: KRISTOPHER ALBERTO MD on 1/16/2022 at 11:20             XR Chest 1 View    Result Date: 1/27/2022  PROCEDURE: XR CHEST 1 VW  COMPARISON: [facility], , XR CHEST 1 VW, 1/23/2022, 11:32.  INDICATIONS: s/p left Thoracentesis  FINDINGS:  The heart is normal in size.  The lungs are well-expanded.  There is no evidence of pneumothorax.  The patient is post left thoracentesis.  Right subclavian PICC line tip is at the cavoatrial junction.        Post left thoracentesis.  No pneumothorax is seen.  Right subclavian PICC line tip is at the cavoatrial junction.       LI NICOLAS MD       Electronically Signed and Approved By: LI NICOLAS MD on 1/27/2022 at 16:29             XR Chest 1 View    Result Date: 1/23/2022  PROCEDURE: XR CHEST 1 VW  COMPARISON: [facility], , XR CHEST 1 VW, 1/17/2022, 12:38.  INDICATIONS: HYPOXIA  FINDINGS:  Right upper extremity PICC line tip at the cavoatrial junction.  Esophagogastric tube is been removed.  Heart size normal.  There is a small left-sided pleural effusion which appears slightly decreased from the prior study.  No significant effusion on the right.  No pneumothorax.        1. Removal of esophagogastric tube. 2. Right upper extremity PICC line tip at the cavoatrial junction. 3. Small left pleural effusion slightly decreased from the prior study with mild left basilar airspace disease likely atelectasis. 4. Clear right lung.      SHAHID STEEL MD       Electronically Signed and Approved By: SHAHID STEEL MD on 1/23/2022 at 16:52             XR Chest 1 View    Result Date: 1/17/2022  PROCEDURE: XR  CHEST 1 VW  COMPARISON: [facility], CT, CT ABDOMEN PELVIS WO CONTRAST, 1/16/2022, 10:49.  [facility], CR, XR CHEST 1 VW, 1/16/2022, 8:26.  INDICATIONS: hypoxia, cough  FINDINGS:  Enteric tube is present extending into the left quadrant.  Previously seen pneumoperitoneum decreased in the interval.  There has been interval development of perihilar and bibasilar opacities.  There is a small left effusion with mild left basilar consolidation.  The heart appears mildly enlarged.  No pneumothorax is seen.        1. New perihilar and bibasilar opacities suspicious for pulmonary edema.  Pneumonia and/or atelectasis are also included in the differential diagnosis. 2. Small left effusion with mild left basilar consolidation. 3. Mild cardiomegaly. 4. Enteric tube extends into the left upper quadrant. 5. Previously seen pneumoperitoneum appears decreased in the interval.       KRISTOPHER ALBERTO MD       Electronically Signed and Approved By: KRISTOPHER ALBERTO MD on 1/17/2022 at 13:20             XR Chest 1 View    Result Date: 1/16/2022  PROCEDURE: XR CHEST 1 VW  COMPARISON: [facility], CR, CHEST AP/PA 1 VIEW, 11/22/2020, 6:50.  INDICATIONS: SOA Triage Protocol  FINDINGS:  If there is suspicion for pneumoperitoneum with gaseous lucencies seen below the right and left diaphragm.  Lung volumes are low with mild left basilar consolidation.  Small left effusion cannot be excluded.  No definite right effusion or pneumothorax.  The heart appears mildly enlarged.  No acute osseous abnormality is seen.        1. Findings suspicious for pneumoperitoneum.  Recommend further assessment with CT abdomen pelvis. 2. Left basilar consolidation which could represent atelectasis or infiltrate.  3. Mild cardiomegaly.  This report was communicated by telephone to Dr. Ham in the ED at the dictation time shown below.        KRISTOPHER ALBERTO MD       Electronically Signed and Approved By: KRISTOPHER ALBERTO MD on  1/16/2022 at 8:43             Peripheral Block    Result Date: 1/16/2022  Gonzalez Lyons MD     1/16/2022  2:00 PM Peripheral Block Patient reassessed immediately prior to procedure Patient location during procedure: OR Start time: 1/16/2022 1:46 PM Stop time: 1/16/2022 1:50 PM Reason for block: at surgeon's request and post-op pain management Performed by Anesthesiologist: Gonzalez Lyons MD Preanesthetic Checklist Completed: patient identified, IV checked, site marked, risks and benefits discussed, surgical consent, monitors and equipment checked, pre-op evaluation and timeout performed Prep: Pt Position: supine Sterile barriers:alcohol skin prep, partial drape, cap, washed/disinfected hands, gloves and mask Prep: ChloraPrep Patient monitoring: blood pressure monitoring, continuous pulse oximetry and EKG Procedure Guidance:ultrasound guided ULTRASOUND INTERPRETATION. Using ultrasound guidance a 20 G gauge needle was placed in close proximity to the nerve, at which point, under ultrasound guidance anesthetic was injected in the area of the nerve and spread of the anesthesia was seen on ultrasound in close proximity thereto.  There were no abnormalities seen on ultrasound; a digital image was taken; and the patient tolerated the procedure with no complications. Images:still images obtained, printed/placed on chart Laterality:Bilateral Block Type:TAP Injection Technique:single-shot Needle Type:echogenic Needle Gauge:20 G (4in) Resistance on Injection: none Medications Used: bupivacaine-EPINEPHrine PF (MARCAINE w/EPI) 0.25% -[id number] injection, 60 mL Post Assessment Injection Assessment: no paresthesia on injection, incremental injection and negative aspiration for heme Patient Tolerance:comfortable throughout block Complications:no Additional Notes The block or continuous infusion is requested by the referring physician for management of postoperative pain, or pain related to a procedure. Ultrasound guidance  (deemed medically necessary). Painless injection, pt was awake and conversant during the procedure without complications. Needle and surrounding structures visualized throughout procedure. No adverse reactions or complications seen during this period. Post-procedure image showed no signs of complication, and anatomy was consistent with an uncomplicated nerve blockade.     US Renal Bilateral    Result Date: 1/21/2022  PROCEDURE: US RENAL BILATERAL  COMPARISON: [facility], CT, CT ABDOMEN PELVIS WO CONTRAST, 1/16/2022, 10:49.  INDICATIONS: INcrease in creatinine  FINDINGS:  The right kidney measures 10.4 x 6.1 x 5.3 cm.  The left kidney measures 9.9 x 5.8 x 5.6 cm.  The cortical thickness and echogenicity is normal.  There is no hydronephrosis, mass, or calculus.  The urinary bladder was not seen well as the patient has a Robledo catheter in place.        1. Both kidneys are normal. 2. The urinary bladder which was not seen well as there is a Robledo catheter in place.      LIANE PEDROZA MD       Electronically Signed and Approved By: LIANE PEDROZA MD on 1/21/2022 at 12:27               [x]  EKG/Telemetry   []  Cardiology/Vascular   []  Pathology  [x]  Old records  []  Other:    Assessment/Plan   Assessment / Plan     Assessment/Plan:  Assessment:  Acute hypoxemic respiratory failure requiring up to 15 L of oxygen resolved now on room air  Left lower and new right middle lobe lobe pneumonia with worsening of left pleural effusion with possible loculation.  S/p repeat thoracentesis February 2.  All cultures negative  Bilateral pleural effusions.  S/p left Thora by pulmonary.  Borderline exudative.  Perforated ulcer status post laparotomy and placement of Chaparro patch   Postop pain control  Postoperative anemia s/p blood transfusion.  Sinus tachycardia  Acute metabolic encephalopathy.  Resolved likely from medications  Infected GIOVANY drain site with superficial infection of lower end of surgical wound.  Candida on wound  culture.     Plan:   CT scan abdomen pelvis noted with improvement in fluid collection size.  No acute or worsening   IV Lasix for anasarca.  All cultures negative  Hemoglobin stable after blood transfusion  Diet per general surgery  Discussed with pulmonary.  Appreciate input   General surgery remains primary, reviewed documentation  Following labs, blood pressure and heart rate.   TSH.  Noted   2D echo.  Noted EF of 55%.  continue Toprol-XL.  Dose increased to 100 mg  Of home Norvasc to make room for increased Toprol  IV meropenem finished on February 6  IV vancomycin DC'd as fluid culture negative MRSA PCR negative  Speech therapy    Sliding-scale insulin per protocol   Continue telemetry monitoring  P.o. Protonix  Appreciate psych input started on morning Seroquel.  Home  mirtazapine.  Increased night Seroquel for agitation and irritability.  May need to restart Neurontin if remains irritable as per psych  Continue with spirometry use  Lovenox for DVT prophylaxis  PT OT evaluation  Encourage ambulation  Discussed with RN.  Discussed with  rehab placement.  Continue right upper extremity PICC line for now.  Patient prefers [facility] and potentially will have bed next week.   and brother upset about not being consulted for rehab placement and they want to know about all other options available for rehab.      DVT prophylaxis:  Medical and mechanical DVT prophylaxis orders are present.    CODE STATUS:   Level Of Support Discussed With: Patient  Code Status (Patient has no pulse and is not breathing): CPR (Attempt to Resuscitate)  Medical Interventions (Patient has pulse or is breathing): Full Support       Electronically signed by Ameya Cortez MD, 02/06/22, 4:16 PM EST.    .        .

## 2022-02-06 NOTE — PLAN OF CARE
Problem: Adult Inpatient Plan of Care  Goal: Plan of Care Review  Outcome: Ongoing, Progressing  Flowsheets  Taken 2/6/2022 0518 by Staci Blevins, HUGO  Progress: improving  Outcome Summary: Pt a&o x4 tonight, has slept well. GIOVANY drain in place, tan thick drainage noted leaking around site, site is reddened. Emptied 10cc of tan thick drainage out of GIOVANY drain. W/C completed last night. Noted that one of Pt's bottom staples has came out, site looks essentially the same, not noted to be opening worse at this time. PICC line, no blood return noted. Call light within reach.  Taken 2/5/2022 1617 by Kirk Fernandez RN  Plan of Care Reviewed With: patient   Goal Outcome Evaluation:           Progress: improving  Outcome Summary: Pt a&o x4 tonight, has slept well. GIOVANY drain in place, tan thick drainage noted leaking around site, site is reddened. Emptied 10cc of tan thick drainage out of GIOVANY drain. W/C completed last night. Noted that one of Pt's bottom staples has came out, site looks essentially the same, not noted to be opening worse at this time. PICC line, no blood return noted. Call light within reach.

## 2022-02-06 NOTE — PROGRESS NOTES
LOS: 21 days   Patient Care Team:  William Mcdonald MD as PCP - General (Internal Medicine)      Subjective     Interval History:   Doing ok, no new issues per pt and family, drain with some yellow output, working on placement    Objective     Vital Signs  Temp:  [98 °F (36.7 °C)-98.9 °F (37.2 °C)] 98.1 °F (36.7 °C)  Heart Rate:  [] 99  Resp:  [18-20] 20  BP: (122-155)/(49-81) 133/81    Physical Exam:  NAD  Abd soft, nd, inc ok - area with some fibrinous exudate has been addressed with dressing changes     Results Review:     I reviewed the patient's new clinical results.      Assessment/Plan       Pneumoperitoneum of unknown etiology    Bowel perforation (HCC)    S/P exploratory laparotomy, oversew of perforated ulcer, placement of Chaparro patch         Plan:  Keep drain  Continue local wound care  Continue diet  Awaiting rehab placement

## 2022-02-06 NOTE — PROGRESS NOTES
[facility]     Progress Note    Patient Name: Atilio Recinos  : 1965  MRN: [id number]  Primary Care Physician:  William Mcdonald MD  Date of admission: 2022    Subjective   Subjective   Follow up on post-operative hypoxia, pleural effusion.    Over the last 24 hours, continued incentive spirometry.  Remains on IV meropenem and vancomycin.    No acute events overnight.    This morning,  Lying in bed on room air  Has been getting up to chair daily  On room air  Xray shows some improvement in effusion  Has minimal cough.  Using incentive spirometry  No nausea or vomiting  Abdominal pain is better  Not able to bring up much phlegm  Weak and fatigued    Review of Systems:  General:  Fatigue, No Fever  Respiratory: + Dyspnea (improved), No phlegm, No Pleuritic Pain  Cardiovascular: Denies chest pain, denies palpitations,+MACHUCA, No Chest Pressure  Gastrointestinal:  No Abdominal Pain, No Nausea, No Vomiting, No Diarrhea      Objective   Objective     Vitals:   Temp:  [97.7 °F (36.5 °C)-98.9 °F (37.2 °C)] 98.8 °F (37.1 °C)  Heart Rate:  [] 121  Resp:  [18-20] 20  BP: (122-150)/(49-78) 144/78    Physical exam  Vital Signs Reviewed  General: WDWN male, Sleepy, NAD on room air    HEENT:  PERRL, EOMI.  OP, nares clear  Chest: Good aeration, diminished left base with bibasilar rhonchi, dull to percussion left base, no work of breathing noted   CV: RRR, no MGR, pulses 2+, equal  Abd: dressing D/I to abdomen with GIOVANY intact, abdomen less distended  EXT:  no clubbing, no cyanosis, no edema  Neuro:  A&Ox3, CN grossly intact, no focal deficits  Skin: No rashes or lesions noted    Result Review    Result Review:  I have personally reviewed the results from the time of this admission to 2022 06:44 EST and agree with these findings:  [x]  Laboratory  [x]  Microbiology  [x]  Radiology  [x]  EKG/Telemetry   []  Cardiology/Vascular   []  Pathology  []  Old records  []  Other:      Assessment/Plan   Assessment / Plan      Brief Patient Summary:  Atilio Recinos is a 57 y.o. male who admitted with hypoxia    Active Hospital Problems:  Active Hospital Problems    Diagnosis    • S/P exploratory laparotomy, oversew of perforated ulcer, placement of Chaparro patch     • Pneumoperitoneum of unknown etiology    • Bowel perforation (HCC)    Acute hypoxic respiratory failure post op  Post op atelectasis  Moderate left pleural effusion: s/p thoracentesis x2  Leukocytosis    Plan:   Pleural fluid cultures and cytology negative. Consistent with borderline exudative effusion from intra-abdominal issues  Repeat chest x-ray , shows some improvement  Needs deep breathing exercises.  Encouraged activity, out of chair daily.   Continue IS use.  DC vancomycin.  Continue Merrem. Duration of antibiotics per primary and surgery  Continue nebulizers and bronchopulmonary hygiene.   Encourage activity.  Ambulate and up to chair as tolerated.  Wound care per surgery.  Rest per primary.    DVT prophylaxis:  Medical and mechanical DVT prophylaxis orders are present.    CODE STATUS:    Level Of Support Discussed With: Patient  Code Status (Patient has no pulse and is not breathing): CPR (Attempt to Resuscitate)  Medical Interventions (Patient has pulse or is breathing): Full Support    Stable for discharge from my perspective      Labs, radiology, microbiology and provider notes were personally reviewed.  Patient's case was discussed with the primary service as well as the bedside RN.    Electronically signed by Humberto Pleitez MD, 02/06/22, 1:18 PM EST.

## 2022-02-07 LAB
ANION GAP SERPL CALCULATED.3IONS-SCNC: 10.6 MMOL/L (ref 5–15)
ANISOCYTOSIS BLD QL: ABNORMAL
BACTERIA SPEC ANAEROBE CULT: NORMAL
BUN SERPL-MCNC: 11 MG/DL (ref 6–20)
BUN/CREAT SERPL: 13.1 (ref 7–25)
CALCIUM SPEC-SCNC: 8.9 MG/DL (ref 8.6–10.5)
CHLORIDE SERPL-SCNC: 100 MMOL/L (ref 98–107)
CLUMPED PLATELETS: PRESENT
CO2 SERPL-SCNC: 24.4 MMOL/L (ref 22–29)
CREAT SERPL-MCNC: 0.84 MG/DL (ref 0.76–1.27)
DEPRECATED RDW RBC AUTO: 48 FL (ref 37–54)
EOSINOPHIL # BLD MANUAL: 0.24 10*3/MM3 (ref 0–0.4)
EOSINOPHIL NFR BLD MANUAL: 2 % (ref 0.3–6.2)
ERYTHROCYTE [DISTWIDTH] IN BLOOD BY AUTOMATED COUNT: 15.1 % (ref 12.3–15.4)
GFR SERPL CREATININE-BSD FRML MDRD: 94 ML/MIN/1.73
GLUCOSE SERPL-MCNC: 94 MG/DL (ref 65–99)
HCT VFR BLD AUTO: 29.1 % (ref 37.5–51)
HGB BLD-MCNC: 9.6 G/DL (ref 13–17.7)
HIV1+2 AB SER QL: NORMAL
LARGE PLATELETS: ABNORMAL
LYMPHOCYTES # BLD MANUAL: 1.79 10*3/MM3 (ref 0.7–3.1)
LYMPHOCYTES NFR BLD MANUAL: 10 % (ref 5–12)
MACROCYTES BLD QL SMEAR: ABNORMAL
MCH RBC QN AUTO: 29.3 PG (ref 26.6–33)
MCHC RBC AUTO-ENTMCNC: 33 G/DL (ref 31.5–35.7)
MCV RBC AUTO: 88.7 FL (ref 79–97)
METAMYELOCYTES NFR BLD MANUAL: 6 % (ref 0–0)
MICROCYTES BLD QL: ABNORMAL
MONOCYTES # BLD: 1.19 10*3/MM3 (ref 0.1–0.9)
MYELOCYTES NFR BLD MANUAL: 3 % (ref 0–0)
NEUTROPHILS # BLD AUTO: 7.62 10*3/MM3 (ref 1.7–7)
NEUTROPHILS NFR BLD MANUAL: 61 % (ref 42.7–76)
NEUTS BAND NFR BLD MANUAL: 3 % (ref 0–5)
PLATELET # BLD AUTO: 392 10*3/MM3 (ref 140–450)
PMV BLD AUTO: 10 FL (ref 6–12)
POIKILOCYTOSIS BLD QL SMEAR: ABNORMAL
POLYCHROMASIA BLD QL SMEAR: ABNORMAL
POTASSIUM SERPL-SCNC: 4.8 MMOL/L (ref 3.5–5.2)
RBC # BLD AUTO: 3.28 10*6/MM3 (ref 4.14–5.8)
SMALL PLATELETS BLD QL SMEAR: ABNORMAL
SODIUM SERPL-SCNC: 135 MMOL/L (ref 136–145)
VARIANT LYMPHS NFR BLD MANUAL: 7 % (ref 19.6–45.3)
VARIANT LYMPHS NFR BLD MANUAL: 8 % (ref 0–5)
WBC MORPH BLD: NORMAL
WBC NRBC COR # BLD: 11.9 10*3/MM3 (ref 3.4–10.8)

## 2022-02-07 PROCEDURE — G0432 EIA HIV-1/HIV-2 SCREEN: HCPCS | Performed by: INTERNAL MEDICINE

## 2022-02-07 PROCEDURE — 94799 UNLISTED PULMONARY SVC/PX: CPT

## 2022-02-07 PROCEDURE — 25010000002 MEROPENEM PER 100 MG: Performed by: SURGERY

## 2022-02-07 PROCEDURE — 97164 PT RE-EVAL EST PLAN CARE: CPT

## 2022-02-07 PROCEDURE — 85007 BL SMEAR W/DIFF WBC COUNT: CPT | Performed by: NURSE PRACTITIONER

## 2022-02-07 PROCEDURE — 25010000002 FUROSEMIDE PER 20 MG: Performed by: INTERNAL MEDICINE

## 2022-02-07 PROCEDURE — 85025 COMPLETE CBC W/AUTO DIFF WBC: CPT | Performed by: NURSE PRACTITIONER

## 2022-02-07 PROCEDURE — 99232 SBSQ HOSP IP/OBS MODERATE 35: CPT | Performed by: PHYSICIAN ASSISTANT

## 2022-02-07 PROCEDURE — 99024 POSTOP FOLLOW-UP VISIT: CPT | Performed by: SURGERY

## 2022-02-07 PROCEDURE — 80048 BASIC METABOLIC PNL TOTAL CA: CPT | Performed by: NURSE PRACTITIONER

## 2022-02-07 PROCEDURE — 25010000002 ENOXAPARIN PER 10 MG: Performed by: INTERNAL MEDICINE

## 2022-02-07 PROCEDURE — 99233 SBSQ HOSP IP/OBS HIGH 50: CPT | Performed by: INTERNAL MEDICINE

## 2022-02-07 RX ORDER — OXYCODONE HYDROCHLORIDE AND ACETAMINOPHEN 5; 325 MG/1; MG/1
1 TABLET ORAL EVERY 6 HOURS PRN
Status: DISCONTINUED | OUTPATIENT
Start: 2022-02-07 | End: 2022-02-08 | Stop reason: HOSPADM

## 2022-02-07 RX ADMIN — SODIUM CHLORIDE, PRESERVATIVE FREE 3 ML: 5 INJECTION INTRAVENOUS at 20:50

## 2022-02-07 RX ADMIN — QUETIAPINE FUMARATE 50 MG: 25 TABLET ORAL at 08:05

## 2022-02-07 RX ADMIN — BUDESONIDE AND FORMOTEROL FUMARATE DIHYDRATE 2 PUFF: 160; 4.5 AEROSOL RESPIRATORY (INHALATION) at 20:58

## 2022-02-07 RX ADMIN — MEROPENEM 1 G: 1 INJECTION, POWDER, FOR SOLUTION INTRAVENOUS at 05:03

## 2022-02-07 RX ADMIN — POTASSIUM CHLORIDE 40 MEQ: 750 CAPSULE, EXTENDED RELEASE ORAL at 08:05

## 2022-02-07 RX ADMIN — OXYCODONE HYDROCHLORIDE AND ACETAMINOPHEN 1 TABLET: 5; 325 TABLET ORAL at 15:56

## 2022-02-07 RX ADMIN — QUETIAPINE FUMARATE 400 MG: 200 TABLET ORAL at 20:50

## 2022-02-07 RX ADMIN — ENOXAPARIN SODIUM 40 MG: 40 INJECTION SUBCUTANEOUS at 08:08

## 2022-02-07 RX ADMIN — METOPROLOL TARTRATE 5 MG: 5 INJECTION INTRAVENOUS at 22:32

## 2022-02-07 RX ADMIN — IPRATROPIUM BROMIDE AND ALBUTEROL SULFATE 3 ML: .5; 3 SOLUTION RESPIRATORY (INHALATION) at 16:30

## 2022-02-07 RX ADMIN — MIRTAZAPINE 15 MG: 15 TABLET, FILM COATED ORAL at 20:50

## 2022-02-07 RX ADMIN — PANTOPRAZOLE SODIUM 40 MG: 40 TABLET, DELAYED RELEASE ORAL at 18:35

## 2022-02-07 RX ADMIN — PANTOPRAZOLE SODIUM 40 MG: 40 TABLET, DELAYED RELEASE ORAL at 08:04

## 2022-02-07 RX ADMIN — BUDESONIDE AND FORMOTEROL FUMARATE DIHYDRATE 2 PUFF: 160; 4.5 AEROSOL RESPIRATORY (INHALATION) at 08:10

## 2022-02-07 RX ADMIN — SODIUM CHLORIDE, PRESERVATIVE FREE 3 ML: 5 INJECTION INTRAVENOUS at 08:08

## 2022-02-07 RX ADMIN — METOPROLOL SUCCINATE 100 MG: 50 TABLET, EXTENDED RELEASE ORAL at 08:04

## 2022-02-07 RX ADMIN — FUROSEMIDE 20 MG: 10 INJECTION, SOLUTION INTRAMUSCULAR; INTRAVENOUS at 08:07

## 2022-02-07 NOTE — PLAN OF CARE
Goal Outcome Evaluation:           Progress: improving  Outcome Summary: Pt is awaiting rehab. Pt is awaiting prior authorization in works for rehab in [place] per family request. Pain controlled with PO pain medications.

## 2022-02-07 NOTE — PROGRESS NOTES
[facility]     Progress Note    Patient Name: Atilio Recinos  : 1965  MRN: [id number]  Primary Care Physician:  William Mcdonald MD  Date of admission: 2022    Subjective   Subjective     HPI:  Patient Reports feeling little dizzy earlier.  No nausea or vomiting currently.      Objective   Objective     Vitals:   Temp:  [97.3 °F (36.3 °C)-99.7 °F (37.6 °C)] 97.3 °F (36.3 °C)  Heart Rate:  [] 117  Resp:  [18] 18  BP: (121-149)/(63-67) 121/67    Physical Exam  Constitutional:       Appearance: Normal appearance.   Cardiovascular:      Rate and Rhythm: Normal rate.   Pulmonary:      Effort: Pulmonary effort is normal.   Abdominal:      Palpations: Abdomen is soft.         Result Review    Result Review:  I have personally reviewed the results from the time of this admission to 2022 14:36 EST and agree with these findings:  []  Laboratory  []  Microbiology  []  Radiology  []  EKG/Telemetry   []  Cardiology/Vascular   []  Pathology  []  Old records  []  Other:      Assessment/Plan   Assessment / Plan     Brief Patient Summary:  Atilio Recinos is a 57 y.o. male who had ex lap for repair of perforated ulcer    Active Hospital Problems:  Active Hospital Problems    Diagnosis    • S/P exploratory laparotomy, oversew of perforated ulcer, placement of Chaparro patch     • Pneumoperitoneum of unknown etiology    • Bowel perforation (HCC)      Plan:  DC staples today  Out of bed ambulate  To rehab when bed available       DVT prophylaxis:  Medical and mechanical DVT prophylaxis orders are present.    CODE STATUS:   Level Of Support Discussed With: Patient  Code Status (Patient has no pulse and is not breathing): CPR (Attempt to Resuscitate)  Medical Interventions (Patient has pulse or is breathing): Full Support    Disposition:  I expect patient to be discharged 1 to 2 days.    Electronically signed by Atilio Lindsay MD, 22, 2:36 PM EST.

## 2022-02-07 NOTE — PROGRESS NOTES
[facility]   Hospitalist Progress Note  Date: 2022  Patient Name: Atilio Recinos  : 1965  MRN: [id number]  Date of admission: 2022      Subjective   Subjective     Chief complaint: Increasing oxygen requirements     Summary:  57-year-old male hospitalized on 2022 2 the service of general surgery with perforated ulcer status post ex lap and Chaparro patch placement, medicine service consulted for increasing oxygen requirements postoperatively, has been stable on room air, concerning for bilious output in GIOVANY drain, CT abdomen and pelvis showing oral contrast leakage, patient remains n.p.o. with TPN ongoing,  Eventually started on oral diet.  No more bilious drainage from his GIOVANY drain.     Interval follow-up:   Patient is awake alert oriented x3 .    Occasional tachycardic,.  Denies chest pain or palpitations. Denies fevers, chills, sweats.   Tolerating oral diet well.  Abdominal pain well controlled.  Having regular BM.  Good urine output  Thick tan small drainage from GIOVANY drain.  creamy drainage around drainage insertion site.  Surgical wound appears to be dehiscing with erythema around staples.  Lower wound staples out per nursing staff.  Scant drainage from lower end of the wound.    Review of systems:  All systems reviewed and negative except for cough, shortness of breath, generalized fatigue.    Objective   Objective     Vitals:   Temp:  [97.3 °F (36.3 °C)-99.3 °F (37.4 °C)] 97.3 °F (36.3 °C)  Heart Rate:  [] 105  Resp:  [18-21] 21  BP: (121-149)/(63-67) 121/67  Physical Exam    Constitutional: Resting, no acute distress, sitting on edge of the bed              Eyes: Pupils equal, sclerae anicteric, no conjunctival injection, pallor              HENT: NCAT, mucous membranes moist              Neck: Supple, no thyromegaly, no lymphadenopathy, trachea midline              Respiratory: Diminished breath sounds, no wheezes              Cardiovascular: RRR, no murmurs               Gastrointestinal: Appropriate postoperative tenderness, GIOVANY drain in place dressing in place, some swelling of drainage at the lower end of surgical wound with wound dehiscence and redness at Staples insertion site copious creamy drainage noted in GIOVANY drains              musculoskeletal: No edema              Psychiatric: Flat affect               neurologic: Speech clear, no gross deficits              Skin: No rashes    Result Review    Result Review:  I have personally reviewed the results from the time of this admission to 2/7/2022 17:05 EST and agree with these findings:  [x]  Laboratory   CBC    CBC 2/4/22 2/5/22 2/7/22   WBC 9.19 9.41 11.90 (A)   RBC 2.82 (A) 2.92 (A) 3.28 (A)   Hemoglobin 8.4 (A) 8.5 (A) 9.6 (A)   Hematocrit 25.1 (A) 25.1 (A) 29.1 (A)   MCV 89.0 86.0 88.7   MCH 29.8 29.1 29.3   MCHC 33.5 33.9 33.0   RDW 14.4 14.6 15.1   Platelets 403 362 392   (A) Abnormal value            BMP    BMP 2/4/22 2/5/22 2/7/22   BUN 8 8 11   Creatinine 0.71 (A) 0.71 (A) 0.84   Sodium 135 (A) 136 135 (A)   Potassium 3.7 4.0 4.8   Chloride 103 104 100   CO2 21.6 (A) 21.8 (A) 24.4   Calcium 8.2 (A) 8.4 (A) 8.9   (A) Abnormal value            LIVER FUNCTION TESTS:      Lab 02/05/22  0342 02/04/22  0353 02/03/22  0339 02/02/22  0256   TOTAL PROTEIN  --   --  5.9*  --    ALBUMIN 2.50* 2.40* 2.20* 2.20*       [x]  Microbiology   Blood Culture   Date Value Ref Range Status   01/27/2022 No growth at 2 days  Preliminary     No results found for: BCIDPCR, CXREFLEX, CSFCX, CULTURETIS  No results found for: CULTURES, HSVCX, URCX  No results found for: EYECULTURE, GCCX, HSVCULTURE, LABHSV  No results found for: LEGIONELLA, MRSACX, MUMPSCX, MYCOPLASCX  No results found for: NOCARDIACX, STOOLCX  No results found for: THROATCX, UNSTIMCULT, URINECX, CULTURE, VZVCULTUR  No results found for: VIRALCULTU, WOUNDCX    [x]  Radiology CT Abdomen Pelvis Without Contrast    Result Date: 1/27/2022  PROCEDURE: CT ABDOMEN PELVIS WO CONTRAST   COMPARISON: [facility], CT, CT ABDOMEN PELVIS WO CONTRAST, 1/21/2022, 15:05.  INDICATIONS: tachycardia, decreased GIOVANY drain output, recent perf ulcer and leaking Chaparro patch  TECHNIQUE: CT images were created without intravenous contrast.   PROTOCOL:   Standard imaging protocol performed    RADIATION:   DLP: 1678mGy*cm   Automated exposure control was utilized to minimize radiation dose.  FINDINGS:  Within the lung bases is a moderate left pleural effusion with left basilar atelectasis.  There is a stable small cyst in the left hepatic lobe.  The unenhanced gallbladder, adrenal glands, kidneys, spleen, and pancreas are unremarkable.  The stomach is mildly distended.  There is pneumoperitoneum in the upper abdomen.  An abdominal drain enters through the left upper abdomen with its tip just underneath the right inferior hepatic lobe.  There are inflammatory changes within the abdomen, with a 10.4 x 3.7 cm fluid collection within the left mesentery on axial image 103 that appear slightly increased, previously 8.5 x 3.3 cm.  There is another abdominal drain enters through the left lower abdomen with its tip in the right mid pelvis.  The previously identified extravasated contrast is no longer present.  The small bowel appears normal in caliber.  The colon appears normal.  The appendix appears normal.  No abnormally enlarged lymph nodes are identified.  The rectum, prostate, and urinary bladder are unremarkable.  No aggressive osseous lesions are identified.         1. Pneumoperitoneum within upper abdomen with two abdominal drains as described above.  There is a 10.4 cm fluid collection within the left mesentery that has slightly increased in size from prior CT.  Of note, the two abdominal drains do not traverse through this collection. 2. Moderate left pleural effusion with left basilar atelectasis. 3. Previously identified extravasated contrast is no longer present.     ANDERSON REYES MD        Electronically Signed and Approved By: ANDERSON REYES MD on 1/27/2022 at 11:19             CT Abdomen Pelvis Without Contrast    Result Date: 1/21/2022  PROCEDURE: CT ABDOMEN PELVIS WO CONTRAST  COMPARISON: [facility], CT, CT ABDOMEN PELVIS WO CONTRAST, 1/16/2022, 10:49.  INDICATIONS: repair of perforated ulcer, mid abdominal pain, increased GIOVANY drainage  TECHNIQUE: CT images were created without intravenous contrast.   PROTOCOL:   Standard imaging protocol performed    RADIATION:   DLP: 496 mGy*cm   Automated exposure control was utilized to minimize radiation dose.  FINDINGS:  Small left pleural effusion, unchanged.  Adjacent atelectasis.  There is new ground-glass opacity within the right middle lobe and right lower lobe, only partially imaged.  No left-sided ground-glass opacity however.  There is new oral contrast in the peritoneal cavity along the posterior margin of the left hepatic lobe, between the liver and stomach.  There is a small amount oral contrast in the lumen of the stomach.  There also is oral contrast within small bowel loops as well as the colon.  No bowel obstruction.    There is an 8.7 x 4.0 cm collection of fluid amongst the mesentery in the left mid abdomen axial image 99. This measures 4.1 cm on cranial caudad measurement, coronal imaging.  This is unchanged.  There has been near complete evacuation of the previous free fluid and free air of the peritoneal cavity elsewhere.  No new fluid collections.  Two separate drains are present, 1 terminating in the posterior right-sided pelvis and the other in the right pericolic gutter, adjacent to the inferior tip of the liver.  Bladder is collapsed around a Robledo catheter.  A few small indeterminate low-density lesions of the liver again noted       There is extraluminal oral contrast along the posterior margin of the left hepatic lobe.  This raises concern for persistent perforation although the oral contrast may have extravasated  prior to the surgery; it is unknown whether oral contrast was given after the comparison CT examination but before the surgery.  There appears to have been a very small amount of oral contrast in the stomach lumen at the time of the comparison CT.  There is a unchanged 8.7 by 4.0 by 4.1 cm fluid collection within the left-sided small bowel mesentery.      TYLER KENT MD       Electronically Signed and Approved By: TYLER KENT MD on 1/21/2022 at 15:35             CT Abdomen Pelvis Without Contrast    Result Date: 1/16/2022  PROCEDURE: CT ABDOMEN PELVIS WO CONTRAST  COMPARISON: [facility], CR, XR CHEST 1 VW, 1/16/2022, 8:26.  [facility], CT, ABD PEL W/O CONTRAST, 11/22/2020, 7:03.  INDICATIONS: Abdominal distention and pain, pneumoperitoneum on chest radiograph.  TECHNIQUE: CT images were created without intravenous contrast.   PROTOCOL:   Standard imaging protocol performed    RADIATION:   DLP: 751.9 mGy*cm   Automated exposure control was utilized to minimize radiation dose.  FINDINGS:  There are small pleural effusions, left greater than right.  There is mild adjacent dependent lower lobe consolidation, also left greater than right.  No significant pericardial effusion.  Heart size appears within normal limits.  There is a small to moderate amount of pneumoperitoneum.  There is also a small to moderate amount of low attenuation free fluid in the abdomen and pelvis.  Small hypodensities are seen in the left hepatic lobe on axial image 50 and in the right hepatic lobe on images 42 and 41 which appear similar to the prior CT and are therefore favored to be related to hemangiomas or cysts.  No definite new hepatic lesion is seen.  No suspicious splenic abnormality is seen.  Adrenal glands appear unremarkable.  Gallbladder is mildly distended.  No definite surrounding inflammation or biliary ductal dilatation is seen.  No acute pancreatic abnormality is identified.  There is a suspected left  posterior mid pole renal cyst, as before.  There is mild nonspecific bilateral perinephric fat stranding.  No significant renal calculi.  No hydronephrosis or ureteral calculus is identified.  Bladder is decompressed with Robledo catheter present.  There is calcific atherosclerosis of the aorta without definite aneurysm.  Abdominal and pelvic lymph nodes do not appear enlarged.  There are fat containing bilateral inguinal hernias.  The prostate does not appear enlarged.  There is a small umbilical hernia containing fat and pneumoperitoneum.  The stomach is minimally distended.  There is some pneumoperitoneum along the stomach but gastric perforation cannot be definitively confirmed.  There is a small amount of hyperdense material in the posterior-superior gastric lumen.  This could be related to ingested hyperdense material, but blood products cannot be excluded.  There are some fluid distended small bowel loops without definite small bowel dilatation.  A focal small bowel perforation is not identified but again cannot be excluded.  There is a moderate amount of formed stool in the proximal colon.  The visualized appendix appears partially gas-filled without definite findings to suggest inflammation.  There is mild colonic diverticulosis.  No definite focal colonic or rectal inflammation is identified, but again colonic perforation cannot be completely excluded.  There is a vascular necrosis of the bilateral femoral heads.  There appears to be mild to moderate bilateral hip osteoarthritis with left calcified intra-articular bodies.  There are degenerative changes in the thoracolumbar spine.  No definite acute osseous abnormality is seen.          1. Small to moderate amount of pneumoperitoneum and low-attenuation free fluid consistent with perforated viscus.  The source of perforation cannot be definitively visualized. 2. Questionable small amount of hyperdense ingested material in the stomach versus a small amount of  blood products. 3. Small pleural effusions and mild adjacent lower lobe consolidation, left greater than right. 4. Probable renal and hepatic cysts, as before. 5. Avascular necrosis of the femoral heads.    This report was communicated by telephone to Dr. Ham at the dictation time shown below.     KRISTOPHER ALBERTO MD       Electronically Signed and Approved By: KRISTOPHER ALBERTO MD on 1/16/2022 at 11:20             XR Chest 1 View    Result Date: 1/27/2022  PROCEDURE: XR CHEST 1 VW  COMPARISON: [facility], , XR CHEST 1 VW, 1/23/2022, 11:32.  INDICATIONS: s/p left Thoracentesis  FINDINGS:  The heart is normal in size.  The lungs are well-expanded.  There is no evidence of pneumothorax.  The patient is post left thoracentesis.  Right subclavian PICC line tip is at the cavoatrial junction.        Post left thoracentesis.  No pneumothorax is seen.  Right subclavian PICC line tip is at the cavoatrial junction.       LI NICOLAS MD       Electronically Signed and Approved By: LI NICOLAS MD on 1/27/2022 at 16:29             XR Chest 1 View    Result Date: 1/23/2022  PROCEDURE: XR CHEST 1 VW  COMPARISON: [facility], , XR CHEST 1 VW, 1/17/2022, 12:38.  INDICATIONS: HYPOXIA  FINDINGS:  Right upper extremity PICC line tip at the cavoatrial junction.  Esophagogastric tube is been removed.  Heart size normal.  There is a small left-sided pleural effusion which appears slightly decreased from the prior study.  No significant effusion on the right.  No pneumothorax.        1. Removal of esophagogastric tube. 2. Right upper extremity PICC line tip at the cavoatrial junction. 3. Small left pleural effusion slightly decreased from the prior study with mild left basilar airspace disease likely atelectasis. 4. Clear right lung.      SHAHID STEEL MD       Electronically Signed and Approved By: SHAHID STEEL MD on 1/23/2022 at 16:52             XR Chest 1 View    Result Date: 1/17/2022  PROCEDURE: XR  CHEST 1 VW  COMPARISON: [facility], CT, CT ABDOMEN PELVIS WO CONTRAST, 1/16/2022, 10:49.  [facility], CR, XR CHEST 1 VW, 1/16/2022, 8:26.  INDICATIONS: hypoxia, cough  FINDINGS:  Enteric tube is present extending into the left quadrant.  Previously seen pneumoperitoneum decreased in the interval.  There has been interval development of perihilar and bibasilar opacities.  There is a small left effusion with mild left basilar consolidation.  The heart appears mildly enlarged.  No pneumothorax is seen.        1. New perihilar and bibasilar opacities suspicious for pulmonary edema.  Pneumonia and/or atelectasis are also included in the differential diagnosis. 2. Small left effusion with mild left basilar consolidation. 3. Mild cardiomegaly. 4. Enteric tube extends into the left upper quadrant. 5. Previously seen pneumoperitoneum appears decreased in the interval.       KRISTOPHER ALBERTO MD       Electronically Signed and Approved By: KRISTOPHER ALBERTO MD on 1/17/2022 at 13:20             XR Chest 1 View    Result Date: 1/16/2022  PROCEDURE: XR CHEST 1 VW  COMPARISON: [facility], CR, CHEST AP/PA 1 VIEW, 11/22/2020, 6:50.  INDICATIONS: SOA Triage Protocol  FINDINGS:  If there is suspicion for pneumoperitoneum with gaseous lucencies seen below the right and left diaphragm.  Lung volumes are low with mild left basilar consolidation.  Small left effusion cannot be excluded.  No definite right effusion or pneumothorax.  The heart appears mildly enlarged.  No acute osseous abnormality is seen.        1. Findings suspicious for pneumoperitoneum.  Recommend further assessment with CT abdomen pelvis. 2. Left basilar consolidation which could represent atelectasis or infiltrate.  3. Mild cardiomegaly.  This report was communicated by telephone to Dr. Ham in the ED at the dictation time shown below.        KRISTOPHER ALBERTO MD       Electronically Signed and Approved By: KRISTOPHER ALBERTO MD on  1/16/2022 at 8:43             Peripheral Block    Result Date: 1/16/2022  Gonzalez Lyons MD     1/16/2022  2:00 PM Peripheral Block Patient reassessed immediately prior to procedure Patient location during procedure: OR Start time: 1/16/2022 1:46 PM Stop time: 1/16/2022 1:50 PM Reason for block: at surgeon's request and post-op pain management Performed by Anesthesiologist: Gonzalez Lyons MD Preanesthetic Checklist Completed: patient identified, IV checked, site marked, risks and benefits discussed, surgical consent, monitors and equipment checked, pre-op evaluation and timeout performed Prep: Pt Position: supine Sterile barriers:alcohol skin prep, partial drape, cap, washed/disinfected hands, gloves and mask Prep: ChloraPrep Patient monitoring: blood pressure monitoring, continuous pulse oximetry and EKG Procedure Guidance:ultrasound guided ULTRASOUND INTERPRETATION. Using ultrasound guidance a 20 G gauge needle was placed in close proximity to the nerve, at which point, under ultrasound guidance anesthetic was injected in the area of the nerve and spread of the anesthesia was seen on ultrasound in close proximity thereto.  There were no abnormalities seen on ultrasound; a digital image was taken; and the patient tolerated the procedure with no complications. Images:still images obtained, printed/placed on chart Laterality:Bilateral Block Type:TAP Injection Technique:single-shot Needle Type:echogenic Needle Gauge:20 G (4in) Resistance on Injection: none Medications Used: bupivacaine-EPINEPHrine PF (MARCAINE w/EPI) 0.25% -1:140047 injection, 60 mL Post Assessment Injection Assessment: no paresthesia on injection, incremental injection and negative aspiration for heme Patient Tolerance:comfortable throughout block Complications:no Additional Notes The block or continuous infusion is requested by the referring physician for management of postoperative pain, or pain related to a procedure. Ultrasound guidance  (deemed medically necessary). Painless injection, pt was awake and conversant during the procedure without complications. Needle and surrounding structures visualized throughout procedure. No adverse reactions or complications seen during this period. Post-procedure image showed no signs of complication, and anatomy was consistent with an uncomplicated nerve blockade.     US Renal Bilateral    Result Date: 1/21/2022  PROCEDURE: US RENAL BILATERAL  COMPARISON: [facility], CT, CT ABDOMEN PELVIS WO CONTRAST, 1/16/2022, 10:49.  INDICATIONS: INcrease in creatinine  FINDINGS:  The right kidney measures 10.4 x 6.1 x 5.3 cm.  The left kidney measures 9.9 x 5.8 x 5.6 cm.  The cortical thickness and echogenicity is normal.  There is no hydronephrosis, mass, or calculus.  The urinary bladder was not seen well as the patient has a Robledo catheter in place.        1. Both kidneys are normal. 2. The urinary bladder which was not seen well as there is a Robledo catheter in place.      LIANE PEDROZA MD       Electronically Signed and Approved By: LIANE PEDROZA MD on 1/21/2022 at 12:27               [x]  EKG/Telemetry   []  Cardiology/Vascular   []  Pathology  [x]  Old records  []  Other:    Assessment/Plan   Assessment / Plan     Assessment/Plan:  Assessment:  Acute hypoxemic respiratory failure requiring up to 15 L of oxygen resolved now on room air  Left lower and new right middle lobe lobe pneumonia with worsening of left pleural effusion with possible loculation.  S/p repeat thoracentesis February 2.  All cultures negative  Bilateral pleural effusions.  S/p left Thora by pulmonary.  Borderline exudative.  Perforated ulcer status post laparotomy and placement of Chaparro patch   Postop pain control  Postoperative anemia s/p blood transfusion.  Sinus tachycardia  Acute metabolic encephalopathy.  Resolved likely from medications  Infected GIOVANY drain site with superficial infection of lower end of surgical wound.  Candida on wound  culture.     Plan:   CT scan abdomen pelvis noted with improvement in fluid collection size.  No acute or worsening findings.  IV Lasix for anasarca.  All cultures negative  Hemoglobin stable after blood transfusion  Diet per general surgery  Discussed with pulmonary.  Appreciate input   General surgery remains primary, reviewed documentation  Following labs, blood pressure and heart rate.   TSH.  Noted   2D echo.  Noted EF of 55%.  continue Toprol-XL.  Dose increased to 100 mg  Off home Norvasc to make room for increased Toprol  Finished second round of IV antibiotics  Sliding-scale insulin per protocol   Continue telemetry monitoring due to occasional tachycardia  P.o. Protonix  Appreciate psych input started on morning Seroquel.  Home  mirtazapine.  Increased night Seroquel for agitation and irritability.  May need to restart Neurontin if remains irritable as per psych  Continue with spirometry use  Lovenox for DVT prophylaxis  PT OT evaluation  Encourage ambulation  Discussed with RN.  Discussed with  rehab placement.  Continue right upper extremity PICC line for now.  Patient prefers [facility] and potentially will have bed next week.   and brother upset about not being consulted for rehab placement and they want to know about all other options available for rehab.  Ready for discharge when bed available.      DVT prophylaxis:  Medical and mechanical DVT prophylaxis orders are present.    CODE STATUS:   Level Of Support Discussed With: Patient  Code Status (Patient has no pulse and is not breathing): CPR (Attempt to Resuscitate)  Medical Interventions (Patient has pulse or is breathing): Full Support         Electronically signed by Ameya Cortez MD, 02/07/22, 5:08 PM EST.        .        .

## 2022-02-07 NOTE — NURSING NOTE
Patient currently being worked up for [facility].  However, the patient and his  would like to explore different options.  Care Management consult placed today for placement.  The patient would like his  called to help in this process.  Please follow up tomorrow morning.

## 2022-02-07 NOTE — PLAN OF CARE
Goal Outcome Evaluation:  Plan of Care Reviewed With: patient        Progress: improving  Outcome Summary: Patient AAOx4 throughout this shift.  GIOVANY drain continues to have thick tan drainage around insertion site.  Only 5cc of fluid from the bulb was emptied.  Wound care completed.  There is a staple missing from lower incision.  Wound is pink with minimal sloughing as well.  PICC intact at this time but does not draw blood.  VSS.  Case management consulted for placement.    Kirk PEREZN, RN

## 2022-02-07 NOTE — THERAPY RE-EVALUATION
Acute Care - Physical Therapy Re-Evaluation   [place]     Patient Name: Atilio Recinos  : 1965  MRN: [id number]  Today's Date: 2022      Visit Dx: Admit date: 2022     Referring Physician: Atilio Lindsay MD     Surgery Date:2022   Procedure(s) (LRB):  EXPLORATORY LAPAROTOMY, OVERSEW OF PERFORATED GASTRIC ULCER WITH CHAPARRO PATCH (N/A)         ICD-10-CM ICD-9-CM   1. Pneumoperitoneum  K66.8 568.89   2. Bowel perforation (HCC)  K63.1 569.83   3. Sepsis, due to unspecified organism, unspecified whether acute organ dysfunction present (HCC)  A41.9 038.9     995.91   4. Decreased activities of daily living (ADL)  Z78.9 V49.89   5. Difficulty walking  R26.2 719.7     Patient Active Problem List   Diagnosis   • Anxiety   • Bipolar 1 disorder (HCC)   • COPD (chronic obstructive pulmonary disease) (HCC)   • Depression   • High blood pressure   • High cholesterol   • Other acute sinusitis   • Obesity   • Cigarette nicotine dependence without complication   • Pneumoperitoneum of unknown etiology   • Bowel perforation (HCC)   • S/P exploratory laparotomy, oversew of perforated ulcer, placement of Chaparro patch      Past Medical History:   Diagnosis Date   • Allergies    • Anxiety    • Bipolar 1 disorder (HCC)    • COPD (chronic obstructive pulmonary disease) ([facility])    • Depression    • Forgetfulness    • Head injury    • Hepatitis    • High blood pressure    • High cholesterol    • Psychiatric illness    • S/P exploratory laparotomy, oversew of perforated ulcer, placement of Chaparro patch  2022   • Shortness of breath    • Sinus trouble      Past Surgical History:   Procedure Laterality Date   • EXPLORATORY LAPAROTOMY N/A 2022    Procedure: EXPLORATORY LAPAROTOMY, OVERSEW OF PERFORATED GASTRIC ULCER WITH CHAPARRO PATCH;  Surgeon: Atilio Lindsay MD;  Location: [facility] MAIN OR;  Service: General;  Laterality: N/A;   • PLEURAL SCARIFICATION     • SPHINCTEROTOMY       PT Assessment (last 12 hours)      PT Evaluation and Treatment     Row Name 02/07/22 1000          Physical Therapy Time and Intention    Subjective Information complains of; dizziness  -DP     Document Type re-evaluation  -DP     Mode of Treatment individual therapy; physical therapy  -DP     Patient Effort good  -DP     Row Name 02/07/22 1000          General Information    Patient Profile Reviewed yes  -DP     Patient Observations alert; cooperative; agree to therapy  -DP     Row Name 02/07/22 1000          Bed Mobility    Bed Mobility rolling right  -DP     Rolling Right Coleman (Bed Mobility) contact guard  -DP     Supine-Sit Coleman (Bed Mobility) contact guard; standby assist  -DP     Assistive Device (Bed Mobility) bed rails; head of bed elevated  -DP     Row Name 02/07/22 1000          Transfers    Transfers sit-stand transfer  -DP     Sit-Stand Coleman (Transfers) contact guard  -DP     Row Name 02/07/22 1000          Gait/Stairs (Locomotion)    Gait/Stairs Locomotion gait/ambulation independence; gait/ambulation assistive device; distance ambulated  -DP     Coleman Level (Gait) contact guard  -DP     Assistive Device (Gait) walker, front-wheeled  -DP     Distance in Feet (Gait) 50  -DP     Comment (Gait/Stairs) Patient needed a standing restbreak  as pt was feeling dizzy  -DP     Row Name 02/07/22 1000          Balance    Dynamic Standing Balance mild impairment  -DP     Row Name             Wound 01/16/22 1412 midline abdomen Incision    Wound - Properties Group Placement Date: 01/16/22  -RB Placement Time: 1412  -RB Present on Hospital Admission: N  -RB Orientation: midline  -RB Location: abdomen  -RB Primary Wound Type: Incision  -RB     Retired Wound - Properties Group Date first assessed: 01/16/22  -RB Time first assessed: 1412  -RB Present on Hospital Admission: N  -RB Location: abdomen  -RB Primary Wound Type: Incision  -RB     Row Name 02/07/22 1000          Plan of Care Review    Plan of Care Reviewed With  patient  -DP     Outcome Summary Pt to continue with skilled PT servicesto maximize independence with functional mobility.  -DP     Row Name 02/07/22 1000          Physical Therapy Goals    Bed Mobility Goal Selection (PT) bed mobility, PT goal 1  -DP     Transfer Goal Selection (PT) transfer, PT goal 1  -DP     Gait Training Goal Selection (PT) gait training, PT goal 1  -DP     Row Name 02/07/22 1000          Bed Mobility Goal 1 (PT)    Activity/Assistive Device (Bed Mobility Goal 1, PT) supine to sit  -DP     [place] Level/Cues Needed (Bed Mobility Goal 1, PT) modified independence  -DP     Time Frame (Bed Mobility Goal 1, PT) 10 days  -DP     Progress/Outcomes (Bed Mobility Goal 1, PT) goal revised this date  -DP     Row Name 02/07/22 1000          Transfer Goal 1 (PT)    Activity/Assistive Device (Transfer Goal 1, PT) sit-to-stand/stand-to-sit  -DP     [place] Level/Cues Needed (Transfer Goal 1, PT) modified independence  -DP     Time Frame (Transfer Goal 1, PT) 10 days  -DP     Progress/Outcome (Transfer Goal 1, PT) goal revised this date  -DP     Row Name 02/07/22 1000          Gait Training Goal 1 (PT)    Activity/Assistive Device (Gait Training Goal 1, PT) assistive device use; walker, rolling  -DP     [place] Level (Gait Training Goal 1, PT) supervision required  -DP     Distance (Gait Training Goal 1, PT) 200  -DP     Time Frame (Gait Training Goal 1, PT) 10 days  -DP     Progress/Outcome (Gait Training Goal 1, PT) goal ongoing  -DP     Row Name 02/07/22 1000          Therapy Assessment/Plan (PT)    Rehab Potential (PT) good, to achieve stated therapy goals  -DP     Criteria for Skilled Interventions Met (PT) yes; skilled treatment is necessary  -DP     Predicted Duration of Therapy Intervention (PT) 10 days  -DP     Problem List (PT) problems related to; balance; cognition; strength; pain; hearing  -DP     Activity Limitations Related to Problem List (PT) unable to ambulate safely; unable  to transfer safely  -DP           User Key  (r) = Recorded By, (t) = Taken By, (c) = Cosigned By    Initials Name Provider Type    Mckenzie Cordon RN Registered Nurse    Anai Villalobos PT Physical Therapist                  PT Recommendation and Plan  Anticipated Discharge Disposition (PT): home with assist, home with home health  Planned Therapy Interventions (PT): balance training, bed mobility training, gait training, transfer training, strengthening  Therapy Frequency (PT): daily  Plan of Care Reviewed With: patient  Outcome Summary: Pt to continue with skilled PT servicesto maximize independence with functional mobility.   Outcome Measures     Row Name 02/07/22 1000             How much help from another person do you currently need...    Turning from your back to your side while in flat bed without using bedrails? 3  -DP      Moving from lying on back to sitting on the side of a flat bed without bedrails? 3  -DP      Moving to and from a bed to a chair (including a wheelchair)? 3  -DP      Standing up from a chair using your arms (e.g., wheelchair, bedside chair)? 3  -DP      Climbing 3-5 steps with a railing? 3  -DP      To walk in hospital room? 3  -DP      AM-PAC 6 Clicks Score (PT) 18  -DP            User Key  (r) = Recorded By, (t) = Taken By, (c) = Cosigned By    Initials Name Provider Type    Anai Villalobos, PT Physical Therapist                 Time Calculation:    PT Charges     Row Name 02/07/22 1018             Time Calculation    PT Received On 02/07/22  -DP      PT Goal Re-Cert Due Date 02/16/22  -DP              Untimed Charges    PT Eval/Re-eval Minutes 30  -DP              Total Minutes    Untimed Charges Total Minutes 30  -DP       Total Minutes 30  -DP            User Key  (r) = Recorded By, (t) = Taken By, (c) = Cosigned By    Initials Name Provider Type    Anai Villalobos PT Physical Therapist              Therapy Charges for Today     Code Description Service Date Service  Provider Modifiers Qty    64418910327 HC PT RE-EVAL ESTABLISHED PLAN 2 2/7/2022 Anai Elias, PT GP 1          PT G-Codes  Outcome Measure Options: AM-PAC 6 Clicks Daily Activity (OT), Optimal Instrument  AM-PAC 6 Clicks Score (PT): 18  AM-PAC 6 Clicks Score (OT): 18    Anai Elias, PT  2/7/2022     [phone]

## 2022-02-08 VITALS
SYSTOLIC BLOOD PRESSURE: 171 MMHG | BODY MASS INDEX: 33.91 KG/M2 | HEART RATE: 109 BPM | OXYGEN SATURATION: 94 % | WEIGHT: 210.98 LBS | HEIGHT: 66 IN | TEMPERATURE: 98.6 F | RESPIRATION RATE: 18 BRPM | DIASTOLIC BLOOD PRESSURE: 74 MMHG

## 2022-02-08 LAB
ALBUMIN SERPL-MCNC: 2.6 G/DL (ref 3.5–5.2)
ANION GAP SERPL CALCULATED.3IONS-SCNC: 8.9 MMOL/L (ref 5–15)
BUN SERPL-MCNC: 10 MG/DL (ref 6–20)
BUN/CREAT SERPL: 12.2 (ref 7–25)
CALCIUM SPEC-SCNC: 8.8 MG/DL (ref 8.6–10.5)
CHLORIDE SERPL-SCNC: 100 MMOL/L (ref 98–107)
CO2 SERPL-SCNC: 23.1 MMOL/L (ref 22–29)
CREAT SERPL-MCNC: 0.82 MG/DL (ref 0.76–1.27)
GFR SERPL CREATININE-BSD FRML MDRD: 96 ML/MIN/1.73
GLUCOSE SERPL-MCNC: 93 MG/DL (ref 65–99)
PHOSPHATE SERPL-MCNC: 4.1 MG/DL (ref 2.5–4.5)
POTASSIUM SERPL-SCNC: 4.2 MMOL/L (ref 3.5–5.2)
SODIUM SERPL-SCNC: 132 MMOL/L (ref 136–145)

## 2022-02-08 PROCEDURE — 99232 SBSQ HOSP IP/OBS MODERATE 35: CPT | Performed by: INTERNAL MEDICINE

## 2022-02-08 PROCEDURE — 25010000002 ENOXAPARIN PER 10 MG: Performed by: INTERNAL MEDICINE

## 2022-02-08 PROCEDURE — 80069 RENAL FUNCTION PANEL: CPT | Performed by: INTERNAL MEDICINE

## 2022-02-08 PROCEDURE — 25010000002 FUROSEMIDE PER 20 MG: Performed by: INTERNAL MEDICINE

## 2022-02-08 PROCEDURE — 94799 UNLISTED PULMONARY SVC/PX: CPT

## 2022-02-08 RX ORDER — QUETIAPINE FUMARATE 400 MG/1
400 TABLET, FILM COATED ORAL NIGHTLY
Qty: 30 TABLET | Refills: 0
Start: 2022-02-08

## 2022-02-08 RX ORDER — METOPROLOL SUCCINATE 100 MG/1
100 TABLET, EXTENDED RELEASE ORAL
Qty: 30 TABLET | Refills: 0
Start: 2022-02-09 | End: 2022-09-20

## 2022-02-08 RX ORDER — QUETIAPINE FUMARATE 50 MG/1
50 TABLET, FILM COATED ORAL DAILY
Qty: 30 TABLET | Refills: 0
Start: 2022-02-09 | End: 2022-12-07

## 2022-02-08 RX ORDER — LISINOPRIL 20 MG/1
20 TABLET ORAL
Qty: 30 TABLET | Status: ON HOLD
Start: 2022-02-08 | End: 2022-09-22

## 2022-02-08 RX ORDER — LISINOPRIL 20 MG/1
20 TABLET ORAL
Status: DISCONTINUED | OUTPATIENT
Start: 2022-02-08 | End: 2022-02-08 | Stop reason: HOSPADM

## 2022-02-08 RX ORDER — FUROSEMIDE 20 MG/1
20 TABLET ORAL DAILY
Status: DISCONTINUED | OUTPATIENT
Start: 2022-02-09 | End: 2022-02-08 | Stop reason: HOSPADM

## 2022-02-08 RX ORDER — OXYCODONE HYDROCHLORIDE AND ACETAMINOPHEN 5; 325 MG/1; MG/1
1 TABLET ORAL EVERY 6 HOURS PRN
Qty: 18 TABLET | Refills: 0 | Status: SHIPPED | OUTPATIENT
Start: 2022-02-08 | End: 2022-02-10

## 2022-02-08 RX ADMIN — SODIUM CHLORIDE, PRESERVATIVE FREE 3 ML: 5 INJECTION INTRAVENOUS at 08:56

## 2022-02-08 RX ADMIN — POLYETHYLENE GLYCOL 3350 17 G: 17 POWDER, FOR SOLUTION ORAL at 08:55

## 2022-02-08 RX ADMIN — BUDESONIDE AND FORMOTEROL FUMARATE DIHYDRATE 2 PUFF: 160; 4.5 AEROSOL RESPIRATORY (INHALATION) at 09:17

## 2022-02-08 RX ADMIN — METOPROLOL SUCCINATE 100 MG: 50 TABLET, EXTENDED RELEASE ORAL at 08:56

## 2022-02-08 RX ADMIN — POTASSIUM CHLORIDE 40 MEQ: 750 CAPSULE, EXTENDED RELEASE ORAL at 08:55

## 2022-02-08 RX ADMIN — PANTOPRAZOLE SODIUM 40 MG: 40 TABLET, DELAYED RELEASE ORAL at 06:28

## 2022-02-08 RX ADMIN — QUETIAPINE FUMARATE 50 MG: 25 TABLET ORAL at 08:55

## 2022-02-08 RX ADMIN — FUROSEMIDE 20 MG: 10 INJECTION, SOLUTION INTRAMUSCULAR; INTRAVENOUS at 08:55

## 2022-02-08 RX ADMIN — ENOXAPARIN SODIUM 40 MG: 40 INJECTION SUBCUTANEOUS at 08:55

## 2022-02-08 NOTE — PROGRESS NOTES
[facility]     Progress Note    Patient Name: Atilio Recinos  : 1965  MRN: [id number]  Primary Care Physician:  William Mcdonald MD  Date of admission: 2022    Subjective   Subjective   Follow up on post-operative hypoxia, pleural effusion.    Over the last 24 hours, continued incentive spirometry.  Continue IV antibiotics    No acute events overnight.    This morning,  In bed with head of bed elevated  Continues on room air  Does get dizzy when sits upright  Is weak and fatigued  Afebrile      Review of Systems:  General: Fatigue, weakness; otherwise denied complaints  HEENT: Denied complaints  Respiratory: MACHUCA, nonproductive cough; otherwise denied complaints  Cardiovascular: MACHUCA; otherwise denied complaints  GI: Postop abdominal tenderness continues to improve; otherwise denied complaints  Neurologic: Dizziness with sitting upright; otherwise denied complaints  Skin: Surgical incision; otherwise denied complaints      Objective   Objective     Vitals:   Temp:  [97.3 °F (36.3 °C)-99.1 °F (37.3 °C)] 99.1 °F (37.3 °C)  Heart Rate:  [] 94  Resp:  [18-21] 18  BP: (121-142)/(63-70) 137/70    Physical exam  Vital Signs Reviewed  General: WDWN, Alert, NAD.    HEENT:  PERRL, EOMI.  OP and nares clear, MMM  Chest:  good aeration, clear to auscultation bilaterally, no increased work of breathing, normal symmetric chest wall rise bilaterally  CV: RRR, no MGR, pulses 2+, equal.  Abd: Steri-Strips in place on postop incision; GIOVANY drain in place,  TTP but improving  Extremities:  no clubbing, no cyanosis, no edema, no joint tenderness  Neuro:  A&Ox3, CN grossly intact, no focal deficits.  Skin: No rashes or lesions noted      Result Review    Result Review:  I have personally reviewed the results from the time of this admission to 2022 20:16 EST and agree with these findings:  [x]  Laboratory  [x]  Microbiology  [x]  Radiology  [x]  EKG/Telemetry   []  Cardiology/Vascular   []  Pathology  []  Old  records  []  Other:      Assessment/Plan   Assessment / Plan     Brief Patient Summary:  Atilio Recinos is a 57 y.o. male who admitted with hypoxia    Active Hospital Problems:  Active Hospital Problems    Diagnosis    • S/P exploratory laparotomy, oversew of perforated ulcer, placement of Chaparro patch     • Pneumoperitoneum of unknown etiology    • Bowel perforation (HCC)    Acute hypoxic respiratory failure post op  Post op atelectasis  Moderate left pleural effusion: s/p thoracentesis x2  Leukocytosis    Plan:   Pleural fluid cultures and cytology negative. Consistent with borderline exudative effusion from intra-abdominal issues  Continues on room air.  Encourage use of IS and flutter valve daily  Encourage activity, out of chair daily as tolerated  Completed IV Merrem today  On Symbicort scheduled.  PRN albuterol.  Continue BPH protocol  General surgery on board.  Wound care per them.  Dispo per them  Disposition.  Patient awaiting rehab bed in [place]      Pulmonary will sign off at this time call with questions.  Pleural effusion likely sympathetic to inflammation in abd no further work up needed.       DVT prophylaxis:  Medical and mechanical DVT prophylaxis orders are present.    CODE STATUS:    Level Of Support Discussed With: Patient  Code Status (Patient has no pulse and is not breathing): CPR (Attempt to Resuscitate)  Medical Interventions (Patient has pulse or is breathing): Full Support      Labs, radiology, microbiology and provider notes were personally reviewed  Patient's case was discussed with the primary service as well as the bedside RN    .Electronically signed by Roland Luis MD, 02/07/22, 9:32 PM EST.

## 2022-02-08 NOTE — PROGRESS NOTES
[facility]     Progress Note    Patient Name: Atilio Recinos  : 1965  MRN: [id number]    Date of admission: 2022    Subjective   Subjective     Patient without complaints. Tolerating diet. Pain controlled.       Objective   Objective     Review of Systems:    A 10 point review of systems was performed and all are negative except for what is documented in the HPI.     Vitals:   Temp:  [97.8 °F (36.6 °C)-99.1 °F (37.3 °C)] 98.6 °F (37 °C)  Heart Rate:  [] 109  Resp:  [18-21] 18  BP: (117-171)/(58-74) 171/74  Flow (L/min):  [2] 2  Physical Exam    Constitutional: Awake, alert   Eyes: PERRLA    Neck: Supple, trachea midline   Respiratory: respirations even and unlabored   Cardiovascular: RRR   Gastrointestinal: Soft, nontender, nondistended, incision with steri-strips intact, GIOVANY with scant output   Psychiatric: Appropriate affect, cooperative   Neurologic: Oriented x 3, speech clear   Skin: No rashes     Result Review    Result Review:  I have personally reviewed the results from the time of this admission to 2022 12:16 EST and agree with these findings:  []  Laboratory  []  Microbiology  []  Radiology  []  EKG/Telemetry   []  Cardiology/Vascular   []  Pathology  []  Old records  []  Other:      Assessment/Plan   Assessment / Plan     Diagnosis     Active Hospital Problems:  Active Hospital Problems    Diagnosis    • S/P exploratory laparotomy, oversew of perforated ulcer, placement of Chaparro patch     • Pneumoperitoneum of unknown etiology    • Bowel perforation (HCC)        Plan:      DC to [facility] today  DC PICC and GIOVANY drain prior to DC       DVT prophylaxis:  Medical and mechanical DVT prophylaxis orders are present.          This document has been electronically signed by DIRK Martinez  2022 12:16 EST

## 2022-02-08 NOTE — DISCHARGE SUMMARY
Date of Admission:   Date of Discharge:  2/8/2022    Discharge Diagnosis:   Pneumoperitoneum [K66.8]  Bowel perforation (HCC) [K63.1]  Pneumoperitoneum of unknown etiology [K66.8]  Sepsis, due to unspecified organism, unspecified whether acute organ dysfunction present (HCC) [A41.9]  Post-Op Diagnosis Codes:     * Pneumoperitoneum [K66.8]       Presenting Problem/History of Present Illness  Active Hospital Problems    Diagnosis  POA   • S/P exploratory laparotomy, oversew of perforated ulcer, placement of Chaparro patch  [Z98.890]  Not Applicable   • Pneumoperitoneum of unknown etiology [K66.8]  Yes   • Bowel perforation (HCC) [K63.1]  Yes      Resolved Hospital Problems   No resolved problems to display.          Hospital Course  Patient is a 57 y.o. male presented to the ED at [facility] on 1/16/2022 with a perforated ulcer.  He was taken to surgery where he underwent an exploratory laparotomy and Chaparro patch placement.  He was admitted after the procedure for routine postoperative care.  He was kept n.p.o. and placed on TPN.  He had a drain to have bilious output.  Wants to drain had less output he was started on clear liquid diet was gradually advanced.  He had some issues with tachycardia, he had a CT scan to rule out an intra-abdominal abscess.  He was noted to have a fluid collection in his abdomen but a follow-up CT showed that the fluid collection was smaller.  He was noted to have a pleural effusion and has twice had that drained by the pulmonary critical care medicine team.  Upon discharge to [facility] rehab he is tolerating regular diet, his pain is controlled, he is having bowel movements, and he is ambulating with the assistance of physical therapy.    Procedures Performed    Procedure(s):  EXPLORATORY LAPAROTOMY, OVERSEW OF PERFORATED GASTRIC ULCER WITH CHAPARRO PATCH  -------------------       Consults:   Consults     Date and Time Order Name Status Description    2/2/2022  2:33 PM Inpatient  Psychiatrist Consult      2/2/2022  9:53 AM Inpatient Pulmonology Consult      1/27/2022  3:09 PM Inpatient Pulmonology Consult      1/17/2022  1:13 PM Inpatient Hospitalist Consult Completed           Condition on Discharge:  stable    Vital Signs  Temp:  [97.8 °F (36.6 °C)-99.1 °F (37.3 °C)] 98.6 °F (37 °C)  Heart Rate:  [] 109  Resp:  [18-21] 18  BP: (117-171)/(58-74) 171/74      Discharge Disposition  Rehab Facility or Unit (DC - External) [facility] rehab in [facility]    Discharge Medications     Discharge Medications      New Medications      Instructions Start Date   oxyCODONE-acetaminophen 5-325 MG per tablet  Commonly known as: PERCOCET   1 tablet, Oral, Every 6 Hours PRN         Changes to Medications      Instructions Start Date   lisinopril 20 MG tablet  Commonly known as: PRINIVIL,ZESTRIL  What changed:   · medication strength  · how much to take  · when to take this   20 mg, Oral, Every 24 Hours Scheduled      metoprolol succinate  MG 24 hr tablet  Commonly known as: TOPROL-XL  What changed:   · medication strength  · how much to take  · when to take this   100 mg, Oral, Every 24 Hours Scheduled   Start Date: February 9, 2022     QUEtiapine 400 MG tablet  Commonly known as: SEROquel  What changed: You were already taking a medication with the same name, and this prescription was added. Make sure you understand how and when to take each.   400 mg, Oral, Nightly      QUEtiapine 50 MG tablet  Commonly known as: SEROquel  What changed:   · medication strength  · how much to take  · when to take this   50 mg, Oral, Daily   Start Date: February 9, 2022        Continue These Medications      Instructions Start Date   amLODIPine 5 MG tablet  Commonly known as: NORVASC   5 mg, Oral, Daily      atorvastatin 40 MG tablet  Commonly known as: LIPITOR   40 mg, Oral, Daily      Breo Ellipta 100-25 MCG/INH inhaler  Generic drug: Fluticasone Furoate-Vilanterol   1 puff, Inhalation, Daily - RT       diclofenac 75 MG EC tablet  Commonly known as: VOLTAREN   TAKE 1 TABLET TWICE DAILY      fish oil 1000 MG capsule capsule   1,000 mg, Oral, 2 times daily      gabapentin 800 MG tablet  Commonly known as: NEURONTIN   800 mg, Oral, 3 Times Daily      gemfibrozil 600 MG tablet  Commonly known as: LOPID   600 mg, Oral, 2 times daily      hydroCHLOROthiazide 12.5 MG tablet  Commonly known as: HYDRODIURIL   12.5 mg, Oral, Daily      mirtazapine 15 MG tablet  Commonly known as: REMERON   15 mg, Oral, Daily      vitamin D 1.25 MG (49750 UT) capsule capsule  Commonly known as: ERGOCALCIFEROL   TAKE 1 CAPSULE EVERY WEEK             Discharge Diet:   Diet Instructions     Diet: Regular      Discharge Diet: Regular          Activity at Discharge:   Activity Instructions     Activity as Tolerated      Bathing Restrictions      Ok to shower    Type of Restriction: Bathing    Bathing Restrictions: No Tub Bath    Gradually Increase Activity Until at Pre-Hospitalization Level      Lifting Restrictions      Type of Restriction: Lifting    Lifting Restrictions: Lifting Restriction (Indicate Limit)    Weight Limit (Pounds): 10    Length of Lifting Restriction: 4 weeks          Follow-up Appointments  No future appointments.  Additional Instructions for the Follow-ups that You Need to Schedule     Discharge Follow-up with Specified Provider: Dr Lindsay; 1 Week   As directed      To: Dr Lindsay    Follow Up: 1 Week               Test Results Pending at Discharge  Pending Labs     Order Current Status    Mirtazapine (Remeron) In process    Mirtazapine (Remeron) In process    Mirtazapine (Remeron) In process    Mirtazapine (Remeron) In process    Mirtazapine (Remeron) In process    Mirtazapine (Remeron) In process    Mirtazapine (Remeron) In process    AFB Culture - Body Fluid, Pleural Cavity Preliminary result    AFB Culture - Body Fluid, Pleural Cavity Preliminary result    Fungus Culture - Body Fluid, Pleural Cavity Preliminary  result    Fungus Culture - Body Fluid, Pleural Cavity Preliminary result           Josy Fletcher, APRN  02/08/22  12:31 EST

## 2022-02-08 NOTE — DISCHARGE INSTRUCTIONS
Exploratory Laparotomy, Adult  Exploratory laparotomy is a surgical procedure to examine the organs inside your abdomen. You may have this procedure if you have pain in your abdomen, a serious injury (trauma), bleeding, infection, or blockage (obstruction).  Exploratory laparotomy may be a planned procedure or an emergency procedure. You may have surgical treatment as part of the laparotomy, or you may have additional treatment after your laparotomy.  Tell a health care provider about:  · Any allergies you have.  · All medicines you are taking, including vitamins, herbs, eye drops, creams, and over-the-counter medicines.  · Any problems you or family members have had with anesthetic medicines.  · Any blood disorders you have.  · Any surgeries you have had.  · Any medical conditions you have.  · Whether you are pregnant or may be pregnant.  What are the risks?  Generally, this is a safe procedure. However, problems may occur, including:  · Bleeding.  · Infection.  · A blood clot that forms in your leg and travels to your lungs.  · Damage to organs inside your abdomen.  · Scar tissue that blocks your digestive tract.  What happens before the procedure?  Staying hydrated  · Follow instructions from your health care provider about hydration, which may include:  ? Up to 2 hours before the procedure - you may continue to drink clear liquids, such as water.  Eating and drinking restrictions  · Follow instructions from your health care provider about eating and drinking, which may include:  ? 8 hours before the procedure - stop eating heavy meals or foods, such as meat, fried foods, or fatty foods.  ? 6 hours before the procedure - stop eating light meals or foods, such as toast or cereal.  ? 6 hours before the procedure - stop drinking milk or drinks that contain milk.  ? 2 hours before the procedure - stop drinking clear liquids.  Medicines  · Ask your health care provider about:  ? Changing or stopping your regular  medicines. This is especially important if you are taking diabetes medicines or blood thinners.  ? Taking medicines such as aspirin and ibuprofen. These medicines can thin your blood. Do not take these medicines unless your health care provider tells you to take them.  ? Taking over-the-counter medicines, vitamins, herbs, and supplements.  General instructions  · You may be given instructions for clearing out your bowel before surgery (bowel prep). If you are already in the hospital, the bowel prep may be done there.  · Do not use any products that contain nicotine or tobacco, such as cigarettes and e-cigarettes. These may delay healing after surgery. If you need help quitting, ask your health care provider.  · Plan to have a responsible adult care for you for at least 24 hours after you leave the hospital or clinic. This is important.  · Ask your health care provider what steps will be taken to help prevent infection. These may include:  ? Removing hair at the surgery site.  ? Washing skin with a germ-killing soap.  ? Giving antibiotic medicine.  What happens during the procedure?    · An IV will be inserted into one of your veins. You will be given one or more of the following:  ? A medicine to help you relax (sedative).  ? A medicine to numb the area (local anesthetic).  ? A medicine to make you fall asleep (general anesthetic).  · A tube may be placed through your nose and into your stomach (nasogastric tube) to drain your stomach fluids.  · You may have a tube placed into your bladder (urinary catheter) to drain urine.  · Tight-fitting (compression) stockings may be placed on your legs to promote circulation.  · Your health care provider will make a surgical cut (incision) in your abdomen. This is usually an up-and-down incision in the middle of your abdomen.  · All organs of the abdomen will be checked.  · The rest of the procedure will depend on what your health care provider finds.  ? If there is damage or  obstruction in any of the organs, the health care provider will repair it.  ? If there is blood in the abdomen, the health care provider will look for the source of the bleeding in order to stop it.  ? If there is pus or stomach (gastric) fluids in your abdomen, the health care provider will check for an infection or a hole (perforation) in your digestive tract.  ? If the health care provider finds infection, a drain may be placed to empty fluid that can build up in your abdomen after surgery.  ? If there is a growth (tumor) inside your abdomen, the health care provider may remove a piece of the growth (biopsy) to examine it under a microscope.  · When all procedures are complete, the health care provider will close your incision with stitches (sutures), skin glue, or adhesive strips.  · A bandage (dressing) may be placed over the incision.  What happens after the procedure?    · Your blood pressure, heart rate, breathing rate, and blood oxygen level will be monitored until you leave the hospital or clinic.  · You will continue to receive fluids and nutrition through your IV line. This will stop when you can eat and drink on your own.  · You may also get antibiotic medicine and pain medicine through your IV.  · Your nasogastric tube may be removed when you start to pass gas.  · Your urinary catheter may be removed when the anesthetic wears off.  · You may have to wear compression stockings. These stockings help prevent blood clots and reduce swelling in your legs.  · You will be encouraged to walk as soon as possible. You will also use a device or do breathing exercises to keep your lungs clear.  · Do not drive for 24 hours if you were given a medicine to help you relax (sedative)during your procedure.  Summary  · Exploratory laparotomy is a surgical procedure to examine the organs inside your abdomen.  · You may have this procedure if you have pain in your abdomen, a serious injury (trauma), bleeding, infection, or  obstruction.  · Exploratory laparotomy may be a planned procedure or an emergency procedure.  · If your surgery is planned, arrange to have a responsible adult care for you for at least 24 hours after you leave the hospital or clinic.  This information is not intended to replace advice given to you by your health care provider. Make sure you discuss any questions you have with your health care provider.  Document Revised: 01/04/2019 Document Reviewed: 12/28/2018  [employer] Patient Education © 2021 [employer]

## 2022-02-08 NOTE — PLAN OF CARE
Goal Outcome Evaluation:  Plan of Care Reviewed With: patient        Pt rested in bed with no c/o pain or discomfort throughout the night, Pt hr was tachy at 150, PRN lopressor required to lower hr, and was effective. Pt awaiting bed availability at rehabilitation [facility], to DC when available.

## 2022-02-08 NOTE — PROGRESS NOTES
[facility]   Hospitalist Progress Note  Date: 2022  Patient Name: Atilio Recinos  : 1965  MRN: [id number]  Date of admission: 2022      Subjective   Subjective     Chief Complaint: Follow-up for hypoxia, shortness of breath    Summary: 57-year-old male initially hospitalized with perforated ulcer status post ex lap with patch placement per general surgery.  Hospital service consulted postoperatively for hypoxia.  Found to have bilateral pneumonia with pleural effusions.  Pulmonary consulted status post left-sided thoracentesis.  Since weaned to room air.  Doing well from surgical perspective, repeat scan of abdomen showed stable fluid collection size, staples removed, stable for discharge from surgical standpoint.  Tolerating oral diet.  Awaiting rehab bed.    Interval Followup: No acute issues overnight.  No fevers.  Resting in bed this morning. Tolerating oral intake.  No abdominal pain.  No constipation.  Getting up out of bed independently.  No acute complaints.  Systolic blood pressures in the 170s this morning.    Review of Systems  Constitutional:  No Fever, No Chills  Respiratory:  No Cough, No Dyspnea  Gastrointestinal:  No Nausea, No Vomiting, No Diarrhea    Objective   Objective     Vitals:   Temp:  [97.3 °F (36.3 °C)-99.1 °F (37.3 °C)] 98.6 °F (37 °C)  Heart Rate:  [] 109  Resp:  [18-21] 18  BP: (117-171)/(58-74) 171/74  Flow (L/min):  [2] 2  Physical Exam    Constitutional: WNWD, awake, alert, no acute distress   Eyes: Pupils equal and reactive, no conjunctival injection   HENT: NCAT, moist mucous membranes   Respiratory: Clear to auscultation bilaterally, nonlabored respirations    Cardiovascular: RRR, no murmurs, no pedal edema   Gastrointestinal: Soft, midline surgical scar closed with Steri-Strips, no signs of infection or drainage, left-sided GIOVANY drain   musculoskeletal: No gross deformities, no clubbing or cyanosis to extremities   Neurologic: Oriented x 3, Cranial Nerves  grossly intact speech clear   Skin: Warm and dry, no rashes    Other: Right upper extremity PICC line    Result Review    Result Review:  I have personally reviewed the results from the time of this admission to 2/8/2022 08:34 EST and agree with these findings:  [x]  Laboratory    BMP    BMP 2/5/22 2/7/22 2/8/22   BUN 8 11 10   Creatinine 0.71 (A) 0.84 0.82   Sodium 136 135 (A) 132 (A)   Potassium 4.0 4.8 4.2   Chloride 104 100 100   CO2 21.8 (A) 24.4 23.1   Calcium 8.4 (A) 8.9 8.8   (A) Abnormal value              Microbiology  []  Radiology  []  EKG/Telemetry   []  Cardiology/Vascular   []  Pathology  []  Old records  []  Other:    Assessment/Plan   Assessment / Plan     Assessment:  Essential hypertension  Acute hypoxemic respiratory failure, postoperative. Resolved  Postoperative atelectasis  Left lower and new right middle lobe lobe pneumonia  Bilateral pleural effusions  Recurrent left pleural effusion S/p repeat thoracentesis February 2.  All cultures negative  Perforated ulcer status post laparotomy and placement of Chaparro patch   Pneumoperitoneum  Postoperative anemia s/p blood transfusion.  Infected GIOVANY drain site with superficial infection of lower end of surgical wound.  Candida on wound culture.  History of bipolar disorder  History of dyslipidemia    Plan:  Will restart home lisinopril at 20 mg daily given elevated blood pressures this morning  Continue Toprol  mg daily  Stop scheduled potassium replacement  Switch Lasix to p.o. 20 mg daily  Continue Symbicort 2 puffs 2 times daily  Abdominal drain management per surgery  Seroquel and mirtazapine per psychiatry  Continue Lovenox 40 mg daily  Activity ad amber.  Discontinue telemetry   CBC, BMP in a.m.     Disposition: Stable for discharge pending rehab bed. PICC line can be removed at time of discharge    Discussed plan with staff.    DVT prophylaxis:  Medical and mechanical DVT prophylaxis orders are present.    CODE STATUS:   Level Of Support  Discussed With: Patient  Code Status (Patient has no pulse and is not breathing): CPR (Attempt to Resuscitate)  Medical Interventions (Patient has pulse or is breathing): Full Support      Electronically signed by Marc Diaz DO, 02/08/22, 8:34 AM EST.

## 2022-02-10 LAB
MIRTAZAPINE SERPL-MCNC: <2 NG/ML (ref 4–40)

## 2022-02-12 ENCOUNTER — APPOINTMENT (OUTPATIENT)
Dept: GENERAL RADIOLOGY | Facility: HOSPITAL | Age: 57
End: 2022-02-12

## 2022-02-12 ENCOUNTER — HOSPITAL ENCOUNTER (INPATIENT)
Facility: HOSPITAL | Age: 57
LOS: 4 days | Discharge: NURSING, REHAB OR LONG-TERM CARE | End: 2022-02-16
Attending: EMERGENCY MEDICINE | Admitting: HOSPITALIST

## 2022-02-12 DIAGNOSIS — N17.9 ACUTE RENAL FAILURE, UNSPECIFIED ACUTE RENAL FAILURE TYPE: ICD-10-CM

## 2022-02-12 DIAGNOSIS — D64.9 ANEMIA, UNSPECIFIED TYPE: ICD-10-CM

## 2022-02-12 DIAGNOSIS — I95.89 HYPOTENSION DUE TO HYPOVOLEMIA: Primary | ICD-10-CM

## 2022-02-12 DIAGNOSIS — Z98.890 S/P EXPLORATORY LAPAROTOMY: ICD-10-CM

## 2022-02-12 DIAGNOSIS — E86.1 HYPOTENSION DUE TO HYPOVOLEMIA: Primary | ICD-10-CM

## 2022-02-12 PROBLEM — R41.82 ALTERED MENTAL STATUS: Status: ACTIVE | Noted: 2022-02-12

## 2022-02-12 LAB
ALBUMIN SERPL-MCNC: 2.2 G/DL (ref 3.5–5.2)
ALBUMIN SERPL-MCNC: 2.3 G/DL (ref 3.5–5.2)
ALBUMIN/GLOB SERPL: 0.6 G/DL
ALP SERPL-CCNC: 120 U/L (ref 39–117)
ALP SERPL-CCNC: 126 U/L (ref 39–117)
ALT SERPL W P-5'-P-CCNC: 41 U/L (ref 1–41)
ALT SERPL W P-5'-P-CCNC: 48 U/L (ref 1–41)
ANION GAP SERPL CALCULATED.3IONS-SCNC: 10.9 MMOL/L (ref 5–15)
ANION GAP SERPL CALCULATED.3IONS-SCNC: 9 MMOL/L (ref 5–15)
AST SERPL-CCNC: 39 U/L (ref 1–40)
AST SERPL-CCNC: 42 U/L (ref 1–40)
BASOPHILS # BLD AUTO: 0.04 10*3/MM3 (ref 0–0.2)
BASOPHILS # BLD AUTO: 0.07 10*3/MM3 (ref 0–0.2)
BASOPHILS NFR BLD AUTO: 0.3 % (ref 0–1.5)
BASOPHILS NFR BLD AUTO: 0.6 % (ref 0–1.5)
BILIRUB CONJ SERPL-MCNC: <0.2 MG/DL (ref 0–0.3)
BILIRUB INDIRECT SERPL-MCNC: ABNORMAL MG/DL
BILIRUB SERPL-MCNC: 0.2 MG/DL (ref 0–1.2)
BILIRUB SERPL-MCNC: 0.2 MG/DL (ref 0–1.2)
BUN SERPL-MCNC: 27 MG/DL (ref 6–20)
BUN SERPL-MCNC: 32 MG/DL (ref 6–20)
BUN/CREAT SERPL: 14.4 (ref 7–25)
BUN/CREAT SERPL: 17.2 (ref 7–25)
CALCIUM SPEC-SCNC: 7.8 MG/DL (ref 8.6–10.5)
CALCIUM SPEC-SCNC: 8 MG/DL (ref 8.6–10.5)
CHLORIDE SERPL-SCNC: 102 MMOL/L (ref 98–107)
CHLORIDE SERPL-SCNC: 108 MMOL/L (ref 98–107)
CO2 SERPL-SCNC: 19 MMOL/L (ref 22–29)
CO2 SERPL-SCNC: 22.1 MMOL/L (ref 22–29)
CREAT SERPL-MCNC: 1.08 MG/DL (ref 0.76–1.27)
CREAT SERPL-MCNC: 1.57 MG/DL (ref 0.76–1.27)
CREAT SERPL-MCNC: 2.22 MG/DL (ref 0.76–1.27)
D DIMER PPP FEU-MCNC: 2.18 MCGFEU/ML (ref 0–0.49)
D-LACTATE SERPL-SCNC: 0.7 MMOL/L (ref 0.5–2)
DEPRECATED RDW RBC AUTO: 45.2 FL (ref 37–54)
DEPRECATED RDW RBC AUTO: 45.6 FL (ref 37–54)
EOSINOPHIL # BLD AUTO: 0.13 10*3/MM3 (ref 0–0.4)
EOSINOPHIL # BLD AUTO: 0.16 10*3/MM3 (ref 0–0.4)
EOSINOPHIL NFR BLD AUTO: 1.1 % (ref 0.3–6.2)
EOSINOPHIL NFR BLD AUTO: 1.5 % (ref 0.3–6.2)
ERYTHROCYTE [DISTWIDTH] IN BLOOD BY AUTOMATED COUNT: 15.1 % (ref 12.3–15.4)
ERYTHROCYTE [DISTWIDTH] IN BLOOD BY AUTOMATED COUNT: 15.1 % (ref 12.3–15.4)
GFR SERPL CREATININE-BSD FRML MDRD: 30 ML/MIN/1.73
GFR SERPL CREATININE-BSD FRML MDRD: 45 ML/MIN/1.73
GFR SERPL CREATININE-BSD FRML MDRD: 70 ML/MIN/1.73
GLOBULIN UR ELPH-MCNC: 3.5 GM/DL
GLUCOSE BLDC GLUCOMTR-MCNC: 176 MG/DL (ref 70–130)
GLUCOSE SERPL-MCNC: 106 MG/DL (ref 65–99)
GLUCOSE SERPL-MCNC: 88 MG/DL (ref 65–99)
HCT VFR BLD AUTO: 23.3 % (ref 37.5–51)
HCT VFR BLD AUTO: 24.8 % (ref 37.5–51)
HGB BLD-MCNC: 7.7 G/DL (ref 13–17.7)
HGB BLD-MCNC: 8 G/DL (ref 13–17.7)
IMM GRANULOCYTES # BLD AUTO: 0.54 10*3/MM3 (ref 0–0.05)
IMM GRANULOCYTES # BLD AUTO: 0.58 10*3/MM3 (ref 0–0.05)
IMM GRANULOCYTES NFR BLD AUTO: 4.8 % (ref 0–0.5)
IMM GRANULOCYTES NFR BLD AUTO: 5 % (ref 0–0.5)
INR PPP: 1.24 (ref 0.9–1.1)
LYMPHOCYTES # BLD AUTO: 2.52 10*3/MM3 (ref 0.7–3.1)
LYMPHOCYTES # BLD AUTO: 3.11 10*3/MM3 (ref 0.7–3.1)
LYMPHOCYTES NFR BLD AUTO: 21.1 % (ref 19.6–45.3)
LYMPHOCYTES NFR BLD AUTO: 28.6 % (ref 19.6–45.3)
MCH RBC QN AUTO: 27.3 PG (ref 26.6–33)
MCH RBC QN AUTO: 28 PG (ref 26.6–33)
MCHC RBC AUTO-ENTMCNC: 32.3 G/DL (ref 31.5–35.7)
MCHC RBC AUTO-ENTMCNC: 33 G/DL (ref 31.5–35.7)
MCV RBC AUTO: 84.6 FL (ref 79–97)
MCV RBC AUTO: 84.7 FL (ref 79–97)
MONOCYTES # BLD AUTO: 0.91 10*3/MM3 (ref 0.1–0.9)
MONOCYTES # BLD AUTO: 0.93 10*3/MM3 (ref 0.1–0.9)
MONOCYTES NFR BLD AUTO: 7.8 % (ref 5–12)
MONOCYTES NFR BLD AUTO: 8.4 % (ref 5–12)
NEUTROPHILS NFR BLD AUTO: 55.9 % (ref 42.7–76)
NEUTROPHILS NFR BLD AUTO: 6.1 10*3/MM3 (ref 1.7–7)
NEUTROPHILS NFR BLD AUTO: 64.9 % (ref 42.7–76)
NEUTROPHILS NFR BLD AUTO: 7.76 10*3/MM3 (ref 1.7–7)
NRBC BLD AUTO-RTO: 0 /100 WBC (ref 0–0.2)
NRBC BLD AUTO-RTO: 0.1 /100 WBC (ref 0–0.2)
PLATELET # BLD AUTO: 327 10*3/MM3 (ref 140–450)
PLATELET # BLD AUTO: 385 10*3/MM3 (ref 140–450)
PMV BLD AUTO: 10.7 FL (ref 6–12)
PMV BLD AUTO: 10.7 FL (ref 6–12)
POTASSIUM SERPL-SCNC: 4 MMOL/L (ref 3.5–5.2)
POTASSIUM SERPL-SCNC: 4.5 MMOL/L (ref 3.5–5.2)
PROCALCITONIN SERPL-MCNC: 0.2 NG/ML (ref 0–0.25)
PROT SERPL-MCNC: 5.7 G/DL (ref 6–8.5)
PROT SERPL-MCNC: 5.7 G/DL (ref 6–8.5)
PROTHROMBIN TIME: 15.5 SECONDS (ref 11.7–14.2)
QT INTERVAL: 360 MS
RBC # BLD AUTO: 2.75 10*6/MM3 (ref 4.14–5.8)
RBC # BLD AUTO: 2.93 10*6/MM3 (ref 4.14–5.8)
SARS-COV-2 RNA RESP QL NAA+PROBE: DETECTED
SODIUM SERPL-SCNC: 135 MMOL/L (ref 136–145)
SODIUM SERPL-SCNC: 136 MMOL/L (ref 136–145)
TROPONIN T SERPL-MCNC: <0.01 NG/ML (ref 0–0.03)
WBC NRBC COR # BLD: 10.89 10*3/MM3 (ref 3.4–10.8)
WBC NRBC COR # BLD: 11.96 10*3/MM3 (ref 3.4–10.8)

## 2022-02-12 PROCEDURE — 82248 BILIRUBIN DIRECT: CPT | Performed by: EMERGENCY MEDICINE

## 2022-02-12 PROCEDURE — 25010000002 DEXAMETHASONE PER 1 MG: Performed by: HOSPITALIST

## 2022-02-12 PROCEDURE — 71045 X-RAY EXAM CHEST 1 VIEW: CPT

## 2022-02-12 PROCEDURE — 93005 ELECTROCARDIOGRAM TRACING: CPT | Performed by: EMERGENCY MEDICINE

## 2022-02-12 PROCEDURE — 87150 DNA/RNA AMPLIFIED PROBE: CPT | Performed by: EMERGENCY MEDICINE

## 2022-02-12 PROCEDURE — 87040 BLOOD CULTURE FOR BACTERIA: CPT | Performed by: EMERGENCY MEDICINE

## 2022-02-12 PROCEDURE — 93010 ELECTROCARDIOGRAM REPORT: CPT | Performed by: INTERNAL MEDICINE

## 2022-02-12 PROCEDURE — 85025 COMPLETE CBC W/AUTO DIFF WBC: CPT | Performed by: EMERGENCY MEDICINE

## 2022-02-12 PROCEDURE — 82962 GLUCOSE BLOOD TEST: CPT

## 2022-02-12 PROCEDURE — 85379 FIBRIN DEGRADATION QUANT: CPT | Performed by: HOSPITALIST

## 2022-02-12 PROCEDURE — 84484 ASSAY OF TROPONIN QUANT: CPT | Performed by: EMERGENCY MEDICINE

## 2022-02-12 PROCEDURE — 36415 COLL VENOUS BLD VENIPUNCTURE: CPT | Performed by: NURSE PRACTITIONER

## 2022-02-12 PROCEDURE — 85025 COMPLETE CBC W/AUTO DIFF WBC: CPT | Performed by: NURSE PRACTITIONER

## 2022-02-12 PROCEDURE — U0003 INFECTIOUS AGENT DETECTION BY NUCLEIC ACID (DNA OR RNA); SEVERE ACUTE RESPIRATORY SYNDROME CORONAVIRUS 2 (SARS-COV-2) (CORONAVIRUS DISEASE [COVID-19]), AMPLIFIED PROBE TECHNIQUE, MAKING USE OF HIGH THROUGHPUT TECHNOLOGIES AS DESCRIBED BY CMS-2020-01-R: HCPCS | Performed by: EMERGENCY MEDICINE

## 2022-02-12 PROCEDURE — 80053 COMPREHEN METABOLIC PANEL: CPT | Performed by: EMERGENCY MEDICINE

## 2022-02-12 PROCEDURE — 85610 PROTHROMBIN TIME: CPT | Performed by: EMERGENCY MEDICINE

## 2022-02-12 PROCEDURE — 84145 PROCALCITONIN (PCT): CPT | Performed by: EMERGENCY MEDICINE

## 2022-02-12 PROCEDURE — 82565 ASSAY OF CREATININE: CPT | Performed by: HOSPITALIST

## 2022-02-12 PROCEDURE — 83605 ASSAY OF LACTIC ACID: CPT | Performed by: EMERGENCY MEDICINE

## 2022-02-12 PROCEDURE — 87147 CULTURE TYPE IMMUNOLOGIC: CPT | Performed by: EMERGENCY MEDICINE

## 2022-02-12 PROCEDURE — XW033E5 INTRODUCTION OF REMDESIVIR ANTI-INFECTIVE INTO PERIPHERAL VEIN, PERCUTANEOUS APPROACH, NEW TECHNOLOGY GROUP 5: ICD-10-PCS | Performed by: HOSPITALIST

## 2022-02-12 PROCEDURE — 25010000002 REMDESIVIR 100 MG/20ML SOLUTION 1 EACH VIAL: Performed by: HOSPITALIST

## 2022-02-12 PROCEDURE — 99285 EMERGENCY DEPT VISIT HI MDM: CPT

## 2022-02-12 RX ORDER — DOXYCYCLINE HYCLATE 100 MG/1
100 CAPSULE ORAL 2 TIMES DAILY
COMMUNITY
End: 2022-02-16 | Stop reason: HOSPADM

## 2022-02-12 RX ORDER — QUETIAPINE FUMARATE 50 MG/1
50 TABLET, FILM COATED ORAL DAILY
Status: DISCONTINUED | OUTPATIENT
Start: 2022-02-12 | End: 2022-02-16 | Stop reason: HOSPADM

## 2022-02-12 RX ORDER — ATORVASTATIN CALCIUM 20 MG/1
40 TABLET, FILM COATED ORAL DAILY
Status: DISCONTINUED | OUTPATIENT
Start: 2022-02-12 | End: 2022-02-16 | Stop reason: HOSPADM

## 2022-02-12 RX ORDER — ACETAMINOPHEN 160 MG/5ML
650 SOLUTION ORAL EVERY 4 HOURS PRN
Status: DISCONTINUED | OUTPATIENT
Start: 2022-02-12 | End: 2022-02-16 | Stop reason: HOSPADM

## 2022-02-12 RX ORDER — DEXAMETHASONE SODIUM PHOSPHATE 10 MG/ML
6 INJECTION INTRAMUSCULAR; INTRAVENOUS DAILY
Status: DISCONTINUED | OUTPATIENT
Start: 2022-02-12 | End: 2022-02-16 | Stop reason: HOSPADM

## 2022-02-12 RX ORDER — SODIUM CHLORIDE 9 MG/ML
100 INJECTION, SOLUTION INTRAVENOUS CONTINUOUS
Status: DISCONTINUED | OUTPATIENT
Start: 2022-02-12 | End: 2022-02-16 | Stop reason: HOSPADM

## 2022-02-12 RX ORDER — ACETAMINOPHEN 650 MG/1
650 SUPPOSITORY RECTAL EVERY 4 HOURS PRN
Status: DISCONTINUED | OUTPATIENT
Start: 2022-02-12 | End: 2022-02-16 | Stop reason: HOSPADM

## 2022-02-12 RX ORDER — OXYCODONE HYDROCHLORIDE AND ACETAMINOPHEN 5; 325 MG/1; MG/1
1 TABLET ORAL EVERY 6 HOURS PRN
Status: ON HOLD | COMMUNITY
End: 2022-02-16 | Stop reason: SDUPTHER

## 2022-02-12 RX ORDER — SODIUM CHLORIDE 0.9 % (FLUSH) 0.9 %
10 SYRINGE (ML) INJECTION AS NEEDED
Status: DISCONTINUED | OUTPATIENT
Start: 2022-02-12 | End: 2022-02-16 | Stop reason: HOSPADM

## 2022-02-12 RX ORDER — METOPROLOL SUCCINATE 100 MG/1
100 TABLET, EXTENDED RELEASE ORAL
Status: DISCONTINUED | OUTPATIENT
Start: 2022-02-12 | End: 2022-02-16 | Stop reason: HOSPADM

## 2022-02-12 RX ORDER — QUETIAPINE FUMARATE 200 MG/1
400 TABLET, FILM COATED ORAL NIGHTLY
Status: DISCONTINUED | OUTPATIENT
Start: 2022-02-12 | End: 2022-02-16 | Stop reason: HOSPADM

## 2022-02-12 RX ORDER — ACETAMINOPHEN 325 MG/1
650 TABLET ORAL EVERY 4 HOURS PRN
Status: DISCONTINUED | OUTPATIENT
Start: 2022-02-12 | End: 2022-02-16 | Stop reason: HOSPADM

## 2022-02-12 RX ORDER — AMLODIPINE BESYLATE 5 MG/1
2.5 TABLET ORAL DAILY
Status: DISCONTINUED | OUTPATIENT
Start: 2022-02-12 | End: 2022-02-16 | Stop reason: HOSPADM

## 2022-02-12 RX ORDER — MIRTAZAPINE 15 MG/1
15 TABLET, FILM COATED ORAL DAILY
Status: DISCONTINUED | OUTPATIENT
Start: 2022-02-12 | End: 2022-02-16 | Stop reason: HOSPADM

## 2022-02-12 RX ORDER — SODIUM CHLORIDE 0.9 % (FLUSH) 0.9 %
10 SYRINGE (ML) INJECTION EVERY 12 HOURS SCHEDULED
Status: DISCONTINUED | OUTPATIENT
Start: 2022-02-12 | End: 2022-02-16 | Stop reason: HOSPADM

## 2022-02-12 RX ORDER — OXYCODONE HYDROCHLORIDE AND ACETAMINOPHEN 5; 325 MG/1; MG/1
1 TABLET ORAL EVERY 4 HOURS PRN
Status: DISCONTINUED | OUTPATIENT
Start: 2022-02-12 | End: 2022-02-16 | Stop reason: HOSPADM

## 2022-02-12 RX ORDER — ONDANSETRON 2 MG/ML
4 INJECTION INTRAMUSCULAR; INTRAVENOUS EVERY 6 HOURS PRN
Status: DISCONTINUED | OUTPATIENT
Start: 2022-02-12 | End: 2022-02-16 | Stop reason: HOSPADM

## 2022-02-12 RX ORDER — NITROGLYCERIN 0.4 MG/1
0.4 TABLET SUBLINGUAL
Status: DISCONTINUED | OUTPATIENT
Start: 2022-02-12 | End: 2022-02-16 | Stop reason: HOSPADM

## 2022-02-12 RX ADMIN — SODIUM CHLORIDE 100 ML/HR: 9 INJECTION, SOLUTION INTRAVENOUS at 21:27

## 2022-02-12 RX ADMIN — OXYCODONE AND ACETAMINOPHEN 1 TABLET: 5; 325 TABLET ORAL at 20:40

## 2022-02-12 RX ADMIN — SODIUM CHLORIDE 100 ML/HR: 9 INJECTION, SOLUTION INTRAVENOUS at 09:26

## 2022-02-12 RX ADMIN — QUETIAPINE FUMARATE 400 MG: 200 TABLET ORAL at 20:40

## 2022-02-12 RX ADMIN — SODIUM CHLORIDE, PRESERVATIVE FREE 10 ML: 5 INJECTION INTRAVENOUS at 20:40

## 2022-02-12 RX ADMIN — AMLODIPINE BESYLATE 2.5 MG: 5 TABLET ORAL at 20:40

## 2022-02-12 RX ADMIN — ATORVASTATIN CALCIUM 40 MG: 20 TABLET, FILM COATED ORAL at 16:00

## 2022-02-12 RX ADMIN — QUETIAPINE FUMARATE 50 MG: 50 TABLET, FILM COATED ORAL at 16:00

## 2022-02-12 RX ADMIN — METOPROLOL SUCCINATE 100 MG: 100 TABLET, EXTENDED RELEASE ORAL at 16:00

## 2022-02-12 RX ADMIN — SODIUM CHLORIDE, PRESERVATIVE FREE 10 ML: 5 INJECTION INTRAVENOUS at 09:27

## 2022-02-12 RX ADMIN — DEXAMETHASONE SODIUM PHOSPHATE 6 MG: 10 INJECTION INTRAMUSCULAR; INTRAVENOUS at 17:40

## 2022-02-12 RX ADMIN — MIRTAZAPINE 15 MG: 15 TABLET, FILM COATED ORAL at 16:01

## 2022-02-12 RX ADMIN — REMDESIVIR 200 MG: 100 INJECTION, POWDER, LYOPHILIZED, FOR SOLUTION INTRAVENOUS at 17:40

## 2022-02-12 NOTE — ED PROVIDER NOTES
" EMERGENCY DEPARTMENT ENCOUNTER    Room Number:  13/13  Date of encounter:  2/12/2022  PCP: William Mcdonald MD  Historian: Patient     I used full protective equipment while examining this patient.  This includes face mask, gloves and protective eyewear.  I washed my hands before entering the room and immediately upon leaving the room      HPI:  Chief Complaint: Altered mental status  A complete HPI/ROS/PMH/PSH/SH/FH are unobtainable due to: None    Context: Atilio Recinos is a 57 y.o. male who presents to the ED c/o altered mental status.  Patient sent from nursing home for confusion onset tonight.  According to EMS he had O2 saturations of 84% on room air which improved after administration of 2 L nasal cannula oxygen.  Here in the ED patient is oriented times \"[facility]\", 2020 and name.  He is able to answer simple questions and follow simple commands.  He denies chest pain, shortness of breath or abdominal pain.  He does report cough which is occasionally productive of yellow sputum.      MEDICAL RECORD REVIEW  I reviewed prior medical records note the patient was hospitalized at [facility] last month with perforated ulcer.  Patient underwent surgical repair and had numerous complications including recurrent pleural effusion.    PAST MEDICAL HISTORY  Active Ambulatory Problems     Diagnosis Date Noted   • Anxiety 08/06/2021   • Bipolar 1 disorder (HCC) 08/06/2021   • COPD (chronic obstructive pulmonary disease) ([facility]) 08/06/2021   • Depression 08/06/2021   • High blood pressure 08/06/2021   • High cholesterol 08/06/2021   • Other acute sinusitis 08/06/2021   • Obesity 09/14/2021   • Cigarette nicotine dependence without complication 09/14/2021   • Pneumoperitoneum of unknown etiology 01/16/2022   • Bowel perforation ([facility]) 01/16/2022   • S/P exploratory laparotomy, oversew of perforated ulcer, placement of Chaparro patch  01/17/2022     Resolved Ambulatory Problems     Diagnosis Date Noted   • No Resolved " Ambulatory Problems     Past Medical History:   Diagnosis Date   • Allergies    • Forgetfulness    • Head injury    • Hepatitis    • Psychiatric illness    • Shortness of breath    • Sinus trouble          PAST SURGICAL HISTORY  Past Surgical History:   Procedure Laterality Date   • EXPLORATORY LAPAROTOMY N/A 1/16/2022    Procedure: EXPLORATORY LAPAROTOMY, OVERSEW OF PERFORATED GASTRIC ULCER WITH [place] PATCH;  Surgeon: Atilio Lindsay MD;  Location: [facility] MAIN OR;  Service: General;  Laterality: N/A;   • PLEURAL SCARIFICATION     • SPHINCTEROTOMY           FAMILY HISTORY  Family History   Problem Relation Age of Onset   • Stroke Other    • Heart disease Other    • Diabetes Other          SOCIAL HISTORY  Social History     Socioeconomic History   • Marital status: Single   Tobacco Use   • Smoking status: Current Every Day Smoker     Packs/day: 2.00     Years: 40.00     Pack years: 80.00     Types: Cigarettes   • Smokeless tobacco: Never Used   Vaping Use   • Vaping Use: Never used   Substance and Sexual Activity   • Alcohol use: Not Currently   • Drug use: Never         ALLERGIES  Antihistamines, chlorpheniramine-type; Amoxicillin-pot clavulanate; Contrast dye; and Diphenhydramine       REVIEW OF SYSTEMS  Review of Systems   Constitutional: Negative.  Negative for fever.   HENT: Negative.  Negative for sore throat.    Eyes: Negative.    Respiratory: Positive for cough and shortness of breath.    Cardiovascular: Negative.  Negative for chest pain.   Gastrointestinal: Positive for nausea. Negative for abdominal pain.   Genitourinary: Negative.  Negative for dysuria.   Musculoskeletal: Negative.  Negative for back pain.   Skin: Negative.  Negative for rash.   Neurological: Negative.  Negative for headaches.   Psychiatric/Behavioral: Positive for confusion.   All other systems reviewed and are negative.          PHYSICAL EXAM    I have reviewed the triage vital signs and nursing notes.    ED Triage Vitals [02/12/22  0137]   Temp Heart Rate Resp BP SpO2   97.5 °F (36.4 °C) (!) 130 18 100/60 95 %      Temp src Heart Rate Source Patient Position BP Location FiO2 (%)   -- -- -- -- --       Physical Exam  GENERAL: Alert male in Mild distress.  Triage vitals are notable for pelevated pulse of 130 and blood pressure of 100/60.  HENT: nares patent, atraumatic  EYES: no scleral icterus  CV: Tachycardic without murmur  RESPIRATORY: Mild tachypnea, slightly decreased breath sounds bilaterally-O2 saturations 94% on 2 L nasal cannula  ABDOMEN: soft, mild distention-no significant tenderness to palpation.  Surgical bandages in place are clean  MUSCULOSKELETAL: no deformity  NEURO: Strength sensation and coordination are grossly intact.  Speech is coherent and clear.  Mentation-able to answer simple questions and follows simple commands  SKIN: warm, dry-No unusual rashes are noted      LAB RESULTS  Recent Results (from the past 24 hour(s))   POC Glucose Once    Collection Time: 02/12/22  1:52 AM    Specimen: Blood   Result Value Ref Range    Glucose 176 (H) 70 - 130 mg/dL   Comprehensive Metabolic Panel    Collection Time: 02/12/22  2:17 AM    Specimen: Blood   Result Value Ref Range    Glucose 106 (H) 65 - 99 mg/dL    BUN 32 (H) 6 - 20 mg/dL    Creatinine 2.22 (H) 0.76 - 1.27 mg/dL    Sodium 135 (L) 136 - 145 mmol/L    Potassium 4.0 3.5 - 5.2 mmol/L    Chloride 102 98 - 107 mmol/L    CO2 22.1 22.0 - 29.0 mmol/L    Calcium 8.0 (L) 8.6 - 10.5 mg/dL    Total Protein 5.7 (L) 6.0 - 8.5 g/dL    Albumin 2.20 (L) 3.50 - 5.20 g/dL    ALT (SGPT) 48 (H) 1 - 41 U/L    AST (SGOT) 42 (H) 1 - 40 U/L    Alkaline Phosphatase 126 (H) 39 - 117 U/L    Total Bilirubin 0.2 0.0 - 1.2 mg/dL    eGFR Non African Amer 30 (L) >60 mL/min/1.73    Globulin 3.5 gm/dL    A/G Ratio 0.6 g/dL    BUN/Creatinine Ratio 14.4 7.0 - 25.0    Anion Gap 10.9 5.0 - 15.0 mmol/L   Protime-INR    Collection Time: 02/12/22  2:17 AM    Specimen: Blood   Result Value Ref Range    Protime 15.5  (H) 11.7 - 14.2 Seconds    INR 1.24 (H) 0.90 - 1.10   Procalcitonin    Collection Time: 02/12/22  2:17 AM    Specimen: Blood   Result Value Ref Range    Procalcitonin 0.20 0.00 - 0.25 ng/mL   Lactic Acid, Plasma    Collection Time: 02/12/22  2:17 AM    Specimen: Blood   Result Value Ref Range    Lactate 0.7 0.5 - 2.0 mmol/L   Troponin    Collection Time: 02/12/22  2:17 AM    Specimen: Blood   Result Value Ref Range    Troponin T <0.010 0.000 - 0.030 ng/mL   CBC Auto Differential    Collection Time: 02/12/22  2:17 AM    Specimen: Blood   Result Value Ref Range    WBC 11.96 (H) 3.40 - 10.80 10*3/mm3    RBC 2.75 (L) 4.14 - 5.80 10*6/mm3    Hemoglobin 7.7 (L) 13.0 - 17.7 g/dL    Hematocrit 23.3 (L) 37.5 - 51.0 %    MCV 84.7 79.0 - 97.0 fL    MCH 28.0 26.6 - 33.0 pg    MCHC 33.0 31.5 - 35.7 g/dL    RDW 15.1 12.3 - 15.4 %    RDW-SD 45.2 37.0 - 54.0 fl    MPV 10.7 6.0 - 12.0 fL    Platelets 327 140 - 450 10*3/mm3    Neutrophil % 64.9 42.7 - 76.0 %    Lymphocyte % 21.1 19.6 - 45.3 %    Monocyte % 7.8 5.0 - 12.0 %    Eosinophil % 1.1 0.3 - 6.2 %    Basophil % 0.3 0.0 - 1.5 %    Immature Grans % 4.8 (H) 0.0 - 0.5 %    Neutrophils, Absolute 7.76 (H) 1.70 - 7.00 10*3/mm3    Lymphocytes, Absolute 2.52 0.70 - 3.10 10*3/mm3    Monocytes, Absolute 0.93 (H) 0.10 - 0.90 10*3/mm3    Eosinophils, Absolute 0.13 0.00 - 0.40 10*3/mm3    Basophils, Absolute 0.04 0.00 - 0.20 10*3/mm3    Immature Grans, Absolute 0.58 (H) 0.00 - 0.05 10*3/mm3    nRBC 0.0 0.0 - 0.2 /100 WBC   ECG 12 Lead    Collection Time: 02/12/22  2:40 AM   Result Value Ref Range    QT Interval 360 ms       Ordered the above labs and independently reviewed the results.      RADIOLOGY  XR Chest 1 View    Result Date: 2/12/2022  Patient: FLOR LEYVA  Time Out: 02:31 Exam(s): FILM CXR 1 VIEW EXAM:   XR Chest, 1 View CLINICAL HISTORY:    Reason for exam: Shortness of breath. TECHNIQUE:   Frontal view of the chest. COMPARISON:   No previous studies. FINDINGS:   Lungs:   Unremarkable.  No consolidation.   Pleural space:  Small to moderate left pleural effusion.  No pneumothorax.   Heart:  Cardiomegaly.   Mediastinum:  Unremarkable.   Bones joints:  Osteopenia.   Soft tissues:  Soft tissues are within normal limits.   Other findings:  Mild hypoaeration. IMPRESSION:     1.  Cardiomegaly. 2.  Small to moderate left pleural effusion. 3.  Clinical correlation is advised to assess for the possibility of congestive heart failure.     Electronically signed by Gregor White MD on 02-12-22 at 0231      I ordered the above noted radiological studies. Reviewed by me and discussed with radiologist.  See dictation for official radiology interpretation.      PROCEDURES  Procedures      MEDICATIONS GIVEN IN ER    Medications   sodium chloride 0.9 % bolus 2,871 mL (2,871 mL Intravenous New Bag 2/12/22 0204)         PROGRESS, DATA ANALYSIS, CONSULTS, AND MEDICAL DECISION MAKING    All labs have been independently reviewed by me.  All radiology studies have been reviewed by me and discussed with radiologist dictating the report.   EKG's independently viewed and interpreted by me.  Discussion below represents my analysis of pertinent findings related to patient's condition, differential diagnosis, treatment plan and final disposition.      ED Course as of 02/12/22 0425   Sat Feb 12, 2022   0204 [id number]-old male with complicated past history including recent surgical repair of perforated ulcer.  Patient sent from nursing home with confusion.  Upon presentation to ED patient was fairly tachycardic with pulse around 130 and hypotensive with pressures running around 90s systolic.  Etiology of hypotension and confusion is unclear.  Would consider multiple etiologies including but not limited to the following:  Infection  Dehydration  Pulmonary effusion  Bowel obstruction  Cardiac arrhythmia [DB]   0205 EKG          EKG time: 0240  Rhythm/Rate: Sinus tachycardia 111  P waves and ID: Normal P waves and  NC interval  QRS, axis: Normal axis, normal QRS  ST and T waves: Unremarkable ST and T wave    Interpreted Contemporaneously by me, independently viewed  Not significantly changed compared to prior 2/4/2022   [DB]   0249 Hemoglobin(!): 7.7  Hemoglobin of 7.7 represents significant anemia.  Most recent hemoglobin 5 days ago was 9.6 and could represent some acute blood loss. [DB]   0250 WBC(!): 11.96  White blood count mildly elevated 11.96.  This is similar to white blood count from 5 days ago. [DB]   0250 Chest x-ray reviewed with reading radiologist shows cardiomegaly and small to moderate left pleural effusion. [DB]   0405 Labs show that patient is in some degree of acute renal failure with elevated BUN/creatinine of 32 and 2.2. [DB]   0405 Blood pressure and heart rate are improved.  Will contact hospitalist about admission for further evaluation treatment. [DB]   0423 I discussed evaluation of this patient with Patricia who will admit on behalf of Dr. Rodriguez from [facility]. [DB]   0424 I spoke with patient's  who is at the bedside.  He is a nurse and familiar with patient's medical history.  We discussed need for admission with apparent acute renal failure.  Also discussed that there is worsening of prior anemia. [DB]      ED Course User Index  [DB] Ru Saba MD       AS OF 04:25 EST VITALS:    BP - 114/64  HR - 109  TEMP - 97.5 °F (36.4 °C)  O2 SATS - 100%      DIAGNOSIS  Final diagnoses:   Hypotension due to hypovolemia   Acute renal failure, unspecified acute renal failure type (HCC)   Anemia, unspecified type         DISPOSITION  Admission         Ru Saba MD  02/12/22 1235

## 2022-02-12 NOTE — ED NOTES
Patient wearing mask, nurse wearing mask, n95, protective eyewear for care and assessment.  Hand hygiene performed prior to and post care.      Provider at bedside. Pt lethargic, can respond to provider questions. Pt spontaneously has tremors and shakes.      Danica Morales RN  02/12/22 0156

## 2022-02-12 NOTE — ED TRIAGE NOTES
Pt to ER via EMS from [facility] with c/o ams. Pt was discharged from [facility] 3 days ago after abdominal surgery. Facility called EMS with complaints that pt was not acting himself. He was slow to respond, reaching for things that aren't there, and jerking movements. Pt was 84% on room air and now 96% on 2 L. Pt in mask in triage along with nursing staff.

## 2022-02-12 NOTE — PROGRESS NOTES
[facility]  Clinical Pharmacy Department     Remdesivir Review Note    Atilio Recinos is a 57 y.o. male with confirmed COVID-19 infection on day 1 of hospitalization.     Consulting Provider:  Victorino  Date of Confirmed SARS-CoV-2: 02/12/2022  Date of Symptom Onset: 02/12/2022  Other Antimicrobials: none    Allergies  Allergies as of 02/12/2022 - Reviewed 02/12/2022   Allergen Reaction Noted   • Antihistamines, chlorpheniramine-type Other (See Comments) 11/05/2020   • Amoxicillin-pot clavulanate Hives 07/15/2021   • Contrast dye Hives and Nausea Only 07/15/2021   • Diphenhydramine Hives 07/15/2021       Microbiology:  Microbiology Results (last 10 days)     Procedure Component Value - Date/Time    COVID PRE-OP / PRE-PROCEDURE SCREENING ORDER (NO ISOLATION) - Swab, Nasopharynx [[id number]]  (Abnormal) Collected: 02/12/22 0338    Lab Status: Final result Specimen: Swab from Nasopharynx Updated: 02/12/22 0521    Narrative:      The following orders were created for panel order COVID PRE-OP / PRE-PROCEDURE SCREENING ORDER (NO ISOLATION) - Swab, Nasopharynx.  Procedure                               Abnormality         Status                     ---------                               -----------         ------                     COVID-19,[facility] IN-HOUSE...[[id number]]  Abnormal            Final result                 Please view results for these tests on the individual orders.    COVID-19,[facility] IN-HOUSE CEPHEID/ARIEL NP SWAB IN TRANSPORT MEDIA 8-12 HR TAT - Swab, Nasopharynx [[id number]]  (Abnormal) Collected: 02/12/22 0338    Lab Status: Final result Specimen: Swab from Nasopharynx Updated: 02/12/22 0521     COVID19 Detected    Narrative:      Fact sheet for providers: https://www.fda.gov/media/448540/download     Fact sheet for patients: https://www.fda.gov/media/695233/download    COVID PRE-OP / PRE-PROCEDURE SCREENING ORDER (NO ISOLATION) - Swab, Nasopharynx [[id number]]  (Normal) Collected: 02/06/22 1440    Lab  Status: Final result Specimen: Swab from Nasopharynx Updated: 02/06/22 2037    Narrative:      The following orders were created for panel order COVID PRE-OP / PRE-PROCEDURE SCREENING ORDER (NO ISOLATION) - Swab, Nasopharynx.  Procedure                               Abnormality         Status                     ---------                               -----------         ------                     COVID-19,APTIMA PANTHER(...[[id number]]  Normal              Final result                 Please view results for these tests on the individual orders.    COVID-19,APTIMA PANTHER(MURALI),[facility], NP/OP SWAB IN UTM/VTM/SALINE TRANSPORT MEDIA,24 HR TAT - Swab, Nasopharynx [[id number]]  (Normal) Collected: 02/06/22 1440    Lab Status: Final result Specimen: Swab from Nasopharynx Updated: 02/06/22 2037     COVID19 Not Detected    Narrative:      Fact sheet for providers: https://www.fda.gov/media/782267/download     Fact sheet for patients: https://www.fda.gov/media/248941/download    Test performed by RT PCR.    MRSA Screen, PCR (Inpatient) - Swab, Nares [[id number]]  (Normal) Collected: 02/03/22 1853    Lab Status: Final result Specimen: Swab from Nares Updated: 02/03/22 2049     MRSA PCR No MRSA Detected    Wound Culture - Wound, Abdominal Wall [[id number]]  (Abnormal) Collected: 02/02/22 2201    Lab Status: Final result Specimen: Wound from Abdominal Wall Updated: 02/05/22 0827     Wound Culture Rare Candida albicans     Gram Stain No organisms seen    Anaerobic Culture - Pleural Fluid, Pleural Cavity [[id number]] Collected: 02/02/22 1733    Lab Status: Final result Specimen: Pleural Fluid from Pleural Cavity Updated: 02/07/22 0654     Anaerobic Culture No anaerobes isolated at 5 days    AFB Culture - Body Fluid, Pleural Cavity [[id number]] Collected: 02/02/22 1732    Lab Status: Preliminary result Specimen: Body Fluid from Pleural Cavity Updated: 02/09/22 1800     AFB Culture No AFB isolated at 1 week     AFB Stain No acid  fast bacilli seen    Body Fluid Culture - Body Fluid, Pleural Cavity [[id number]] Collected: 02/02/22 1732    Lab Status: Final result Specimen: Body Fluid from Pleural Cavity Updated: 02/05/22 0645     Body Fluid Culture No growth at 3 days     Gram Stain Few (2+) WBCs seen      No organisms seen    Fungus Culture - Body Fluid, Pleural Cavity [[id number]] Collected: 02/02/22 1732    Lab Status: Preliminary result Specimen: Body Fluid from Pleural Cavity Updated: 02/09/22 1800     Fungus Culture No fungus isolated at 1 week          Vitals/Labs/I&O  Temp:  [97 °F (36.1 °C)-97.7 °F (36.5 °C)] 97 °F (36.1 °C)  Heart Rate:  [109-130] 122  Resp:  [16-18] 16  BP: ()/(53-74) 120/74    Results from last 7 days   Lab Units 02/12/22  0919 02/12/22 0217 02/07/22  1317   WBC 10*3/mm3 10.89* 11.96* 11.90*     Results from last 7 days   Lab Units 02/12/22  0217   PROCALCITONIN ng/mL 0.20     Results from last 7 days   Lab Units 02/12/22 0217   AST (SGOT) U/L 42*      Results from last 7 days   Lab Units 02/12/22 0217   ALT (SGPT) U/L 48*       Estimated Creatinine Clearance: 54.9 mL/min (A) (by C-G formula based on SCr of 1.57 mg/dL (H)).  Results from last 7 days   Lab Units 02/12/22 0919 02/12/22 0217 02/08/22  0326   BUN mg/dL 27* 32* 10   CREATININE mg/dL 1.57* 2.22* 0.82     Intake & Output (last 3 days)       02/09 0701  02/10 0700 02/10 0701 02/11 0700 02/11 0701 02/12 0700 02/12 0701 02/13 0700    IV Piggyback   2871     Total Intake   2871     Urine    875    Total Output    875    Net   +2871 -875            Urine Unmeasured Occurrence    2 x          Assessment/Plan:    Patient meets the following inclusion/exclusion criteria:  1. Patient is hospitalized with confirmed COVID-19 infection (and accompanying symptoms)  2. Patient is requiring an increase in baseline supplemental oxygen requirements OR SpO2 </= 94% on room air secondary to COVID-19 infectionBaseline and daily LFTs and Scr have been ordered  prior to remdesivir initiation  3. ALT is not ? 10 times the upper limit of normal  4. Patient is not on concomitant hydroxychloroquine or chloroquine  5. Patient does not require invasive mechanical ventilation (IMV) or ECMO  6. Patient is within 10 days from symptom onset (for criteria 2a) or within 7 days of symptom onset (for criteria 2b)    Remdesivir 200 mg IV x 1 dose, followed by 100 mg IV q24h for 5 days or until hospital discharge (whichever comes first) has been ordered.    Thank you for involving pharmacy in this patient's care. Please contact pharmacy with any questions or concerns.                           Randy Shelby, PharmD  Clinical Pharmacist  02/12/22 16:24 EST

## 2022-02-12 NOTE — ED NOTES
..  Nursing report ED to floor  Atilio Recinos  57 y.o.  male    HPI :   Chief Complaint   Patient presents with   • Altered Mental Status       Admitting doctor:   Sourav Rodriguez MD    Admitting diagnosis:   The primary encounter diagnosis was Hypotension due to hypovolemia. Diagnoses of Acute renal failure, unspecified acute renal failure type (HCC) and Anemia, unspecified type were also pertinent to this visit.    Code status:   Current Code Status     Date Active Code Status Order ID Comments User Context       Prior    Advance Care Planning Activity          Allergies:   Antihistamines, chlorpheniramine-type; Amoxicillin-pot clavulanate; Contrast dye; and Diphenhydramine    Intake and Output    Intake/Output Summary (Last 24 hours) at 2/12/2022 0459  Last data filed at 2/12/2022 0434  Gross per 24 hour   Intake 2871 ml   Output --   Net 2871 ml       Weight:   There were no vitals filed for this visit.    Most recent vitals:   Vitals:    02/12/22 0308 02/12/22 0321 02/12/22 0336 02/12/22 0424   BP: 105/64 108/62 114/64 109/59   Pulse: 109 112 109    Resp:       Temp:       SpO2: 100% 100% 100%        Active LDAs/IV Access:   Lines, Drains & Airways     Active LDAs     Name Placement date Placement time Site Days    Peripheral IV 02/12/22 0136 Left Antecubital 02/12/22 0136  Antecubital  less than 1    Peripheral IV 02/12/22 0423 Right; Proximal Forearm 02/12/22 0423  Forearm  less than 1                Labs (abnormal labs have a star):   Labs Reviewed   COMPREHENSIVE METABOLIC PANEL - Abnormal; Notable for the following components:       Result Value    Glucose 106 (*)     BUN 32 (*)     Creatinine 2.22 (*)     Sodium 135 (*)     Calcium 8.0 (*)     Total Protein 5.7 (*)     Albumin 2.20 (*)     ALT (SGPT) 48 (*)     AST (SGOT) 42 (*)     Alkaline Phosphatase 126 (*)     eGFR Non  Amer 30 (*)     All other components within normal limits    Narrative:     GFR Normal >60  Chronic Kidney Disease  "<60  Kidney Failure <15     PROTIME-INR - Abnormal; Notable for the following components:    Protime 15.5 (*)     INR 1.24 (*)     All other components within normal limits   CBC WITH AUTO DIFFERENTIAL - Abnormal; Notable for the following components:    WBC 11.96 (*)     RBC 2.75 (*)     Hemoglobin 7.7 (*)     Hematocrit 23.3 (*)     Immature Grans % 4.8 (*)     Neutrophils, Absolute 7.76 (*)     Monocytes, Absolute 0.93 (*)     Immature Grans, Absolute 0.58 (*)     All other components within normal limits   POCT GLUCOSE FINGERSTICK - Abnormal; Notable for the following components:    Glucose 176 (*)     All other components within normal limits   PROCALCITONIN - Normal    Narrative:     As a Marker for Sepsis (Non-Neonates):     1. <0.5 ng/mL represents a low risk of severe sepsis and/or septic shock.  2. >2 ng/mL represents a high risk of severe sepsis and/or septic shock.    As a Marker for Lower Respiratory Tract Infections that require antibiotic therapy:  PCT on Admission     Antibiotic Therapy             6-12 Hrs later  >0.5                          Strongly Recommended            >0.25 - <0.5             Recommended  0.1 - 0.25                  Discouraged                       Remeasure/reassess PCT  <0.1                         Strongly Discouraged         Remeasure/reassess PCT      As 28 day mortality risk marker: \"Change in Procalcitonin Result\" (>80% or <=80%) if Day 0 (or Day 1) and Day 4 values are available. Refer to http://www.[employer]-pct-calculator.com/    Change in PCT <=80 %   A decrease of PCT levels below or equal to 80% defines a positive change in PCT test result representing a higher risk for 28-day all-cause mortality of patients diagnosed with severe sepsis or septic shock.    Change in PCT >80 %   A decrease of PCT levels of more than 80% defines a negative change in PCT result representing a lower risk for 28-day all-cause mortality of patients diagnosed with severe sepsis or septic " shock.               LACTIC ACID, PLASMA - Normal   TROPONIN (IN-HOUSE) - Normal    Narrative:     Troponin T Reference Range:  <= 0.03 ng/mL-   Negative for AMI  >0.03 ng/mL-     Abnormal for myocardial necrosis.  Clinicians would have to utilize clinical acumen, EKG, Troponin and serial changes to determine if it is an Acute Myocardial Infarction or myocardial injury due to an underlying chronic condition.       Results may be falsely decreased if patient taking Biotin.     BLOOD CULTURE   BLOOD CULTURE   COVID PRE-OP / PRE-PROCEDURE SCREENING ORDER (NO ISOLATION)    Narrative:     The following orders were created for panel order COVID PRE-OP / PRE-PROCEDURE SCREENING ORDER (NO ISOLATION) - Swab, Nasopharynx.  Procedure                               Abnormality         Status                     ---------                               -----------         ------                     COVID-19, [facility] IN-HOUSE...[[id number]]                      In process                   Please view results for these tests on the individual orders.   COVID-19,[facility] IN-HOUSE CEPHEID/ARIEL, NP SWAB IN TRANSPORT MEDIA 8-12 HR TAT   CBC AND DIFFERENTIAL    Narrative:     The following orders were created for panel order CBC & Differential.  Procedure                               Abnormality         Status                     ---------                               -----------         ------                     CBC Auto Differential[[id number]]        Abnormal            Final result                 Please view results for these tests on the individual orders.       EKG:   ECG 12 Lead   Preliminary Result   HEART RATE= 111  bpm   RR Interval= 540  ms   [place] Interval= 125  ms   P Horizontal Axis= -1  deg   P Front Axis= 62  deg   QRSD Interval= 87  ms   QT Interval= 360  ms   QRS Axis= 70  deg   T Wave Axis= 40  deg   - BORDERLINE ECG -   Sinus tachycardia   Borderline prolonged QT interval   Electronically Signed By:    Date and Time of  Study: 2022-02-12 02:40:00          Meds given in ED:   Medications   sodium chloride 0.9 % bolus 2,871 mL (0 mL/kg × 95.7 kg Intravenous Stopped 2/12/22 0434)       Imaging results:  XR Chest 1 View    Result Date: 2/12/2022  Electronically signed by Gregor White MD on 02-12-22 at 0231      Ambulatory status:   - bed bound    Social issues:   Social History     Socioeconomic History   • Marital status: Single   Tobacco Use   • Smoking status: Current Every Day Smoker     Packs/day: 2.00     Years: 40.00     Pack years: 80.00     Types: Cigarettes   • Smokeless tobacco: Never Used   Vaping Use   • Vaping Use: Never used   Substance and Sexual Activity   • Alcohol use: Not Currently   • Drug use: Never       NIH Stroke Scale:        Nursing report ED to floor:       Danica Morales RN  02/12/22 7530

## 2022-02-12 NOTE — PLAN OF CARE
From ER with confusion and hypotension. VSS. Pt A&O x3. Pt remains on 2LNC. Up to BSC x2 ast. Will continue to monitor.     Problem: Skin Injury Risk Increased  Goal: Skin Health and Integrity  Outcome: Ongoing, Progressing     Problem: Fall Injury Risk  Goal: Absence of Fall and Fall-Related Injury  Outcome: Ongoing, Progressing  Intervention: Promote Injury-Free Environment  Recent Flowsheet Documentation  Taken 2/12/2022 1800 by Marcie Morris RN  Safety Promotion/Fall Prevention:   activity supervised   clutter free environment maintained   fall prevention program maintained   nonskid shoes/slippers when out of bed   safety round/check completed   room organization consistent  Taken 2/12/2022 1600 by Marcie Morris, RN  Safety Promotion/Fall Prevention:   activity supervised   clutter free environment maintained   fall prevention program maintained   nonskid shoes/slippers when out of bed   safety round/check completed   room organization consistent  Taken 2/12/2022 1415 by Marcie Morris, HUGO  Safety Promotion/Fall Prevention:   activity supervised   clutter free environment maintained   fall prevention program maintained   nonskid shoes/slippers when out of bed   safety round/check completed   room organization consistent  Taken 2/12/2022 1200 by Marcie Morris RN  Safety Promotion/Fall Prevention:   activity supervised   clutter free environment maintained   fall prevention program maintained   nonskid shoes/slippers when out of bed   room organization consistent   safety round/check completed  Taken 2/12/2022 1000 by Marcie Morris RN  Safety Promotion/Fall Prevention:   activity supervised   clutter free environment maintained   fall prevention program maintained   nonskid shoes/slippers when out of bed   room organization consistent   safety round/check completed  Taken 2/12/2022 0825 by Marcie Morris RN  Safety Promotion/Fall Prevention:   activity supervised   clutter free environment maintained   fall  prevention program maintained   nonskid shoes/slippers when out of bed   room organization consistent   safety round/check completed     Problem: Adult Inpatient Plan of Care  Goal: Plan of Care Review  Outcome: Ongoing, Progressing  Goal: Patient-Specific Goal (Individualized)  Outcome: Ongoing, Progressing  Goal: Absence of Hospital-Acquired Illness or Injury  Outcome: Ongoing, Progressing  Intervention: Identify and Manage Fall Risk  Recent Flowsheet Documentation  Taken 2/12/2022 1800 by Marcie Morris RN  Safety Promotion/Fall Prevention:   activity supervised   clutter free environment maintained   fall prevention program maintained   nonskid shoes/slippers when out of bed   safety round/check completed   room organization consistent  Taken 2/12/2022 1600 by Marcie Morris RN  Safety Promotion/Fall Prevention:   activity supervised   clutter free environment maintained   fall prevention program maintained   nonskid shoes/slippers when out of bed   safety round/check completed   room organization consistent  Taken 2/12/2022 1415 by Marcie Morris RN  Safety Promotion/Fall Prevention:   activity supervised   clutter free environment maintained   fall prevention program maintained   nonskid shoes/slippers when out of bed   safety round/check completed   room organization consistent  Taken 2/12/2022 1200 by Marcie Morris RN  Safety Promotion/Fall Prevention:   activity supervised   clutter free environment maintained   fall prevention program maintained   nonskid shoes/slippers when out of bed   room organization consistent   safety round/check completed  Taken 2/12/2022 1000 by Marcie Morris RN  Safety Promotion/Fall Prevention:   activity supervised   clutter free environment maintained   fall prevention program maintained   nonskid shoes/slippers when out of bed   room organization consistent   safety round/check completed  Taken 2/12/2022 0825 by Marcie Morris, HUGO  Safety Promotion/Fall Prevention:    activity supervised   clutter free environment maintained   fall prevention program maintained   nonskid shoes/slippers when out of bed   room organization consistent   safety round/check completed  Intervention: Prevent and Manage VTE (venous thromboembolism) Risk  Recent Flowsheet Documentation  Taken 2/12/2022 0825 by Marcie Morris RN  VTE Prevention/Management:   bilateral   dorsiflexion/plantar flexion performed  Goal: Optimal Comfort and Wellbeing  Outcome: Ongoing, Progressing  Intervention: Provide Person-Centered Care  Recent Flowsheet Documentation  Taken 2/12/2022 0825 by Marcie Morris RN  Trust Relationship/Rapport: care explained  Goal: Readiness for Transition of Care  Outcome: Ongoing, Progressing  Intervention: Mutually Develop Transition Plan  Recent Flowsheet Documentation  Taken 2/12/2022 1139 by Marcie Morris RN  Transportation Anticipated: health plan transportation  Transportation Concerns: other (see comments)  Patient/Family Anticipated Services at Transition:      rehabilitation services  Patient/Family Anticipates Transition to:   home   inpatient rehabilitation facility  Taken 2/12/2022 1132 by Marcie Morris RN  Equipment Currently Used at Home: (UNKNOWN FROM REHAB) --     Problem: Asthma Comorbidity  Goal: Maintenance of Asthma Control  Outcome: Ongoing, Progressing     Problem: COPD Comorbidity  Goal: Maintenance of COPD Symptom Control  Outcome: Ongoing, Progressing     Problem: Diabetes Comorbidity  Goal: Blood Glucose Level Within Desired Range  Outcome: Ongoing, Progressing     Problem: Heart Failure Comorbidity  Goal: Maintenance of Heart Failure Symptom Control  Outcome: Ongoing, Progressing     Problem: Hypertension Comorbidity  Goal: Blood Pressure in Desired Range  Outcome: Ongoing, Progressing     Problem: Obstructive Sleep Apnea Risk or Actual (Comorbidity Management)  Goal: Unobstructed Breathing During Sleep  Outcome: Ongoing, Progressing     Problem: Pain  Chronic (Persistent) (Comorbidity Management)  Goal: Acceptable Pain Control and Functional Ability  Outcome: Ongoing, Progressing  Intervention: Optimize Psychosocial Wellbeing  Recent Flowsheet Documentation  Taken 2/12/2022 1435 by Marcie Morris RN  Diversional Activities: television  Family/Support System Care:   caregiver stress acknowledged   support provided   self-care encouraged     Problem: Seizure Disorder Comorbidity  Goal: Maintenance of Seizure Control  Outcome: Ongoing, Progressing   Goal Outcome Evaluation:

## 2022-02-12 NOTE — H&P
"HISTORY AND PHYSICAL   [facility]        Patient Identification:  Name: Atilio Recinos  Age: 57 y.o.  Sex: male  :  1965  MRN: [id number]                     Primary Care Physician: William Mcdonald MD    Chief Complaint:  Altered mental status    History of Present Illness:        The patient  is a 57 y.o. male with history of bipolar disorder, COPD, history of head injury with forgetfulness, hypertension, hyperlipidemia and history of recent exploratory laparotomy to oversew perforated ulcer who presents to the hospital complaining of altered mental status that was noticed at his skilled nursing facility.  Patient sent from nursing home for confusion.  According to EMS he had O2 saturations of 84% on room air which improved after administration of 2 L nasal cannula oxygen.  Here in the ED patient is oriented times \"[facility]\",  and name.  He is able to answer simple questions and follow simple commands.  He denies chest pain, shortness of breath or abdominal pain.  He does report cough which is occasionally productive of yellow sputum.  The patient was positive for COVID-19.  The patient was admitted for further evaluation treatment.  He also had acute injury of the kidney with creatinine elevated to 2.2.      Past Medical History:  Past Medical History:   Diagnosis Date   • Allergies    • Anxiety    • Bipolar 1 disorder (HCC)    • COPD (chronic obstructive pulmonary disease) (HCC)    • Depression    • Forgetfulness    • Head injury    • Hepatitis    • High blood pressure    • High cholesterol    • Psychiatric illness    • S/P exploratory laparotomy, oversew of perforated ulcer, placement of Chaparro patch  2022   • Shortness of breath    • Sinus trouble      Past Surgical History:  Past Surgical History:   Procedure Laterality Date   • EXPLORATORY LAPAROTOMY N/A 2022    Procedure: EXPLORATORY LAPAROTOMY, OVERSEW OF PERFORATED GASTRIC ULCER WITH CHAPARRO PATCH;  Surgeon: " Atilio Lindsay MD;  Location: [facility] MAIN OR;  Service: General;  Laterality: N/A;   • PLEURAL SCARIFICATION     • SPHINCTEROTOMY        Home Meds:  Medications Prior to Admission   Medication Sig Dispense Refill Last Dose   • amLODIPine (NORVASC) 5 MG tablet Take 1 tablet by mouth Daily. (Patient taking differently: Take 2.5 mg by mouth Daily.) 90 tablet 1    • atorvastatin (LIPITOR) 40 MG tablet Take 40 mg by mouth Daily.      • diclofenac (VOLTAREN) 75 MG EC tablet TAKE 1 TABLET TWICE DAILY 180 tablet 1    • doxycycline (VIBRAMYCIN) 100 MG capsule Take 100 mg by mouth 2 (Two) Times a Day.      • Fluticasone Furoate-Vilanterol (Breo Ellipta) 100-25 MCG/INH inhaler Inhale 1 puff Daily. 60 each 2    • gabapentin (NEURONTIN) 800 MG tablet Take 800 mg by mouth 3 (Three) Times a Day.      • gemfibrozil (LOPID) 600 MG tablet Take 1 tablet by mouth 2 (two) times a day. 180 tablet 1    • hydroCHLOROthiazide (HYDRODIURIL) 12.5 MG tablet Take 1 tablet by mouth Daily. 90 tablet 1    • lisinopril (PRINIVIL,ZESTRIL) 20 MG tablet Take 1 tablet by mouth Daily. 30 tablet 0.    • metoprolol succinate XL (TOPROL-XL) 100 MG 24 hr tablet Take 1 tablet by mouth Daily. 30 tablet 0    • mirtazapine (REMERON) 15 MG tablet Take 15 mg by mouth Daily.      • Omega-3 Fatty Acids (fish oil) 1000 MG capsule capsule Take 1 capsule by mouth 2 (two) times a day. 180 capsule 1    • oxyCODONE-acetaminophen (PERCOCET) 5-325 MG per tablet Take 1 tablet by mouth Every 6 (Six) Hours As Needed.      • QUEtiapine (SEROquel) 400 MG tablet Take 1 tablet by mouth Every Night. 30 tablet 0    • QUEtiapine (SEROquel) 50 MG tablet Take 1 tablet by mouth Daily for 30 days. 30 tablet 0    • vitamin D (ERGOCALCIFEROL) 1.25 MG (16836 UT) capsule capsule TAKE 1 CAPSULE EVERY WEEK 13 capsule 1      Current meds    Current Facility-Administered Medications:   •  acetaminophen (TYLENOL) tablet 650 mg, 650 mg, Oral, Q4H PRN **OR** acetaminophen (TYLENOL) 160 MG/5ML  solution 650 mg, 650 mg, Oral, Q4H PRN **OR** acetaminophen (TYLENOL) suppository 650 mg, 650 mg, Rectal, Q4H PRN, Patricia Correia APRN  •  amLODIPine (NORVASC) tablet 2.5 mg, 2.5 mg, Oral, Daily, Jose F Gleason MD  •  atorvastatin (LIPITOR) tablet 40 mg, 40 mg, Oral, Daily, Jose F Gleason MD, 40 mg at 02/12/22 1600  •  metoprolol succinate XL (TOPROL-XL) 24 hr tablet 100 mg, 100 mg, Oral, Q24H, Jose F Gleason MD, 100 mg at 02/12/22 1600  •  mirtazapine (REMERON) tablet 15 mg, 15 mg, Oral, Daily, Jose F Gleason MD, 15 mg at 02/12/22 1601  •  nitroglycerin (NITROSTAT) SL tablet 0.4 mg, 0.4 mg, Sublingual, Q5 Min PRN, Patricia Correia APRN  •  ondansetron (ZOFRAN) injection 4 mg, 4 mg, Intravenous, Q6H PRN, Patricia Correia APRN  •  oxyCODONE-acetaminophen (PERCOCET) 5-325 MG per tablet 1 tablet, 1 tablet, Oral, Q4H PRN, Jose F Gleason MD  •  QUEtiapine (SEROquel) tablet 400 mg, 400 mg, Oral, Nightly, Jose F Gleason MD  •  QUEtiapine (SEROquel) tablet 50 mg, 50 mg, Oral, Daily, Jose F Gleason MD, 50 mg at 02/12/22 1600  •  sodium chloride 0.9 % flush 10 mL, 10 mL, Intravenous, Q12H, Patricia Correia APRN, 10 mL at 02/12/22 0927  •  sodium chloride 0.9 % flush 10 mL, 10 mL, Intravenous, PRN, Patricia Correia APRN  •  sodium chloride 0.9 % infusion, 100 mL/hr, Intravenous, Continuous, Patircia Correia APRN, Last Rate: 100 mL/hr at 02/12/22 0926, 100 mL/hr at 02/12/22 0926  Allergies:  Allergies   Allergen Reactions   • Antihistamines, Chlorpheniramine-Type Other (See Comments)   • Amoxicillin-Pot Clavulanate Hives   • Contrast Dye Hives and Nausea Only   • Diphenhydramine Hives     Immunizations:  Immunization History   Administered Date(s) Administered   • COVID-19 (PFIZER) PURPLE CAP 04/16/2021, 05/07/2021   • Flu Vaccine Quad PF >18YRS 10/18/2018   • FluLaval/Fluarix/Fluzone >6 09/14/2021   • Influenza, Unspecified 01/28/2021   • Pneumococcal Polysaccharide (PPSV23) 03/04/2021   • Tdap  2021     Social History:   Social History     Social History Narrative   • Not on file     Social History     Socioeconomic History   • Marital status: Single   Tobacco Use   • Smoking status: Current Every Day Smoker     Packs/day: 2.00     Years: 40.00     Pack years: 80.00     Types: Cigarettes   • Smokeless tobacco: Never Used   Vaping Use   • Vaping Use: Never used   Substance and Sexual Activity   • Alcohol use: Not Currently   • Drug use: Never       Family History:  Family History   Problem Relation Age of Onset   • Stroke Other    • Heart disease Other    • Diabetes Other         Review of Systems  See history of present illness and past medical history.  Patient denies headache, dizziness, syncope, falls, trauma, change in vision, change in hearing, change in taste, changes in weight, changes in appetite, focal weakness, numbness, or paresthesia.  Patient denies chest pain, palpitations, dyspnea, orthopnea, PND, cough, sinus pressure, rhinorrhea, epistaxis, hemoptysis, nausea, vomiting, hematemesis, diarrhea, constipation or hematchezia.  Denies cold or heat intolerance, polydipsia, polyuria, polyphagia. Denies hematuria, pyuria, dysuria, hesitancy, frequency or urgency. Denies consumption of raw and under cooked meats foods or change in water source.  Denies fever, chills, sweats, night sweats.  Denies missing any routine medications. Remainder of ROS is negative.    Objective:  tMax 24 hrs: Temp (24hrs), Av.4 °F (36.3 °C), Min:97 °F (36.1 °C), Max:97.7 °F (36.5 °C)    Vitals Ranges:   Temp:  [97 °F (36.1 °C)-97.7 °F (36.5 °C)] 97 °F (36.1 °C)  Heart Rate:  [109-130] 122  Resp:  [16-18] 16  BP: ()/(53-74) 120/74      Exam:  /74   Pulse (!) 122   Temp 97 °F (36.1 °C) (Oral)   Resp 16   SpO2 97%     General Appearance:    Alert, cooperative, no distress, appears stated age   Head:    Normocephalic, without obvious abnormality, atraumatic   Eyes:    PERRL, conjunctivae/corneas  clear, EOM's intact, both eyes   Ears:    Normal external ear canals, both ears   Nose:   Nares normal, septum midline, mucosa normal, no drainage    or sinus tenderness   Throat:   Lips, mucosa, and tongue normal   Neck:   Supple, symmetrical, trachea midline, no adenopathy;     thyroid:  no enlargement/tenderness/nodules; no carotid    bruit or JVD   Back:     Symmetric, no curvature, ROM normal, no CVA tenderness   Lungs:     Clear to auscultation bilaterally, respirations unlabored   Chest Wall:    No tenderness or deformity    Heart:    Regular rate and rhythm, S1 and S2 normal, no murmur, rub   or gallop   Abdomen:     Soft, nontender, bowel sounds active all four quadrants,     no masses, no hepatomegaly, no splenomegaly   Extremities:   Extremities normal, atraumatic, no cyanosis or edema   Pulses:   2+ and symmetric all extremities   Skin:   Skin color, texture, turgor normal, no rashes or lesions   Lymph nodes:   Cervical, supraclavicular, and axillary nodes normal   Neurologic:   CNII-XII intact, normal strength, sensation intact throughout      .    Data Review:  Lab Results (last 72 hours)     Procedure Component Value Units Date/Time    Basic Metabolic Panel [[id number]]  (Abnormal) Collected: 02/12/22 0919    Specimen: Blood Updated: 02/12/22 1044     Glucose 88 mg/dL      BUN 27 mg/dL      Creatinine 1.57 mg/dL      Sodium 136 mmol/L      Potassium 4.5 mmol/L      Chloride 108 mmol/L      CO2 19.0 mmol/L      Calcium 7.8 mg/dL      eGFR Non African Amer 45 mL/min/1.73      BUN/Creatinine Ratio 17.2     Anion Gap 9.0 mmol/L     Narrative:      GFR Normal >60  Chronic Kidney Disease <60  Kidney Failure <15      CBC Auto Differential [[id number]]  (Abnormal) Collected: 02/12/22 0919    Specimen: Blood Updated: 02/12/22 1017     WBC 10.89 10*3/mm3      RBC 2.93 10*6/mm3      Hemoglobin 8.0 g/dL      Hematocrit 24.8 %      MCV 84.6 fL      MCH 27.3 pg      MCHC 32.3 g/dL      RDW 15.1 %      RDW-SD 45.6 fl       MPV 10.7 fL      Platelets 385 10*3/mm3      Neutrophil % 55.9 %      Lymphocyte % 28.6 %      Monocyte % 8.4 %      Eosinophil % 1.5 %      Basophil % 0.6 %      Immature Grans % 5.0 %      Neutrophils, Absolute 6.10 10*3/mm3      Lymphocytes, Absolute 3.11 10*3/mm3      Monocytes, Absolute 0.91 10*3/mm3      Eosinophils, Absolute 0.16 10*3/mm3      Basophils, Absolute 0.07 10*3/mm3      Immature Grans, Absolute 0.54 10*3/mm3      nRBC 0.1 /100 WBC     COVID PRE-OP / PRE-PROCEDURE SCREENING ORDER (NO ISOLATION) - Swab, Nasopharynx [[id number]]  (Abnormal) Collected: 02/12/22 0338    Specimen: Swab from Nasopharynx Updated: 02/12/22 0521    Narrative:      The following orders were created for panel order COVID PRE-OP / PRE-PROCEDURE SCREENING ORDER (NO ISOLATION) - Swab, Nasopharynx.  Procedure                               Abnormality         Status                     ---------                               -----------         ------                     COVID-19,[facility]HOUSE...[[id number]]  Abnormal            Final result                 Please view results for these tests on the individual orders.    COVID-19,[facility]HOU[facility] CEPHEID/ARIEL NP SWAB IN TRANSPORT MEDIA 8-12 HR TAT - Swab, Nasopharynx [[id number]]  (Abnormal) Collected: 02/12/22 0338    Specimen: Swab from Nasopharynx Updated: 02/12/22 0521     COVID19 Detected    Narrative:      Fact sheet for providers: https://www.fda.gov/media/523742/download     Fact sheet for patients: https://www.fda.gov/media/639162/download    Blood Culture - Blood, Leg, Left [[id number]] Collected: 02/12/22 0421    Specimen: Blood from Leg, Left Updated: 02/12/22 0427    Blood Culture - Blood, Hand, Right [[id number]] Collected: 02/12/22 0421    Specimen: Blood from Hand, Right Updated: 02/12/22 0427    Comprehensive Metabolic Panel [[id number]]  (Abnormal) Collected: 02/12/22 0217    Specimen: Blood Updated: 02/12/22 0307     Glucose 106 mg/dL      BUN 32 mg/dL       "Creatinine 2.22 mg/dL      Sodium 135 mmol/L      Potassium 4.0 mmol/L      Chloride 102 mmol/L      CO2 22.1 mmol/L      Calcium 8.0 mg/dL      Total Protein 5.7 g/dL      Albumin 2.20 g/dL      ALT (SGPT) 48 U/L      AST (SGOT) 42 U/L      Alkaline Phosphatase 126 U/L      Total Bilirubin 0.2 mg/dL      eGFR Non African Amer 30 mL/min/1.73      Globulin 3.5 gm/dL      A/G Ratio 0.6 g/dL      BUN/Creatinine Ratio 14.4     Anion Gap 10.9 mmol/L     Narrative:      GFR Normal >60  Chronic Kidney Disease <60  Kidney Failure <15      Procalcitonin [[id number]]  (Normal) Collected: 02/12/22 0217    Specimen: Blood Updated: 02/12/22 0258     Procalcitonin 0.20 ng/mL     Narrative:      As a Marker for Sepsis (Non-Neonates):     1. <0.5 ng/mL represents a low risk of severe sepsis and/or septic shock.  2. >2 ng/mL represents a high risk of severe sepsis and/or septic shock.    As a Marker for Lower Respiratory Tract Infections that require antibiotic therapy:  PCT on Admission     Antibiotic Therapy             6-12 Hrs later  >0.5                          Strongly Recommended            >0.25 - <0.5             Recommended  0.1 - 0.25                  Discouraged                       Remeasure/reassess PCT  <0.1                         Strongly Discouraged         Remeasure/reassess PCT      As 28 day mortality risk marker: \"Change in Procalcitonin Result\" (>80% or <=80%) if Day 0 (or Day 1) and Day 4 values are available. Refer to http://www.[employer]-pct-calculator.com/    Change in PCT <=80 %   A decrease of PCT levels below or equal to 80% defines a positive change in PCT test result representing a higher risk for 28-day all-cause mortality of patients diagnosed with severe sepsis or septic shock.    Change in PCT >80 %   A decrease of PCT levels of more than 80% defines a negative change in PCT result representing a lower risk for 28-day all-cause mortality of patients diagnosed with severe sepsis or septic " shock.                Troponin [[id number]]  (Normal) Collected: 02/12/22 0217    Specimen: Blood Updated: 02/12/22 0258     Troponin T <0.010 ng/mL     Narrative:      Troponin T Reference Range:  <= 0.03 ng/mL-   Negative for AMI  >0.03 ng/mL-     Abnormal for myocardial necrosis.  Clinicians would have to utilize clinical acumen, EKG, Troponin and serial changes to determine if it is an Acute Myocardial Infarction or myocardial injury due to an underlying chronic condition.       Results may be falsely decreased if patient taking Biotin.      Protime-INR [[id number]]  (Abnormal) Collected: 02/12/22 0217    Specimen: Blood Updated: 02/12/22 0246     Protime 15.5 Seconds      INR 1.24    Lactic Acid, Plasma [[id number]]  (Normal) Collected: 02/12/22 0217    Specimen: Blood Updated: 02/12/22 0245     Lactate 0.7 mmol/L     CBC & Differential [[id number]]  (Abnormal) Collected: 02/12/22 0217    Specimen: Blood Updated: 02/12/22 0232    Narrative:      The following orders were created for panel order CBC & Differential.  Procedure                               Abnormality         Status                     ---------                               -----------         ------                     CBC Auto Differential[[id number]]        Abnormal            Final result                 Please view results for these tests on the individual orders.    CBC Auto Differential [[id number]]  (Abnormal) Collected: 02/12/22 0217    Specimen: Blood Updated: 02/12/22 0232     WBC 11.96 10*3/mm3      RBC 2.75 10*6/mm3      Hemoglobin 7.7 g/dL      Hematocrit 23.3 %      MCV 84.7 fL      MCH 28.0 pg      MCHC 33.0 g/dL      RDW 15.1 %      RDW-SD 45.2 fl      MPV 10.7 fL      Platelets 327 10*3/mm3      Neutrophil % 64.9 %      Lymphocyte % 21.1 %      Monocyte % 7.8 %      Eosinophil % 1.1 %      Basophil % 0.3 %      Immature Grans % 4.8 %      Neutrophils, Absolute 7.76 10*3/mm3      Lymphocytes, Absolute 2.52 10*3/mm3      Monocytes,  Absolute 0.93 10*3/mm3      Eosinophils, Absolute 0.13 10*3/mm3      Basophils, Absolute 0.04 10*3/mm3      Immature Grans, Absolute 0.58 10*3/mm3      nRBC 0.0 /100 WBC     POC Glucose Once [[id number]]  (Abnormal) Collected: 02/12/22 0152    Specimen: Blood Updated: 02/12/22 0153     Glucose 176 mg/dL      Comment: Meter: [id number] : [id number] Donis Wiley RN                      Imaging Results (All)     Procedure Component Value Units Date/Time    XR Chest 1 View [[id number]] Collected: 02/12/22 0232     Updated: 02/12/22 0232    Narrative:        Patient: FLOR LEYVA  Time Out: 02:31  Exam(s): FILM CXR 1 VIEW     EXAM:    XR Chest, 1 View    CLINICAL HISTORY:     Reason for exam: Shortness of breath.    TECHNIQUE:    Frontal view of the chest.    COMPARISON:    No previous studies.    FINDINGS:    Lungs:  Unremarkable.  No consolidation.    Pleural space:  Small to moderate left pleural effusion.  No   pneumothorax.    Heart:  Cardiomegaly.    Mediastinum:  Unremarkable.    Bones joints:  Osteopenia.    Soft tissues:  Soft tissues are within normal limits.    Other findings:  Mild hypoaeration.    IMPRESSION:       1.  Cardiomegaly.  2.  Small to moderate left pleural effusion.  3.  Clinical correlation is advised to assess for the possibility of   congestive heart failure.      Impression:          Electronically signed by Gregor White MD on 02-12-22 at 0231        Past Medical History:   Diagnosis Date   • Allergies    • Anxiety    • Bipolar 1 disorder (HCC)    • COPD (chronic obstructive pulmonary disease) (HCC)    • Depression    • Forgetfulness    • Head injury    • Hepatitis    • High blood pressure    • High cholesterol    • Psychiatric illness    • S/P exploratory laparotomy, oversew of perforated ulcer, placement of Chaparro patch  1/17/2022   • Shortness of breath    • Sinus trouble        Assessment:  Active Hospital Problems    Diagnosis  POA   • **Altered mental status [R41.82]  Yes   •  Hypotension due to hypovolemia [I95.89, E86.1]  Yes   • VERA (acute kidney injury) ([facility]) [N17.9]  Yes   • S/P exploratory laparotomy, oversew of perforated ulcer, placement of Chaparro patch  [Z98.890]  Not Applicable   • Pneumoperitoneum of unknown etiology [K66.8]  Yes   • Bowel perforation ([facility]) [K63.1]  Yes   • Anxiety [F41.9]  Yes   • Bipolar 1 disorder ([facility]) [F31.9]  Yes   • COPD (chronic obstructive pulmonary disease) ([facility]) [J44.9]  Yes   • High blood pressure [I10]  Yes      Resolved Hospital Problems   No resolved problems to display.       Plan:  We will continue with some IV fluid hydration and follow-up lab studies.  We will start Decadron and remdesivir.    Jose F Gleason MD  2/12/2022  16:11 EST

## 2022-02-13 LAB
ALBUMIN SERPL-MCNC: 2.7 G/DL (ref 3.5–5.2)
ALBUMIN/GLOB SERPL: 0.8 G/DL
ALP SERPL-CCNC: 128 U/L (ref 39–117)
ALT SERPL W P-5'-P-CCNC: 48 U/L (ref 1–41)
ANION GAP SERPL CALCULATED.3IONS-SCNC: 8 MMOL/L (ref 5–15)
AST SERPL-CCNC: 35 U/L (ref 1–40)
BASOPHILS # BLD AUTO: 0.04 10*3/MM3 (ref 0–0.2)
BASOPHILS NFR BLD AUTO: 0.4 % (ref 0–1.5)
BILIRUB CONJ SERPL-MCNC: <0.2 MG/DL (ref 0–0.3)
BILIRUB SERPL-MCNC: 0.3 MG/DL (ref 0–1.2)
BUN SERPL-MCNC: 13 MG/DL (ref 6–20)
BUN/CREAT SERPL: 14.3 (ref 7–25)
CALCIUM SPEC-SCNC: 8.7 MG/DL (ref 8.6–10.5)
CHLORIDE SERPL-SCNC: 107 MMOL/L (ref 98–107)
CO2 SERPL-SCNC: 23 MMOL/L (ref 22–29)
CREAT SERPL-MCNC: 0.91 MG/DL (ref 0.76–1.27)
CRP SERPL-MCNC: 4.56 MG/DL (ref 0–0.5)
DEPRECATED RDW RBC AUTO: 45.2 FL (ref 37–54)
EOSINOPHIL # BLD AUTO: 0 10*3/MM3 (ref 0–0.4)
EOSINOPHIL NFR BLD AUTO: 0 % (ref 0.3–6.2)
ERYTHROCYTE [DISTWIDTH] IN BLOOD BY AUTOMATED COUNT: 14.7 % (ref 12.3–15.4)
FERRITIN SERPL-MCNC: 583 NG/ML (ref 30–400)
FOLATE SERPL-MCNC: 4.48 NG/ML (ref 4.78–24.2)
GFR SERPL CREATININE-BSD FRML MDRD: 85 ML/MIN/1.73
GLOBULIN UR ELPH-MCNC: 3.6 GM/DL
GLUCOSE SERPL-MCNC: 110 MG/DL (ref 65–99)
HCT VFR BLD AUTO: 25.8 % (ref 37.5–51)
HGB BLD-MCNC: 8.3 G/DL (ref 13–17.7)
IMM GRANULOCYTES # BLD AUTO: 0.49 10*3/MM3 (ref 0–0.05)
IMM GRANULOCYTES NFR BLD AUTO: 4.8 % (ref 0–0.5)
IRON 24H UR-MRATE: 28 MCG/DL (ref 59–158)
IRON SATN MFR SERPL: 16 % (ref 20–50)
LDH SERPL-CCNC: 319 U/L (ref 135–225)
LYMPHOCYTES # BLD AUTO: 1.57 10*3/MM3 (ref 0.7–3.1)
LYMPHOCYTES NFR BLD AUTO: 15.2 % (ref 19.6–45.3)
MCH RBC QN AUTO: 27.5 PG (ref 26.6–33)
MCHC RBC AUTO-ENTMCNC: 32.2 G/DL (ref 31.5–35.7)
MCV RBC AUTO: 85.4 FL (ref 79–97)
MONOCYTES # BLD AUTO: 0.41 10*3/MM3 (ref 0.1–0.9)
MONOCYTES NFR BLD AUTO: 4 % (ref 5–12)
NEUTROPHILS NFR BLD AUTO: 7.8 10*3/MM3 (ref 1.7–7)
NEUTROPHILS NFR BLD AUTO: 75.6 % (ref 42.7–76)
NRBC BLD AUTO-RTO: 0 /100 WBC (ref 0–0.2)
PLATELET # BLD AUTO: 398 10*3/MM3 (ref 140–450)
PMV BLD AUTO: 10.3 FL (ref 6–12)
POTASSIUM SERPL-SCNC: 4.5 MMOL/L (ref 3.5–5.2)
PROCALCITONIN SERPL-MCNC: 0.1 NG/ML (ref 0–0.25)
PROT SERPL-MCNC: 6.3 G/DL (ref 6–8.5)
RBC # BLD AUTO: 3.02 10*6/MM3 (ref 4.14–5.8)
SODIUM SERPL-SCNC: 138 MMOL/L (ref 136–145)
TIBC SERPL-MCNC: 174 MCG/DL (ref 298–536)
TRANSFERRIN SERPL-MCNC: 117 MG/DL (ref 200–360)
TSH SERPL DL<=0.05 MIU/L-ACNC: 0.25 UIU/ML (ref 0.27–4.2)
VIT B12 BLD-MCNC: 888 PG/ML (ref 211–946)
WBC NRBC COR # BLD: 10.31 10*3/MM3 (ref 3.4–10.8)

## 2022-02-13 PROCEDURE — 25010000002 ONDANSETRON PER 1 MG: Performed by: NURSE PRACTITIONER

## 2022-02-13 PROCEDURE — 84466 ASSAY OF TRANSFERRIN: CPT | Performed by: HOSPITALIST

## 2022-02-13 PROCEDURE — 80053 COMPREHEN METABOLIC PANEL: CPT | Performed by: HOSPITALIST

## 2022-02-13 PROCEDURE — 82728 ASSAY OF FERRITIN: CPT | Performed by: HOSPITALIST

## 2022-02-13 PROCEDURE — 25010000002 DEXAMETHASONE PER 1 MG: Performed by: HOSPITALIST

## 2022-02-13 PROCEDURE — 85025 COMPLETE CBC W/AUTO DIFF WBC: CPT | Performed by: HOSPITALIST

## 2022-02-13 PROCEDURE — 82607 VITAMIN B-12: CPT | Performed by: HOSPITALIST

## 2022-02-13 PROCEDURE — 83540 ASSAY OF IRON: CPT | Performed by: HOSPITALIST

## 2022-02-13 PROCEDURE — 25010000002 NA FERRIC GLUC CPLX PER 12.5 MG: Performed by: HOSPITALIST

## 2022-02-13 PROCEDURE — 82746 ASSAY OF FOLIC ACID SERUM: CPT | Performed by: HOSPITALIST

## 2022-02-13 PROCEDURE — 84145 PROCALCITONIN (PCT): CPT | Performed by: HOSPITALIST

## 2022-02-13 PROCEDURE — 25010000002 REMDESIVIR 100 MG/20ML SOLUTION 1 EACH VIAL: Performed by: HOSPITALIST

## 2022-02-13 PROCEDURE — 83615 LACTATE (LD) (LDH) ENZYME: CPT | Performed by: HOSPITALIST

## 2022-02-13 PROCEDURE — 84443 ASSAY THYROID STIM HORMONE: CPT | Performed by: HOSPITALIST

## 2022-02-13 PROCEDURE — 82248 BILIRUBIN DIRECT: CPT | Performed by: HOSPITALIST

## 2022-02-13 PROCEDURE — 86140 C-REACTIVE PROTEIN: CPT | Performed by: HOSPITALIST

## 2022-02-13 RX ADMIN — OXYCODONE AND ACETAMINOPHEN 1 TABLET: 5; 325 TABLET ORAL at 20:07

## 2022-02-13 RX ADMIN — AMLODIPINE BESYLATE 2.5 MG: 5 TABLET ORAL at 11:08

## 2022-02-13 RX ADMIN — METOPROLOL SUCCINATE 100 MG: 100 TABLET, EXTENDED RELEASE ORAL at 08:48

## 2022-02-13 RX ADMIN — SODIUM CHLORIDE 100 ML/HR: 9 INJECTION, SOLUTION INTRAVENOUS at 08:48

## 2022-02-13 RX ADMIN — ONDANSETRON 4 MG: 2 INJECTION INTRAMUSCULAR; INTRAVENOUS at 20:23

## 2022-02-13 RX ADMIN — QUETIAPINE FUMARATE 50 MG: 50 TABLET, FILM COATED ORAL at 08:48

## 2022-02-13 RX ADMIN — SODIUM CHLORIDE 100 ML/HR: 9 INJECTION, SOLUTION INTRAVENOUS at 20:11

## 2022-02-13 RX ADMIN — DEXAMETHASONE SODIUM PHOSPHATE 6 MG: 10 INJECTION INTRAMUSCULAR; INTRAVENOUS at 08:48

## 2022-02-13 RX ADMIN — OXYCODONE AND ACETAMINOPHEN 1 TABLET: 5; 325 TABLET ORAL at 15:57

## 2022-02-13 RX ADMIN — SODIUM CHLORIDE 250 MG: 9 INJECTION, SOLUTION INTRAVENOUS at 20:07

## 2022-02-13 RX ADMIN — MIRTAZAPINE 15 MG: 15 TABLET, FILM COATED ORAL at 08:48

## 2022-02-13 RX ADMIN — REMDESIVIR 100 MG: 100 INJECTION, POWDER, LYOPHILIZED, FOR SOLUTION INTRAVENOUS at 17:43

## 2022-02-13 RX ADMIN — SODIUM CHLORIDE, PRESERVATIVE FREE 10 ML: 5 INJECTION INTRAVENOUS at 20:07

## 2022-02-13 RX ADMIN — QUETIAPINE FUMARATE 400 MG: 200 TABLET ORAL at 20:07

## 2022-02-13 RX ADMIN — SODIUM CHLORIDE, PRESERVATIVE FREE 10 ML: 5 INJECTION INTRAVENOUS at 08:50

## 2022-02-13 RX ADMIN — ATORVASTATIN CALCIUM 40 MG: 20 TABLET, FILM COATED ORAL at 08:48

## 2022-02-13 NOTE — PROGRESS NOTES
DAILY PROGRESS NOTE  [facility]    Patient Identification:  Name: Atilio Recinos  Age: 57 y.o.  Sex: male  :  1965  MRN: [id number]         Primary Care Physician: William Mcdonald MD    Subjective:  Interval History: He is still very weak.    Objective:    Scheduled Meds:amLODIPine, 2.5 mg, Oral, Daily  atorvastatin, 40 mg, Oral, Daily  dexamethasone, 6 mg, Intravenous, Daily  metoprolol succinate XL, 100 mg, Oral, Q24H  mirtazapine, 15 mg, Oral, Daily  QUEtiapine, 400 mg, Oral, Nightly  QUEtiapine, 50 mg, Oral, Daily  remdesivir, 100 mg, Intravenous, Q24H  sodium chloride, 10 mL, Intravenous, Q12H      Continuous Infusions:sodium chloride, 100 mL/hr, Last Rate: 100 mL/hr (22 0848)        Vital signs in last 24 hours:  Temp:  [97.6 °F (36.4 °C)-98 °F (36.7 °C)] 97.8 °F (36.6 °C)  Heart Rate:  [] 91  Resp:  [18] 18  BP: (120-149)/(70-76) 146/75    Intake/Output:    Intake/Output Summary (Last 24 hours) at 2022 1537  Last data filed at 2022 1107  Gross per 24 hour   Intake --   Output 1475 ml   Net -1475 ml       Exam:  /75 (BP Location: Left arm, Patient Position: Lying)   Pulse 91   Temp 97.8 °F (36.6 °C) (Oral)   Resp 18   SpO2 93%     General Appearance:    Alert, cooperative, no distress   Head:    Normocephalic, without obvious abnormality, atraumatic   Eyes:       Throat:   Lips, tongue, gums normal   Neck:   Supple, symmetrical, trachea midline, no JVD   Lungs:     Clear to auscultation bilaterally, respirations unlabored   Chest Wall:    No tenderness or deformity    Heart:    Regular rate and rhythm, S1 and S2 normal, no murmur,no  Rub or gallop   Abdomen:     Soft, abdominal wounds with dressing in place, nontender, bowel sounds active, no masses, no organomegaly    Extremities:   Extremities normal, atraumatic, no cyanosis or edema   Pulses:      Skin:   Skin is warm and dry,  no rashes or palpable lesions   Neurologic:   no focal deficits noted       Lab Results (last 72 hours)     Procedure Component Value Units Date/Time    TSH [[id number]]  (Abnormal) Collected: 02/13/22 1042    Specimen: Blood Updated: 02/13/22 1413     TSH 0.251 uIU/mL     Lactate Dehydrogenase [[id number]]  (Abnormal) Collected: 02/13/22 1042    Specimen: Blood Updated: 02/13/22 1412      U/L     Iron Profile [[id number]]  (Abnormal) Collected: 02/13/22 1042    Specimen: Blood Updated: 02/13/22 1412     Iron 28 mcg/dL      Iron Saturation 16 %      Transferrin 117 mg/dL      TIBC 174 mcg/dL     Bilirubin, Direct [[id number]]  (Normal) Collected: 02/13/22 1042    Specimen: Blood Updated: 02/13/22 1412     Bilirubin, Direct <0.2 mg/dL     C-reactive Protein [[id number]]  (Abnormal) Collected: 02/13/22 1042    Specimen: Blood Updated: 02/13/22 1412     C-Reactive Protein 4.56 mg/dL     Folate [[id number]]  (Abnormal) Collected: 02/13/22 1042    Specimen: Blood Updated: 02/13/22 1157     Folate 4.48 ng/mL     Narrative:      Results may be falsely increased if patient taking Biotin.      Vitamin B12 [[id number]]  (Normal) Collected: 02/13/22 1042    Specimen: Blood Updated: 02/13/22 1157     Vitamin B-12 888 pg/mL     Narrative:      Results may be falsely increased if patient taking Biotin.      Procalcitonin [[id number]]  (Normal) Collected: 02/13/22 1042    Specimen: Blood Updated: 02/13/22 1151     Procalcitonin 0.10 ng/mL     Narrative:      As a Marker for Sepsis (Non-Neonates):     1. <0.5 ng/mL represents a low risk of severe sepsis and/or septic shock.  2. >2 ng/mL represents a high risk of severe sepsis and/or septic shock.    As a Marker for Lower Respiratory Tract Infections that require antibiotic therapy:  PCT on Admission     Antibiotic Therapy             6-12 Hrs later  >0.5                          Strongly Recommended            >0.25 - <0.5             Recommended  0.1 - 0.25                  Discouraged                       Remeasure/reassess PCT  <0.1                     "     Strongly Discouraged         Remeasure/reassess PCT      As 28 day mortality risk marker: \"Change in Procalcitonin Result\" (>80% or <=80%) if Day 0 (or Day 1) and Day 4 values are available. Refer to http://www.[facility] CenterDealstreetpct-calculator.com/    Change in PCT <=80 %   A decrease of PCT levels below or equal to 80% defines a positive change in PCT test result representing a higher risk for 28-day all-cause mortality of patients diagnosed with severe sepsis or septic shock.    Change in PCT >80 %   A decrease of PCT levels of more than 80% defines a negative change in PCT result representing a lower risk for 28-day all-cause mortality of patients diagnosed with severe sepsis or septic shock.                Ferritin [[id number]]  (Abnormal) Collected: 02/13/22 1042    Specimen: Blood Updated: 02/13/22 1151     Ferritin 583.00 ng/mL     Narrative:      Results may be falsely decreased if patient taking Biotin.      Comprehensive Metabolic Panel [[id number]]  (Abnormal) Collected: 02/13/22 1042    Specimen: Blood Updated: 02/13/22 1149     Glucose 110 mg/dL      BUN 13 mg/dL      Creatinine 0.91 mg/dL      Sodium 138 mmol/L      Potassium 4.5 mmol/L      Chloride 107 mmol/L      CO2 23.0 mmol/L      Calcium 8.7 mg/dL      Total Protein 6.3 g/dL      Albumin 2.70 g/dL      ALT (SGPT) 48 U/L      AST (SGOT) 35 U/L      Alkaline Phosphatase 128 U/L      Total Bilirubin 0.3 mg/dL      eGFR Non African Amer 85 mL/min/1.73      Globulin 3.6 gm/dL      A/G Ratio 0.8 g/dL      BUN/Creatinine Ratio 14.3     Anion Gap 8.0 mmol/L     Narrative:      GFR Normal >60  Chronic Kidney Disease <60  Kidney Failure <15      CBC & Differential [[id number]]  (Abnormal) Collected: 02/13/22 1042    Specimen: Blood Updated: 02/13/22 1125    Narrative:      The following orders were created for panel order CBC & Differential.  Procedure                               Abnormality         Status                     ---------                            "    -----------         ------                     CBC Auto Differential[[id number]]        Abnormal            Final result                 Please view results for these tests on the individual orders.    CBC Auto Differential [[id number]]  (Abnormal) Collected: 02/13/22 1042    Specimen: Blood Updated: 02/13/22 1125     WBC 10.31 10*3/mm3      RBC 3.02 10*6/mm3      Hemoglobin 8.3 g/dL      Hematocrit 25.8 %      MCV 85.4 fL      MCH 27.5 pg      MCHC 32.2 g/dL      RDW 14.7 %      RDW-SD 45.2 fl      MPV 10.3 fL      Platelets 398 10*3/mm3      Neutrophil % 75.6 %      Lymphocyte % 15.2 %      Monocyte % 4.0 %      Eosinophil % 0.0 %      Basophil % 0.4 %      Immature Grans % 4.8 %      Neutrophils, Absolute 7.80 10*3/mm3      Lymphocytes, Absolute 1.57 10*3/mm3      Monocytes, Absolute 0.41 10*3/mm3      Eosinophils, Absolute 0.00 10*3/mm3      Basophils, Absolute 0.04 10*3/mm3      Immature Grans, Absolute 0.49 10*3/mm3      nRBC 0.0 /100 WBC     Blood Culture - Blood, Leg, Left [[id number]]  (Normal) Collected: 02/12/22 0421    Specimen: Blood from Leg, Left Updated: 02/13/22 0430     Blood Culture No growth at 24 hours    Blood Culture - Blood, Hand, Right [[id number]]  (Normal) Collected: 02/12/22 0421    Specimen: Blood from Hand, Right Updated: 02/13/22 0430     Blood Culture No growth at 24 hours    Hepatic Function Panel [[id number]]  (Abnormal) Collected: 02/12/22 1851    Specimen: Blood Updated: 02/12/22 1923     Total Protein 5.7 g/dL      Albumin 2.30 g/dL      ALT (SGPT) 41 U/L      AST (SGOT) 39 U/L      Alkaline Phosphatase 120 U/L      Total Bilirubin 0.2 mg/dL      Bilirubin, Direct <0.2 mg/dL      Bilirubin, Indirect --     Comment: Unable to calculate       Creatinine, Serum [[id number]]  (Normal) Collected: 02/12/22 1851    Specimen: Blood Updated: 02/12/22 1923     Creatinine 1.08 mg/dL      eGFR Non African Amer 70 mL/min/1.73     Narrative:      GFR Normal >60  Chronic Kidney Disease <60  Kidney  Failure <15      D-dimer, Quantitative [[id number]]  (Abnormal) Collected: 02/12/22 1851    Specimen: Blood Updated: 02/12/22 1915     D-Dimer, Quantitative 2.18 MCGFEU/mL     Narrative:      The Stago D-Dimer test used in conjunction with a clinical pretest probability (PTP) assessment model, has been approved by the FDA to rule out the presence of venous thromboembolism (VTE) in outpatients suspected of deep venous thrombosis (DVT) or pulmonary embolism (PE). The cut-off for negative predictive value is <0.50 MCGFEU/mL.    Basic Metabolic Panel [[id number]]  (Abnormal) Collected: 02/12/22 0919    Specimen: Blood Updated: 02/12/22 1044     Glucose 88 mg/dL      BUN 27 mg/dL      Creatinine 1.57 mg/dL      Sodium 136 mmol/L      Potassium 4.5 mmol/L      Chloride 108 mmol/L      CO2 19.0 mmol/L      Calcium 7.8 mg/dL      eGFR Non African Amer 45 mL/min/1.73      BUN/Creatinine Ratio 17.2     Anion Gap 9.0 mmol/L     Narrative:      GFR Normal >60  Chronic Kidney Disease <60  Kidney Failure <15      CBC Auto Differential [[id number]]  (Abnormal) Collected: 02/12/22 0919    Specimen: Blood Updated: 02/12/22 1017     WBC 10.89 10*3/mm3      RBC 2.93 10*6/mm3      Hemoglobin 8.0 g/dL      Hematocrit 24.8 %      MCV 84.6 fL      MCH 27.3 pg      MCHC 32.3 g/dL      RDW 15.1 %      RDW-SD 45.6 fl      MPV 10.7 fL      Platelets 385 10*3/mm3      Neutrophil % 55.9 %      Lymphocyte % 28.6 %      Monocyte % 8.4 %      Eosinophil % 1.5 %      Basophil % 0.6 %      Immature Grans % 5.0 %      Neutrophils, Absolute 6.10 10*3/mm3      Lymphocytes, Absolute 3.11 10*3/mm3      Monocytes, Absolute 0.91 10*3/mm3      Eosinophils, Absolute 0.16 10*3/mm3      Basophils, Absolute 0.07 10*3/mm3      Immature Grans, Absolute 0.54 10*3/mm3      nRBC 0.1 /100 WBC     COVID PRE-OP / PRE-PROCEDURE SCREENING ORDER (NO ISOLATION) - Swab, Nasopharynx [[id number]]  (Abnormal) Collected: 02/12/22 0338    Specimen: Swab from Nasopharynx Updated:  02/12/22 0521    Narrative:      The following orders were created for panel order COVID PRE-OP / PRE-PROCEDURE SCREENING ORDER (NO ISOLATION) - Swab, Nasopharynx.  Procedure                               Abnormality         Status                     ---------                               -----------         ------                     COVID-19,[facility] IN-HOUSE...[[id number]]  Abnormal            Final result                 Please view results for these tests on the individual orders.    COVID-19,[facility] IN-HOUSE CEPHEID/ARIEL NP SWAB IN TRANSPORT MEDIA 8-12 HR TAT - Swab, Nasopharynx [[id number]]  (Abnormal) Collected: 02/12/22 0338    Specimen: Swab from Nasopharynx Updated: 02/12/22 0521     COVID19 Detected    Narrative:      Fact sheet for providers: https://www.fda.gov/media/867247/download     Fact sheet for patients: https://www.fda.gov/media/757826/download    Comprehensive Metabolic Panel [[id number]]  (Abnormal) Collected: 02/12/22 0217    Specimen: Blood Updated: 02/12/22 0307     Glucose 106 mg/dL      BUN 32 mg/dL      Creatinine 2.22 mg/dL      Sodium 135 mmol/L      Potassium 4.0 mmol/L      Chloride 102 mmol/L      CO2 22.1 mmol/L      Calcium 8.0 mg/dL      Total Protein 5.7 g/dL      Albumin 2.20 g/dL      ALT (SGPT) 48 U/L      AST (SGOT) 42 U/L      Alkaline Phosphatase 126 U/L      Total Bilirubin 0.2 mg/dL      eGFR Non African Amer 30 mL/min/1.73      Globulin 3.5 gm/dL      A/G Ratio 0.6 g/dL      BUN/Creatinine Ratio 14.4     Anion Gap 10.9 mmol/L     Narrative:      GFR Normal >60  Chronic Kidney Disease <60  Kidney Failure <15      Procalcitonin [[id number]]  (Normal) Collected: 02/12/22 0217    Specimen: Blood Updated: 02/12/22 0258     Procalcitonin 0.20 ng/mL     Narrative:      As a Marker for Sepsis (Non-Neonates):     1. <0.5 ng/mL represents a low risk of severe sepsis and/or septic shock.  2. >2 ng/mL represents a high risk of severe sepsis and/or septic shock.    As a Marker for Lower  "Respiratory Tract Infections that require antibiotic therapy:  PCT on Admission     Antibiotic Therapy             6-12 Hrs later  >0.5                          Strongly Recommended            >0.25 - <0.5             Recommended  0.1 - 0.25                  Discouraged                       Remeasure/reassess PCT  <0.1                         Strongly Discouraged         Remeasure/reassess PCT      As 28 day mortality risk marker: \"Change in Procalcitonin Result\" (>80% or <=80%) if Day 0 (or Day 1) and Day 4 values are available. Refer to http://www.[employer] [facility]"pct-calculator.com/    Change in PCT <=80 %   A decrease of PCT levels below or equal to 80% defines a positive change in PCT test result representing a higher risk for 28-day all-cause mortality of patients diagnosed with severe sepsis or septic shock.    Change in PCT >80 %   A decrease of PCT levels of more than 80% defines a negative change in PCT result representing a lower risk for 28-day all-cause mortality of patients diagnosed with severe sepsis or septic shock.                Troponin [[id number]]  (Normal) Collected: 02/12/22 0217    Specimen: Blood Updated: 02/12/22 0258     Troponin T <0.010 ng/mL     Narrative:      Troponin T Reference Range:  <= 0.03 ng/mL-   Negative for AMI  >0.03 ng/mL-     Abnormal for myocardial necrosis.  Clinicians would have to utilize clinical acumen, EKG, Troponin and serial changes to determine if it is an Acute Myocardial Infarction or myocardial injury due to an underlying chronic condition.       Results may be falsely decreased if patient taking Biotin.      Protime-INR [[id number]]  (Abnormal) Collected: 02/12/22 0217    Specimen: Blood Updated: 02/12/22 0246     Protime 15.5 Seconds      INR 1.24    Lactic Acid, Plasma [[id number]]  (Normal) Collected: 02/12/22 0217    Specimen: Blood Updated: 02/12/22 0245     Lactate 0.7 mmol/L     CBC & Differential [[id number]]  (Abnormal) Collected: 02/12/22 0217    Specimen: " Blood Updated: 02/12/22 0232    Narrative:      The following orders were created for panel order CBC & Differential.  Procedure                               Abnormality         Status                     ---------                               -----------         ------                     CBC Auto Differential[[id number]]        Abnormal            Final result                 Please view results for these tests on the individual orders.    CBC Auto Differential [[id number]]  (Abnormal) Collected: 02/12/22 0217    Specimen: Blood Updated: 02/12/22 0232     WBC 11.96 10*3/mm3      RBC 2.75 10*6/mm3      Hemoglobin 7.7 g/dL      Hematocrit 23.3 %      MCV 84.7 fL      MCH 28.0 pg      MCHC 33.0 g/dL      RDW 15.1 %      RDW-SD 45.2 fl      MPV 10.7 fL      Platelets 327 10*3/mm3      Neutrophil % 64.9 %      Lymphocyte % 21.1 %      Monocyte % 7.8 %      Eosinophil % 1.1 %      Basophil % 0.3 %      Immature Grans % 4.8 %      Neutrophils, Absolute 7.76 10*3/mm3      Lymphocytes, Absolute 2.52 10*3/mm3      Monocytes, Absolute 0.93 10*3/mm3      Eosinophils, Absolute 0.13 10*3/mm3      Basophils, Absolute 0.04 10*3/mm3      Immature Grans, Absolute 0.58 10*3/mm3      nRBC 0.0 /100 WBC     POC Glucose Once [[id number]]  (Abnormal) Collected: 02/12/22 0152    Specimen: Blood Updated: 02/12/22 0153     Glucose 176 mg/dL      Comment: Meter: [id number] : [id number] oDnis Wiley RN           Data Review:  Results from last 7 days   Lab Units 02/13/22  1042 02/12/22  1851 02/12/22  0919 02/12/22  0919 02/12/22 0217 02/12/22 0217   SODIUM mmol/L 138  --   --  136  --  135*   POTASSIUM mmol/L 4.5  --   --  4.5  --  4.0   CHLORIDE mmol/L 107  --   --  108*  --  102   CO2 mmol/L 23.0  --   --  19.0*  --  22.1   BUN mg/dL 13  --   --  27*  --  32*   CREATININE mg/dL 0.91 1.08  --  1.57*   < > 2.22*   GLUCOSE mg/dL 110*  --    < > 88   < > 106*   CALCIUM mg/dL 8.7  --   --  7.8*  --  8.0*    < > = values in this interval  not displayed.     Results from last 7 days   Lab Units 02/13/22  1042 02/12/22  0919 02/12/22 0217   WBC 10*3/mm3 10.31 10.89* 11.96*   HEMOGLOBIN g/dL 8.3* 8.0* 7.7*   HEMATOCRIT % 25.8* 24.8* 23.3*   PLATELETS 10*3/mm3 398 385 327     Results from last 7 days   Lab Units 02/13/22  1042   TSH uIU/mL 0.251*         Lab Results   Lab Value Date/Time    TROPONINT <0.010 02/12/2022 0217    TROPONINT 0.039 (C) 01/16/2022 0855    TROPONINT <0.01 05/09/2020 0517    TROPONINT <0.01 07/17/2019 1643         Results from last 7 days   Lab Units 02/13/22  1042 02/12/22  1851 02/12/22 0217   ALK PHOS U/L 128* 120* 126*   BILIRUBIN mg/dL 0.3 0.2 0.2   BILIRUBIN DIRECT mg/dL <0.2 <0.2  --    ALT (SGPT) U/L 48* 41 48*   AST (SGOT) U/L 35 39 42*     Results from last 7 days   Lab Units 02/13/22  1042   TSH uIU/mL 0.251*         Glucose   Date/Time Value Ref Range Status   02/12/2022 0152 176 (H) 70 - 130 mg/dL Final     Comment:     Meter: [id number] : [id number] Donis Wiley RN     Results from last 7 days   Lab Units 02/12/22 0217   INR  1.24*       Past Medical History:   Diagnosis Date   • Allergies    • Anxiety    • Bipolar 1 disorder (HCC)    • COPD (chronic obstructive pulmonary disease) ([facility])    • Depression    • Forgetfulness    • Head injury    • Hepatitis    • High blood pressure    • High cholesterol    • Psychiatric illness    • S/P exploratory laparotomy, oversew of perforated ulcer, placement of Chaparro patch  1/17/2022   • Shortness of breath    • Sinus trouble        Assessment:  Active Hospital Problems    Diagnosis  POA   • **Altered mental status [R41.82]  Yes   • Hypotension due to hypovolemia [I95.89, E86.1]  Yes   • VERA (acute kidney injury) ([facility]) [N17.9]  Yes   • S/P exploratory laparotomy, oversew of perforated ulcer, placement of Chaparro patch  [Z98.890]  Not Applicable   • Pneumoperitoneum of unknown etiology [K66.8]  Yes   • Bowel perforation (HCC) [K63.1]  Yes   • Anxiety [F41.9]  Yes   •  Bipolar 1 disorder ([facility]) [F31.9]  Yes   • COPD (chronic obstructive pulmonary disease) ([facility]) [J44.9]  Yes   • High blood pressure [I10]  Yes      Resolved Hospital Problems   No resolved problems to display.       Plan:  Continue with Decadron and remdesivir for COVID-19.  Initially was hypoxic but breathing is doing better.  Will get follow-up Covid labs.    Jose F Gleason MD  2/13/2022  15:37 EST

## 2022-02-13 NOTE — PLAN OF CARE
VSS. Percocet given x1 for pain. Up to chair for part of the day. Will continue to monitor.     Problem: Skin Injury Risk Increased  Goal: Skin Health and Integrity  Outcome: Ongoing, Progressing     Problem: Fall Injury Risk  Goal: Absence of Fall and Fall-Related Injury  Outcome: Ongoing, Progressing  Intervention: Identify and Manage Contributors to Fall Injury Risk  Recent Flowsheet Documentation  Taken 2/13/2022 0845 by Marcie Morris, HUGO  Medication Review/Management: medications reviewed  Intervention: Promote Injury-Free Environment  Recent Flowsheet Documentation  Taken 2/13/2022 1800 by Marcie Morris, RN  Safety Promotion/Fall Prevention:   activity supervised   clutter free environment maintained   fall prevention program maintained   nonskid shoes/slippers when out of bed   room organization consistent   safety round/check completed  Taken 2/13/2022 1600 by Marcie Morris RN  Safety Promotion/Fall Prevention:   activity supervised   clutter free environment maintained   fall prevention program maintained   safety round/check completed   room organization consistent   nonskid shoes/slippers when out of bed  Taken 2/13/2022 1430 by Marcie Morris, HUGO  Safety Promotion/Fall Prevention:   activity supervised   clutter free environment maintained   fall prevention program maintained   nonskid shoes/slippers when out of bed   safety round/check completed   room organization consistent  Taken 2/13/2022 1200 by Marcie Morris RN  Safety Promotion/Fall Prevention:   activity supervised   clutter free environment maintained   fall prevention program maintained   nonskid shoes/slippers when out of bed   room organization consistent   safety round/check completed  Taken 2/13/2022 1000 by Marcie Morris RN  Safety Promotion/Fall Prevention:   activity supervised   clutter free environment maintained   fall prevention program maintained   nonskid shoes/slippers when out of bed   room organization consistent   safety  round/check completed  Taken 2/13/2022 0845 by Marcie Morris RN  Safety Promotion/Fall Prevention:   activity supervised   clutter free environment maintained   fall prevention program maintained   nonskid shoes/slippers when out of bed   safety round/check completed   room organization consistent     Problem: Adult Inpatient Plan of Care  Goal: Plan of Care Review  Outcome: Ongoing, Progressing  Goal: Patient-Specific Goal (Individualized)  Outcome: Ongoing, Progressing  Goal: Absence of Hospital-Acquired Illness or Injury  Outcome: Ongoing, Progressing  Intervention: Identify and Manage Fall Risk  Recent Flowsheet Documentation  Taken 2/13/2022 1800 by Marcie Morris RN  Safety Promotion/Fall Prevention:   activity supervised   clutter free environment maintained   fall prevention program maintained   nonskid shoes/slippers when out of bed   room organization consistent   safety round/check completed  Taken 2/13/2022 1600 by Marcie Morris RN  Safety Promotion/Fall Prevention:   activity supervised   clutter free environment maintained   fall prevention program maintained   safety round/check completed   room organization consistent   nonskid shoes/slippers when out of bed  Taken 2/13/2022 1430 by Marcie Morris RN  Safety Promotion/Fall Prevention:   activity supervised   clutter free environment maintained   fall prevention program maintained   nonskid shoes/slippers when out of bed   safety round/check completed   room organization consistent  Taken 2/13/2022 1200 by Marcie Morris RN  Safety Promotion/Fall Prevention:   activity supervised   clutter free environment maintained   fall prevention program maintained   nonskid shoes/slippers when out of bed   room organization consistent   safety round/check completed  Taken 2/13/2022 1000 by Marcie Morris RN  Safety Promotion/Fall Prevention:   activity supervised   clutter free environment maintained   fall prevention program maintained   nonskid  shoes/slippers when out of bed   room organization consistent   safety round/check completed  Taken 2/13/2022 0845 by Marcie Morris RN  Safety Promotion/Fall Prevention:   activity supervised   clutter free environment maintained   fall prevention program maintained   nonskid shoes/slippers when out of bed   safety round/check completed   room organization consistent  Intervention: Prevent and Manage VTE (venous thromboembolism) Risk  Recent Flowsheet Documentation  Taken 2/13/2022 0845 by Marcie Morris RN  VTE Prevention/Management:   bilateral   dorsiflexion/plantar flexion performed  Goal: Optimal Comfort and Wellbeing  Outcome: Ongoing, Progressing  Intervention: Provide Person-Centered Care  Recent Flowsheet Documentation  Taken 2/13/2022 0845 by Marcie Morris RN  Trust Relationship/Rapport: care explained  Goal: Readiness for Transition of Care  Outcome: Ongoing, Progressing     Problem: Asthma Comorbidity  Goal: Maintenance of Asthma Control  Outcome: Ongoing, Progressing  Intervention: Maintain Asthma Symptom Control  Recent Flowsheet Documentation  Taken 2/13/2022 0845 by Marcie Morris RN  Medication Review/Management: medications reviewed     Problem: COPD Comorbidity  Goal: Maintenance of COPD Symptom Control  Outcome: Ongoing, Progressing  Intervention: Maintain COPD-Symptom Control  Recent Flowsheet Documentation  Taken 2/13/2022 0845 by Marcie Morris RN  Medication Review/Management: medications reviewed     Problem: Diabetes Comorbidity  Goal: Blood Glucose Level Within Desired Range  Outcome: Ongoing, Progressing     Problem: Heart Failure Comorbidity  Goal: Maintenance of Heart Failure Symptom Control  Outcome: Ongoing, Progressing  Intervention: Maintain Heart Failure-Management Strategies  Recent Flowsheet Documentation  Taken 2/13/2022 0845 by Marcie Morris RN  Medication Review/Management: medications reviewed     Problem: Hypertension Comorbidity  Goal: Blood Pressure in Desired  Range  Outcome: Ongoing, Progressing  Intervention: Maintain Hypertension-Management Strategies  Recent Flowsheet Documentation  Taken 2/13/2022 0845 by Marcie Morris RN  Medication Review/Management: medications reviewed     Problem: Obstructive Sleep Apnea Risk or Actual (Comorbidity Management)  Goal: Unobstructed Breathing During Sleep  Outcome: Ongoing, Progressing     Problem: Pain Chronic (Persistent) (Comorbidity Management)  Goal: Acceptable Pain Control and Functional Ability  Outcome: Ongoing, Progressing  Intervention: Develop Pain Management Plan  Recent Flowsheet Documentation  Taken 2/13/2022 1557 by Marcie Morris RN  Pain Management Interventions: see MAR  Intervention: Manage Persistent Pain  Recent Flowsheet Documentation  Taken 2/13/2022 0845 by Marcie Morris, RN  Medication Review/Management: medications reviewed  Intervention: Optimize Psychosocial Wellbeing  Recent Flowsheet Documentation  Taken 2/13/2022 0845 by Marcie Morris RN  Diversional Activities: television  Family/Support System Care:   support provided   self-care encouraged     Problem: Seizure Disorder Comorbidity  Goal: Maintenance of Seizure Control  Outcome: Ongoing, Progressing   Goal Outcome Evaluation:

## 2022-02-14 LAB
ALBUMIN SERPL-MCNC: 2.6 G/DL (ref 3.5–5.2)
ALBUMIN/GLOB SERPL: 0.8 G/DL
ALP SERPL-CCNC: 113 U/L (ref 39–117)
ALT SERPL W P-5'-P-CCNC: 38 U/L (ref 1–41)
ANION GAP SERPL CALCULATED.3IONS-SCNC: 9 MMOL/L (ref 5–15)
ANISOCYTOSIS BLD QL: ABNORMAL
AST SERPL-CCNC: 36 U/L (ref 1–40)
BACTERIA BLD CULT: ABNORMAL
BILIRUB CONJ SERPL-MCNC: <0.2 MG/DL (ref 0–0.3)
BILIRUB SERPL-MCNC: 0.2 MG/DL (ref 0–1.2)
BOTTLE TYPE: ABNORMAL
BUN SERPL-MCNC: 12 MG/DL (ref 6–20)
BUN/CREAT SERPL: 14 (ref 7–25)
CALCIUM SPEC-SCNC: 8.1 MG/DL (ref 8.6–10.5)
CHLORIDE SERPL-SCNC: 109 MMOL/L (ref 98–107)
CO2 SERPL-SCNC: 21 MMOL/L (ref 22–29)
CREAT SERPL-MCNC: 0.86 MG/DL (ref 0.76–1.27)
CRP SERPL-MCNC: 2.2 MG/DL (ref 0–0.5)
DEPRECATED RDW RBC AUTO: 43.5 FL (ref 37–54)
ERYTHROCYTE [DISTWIDTH] IN BLOOD BY AUTOMATED COUNT: 14.5 % (ref 12.3–15.4)
FERRITIN SERPL-MCNC: 568 NG/ML (ref 30–400)
GFR SERPL CREATININE-BSD FRML MDRD: 91 ML/MIN/1.73
GLOBULIN UR ELPH-MCNC: 3.2 GM/DL
GLUCOSE SERPL-MCNC: 89 MG/DL (ref 65–99)
HCT VFR BLD AUTO: 23.6 % (ref 37.5–51)
HGB BLD-MCNC: 7.8 G/DL (ref 13–17.7)
LYMPHOCYTES # BLD MANUAL: 1.24 10*3/MM3 (ref 0.7–3.1)
LYMPHOCYTES NFR BLD MANUAL: 4.1 % (ref 5–12)
MCH RBC QN AUTO: 27.4 PG (ref 26.6–33)
MCHC RBC AUTO-ENTMCNC: 33.1 G/DL (ref 31.5–35.7)
MCV RBC AUTO: 82.8 FL (ref 79–97)
MICROCYTES BLD QL: ABNORMAL
MIRTAZAPINE SERPL-MCNC: NORMAL UG/ML
MONOCYTES # BLD: 0.45 10*3/MM3 (ref 0.1–0.9)
NEUTROPHILS # BLD AUTO: 9.38 10*3/MM3 (ref 1.7–7)
NEUTROPHILS NFR BLD MANUAL: 84.7 % (ref 42.7–76)
NRBC BLD AUTO-RTO: 0.1 /100 WBC (ref 0–0.2)
PLAT MORPH BLD: NORMAL
PLATELET # BLD AUTO: 396 10*3/MM3 (ref 140–450)
PMV BLD AUTO: 10.4 FL (ref 6–12)
POLYCHROMASIA BLD QL SMEAR: ABNORMAL
POTASSIUM SERPL-SCNC: 4.3 MMOL/L (ref 3.5–5.2)
PROT SERPL-MCNC: 5.8 G/DL (ref 6–8.5)
RBC # BLD AUTO: 2.85 10*6/MM3 (ref 4.14–5.8)
SODIUM SERPL-SCNC: 139 MMOL/L (ref 136–145)
VARIANT LYMPHS NFR BLD MANUAL: 11.2 % (ref 19.6–45.3)
WBC MORPH BLD: NORMAL
WBC NRBC COR # BLD: 11.07 10*3/MM3 (ref 3.4–10.8)

## 2022-02-14 PROCEDURE — 85025 COMPLETE CBC W/AUTO DIFF WBC: CPT | Performed by: HOSPITALIST

## 2022-02-14 PROCEDURE — 97162 PT EVAL MOD COMPLEX 30 MIN: CPT

## 2022-02-14 PROCEDURE — 25010000002 REMDESIVIR 100 MG/20ML SOLUTION 1 EACH VIAL: Performed by: HOSPITALIST

## 2022-02-14 PROCEDURE — 86140 C-REACTIVE PROTEIN: CPT | Performed by: HOSPITALIST

## 2022-02-14 PROCEDURE — 85007 BL SMEAR W/DIFF WBC COUNT: CPT | Performed by: HOSPITALIST

## 2022-02-14 PROCEDURE — 97535 SELF CARE MNGMENT TRAINING: CPT

## 2022-02-14 PROCEDURE — 82248 BILIRUBIN DIRECT: CPT | Performed by: HOSPITALIST

## 2022-02-14 PROCEDURE — 97530 THERAPEUTIC ACTIVITIES: CPT

## 2022-02-14 PROCEDURE — 82728 ASSAY OF FERRITIN: CPT | Performed by: HOSPITALIST

## 2022-02-14 PROCEDURE — 80053 COMPREHEN METABOLIC PANEL: CPT | Performed by: HOSPITALIST

## 2022-02-14 PROCEDURE — 97166 OT EVAL MOD COMPLEX 45 MIN: CPT

## 2022-02-14 PROCEDURE — 25010000002 DEXAMETHASONE PER 1 MG: Performed by: HOSPITALIST

## 2022-02-14 PROCEDURE — 25010000002 NA FERRIC GLUC CPLX PER 12.5 MG: Performed by: HOSPITALIST

## 2022-02-14 RX ORDER — NYSTATIN 100000 [USP'U]/G
POWDER TOPICAL EVERY 12 HOURS SCHEDULED
Status: DISCONTINUED | OUTPATIENT
Start: 2022-02-14 | End: 2022-02-16 | Stop reason: HOSPADM

## 2022-02-14 RX ADMIN — ATORVASTATIN CALCIUM 40 MG: 20 TABLET, FILM COATED ORAL at 09:31

## 2022-02-14 RX ADMIN — OXYCODONE AND ACETAMINOPHEN 1 TABLET: 5; 325 TABLET ORAL at 22:45

## 2022-02-14 RX ADMIN — NYSTATIN: 100000 POWDER TOPICAL at 20:36

## 2022-02-14 RX ADMIN — SODIUM CHLORIDE 100 ML/HR: 9 INJECTION, SOLUTION INTRAVENOUS at 09:31

## 2022-02-14 RX ADMIN — OXYCODONE AND ACETAMINOPHEN 1 TABLET: 5; 325 TABLET ORAL at 14:04

## 2022-02-14 RX ADMIN — AMLODIPINE BESYLATE 2.5 MG: 5 TABLET ORAL at 09:31

## 2022-02-14 RX ADMIN — QUETIAPINE FUMARATE 50 MG: 50 TABLET, FILM COATED ORAL at 09:31

## 2022-02-14 RX ADMIN — SODIUM CHLORIDE, PRESERVATIVE FREE 10 ML: 5 INJECTION INTRAVENOUS at 20:36

## 2022-02-14 RX ADMIN — MIRTAZAPINE 15 MG: 15 TABLET, FILM COATED ORAL at 09:31

## 2022-02-14 RX ADMIN — METOPROLOL SUCCINATE 100 MG: 100 TABLET, EXTENDED RELEASE ORAL at 09:31

## 2022-02-14 RX ADMIN — SODIUM CHLORIDE 100 ML/HR: 9 INJECTION, SOLUTION INTRAVENOUS at 22:46

## 2022-02-14 RX ADMIN — SODIUM CHLORIDE 250 MG: 9 INJECTION, SOLUTION INTRAVENOUS at 09:32

## 2022-02-14 RX ADMIN — QUETIAPINE FUMARATE 400 MG: 200 TABLET ORAL at 20:36

## 2022-02-14 RX ADMIN — REMDESIVIR 100 MG: 100 INJECTION, POWDER, LYOPHILIZED, FOR SOLUTION INTRAVENOUS at 18:15

## 2022-02-14 RX ADMIN — SODIUM CHLORIDE, PRESERVATIVE FREE 10 ML: 5 INJECTION INTRAVENOUS at 08:00

## 2022-02-14 RX ADMIN — DEXAMETHASONE SODIUM PHOSPHATE 6 MG: 10 INJECTION INTRAMUSCULAR; INTRAVENOUS at 09:32

## 2022-02-14 NOTE — DISCHARGE PLACEMENT REQUEST
"Flor Leyva (57 y.o. Male)             Date of Birth Social Security Number Address Home Phone MRN    1965  [address] [id number] [phone] [id number]    [facility] Status             None Single       Admission Date Admission Type Admitting Provider Attending Provider Department, Room/Bed    2/12/22 Emergency Jose F Gleason MD Beard, Lyle E, MD [address], [address]    Discharge Date Discharge Disposition Discharge Destination                         Attending Provider: Jose F Gleason MD    Allergies: Antihistamines, Chlorpheniramine-type, Amoxicillin-pot Clavulanate, Contrast Dye, Diphenhydramine    Isolation: Enh Drop/Con   Infection: COVID (confirmed) (02/12/22)   Code Status: CPR   Advance Care Planning Activity    Ht: 167.6 cm (66\")   Wt: 95.7 kg (210 lb 15.7 oz)    Admission Cmt: None   Principal Problem: Altered mental status [R41.82]                 Active Insurance as of 2/12/2022     Primary Coverage     Payor Plan Insurance Group Employer/Plan Group    HUMANA MEDICARE REPLACEMENT HUMANA MEDICARE REPLACEMENT [id number]     Payor Plan Address Payor Plan Phone Number Payor Plan Fax Number Effective Dates    [address] [address] [phone]  1/1/2021 - None Entered    [facility] [id number]       Subscriber Name Subscriber Birth Date Member ID       FLOR LEYVA 1965 [id number]                 Emergency Contacts      (Rel.) Home Phone Work Phone Mobile Phone    Leon Gotti (Sister) -- -- [phone]    LAUREN ZAVALA (Significant Other) -- -- [phone]    ENID LEYVA (Brother) [phone] -- --              "

## 2022-02-14 NOTE — PLAN OF CARE
Goal Outcome Evaluation:  Plan of Care Reviewed With: patient           Outcome Summary: Pt is a 58 yo male admitted from SNF with AMS, acute renal failure and incidental COVID. Pt recently had ex lap on 1/17 and d/c to rehab. Pt with hx of bipolar, COPD, and hx of brain injury. Pt reports typically is IND and lives with  in single level home. pt states at rehab he was only ambulated a few steps with assist. Unsure if pt is reliable historian as pt was able to ambulate 25' in room with cga using walker. Pt demo decreased activity tolerance and insight into deficits. Pt may benefit from skilled PT to progress gait distance and general mobility. Anticipate return to SNF upon d/c.    Patient was not wearing a face mask during this therapy encounter. Therapist used appropriate personal protective equipment including gown, eye protection, mask and gloves.  Mask used was N95/duckbill. Appropriate PPE was worn during the entire therapy session. Hand hygiene was completed before and after therapy session. Patient is in enhanced droplet precautions.

## 2022-02-14 NOTE — CASE MANAGEMENT/SOCIAL WORK
Discharge Planning Assessment  [facility]     Patient Name: Atilio Recinos  MRN: [id number]  Today's Date: 2/14/2022    Admit Date: 2/12/2022     Discharge Needs Assessment     Row Name 02/14/22 1335       Living Environment    Lives With facility resident    Current Living Arrangements extended care facility    Primary Care Provided by self    Provides Primary Care For no one    Family Caregiver if Needed sibling(s); significant other    Able to Return to Prior Arrangements yes       Resource/Environmental Concerns    Resource/Environmental Concerns none       Transition Planning    Patient/Family Anticipates Transition to inpatient rehabilitation facility    Patient/Family Anticipated Services at Transition skilled nursing; rehabilitation services    Transportation Anticipated health plan transportation       Discharge Needs Assessment    Equipment Currently Used at Home none    Concerns to be Addressed discharge planning    Discharge Facility/Level of Care Needs nursing facility, skilled               Discharge Plan     Row Name 02/14/22 1335       Plan    Plan Return to [facility] pending bed availability. Will possibly need w/c van transport    Patient/Family in Agreement with Plan yes    Plan Comments Due to patient having an altered mental status, CCP spoke to his HCS (on file in EPIC) /Sister Leon Gotti over the phone; explained role of CCP, verified facesheet, and discussed d/c planning needs. Plan is for patient to return to [facility] vs home w/ HH.  Patient was admitted from [facility] due to altered mental status and weakness. CCP spoke to [facility] who stated patient came from skilled level no bed hold. [facility] stated she will follow for his return. CCP let [facility] know patient arrived covid positive. Patient will likely need wheelchair van transport at d/c due to family not being able to transport because of work hours. PT jb pending, follow for  recommendations. LEILANI Esparza              Continued Care and Services - Admitted Since 2/12/2022     Destination     Service Provider Request Status Selected Services Address Phone Fax Patient Preferred    [facility]  Pending - Request Sent N/A [address] [facility] [address] [phone] --                 Demographic Summary     Row Name 02/14/22 1334       General Information    Admission Type inpatient    Arrived From subacute/long-term acute care    Referral Source admission list    Reason for Consult discharge planning    Preferred Language English     Used During This Interaction no       Contact Information    Permission Granted to Share Info With family/designee               Functional Status     Row Name 02/14/22 1334       Functional Status    Usual Activity Tolerance moderate    Current Activity Tolerance fair       Functional Status, IADL    Medications independent    Meal Preparation independent    Housekeeping independent    Laundry independent    Shopping independent       Mental Status    General Appearance WDL WDL               Psychosocial    No documentation.                Abuse/Neglect    No documentation.                Legal    No documentation.                Substance Abuse    No documentation.                Patient Forms    No documentation.                   LEILANI HESTER

## 2022-02-14 NOTE — NURSING NOTE
02/14/22 1610   Wound 01/16/22 1412 midline abdomen Incision   Placement Date/Time: 01/16/22 1412   Present on Hospital Admission: No  Orientation: midline  Location: abdomen  Primary Wound Type: Incision   Base pink  (opening to surgical incision)   Wound Length (cm)   (0.5)   Wound Width (cm) 0.5 cm   Wound Depth (cm) 2 cm   Drainage Characteristics/Odor tan; purulent   Drainage Amount small   Care, Wound cleansed with; sterile normal saline   Dressing Care dressing changed  (4x4 gauze folded, bordered gauze)   Wound 02/12/22 0825 Left lower abdomen Puncture   Placement Date/Time: 02/12/22 0825   Side: Left  Orientation: lower  Location: abdomen  Primary Wound Type: (c) Puncture   Base slough; yellow  (wiped and removed, pink base)   Wound Length (cm) 0.5 cm   Wound Width (cm) 0.5 cm   Wound Depth (cm) 0.5 cm   Drainage Characteristics/Odor purulent; tan   Drainage Amount small   Care, Wound cleansed with; sterile normal saline   Dressing Care dressing changed  (2x2 gauze, bordered gauze)     Wound/ostomy - patient was in [facility] 1/16-2/8 for perforated ulcer, surgical intervention with Dr. Atilio Lindsay ex [facility], deja patch, patient inadvertently removed his GIOVANY drain on 2/1. Patient had issues while in the hospital with bilious drainage from drains and drain sites and surgical wound dehiscence and abdomen has drained to some extent since being in the hospital.     At this time openings due not track and tunnel when probed with a q tip, they do have drainage that seeps so management with gauze to wick and collect drainage is appropriate. There is no redness to the abdomen and no odor.

## 2022-02-14 NOTE — PLAN OF CARE
"Goal Outcome Evaluation:  Plan of Care Reviewed With: patient     Outcome Summary: Pt is a 58 y/o male admitted from rehab with AMS, acute renal failure, hypotension, +COVID-19. Pt with recent ex-lap d/t bowel perforation on 1/17 with d/c to rehab. Hx of bipolar d/o, COPD, head injuries- unsure of baseline cognition. Pt typically lives at home with his  and is fully independent. States he has req'ed assist for ADLs and mobility at rehab and has only \"taken a few steps\" since sx however is a questionable historian based on performance during eval. He presents with poor insight into deficits. SBA for bed mobility, set-up for LB dressing to don shoes, set-up for UB dressing. He stands with CGA and performs fxl ambulation in and out of bathroom using RW, no LOB or MACHUCA noted. He stands at toilet for toileting task, encouraged to wash hands after ADL. TF to bedside recliner with CGA-SBA. Pt will benefit from OT services in the acute care setting to address deficits, recommend d/c back to rehab.    Patient was was wearing a face mask during this therapy encounter. Therapist used appropriate personal protective equipment including mask, gloves, eye protection, face shield, and gown. Hand hygiene was completed before and after therapy session. Patient is in enhanced droplet precautions.      "

## 2022-02-14 NOTE — THERAPY EVALUATION
Patient Name: Atilio Recinos  : 1965    MRN: [id number]                              Today's Date: 2022       Admit Date: 2022    Visit Dx:     ICD-10-CM ICD-9-CM   1. Hypotension due to hypovolemia  I95.89 458.8    E86.1 276.52   2. Acute renal failure, unspecified acute renal failure type (HCC)  N17.9 584.9   3. Anemia, unspecified type  D64.9 285.9     Patient Active Problem List   Diagnosis   • Anxiety   • Bipolar 1 disorder (HCC)   • COPD (chronic obstructive pulmonary disease) (HCC)   • Depression   • High blood pressure   • High cholesterol   • Other acute sinusitis   • Obesity   • Cigarette nicotine dependence without complication   • Pneumoperitoneum of unknown etiology   • Bowel perforation (HCC)   • S/P exploratory laparotomy, oversew of perforated ulcer, placement of Chaparro patch    • Hypotension due to hypovolemia   • VERA (acute kidney injury) ([facility])   • Altered mental status     Past Medical History:   Diagnosis Date   • Allergies    • Anxiety    • Bipolar 1 disorder (HCC)    • COPD (chronic obstructive pulmonary disease) (HCC)    • Depression    • Forgetfulness    • Head injury    • Hepatitis    • High blood pressure    • High cholesterol    • Psychiatric illness    • S/P exploratory laparotomy, oversew of perforated ulcer, placement of Chaparro patch  2022   • Shortness of breath    • Sinus trouble      Past Surgical History:   Procedure Laterality Date   • EXPLORATORY LAPAROTOMY N/A 2022    Procedure: EXPLORATORY LAPAROTOMY, OVERSEW OF PERFORATED GASTRIC ULCER WITH CHAPARRO PATCH;  Surgeon: Atilio Lindsay MD;  Location: [facility];  Service: General;  Laterality: N/A;   • PLEURAL SCARIFICATION     • SPHINCTEROTOMY        General Information     Row Name 22 1318          OT Time and Intention    Document Type evaluation  -JW     Mode of Treatment co-treatment; occupational therapy; physical therapy  -     Row Name 22 1318          General Information     "Patient Profile Reviewed yes  -     Prior Level of Function min assist:; ADL's; all household mobility  Pt has been req'ing assist for ADLs at rehab, also states he has only been taking a \"few steps\" for mobility. Prior to surgery on 1/17, pt was fully independent  -     Existing Precautions/Restrictions fall  -     Barriers to Rehab none identified  -     Row Name 02/14/22 1318          Living Environment    Lives With spouse  typically home with  but has been at rehab since abdominal sx on 1/17  -     Row Name 02/14/22 1318          Cognition    Orientation Status (Cognition) oriented x 3  -     Row Name 02/14/22 1318          Safety Issues, Functional Mobility    Safety Issues Affecting Function (Mobility) insight into deficits/self-awareness; problem-solving  -     Impairments Affecting Function (Mobility) cognition; endurance/activity tolerance; pain; strength  -     Cognitive Impairments, Mobility Safety/Performance insight into deficits/self-awareness; problem-solving/reasoning  -           User Key  (r) = Recorded By, (t) = Taken By, (c) = Cosigned By    Initials Name Provider Type     Linda Jaffe OT Occupational Therapist                 Mobility/ADL's     Row Name 02/14/22 1323          Bed Mobility    Bed Mobility sit-supine  -     Sit-Supine [place] (Bed Mobility) standby assist  -     Assistive Device (Bed Mobility) bed rails; head of bed elevated  -     Row Name 02/14/22 1323          Transfers    Transfers sit-stand transfer; bed-chair transfer  -     Bed-Chair [place] (Transfers) contact guard; verbal cues  -     Assistive Device (Bed-Chair Transfers) walker, front-wheeled  -     Sit-Stand [place] (Transfers) verbal cues; contact guard  -     Row Name 02/14/22 1323          Sit-Stand Transfer    Assistive Device (Sit-Stand Transfers) walker, front-wheeled  -     Row Name 02/14/22 1323          Functional Mobility    Functional Mobility- Ind. " Level contact guard assist; verbal cues required  -     Functional Mobility- Device rolling walker  -     Functional Mobility- Comment fxl ambulation in and out of bathroom using RW with CGA.  -     Row Name 02/14/22 1323          Activities of Daily Living    BADL Assessment/Intervention lower body dressing; grooming; toileting  -     Row Name 02/14/22 1323          Lower Body Dressing Assessment/Training    [place] Level (Lower Body Dressing) doff; don; shoes/slippers; set up  -     Position (Lower Body Dressing) edge of bed sitting  -     Comment (Lower Body Dressing) figure 4 position  -     Row Name 02/14/22 1323          Grooming Assessment/Training    [place] Level (Grooming) grooming skills; wash face, hands; supervision  -     Position (Grooming) sink side; unsupported standing  -     Comment (Grooming) encouraged to wash hands at sink after toileting  -     Row Name 02/14/22 1323          Toileting Assessment/Training    [place] Level (Toileting) toileting skills; adjust/manage clothing; supervision; contact guard assist  -     Position (Toileting) supported standing  -     Comment (Toileting) CGA for standing balance at the toilet  -           User Key  (r) = Recorded By, (t) = Taken By, (c) = Cosigned By    Initials Name Provider Type    JW Linda Jaffe OT Occupational Therapist               Obj/Interventions     [facility] Name 02/14/22 1327          Sensory Assessment (Somatosensory)    Sensory Assessment (Somatosensory) sensation intact  -[facility] Name 02/14/22 1327          Vision Assessment/Intervention    Visual Impairment/Limitations WNL  -[facility] Name 02/14/22 1327          Range of Motion Comprehensive    General Range of Motion no range of motion deficits identified  -[facility] Name 02/14/22 1327          Strength Comprehensive (MMT)    General Manual Muscle Testing (MMT) Assessment upper extremity strength deficits identified  -     Comment, General  Manual Muscle Testing (MMT) Assessment generalized weakness, 4/5 in BUEs grossly  -[facility] Name 02/14/22 1327          Motor Skills    Motor Skills functional endurance  -     Functional Endurance fair+  -JW     Row Name 02/14/22 1327          Balance    Balance Assessment sitting static balance; sitting dynamic balance; standing static balance; standing dynamic balance  -     Static Sitting Balance WFL; unsupported; sitting, edge of bed  -     Dynamic Sitting Balance WFL; unsupported; sitting, edge of bed  -     Static Standing Balance WFL; supported; standing  -     Dynamic Standing Balance WFL; supported; standing  -     Balance Interventions sitting; standing; occupation based/functional task  -           User Key  (r) = Recorded By, (t) = Taken By, (c) = Cosigned By    Initials Name Provider Type    Linda Romero OT Occupational Therapist               Goals/Plan     Row Name 02/14/22 1339          Transfer Goal 1 (OT)    Activity/Assistive Device (Transfer Goal 1, OT) transfers, all  -     [place] Level/Cues Needed (Transfer Goal 1, OT) modified independence  -     Time Frame (Transfer Goal 1, OT) short term goal (STG); 2 weeks  -     Progress/Outcome (Transfer Goal 1, OT) goal ongoing  -[facility] Name 02/14/22 1339          Bathing Goal 1 (OT)    Activity/Device (Bathing Goal 1, OT) bathing skills, all  -     [place] Level/Cues Needed (Bathing Goal 1, OT) modified independence  -     Time Frame (Bathing Goal 1, OT) short term goal (STG); 2 weeks  -     Progress/Outcomes (Bathing Goal 1, OT) goal ongoing  -JW     Row Name 02/14/22 1339          Dressing Goal 1 (OT)    Activity/Device (Dressing Goal 1, OT) dressing skills, all  -     [place]/Cues Needed (Dressing Goal 1, OT) modified independence  -     Time Frame (Dressing Goal 1, OT) short term goal (STG); 2 weeks  -     Progress/Outcome (Dressing Goal 1, OT) goal ongoing  -JW     Row Name 02/14/22 1339     "      Toileting Goal 1 (OT)    Activity/Device (Toileting Goal 1, OT) toileting skills, all  -     [place] Level/Cues Needed (Toileting Goal 1, OT) modified independence  -     Time Frame (Toileting Goal 1, OT) short term goal (STG); 2 weeks  -     Progress/Outcome (Toileting Goal 1, OT) goal ongoing  -     Row Name 02/14/22 6608          Problem Specific Goal 1 (OT)    Problem Specific Goal 1 (OT) Pt will be David-spv for ADLs with less than 1 VC for safety  -     Time Frame (Problem Specific Goal 1, OT) short term goal (STG); 2 weeks  -JW     Progress/Outcome (Problem Specific Goal 1, OT) goal ongoing  -     Row Name 02/14/22 3548          Therapy Assessment/Plan (OT)    Planned Therapy Interventions (OT) BADL retraining; occupation/activity based interventions; strengthening exercise; transfer/mobility retraining  -           User Key  (r) = Recorded By, (t) = Taken By, (c) = Cosigned By    Initials Name Provider Type     Linda Jaffe, THOMAS Occupational Therapist               Clinical Impression     Row Name 02/14/22 6157          Pain Assessment    Additional Documentation Pain Scale: Numbers Pre/Post-Treatment (Group)  -[facility] Name 02/14/22 3585          Pain Scale: Numbers Pre/Post-Treatment    Pretreatment Pain Rating 4/10  -     Posttreatment Pain Rating 4/10  -     Pain Location abdomen  -     Pain Intervention(s) Repositioned; Ambulation/increased activity  -     Row Name 02/14/22 0462          Plan of Care Review    Plan of Care Reviewed With patient  -     Outcome Summary Pt is a 58 y/o male admitted from rehab with AMS, acute renal failure, hypotension, +COVID-19. Pt with recent ex-lap d/t bowel perforation on 1/17 with d/c to rehab. Hx of bipolar d/o, COPD, head injuries- unsure of baseline cognition. Pt typically lives at home with his  and is fully independent. States he has req'ed assist for ADLs and mobility at rehab and has only \"taken a few steps\" since sx " however is a questionable historian based on performance during eval. He presents with poor insight into deficits. SBA for bed mobility, set-up for LB dressing to don shoes, set-up for UB dressing. He stands with CGA and performs fxl ambulation in and out of bathroom using RW, no LOB or MACHUCA noted. He stands at toilet for toileting task, encouraged to wash hands after ADL. TF to bedside recliner with CGA-SBA. Pt will benefit from OT services in the acute care setting to address deficits, recommend d/c back to rehab.  -     Row Name 02/14/22 1328          Therapy Assessment/Plan (OT)    Rehab Potential (OT) good, to achieve stated therapy goals  -     Criteria for Skilled Therapeutic Interventions Met (OT) yes; skilled treatment is necessary  -     Therapy Frequency (OT) 3 times/wk  -     Row Name 02/14/22 1328          Therapy Plan Review/Discharge Plan (OT)    Anticipated Discharge Disposition (OT) skilled nursing facility  -     Row Name 02/14/22 1328          Positioning and Restraints    Pre-Treatment Position in bed  -     Post Treatment Position chair  -JW     In Chair sitting; call light within reach; encouraged to call for assist; exit alarm on  -           User Key  (r) = Recorded By, (t) = Taken By, (c) = Cosigned By    Initials Name Provider Type    Linda Romero, THOMAS Occupational Therapist               Outcome Measures     Row Name 02/14/22 1341          How much help from another is currently needed...    Putting on and taking off regular lower body clothing? 3  -JW     Bathing (including washing, rinsing, and drying) 3  -JW     Toileting (which includes using toilet bed pan or urinal) 3  -JW     Putting on and taking off regular upper body clothing 3  -JW     Taking care of personal grooming (such as brushing teeth) 3  -JW     Eating meals 4  -JW     AM-PAC 6 Clicks Score (OT) 19  -     Row Name 02/14/22 1341          Functional Assessment    Outcome Measure Options AM-PAC 6 Clicks  Daily Activity (OT)  -           User Key  (r) = Recorded By, (t) = Taken By, (c) = Cosigned By    Initials Name Provider Type    Linda Romero OT Occupational Therapist                Occupational Therapy Education                 Title: PT OT SLP Therapies (In Progress)     Topic: Occupational Therapy (In Progress)     Point: ADL training (Done)     Description:   Instruct learner(s) on proper safety adaptation and remediation techniques during self care or transfers.   Instruct in proper use of assistive devices.              Learning Progress Summary           Patient Acceptance, E, VU by  at 2/14/2022 1341    Comment: role of OT, plan of care, safety                   Point: Home exercise program (Not Started)     Description:   Instruct learner(s) on appropriate technique for monitoring, assisting and/or progressing therapeutic exercises/activities.              Learner Progress:  Not documented in this visit.          Point: Precautions (Done)     Description:   Instruct learner(s) on prescribed precautions during self-care and functional transfers.              Learning Progress Summary           Patient Acceptance, E, VU by  at 2/14/2022 1341    Comment: role of OT, plan of care, safety                   Point: Body mechanics (Done)     Description:   Instruct learner(s) on proper positioning and spine alignment during self-care, functional mobility activities and/or exercises.              Learning Progress Summary           Patient Acceptance, E, VU by  at 2/14/2022 1341    Comment: role of OT, plan of care, safety                               User Key     Initials Effective Dates Name Provider Type [facility] 06/10/21 -  Linda Jaffe OT Occupational Therapist OT              OT Recommendation and Plan  Planned Therapy Interventions (OT): BADL retraining, occupation/activity based interventions, strengthening exercise, transfer/mobility retraining  Therapy Frequency (OT): 3 times/wk  Plan  "of Care Review  Plan of Care Reviewed With: patient  Outcome Summary: Pt is a 56 y/o male admitted from rehab with AMS, acute renal failure, hypotension, +COVID-19. Pt with recent ex-lap d/t bowel perforation on 1/17 with d/c to rehab. Hx of bipolar d/o, COPD, head injuries- unsure of baseline cognition. Pt typically lives at home with his  and is fully independent. States he has req'ed assist for ADLs and mobility at rehab and has only \"taken a few steps\" since sx however is a questionable historian based on performance during eval. He presents with poor insight into deficits. SBA for bed mobility, set-up for LB dressing to don shoes, set-up for UB dressing. He stands with CGA and performs fxl ambulation in and out of bathroom using RW, no LOB or MACHUCA noted. He stands at toilet for toileting task, encouraged to wash hands after ADL. TF to bedside recliner with CGA-SBA. Pt will benefit from OT services in the acute care setting to address deficits, recommend d/c back to rehab.     Time Calculation:    Time Calculation- OT     Row Name 02/14/22 1342             Time Calculation- OT    OT Start Time 0954  -      OT Stop Time 1019  -      OT Time Calculation (min) 25 min  -      Total Timed Code Minutes- OT 15 minute(s)  -      OT Received On 02/14/22  -      OT - Next Appointment 02/16/22  -      OT Goal Re-Cert Due Date 02/28/22  -              Timed Charges    90131 - OT Self Care/Mgmt Minutes 15  -              Untimed Charges    OT Eval/Re-eval Minutes 10  -              Total Minutes    Timed Charges Total Minutes 15  -      Untimed Charges Total Minutes 10  -       Total Minutes 25  -            User Key  (r) = Recorded By, (t) = Taken By, (c) = Cosigned By    Initials Name Provider Type    Linda Romero OT Occupational Therapist              Therapy Charges for Today     Code Description Service Date Service Provider Modifiers Qty    [id number]  OT SELF CARE/MGMT/TRAIN EA 15 " MIN 2/14/2022 Linda Jaffe OT GO 1    [id number] HC OT EVAL MOD COMPLEXITY 3 2/14/2022 Linda Jaffe OT GO 1               Linda Jaffe OT  2/14/2022

## 2022-02-14 NOTE — PROGRESS NOTES
DAILY PROGRESS NOTE  [facility]    Patient Identification:  Name: Atilio Recinos  Age: 57 y.o.  Sex: male  :  1965  MRN: [id number]         Primary Care Physician: William Mcdonald MD    Subjective:  Interval History: He is still very weak.    Objective:    Scheduled Meds:amLODIPine, 2.5 mg, Oral, Daily  atorvastatin, 40 mg, Oral, Daily  dexamethasone, 6 mg, Intravenous, Daily  ferric gluconate, 250 mg, Intravenous, Daily  metoprolol succinate XL, 100 mg, Oral, Q24H  mirtazapine, 15 mg, Oral, Daily  nystatin, , Topical, Q12H  QUEtiapine, 400 mg, Oral, Nightly  QUEtiapine, 50 mg, Oral, Daily  remdesivir, 100 mg, Intravenous, Q24H  sodium chloride, 10 mL, Intravenous, Q12H      Continuous Infusions:sodium chloride, 100 mL/hr, Last Rate: 100 mL/hr (22 0931)        Vital signs in last 24 hours:  Temp:  [96.7 °F (35.9 °C)-97.7 °F (36.5 °C)] 96.9 °F (36.1 °C)  Heart Rate:  [84-89] 86  Resp:  [16-18] 18  BP: (131-161)/(65-76) 161/76    Intake/Output:    Intake/Output Summary (Last 24 hours) at 2022 1550  Last data filed at 2022 1307  Gross per 24 hour   Intake --   Output 450 ml   Net -450 ml       Exam:  /76 (BP Location: Left arm, Patient Position: Lying)   Pulse 86   Temp 96.9 °F (36.1 °C) (Oral)   Resp 18   SpO2 93%     General Appearance:    Alert, cooperative, no distress   Head:    Normocephalic, without obvious abnormality, atraumatic   Eyes:       Throat:   Lips, tongue, gums normal   Neck:   Supple, symmetrical, trachea midline, no JVD   Lungs:     Clear to auscultation bilaterally, respirations unlabored   Chest Wall:    No tenderness or deformity    Heart:    Regular rate and rhythm, S1 and S2 normal, no murmur,no  Rub or gallop   Abdomen:     Soft, abdominal wounds with dressing in place, nontender, bowel sounds active, no masses, no organomegaly    Extremities:   Extremities normal, atraumatic, no cyanosis or edema   Pulses:      Skin:   Skin is warm and dry,   no rashes or palpable lesions   Neurologic:   no focal deficits noted      Lab Results (last 72 hours)     Procedure Component Value Units Date/Time    TSH [[id number]]  (Abnormal) Collected: 02/13/22 1042    Specimen: Blood Updated: 02/13/22 1413     TSH 0.251 uIU/mL     Lactate Dehydrogenase [[id number]]  (Abnormal) Collected: 02/13/22 1042    Specimen: Blood Updated: 02/13/22 1412      U/L     Iron Profile [[id number]]  (Abnormal) Collected: 02/13/22 1042    Specimen: Blood Updated: 02/13/22 1412     Iron 28 mcg/dL      Iron Saturation 16 %      Transferrin 117 mg/dL      TIBC 174 mcg/dL     Bilirubin, Direct [[id number]]  (Normal) Collected: 02/13/22 1042    Specimen: Blood Updated: 02/13/22 1412     Bilirubin, Direct <0.2 mg/dL     C-reactive Protein [[id number]]  (Abnormal) Collected: 02/13/22 1042    Specimen: Blood Updated: 02/13/22 1412     C-Reactive Protein 4.56 mg/dL     Folate [[id number]]  (Abnormal) Collected: 02/13/22 1042    Specimen: Blood Updated: 02/13/22 1157     Folate 4.48 ng/mL     Narrative:      Results may be falsely increased if patient taking Biotin.      Vitamin B12 [[id number]]  (Normal) Collected: 02/13/22 1042    Specimen: Blood Updated: 02/13/22 1157     Vitamin B-12 888 pg/mL     Narrative:      Results may be falsely increased if patient taking Biotin.      Procalcitonin [[id number]]  (Normal) Collected: 02/13/22 1042    Specimen: Blood Updated: 02/13/22 1151     Procalcitonin 0.10 ng/mL     Narrative:      As a Marker for Sepsis (Non-Neonates):     1. <0.5 ng/mL represents a low risk of severe sepsis and/or septic shock.  2. >2 ng/mL represents a high risk of severe sepsis and/or septic shock.    As a Marker for Lower Respiratory Tract Infections that require antibiotic therapy:  PCT on Admission     Antibiotic Therapy             6-12 Hrs later  >0.5                          Strongly Recommended            >0.25 - <0.5             Recommended  0.1 - 0.25                   "Discouraged                       Remeasure/reassess PCT  <0.1                         Strongly Discouraged         Remeasure/reassess PCT      As 28 day mortality risk marker: \"Change in Procalcitonin Result\" (>80% or <=80%) if Day 0 (or Day 1) and Day 4 values are available. Refer to http://[email or web address] [email or web address]pct-calculator.com/    Change in PCT <=80 %   A decrease of PCT levels below or equal to 80% defines a positive change in PCT test result representing a higher risk for 28-day all-cause mortality of patients diagnosed with severe sepsis or septic shock.    Change in PCT >80 %   A decrease of PCT levels of more than 80% defines a negative change in PCT result representing a lower risk for 28-day all-cause mortality of patients diagnosed with severe sepsis or septic shock.                Ferritin [[id number]]  (Abnormal) Collected: 02/13/22 1042    Specimen: Blood Updated: 02/13/22 1151     Ferritin 583.00 ng/mL     Narrative:      Results may be falsely decreased if patient taking Biotin.      Comprehensive Metabolic Panel [[id number]]  (Abnormal) Collected: 02/13/22 1042    Specimen: Blood Updated: 02/13/22 1149     Glucose 110 mg/dL      BUN 13 mg/dL      Creatinine 0.91 mg/dL      Sodium 138 mmol/L      Potassium 4.5 mmol/L      Chloride 107 mmol/L      CO2 23.0 mmol/L      Calcium 8.7 mg/dL      Total Protein 6.3 g/dL      Albumin 2.70 g/dL      ALT (SGPT) 48 U/L      AST (SGOT) 35 U/L      Alkaline Phosphatase 128 U/L      Total Bilirubin 0.3 mg/dL      eGFR Non African Amer 85 mL/min/1.73      Globulin 3.6 gm/dL      A/G Ratio 0.8 g/dL      BUN/Creatinine Ratio 14.3     Anion Gap 8.0 mmol/L     Narrative:      GFR Normal >60  Chronic Kidney Disease <60  Kidney Failure <15      CBC & Differential [[id number]]  (Abnormal) Collected: 02/13/22 1042    Specimen: Blood Updated: 02/13/22 1125    Narrative:      The following orders were created for panel order CBC & Differential.  Procedure                               " Abnormality         Status                     ---------                               -----------         ------                     CBC Auto Differential[[id number]]        Abnormal            Final result                 Please view results for these tests on the individual orders.    CBC Auto Differential [[id number]]  (Abnormal) Collected: 02/13/22 1042    Specimen: Blood Updated: 02/13/22 1125     WBC 10.31 10*3/mm3      RBC 3.02 10*6/mm3      Hemoglobin 8.3 g/dL      Hematocrit 25.8 %      MCV 85.4 fL      MCH 27.5 pg      MCHC 32.2 g/dL      RDW 14.7 %      RDW-SD 45.2 fl      MPV 10.3 fL      Platelets 398 10*3/mm3      Neutrophil % 75.6 %      Lymphocyte % 15.2 %      Monocyte % 4.0 %      Eosinophil % 0.0 %      Basophil % 0.4 %      Immature Grans % 4.8 %      Neutrophils, Absolute 7.80 10*3/mm3      Lymphocytes, Absolute 1.57 10*3/mm3      Monocytes, Absolute 0.41 10*3/mm3      Eosinophils, Absolute 0.00 10*3/mm3      Basophils, Absolute 0.04 10*3/mm3      Immature Grans, Absolute 0.49 10*3/mm3      nRBC 0.0 /100 WBC     Blood Culture - Blood, Leg, Left [[id number]]  (Normal) Collected: 02/12/22 0421    Specimen: Blood from Leg, Left Updated: 02/13/22 0430     Blood Culture No growth at 24 hours    Blood Culture - Blood, Hand, Right [[id number]]  (Normal) Collected: 02/12/22 0421    Specimen: Blood from Hand, Right Updated: 02/13/22 0430     Blood Culture No growth at 24 hours    Hepatic Function Panel [[id number]]  (Abnormal) Collected: 02/12/22 1851    Specimen: Blood Updated: 02/12/22 1923     Total Protein 5.7 g/dL      Albumin 2.30 g/dL      ALT (SGPT) 41 U/L      AST (SGOT) 39 U/L      Alkaline Phosphatase 120 U/L      Total Bilirubin 0.2 mg/dL      Bilirubin, Direct <0.2 mg/dL      Bilirubin, Indirect --     Comment: Unable to calculate       Creatinine, Serum [[id number]]  (Normal) Collected: 02/12/22 1851    Specimen: Blood Updated: 02/12/22 1923     Creatinine 1.08 mg/dL      eGFR Non African Amer  70 mL/min/1.73     Narrative:      GFR Normal >60  Chronic Kidney Disease <60  Kidney Failure <15      D-dimer, Quantitative [[id number]]  (Abnormal) Collected: 02/12/22 1851    Specimen: Blood Updated: 02/12/22 1915     D-Dimer, Quantitative 2.18 MCGFEU/mL     Narrative:      The Stago D-Dimer test used in conjunction with a clinical pretest probability (PTP) assessment model, has been approved by the FDA to rule out the presence of venous thromboembolism (VTE) in outpatients suspected of deep venous thrombosis (DVT) or pulmonary embolism (PE). The cut-off for negative predictive value is <0.50 MCGFEU/mL.    Basic Metabolic Panel [[id number]]  (Abnormal) Collected: 02/12/22 0919    Specimen: Blood Updated: 02/12/22 1044     Glucose 88 mg/dL      BUN 27 mg/dL      Creatinine 1.57 mg/dL      Sodium 136 mmol/L      Potassium 4.5 mmol/L      Chloride 108 mmol/L      CO2 19.0 mmol/L      Calcium 7.8 mg/dL      eGFR Non African Amer 45 mL/min/1.73      BUN/Creatinine Ratio 17.2     Anion Gap 9.0 mmol/L     Narrative:      GFR Normal >60  Chronic Kidney Disease <60  Kidney Failure <15      CBC Auto Differential [[id number]]  (Abnormal) Collected: 02/12/22 0919    Specimen: Blood Updated: 02/12/22 1017     WBC 10.89 10*3/mm3      RBC 2.93 10*6/mm3      Hemoglobin 8.0 g/dL      Hematocrit 24.8 %      MCV 84.6 fL      MCH 27.3 pg      MCHC 32.3 g/dL      RDW 15.1 %      RDW-SD 45.6 fl      MPV 10.7 fL      Platelets 385 10*3/mm3      Neutrophil % 55.9 %      Lymphocyte % 28.6 %      Monocyte % 8.4 %      Eosinophil % 1.5 %      Basophil % 0.6 %      Immature Grans % 5.0 %      Neutrophils, Absolute 6.10 10*3/mm3      Lymphocytes, Absolute 3.11 10*3/mm3      Monocytes, Absolute 0.91 10*3/mm3      Eosinophils, Absolute 0.16 10*3/mm3      Basophils, Absolute 0.07 10*3/mm3      Immature Grans, Absolute 0.54 10*3/mm3      nRBC 0.1 /100 WBC     COVID PRE-OP / PRE-PROCEDURE SCREENING ORDER (NO ISOLATION) - Swab, Nasopharynx  [[id number]]  (Abnormal) Collected: 02/12/22 0338    Specimen: Swab from Nasopharynx Updated: 02/12/22 0521    Narrative:      The following orders were created for panel order COVID PRE-OP / PRE-PROCEDURE SCREENING ORDER (NO ISOLATION) - Swab, Nasopharynx.  Procedure                               Abnormality         Status                     ---------                               -----------         ------                     COVID-19,[facility] IN-HOUSE...[[id number]]  Abnormal            Final result                 Please view results for these tests on the individual orders.    COVID-19,[facility] IN-HOUSE CEPHEID/ARIEL NP SWAB IN TRANSPORT MEDIA 8-12 HR TAT - Swab, Nasopharynx [[id number]]  (Abnormal) Collected: 02/12/22 0338    Specimen: Swab from Nasopharynx Updated: 02/12/22 0521     COVID19 Detected    Narrative:      Fact sheet for providers: https://www.fda.gov/media/117295/download     Fact sheet for patients: https://www.fda.gov/media/410012/download    Comprehensive Metabolic Panel [[id number]]  (Abnormal) Collected: 02/12/22 0217    Specimen: Blood Updated: 02/12/22 0307     Glucose 106 mg/dL      BUN 32 mg/dL      Creatinine 2.22 mg/dL      Sodium 135 mmol/L      Potassium 4.0 mmol/L      Chloride 102 mmol/L      CO2 22.1 mmol/L      Calcium 8.0 mg/dL      Total Protein 5.7 g/dL      Albumin 2.20 g/dL      ALT (SGPT) 48 U/L      AST (SGOT) 42 U/L      Alkaline Phosphatase 126 U/L      Total Bilirubin 0.2 mg/dL      eGFR Non African Amer 30 mL/min/1.73      Globulin 3.5 gm/dL      A/G Ratio 0.6 g/dL      BUN/Creatinine Ratio 14.4     Anion Gap 10.9 mmol/L     Narrative:      GFR Normal >60  Chronic Kidney Disease <60  Kidney Failure <15      Procalcitonin [[id number]]  (Normal) Collected: 02/12/22 0217    Specimen: Blood Updated: 02/12/22 0258     Procalcitonin 0.20 ng/mL     Narrative:      As a Marker for Sepsis (Non-Neonates):     1. <0.5 ng/mL represents a low risk of severe sepsis and/or septic shock.  2.  ">2 ng/mL represents a high risk of severe sepsis and/or septic shock.    As a Marker for Lower Respiratory Tract Infections that require antibiotic therapy:  PCT on Admission     Antibiotic Therapy             6-12 Hrs later  >0.5                          Strongly Recommended            >0.25 - <0.5             Recommended  0.1 - 0.25                  Discouraged                       Remeasure/reassess PCT  <0.1                         Strongly Discouraged         Remeasure/reassess PCT      As 28 day mortality risk marker: \"Change in Procalcitonin Result\" (>80% or <=80%) if Day 0 (or Day 1) and Day 4 values are available. Refer to http://www.[employer]ct-calculator.com/    Change in PCT <=80 %   A decrease of PCT levels below or equal to 80% defines a positive change in PCT test result representing a higher risk for 28-day all-cause mortality of patients diagnosed with severe sepsis or septic shock.    Change in PCT >80 %   A decrease of PCT levels of more than 80% defines a negative change in PCT result representing a lower risk for 28-day all-cause mortality of patients diagnosed with severe sepsis or septic shock.                Troponin [[id number]]  (Normal) Collected: 02/12/22 0217    Specimen: Blood Updated: 02/12/22 0258     Troponin T <0.010 ng/mL     Narrative:      Troponin T Reference Range:  <= 0.03 ng/mL-   Negative for AMI  >0.03 ng/mL-     Abnormal for myocardial necrosis.  Clinicians would have to utilize clinical acumen, EKG, Troponin and serial changes to determine if it is an Acute Myocardial Infarction or myocardial injury due to an underlying chronic condition.       Results may be falsely decreased if patient taking Biotin.      Protime-INR [[id number]]  (Abnormal) Collected: 02/12/22 0217    Specimen: Blood Updated: 02/12/22 0246     Protime 15.5 Seconds      INR 1.24    Lactic Acid, Plasma [[id number]]  (Normal) Collected: 02/12/22 0217    Specimen: Blood Updated: 02/12/22 0245     Lactate 0.7 " mmol/L     CBC & Differential [[id number]]  (Abnormal) Collected: 02/12/22 0217    Specimen: Blood Updated: 02/12/22 0232    Narrative:      The following orders were created for panel order CBC & Differential.  Procedure                               Abnormality         Status                     ---------                               -----------         ------                     CBC Auto Differential[[id number]]        Abnormal            Final result                 Please view results for these tests on the individual orders.    CBC Auto Differential [[id number]]  (Abnormal) Collected: 02/12/22 0217    Specimen: Blood Updated: 02/12/22 0232     WBC 11.96 10*3/mm3      RBC 2.75 10*6/mm3      Hemoglobin 7.7 g/dL      Hematocrit 23.3 %      MCV 84.7 fL      MCH 28.0 pg      MCHC 33.0 g/dL      RDW 15.1 %      RDW-SD 45.2 fl      MPV 10.7 fL      Platelets 327 10*3/mm3      Neutrophil % 64.9 %      Lymphocyte % 21.1 %      Monocyte % 7.8 %      Eosinophil % 1.1 %      Basophil % 0.3 %      Immature Grans % 4.8 %      Neutrophils, Absolute 7.76 10*3/mm3      Lymphocytes, Absolute 2.52 10*3/mm3      Monocytes, Absolute 0.93 10*3/mm3      Eosinophils, Absolute 0.13 10*3/mm3      Basophils, Absolute 0.04 10*3/mm3      Immature Grans, Absolute 0.58 10*3/mm3      nRBC 0.0 /100 WBC     POC Glucose Once [[id number]]  (Abnormal) Collected: 02/12/22 0152    Specimen: Blood Updated: 02/12/22 0153     Glucose 176 mg/dL      Comment: Meter: [id number] : [id number] Donis Wiley RN           Data Review:  Results from last 7 days   Lab Units 02/14/22  0347 02/13/22  1042 02/13/22  1042 02/12/22  1851 02/12/22  0919 02/12/22 0919   SODIUM mmol/L 139  --  138  --   --  136   POTASSIUM mmol/L 4.3  --  4.5  --   --  4.5   CHLORIDE mmol/L 109*  --  107  --   --  108*   CO2 mmol/L 21.0*  --  23.0  --   --  19.0*   BUN mg/dL 12  --  13  --   --  27*   CREATININE mg/dL 0.86  --  0.91 1.08   < > 1.57*   GLUCOSE mg/dL 89   < > 110*   --    < > 88   CALCIUM mg/dL 8.1*  --  8.7  --   --  7.8*    < > = values in this interval not displayed.     Results from last 7 days   Lab Units 02/14/22  0347 02/13/22  1042 02/12/22  0919   WBC 10*3/mm3 11.07* 10.31 10.89*   HEMOGLOBIN g/dL 7.8* 8.3* 8.0*   HEMATOCRIT % 23.6* 25.8* 24.8*   PLATELETS 10*3/mm3 396 398 385     Results from last 7 days   Lab Units 02/13/22  1042   TSH uIU/mL 0.251*         Lab Results   Lab Value Date/Time    TROPONINT <0.010 02/12/2022 0217    TROPONINT 0.039 (C) 01/16/2022 0855    TROPONINT <0.01 05/09/2020 0517    TROPONINT <0.01 07/17/2019 1643         Results from last 7 days   Lab Units 02/14/22  0347 02/13/22  1042 02/12/22  1851   ALK PHOS U/L 113 128* 120*   BILIRUBIN mg/dL 0.2 0.3 0.2   BILIRUBIN DIRECT mg/dL <0.2 <0.2 <0.2   ALT (SGPT) U/L 38 48* 41   AST (SGOT) U/L 36 35 39     Results from last 7 days   Lab Units 02/13/22  1042   TSH uIU/mL 0.251*         Glucose   Date/Time Value Ref Range Status   02/12/2022 0152 176 (H) 70 - 130 mg/dL Final     Comment:     Meter: [id number] : [id number] Donis Wiley RN     Results from last 7 days   Lab Units 02/12/22  0217   INR  1.24*       Past Medical History:   Diagnosis Date   • Allergies    • Anxiety    • Bipolar 1 disorder (HCC)    • COPD (chronic obstructive pulmonary disease) (HCC)    • Depression    • Forgetfulness    • Head injury    • Hepatitis    • High blood pressure    • High cholesterol    • Psychiatric illness    • S/P exploratory laparotomy, oversew of perforated ulcer, placement of Chaparro patch  1/17/2022   • Shortness of breath    • Sinus trouble        Assessment:  Active Hospital Problems    Diagnosis  POA   • **Altered mental status [R41.82]  Yes   • Hypotension due to hypovolemia [I95.89, E86.1]  Yes   • VERA (acute kidney injury) (HCC) [N17.9]  Yes   • S/P exploratory laparotomy, oversew of perforated ulcer, placement of Chaparro patch  [Z98.890]  Not Applicable   • Pneumoperitoneum of unknown etiology  [K66.8]  Yes   • Bowel perforation (HCC) [K63.1]  Yes   • Anxiety [F41.9]  Yes   • Bipolar 1 disorder (HCC) [F31.9]  Yes   • COPD (chronic obstructive pulmonary disease) (HCC) [J44.9]  Yes   • High blood pressure [I10]  Yes      Resolved Hospital Problems   No resolved problems to display.       Plan:  Continue with Decadron and finish remdesivir for COVID-19.  Initially was hypoxic but breathing is doing better.  Will get follow-up Covid labs.    Jose F Gleason MD  2/14/2022  15:50 EST

## 2022-02-14 NOTE — THERAPY EVALUATION
Patient Name: Atilio Recinos  : 1965    MRN: [id number]                              Today's Date: 2022       Admit Date: 2022    Visit Dx:     ICD-10-CM ICD-9-CM   1. Hypotension due to hypovolemia  I95.89 458.8    E86.1 276.52   2. Acute renal failure, unspecified acute renal failure type (HCC)  N17.9 584.9   3. Anemia, unspecified type  D64.9 285.9     Patient Active Problem List   Diagnosis   • Anxiety   • Bipolar 1 disorder (HCC)   • COPD (chronic obstructive pulmonary disease) (HCC)   • Depression   • High blood pressure   • High cholesterol   • Other acute sinusitis   • Obesity   • Cigarette nicotine dependence without complication   • Pneumoperitoneum of unknown etiology   • Bowel perforation (HCC)   • S/P exploratory laparotomy, oversew of perforated ulcer, placement of Chaparro patch    • Hypotension due to hypovolemia   • VERA (acute kidney injury) ([facility])   • Altered mental status     Past Medical History:   Diagnosis Date   • Allergies    • Anxiety    • Bipolar 1 disorder (HCC)    • COPD (chronic obstructive pulmonary disease) (HCC)    • Depression    • Forgetfulness    • Head injury    • Hepatitis    • High blood pressure    • High cholesterol    • Psychiatric illness    • S/P exploratory laparotomy, oversew of perforated ulcer, placement of Chaparro patch  2022   • Shortness of breath    • Sinus trouble      Past Surgical History:   Procedure Laterality Date   • EXPLORATORY LAPAROTOMY N/A 2022    Procedure: EXPLORATORY LAPAROTOMY, OVERSEW OF PERFORATED GASTRIC ULCER WITH CHAPARRO PATCH;  Surgeon: Atilio Lindsay MD;  Location: [facility];  Service: General;  Laterality: N/A;   • PLEURAL SCARIFICATION     • SPHINCTEROTOMY        General Information     Row Name 22 1415          Physical Therapy Time and Intention    Document Type evaluation  -CF     Mode of Treatment co-treatment; physical therapy; occupational therapy  -CF     Row Name 22 1415          General  Spoke with nurse gabe Smith at [place]. Pt's PCP (dr. Frey) is aware of these results. Information    Patient Profile Reviewed yes  -CF     Existing Precautions/Restrictions fall  -CF     Barriers to Rehab cognitive status; previous functional deficit  -[facility] Name 02/14/22 1415          Living Environment    Lives With facility resident  has been in SNF following abdominal surgery  -[facility] Name 02/14/22 1415          Home Main Entrance    Number of Stairs, Main Entrance none  -[facility] Name 02/14/22 1415          Stairs Within Home, Primary    Number of Stairs, Within Home, Primary none  -[facility] Name 02/14/22 1415          Cognition    Orientation Status (Cognition) oriented x 3  -[facility] Name 02/14/22 1415          Safety Issues, Functional Mobility    Safety Issues Affecting Function (Mobility) insight into deficits/self-awareness; problem-solving  -CF     Impairments Affecting Function (Mobility) cognition; endurance/activity tolerance; pain; strength  -CF           User Key  (r) = Recorded By, (t) = Taken By, (c) = Cosigned By    Initials Name Provider Type    CF Mulu Villarreal PT Physical Therapist               Mobility     [facility] Name 02/14/22 1417          Bed Mobility    Bed Mobility supine-sit  -     Supine-Sit [place] (Bed Mobility) standby assist  -     Assistive Device (Bed Mobility) bed rails; head of bed elevated  -[facility] Name 02/14/22 1417          Bed-Chair Transfer    Bed-Chair [place] (Transfers) contact guard; verbal cues  -     Assistive Device (Bed-Chair Transfers) walker, front-wheeled  -CF     [facility] Name 02/14/22 1417          Sit-Stand Transfer    Sit-Stand [place] (Transfers) verbal cues; contact guard  -     Assistive Device (Sit-Stand Transfers) walker, front-wheeled  -[facility] Name 02/14/22 1417          Gait/Stairs (Locomotion)    [place] Level (Gait) contact guard; verbal cues  -     Assistive Device (Gait) walker, front-wheeled  -CF     Distance in Feet (Gait) 25'  -CF     Deviations/Abnormal Patterns (Gait) mayte  decreased; gait speed decreased  -CF     Bilateral Gait Deviations forward flexed posture  -CF     Comment (Gait/Stairs) No overt loss of balance noted  -CF           User Key  (r) = Recorded By, (t) = Taken By, (c) = Cosigned By    Initials Name Provider Type    CF Mulu Villarreal, LILIAN Physical Therapist               Obj/Interventions     Row Name 02/14/22 1425          Range of Motion Comprehensive    General Range of Motion no range of motion deficits identified  -[facility] Name 02/14/22 1425          Strength Comprehensive (MMT)    Comment, General Manual Muscle Testing (MMT) Assessment BLE grossly 4/5  -     Row Name 02/14/22 1425          Balance    Static Standing Balance WFL; supported  -CF     Dynamic Standing Balance WFL; supported  -CF     Row Name 02/14/22 1425          Sensory Assessment (Somatosensory)    Sensory Assessment (Somatosensory) LE sensation intact  -           User Key  (r) = Recorded By, (t) = Taken By, (c) = Cosigned By    Initials Name Provider Type     Mulu Villarreal PT Physical Therapist               Goals/Plan     [facility] Name 02/14/22 1431          Bed Mobility Goal 1 (PT)    Activity/Assistive Device (Bed Mobility Goal 1, PT) bed mobility activities, all  -CF     [place] Level/Cues Needed (Bed Mobility Goal 1, PT) modified independence  -CF     Time Frame (Bed Mobility Goal 1, PT) 2 weeks  -[facility] Name 02/14/22 1431          Transfer Goal 1 (PT)    Activity/Assistive Device (Transfer Goal 1, PT) sit-to-stand/stand-to-sit; bed-to-chair/chair-to-bed; walker, rolling  -CF     [place] Level/Cues Needed (Transfer Goal 1, PT) standby assist  -CF     Time Frame (Transfer Goal 1, PT) 2 weeks  -[facility] Name 02/14/22 1431          Gait Training Goal 1 (PT)    Activity/Assistive Device (Gait Training Goal 1, PT) gait (walking locomotion); walker, rolling  -CF     [place] Level (Gait Training Goal 1, PT) standby assist  -CF     Distance (Gait Training Goal 1, PT)  120'  -CF     Time Frame (Gait Training Goal 1, PT) 2 weeks  -CF           User Key  (r) = Recorded By, (t) = Taken By, (c) = Cosigned By    Initials Name Provider Type    CF Mulu Villarreal, PT Physical Therapist               Clinical Impression     Row Name 02/14/22 1426          Pain Scale: Numbers Pre/Post-Treatment    Pretreatment Pain Rating 4/10  -CF     Posttreatment Pain Rating 4/10  -CF     Pain Location abdomen  -CF     Pain Intervention(s) Repositioned; Rest  -CF     Row Name 02/14/22 1426          Plan of Care Review    Plan of Care Reviewed With patient  -CF     Outcome Summary Pt is a 56 yo male admitted from SNF with AMS, acute renal failure and incidental COVID. Pt recently had ex lap on 1/17 and d/c to rehab. Pt with hx of bipolar, COPD, and hx of brain injury. Pt reports typically is IND and lives with  in single level home. pt states at rehab he was only ambulated a few steps with assist. Unsure if pt is reliable historian as pt was able to ambulate 25' in room with cga using walker. Pt demo decreased activity tolerance and insight into deficits. Pt may benefit from skilled PT to progress gait distance and general mobility. Anticipate return to SNF upon d/c.  -CF     Row Name 02/14/22 1426          Therapy Assessment/Plan (PT)    Rehab Potential (PT) good, to achieve stated therapy goals  -CF     Criteria for Skilled Interventions Met (PT) yes  -CF     Predicted Duration of Therapy Intervention (PT) 2 weeks  -CF     Row Name 02/14/22 1426          Vital Signs    O2 Delivery Pre Treatment room air  -CF     O2 Delivery Intra Treatment room air  -CF     O2 Delivery Post Treatment room air  -CF     Row Name 02/14/22 1426          Positioning and Restraints    Pre-Treatment Position in bed  -CF     Post Treatment Position chair  -CF     In Chair call light within reach; encouraged to call for assist; exit alarm on; sitting; notified nsg  -CF           User Key  (r) = Recorded By, (t) = Taken  By, (c) = Cosigned By    Initials Name Provider Type    CF Mulu Villarreal, PT Physical Therapist               Outcome Measures     Row Name 02/14/22 1432          How much help from another person do you currently need...    Turning from your back to your side while in flat bed without using bedrails? 4  -CF     Moving from lying on back to sitting on the side of a flat bed without bedrails? 3  -CF     Moving to and from a bed to a chair (including a wheelchair)? 3  -CF     Standing up from a chair using your arms (e.g., wheelchair, bedside chair)? 3  -CF     Climbing 3-5 steps with a railing? 2  -CF     To walk in hospital room? 3  -CF     AM-PAC 6 Clicks Score (PT) 18  -CF     Row Name 02/14/22 1432 02/14/22 1341       Functional Assessment    Outcome Measure Options AM-PAC 6 Clicks Basic Mobility (PT)  -CF AM-PAC 6 Clicks Daily Activity (OT)  -JW          User Key  (r) = Recorded By, (t) = Taken By, (c) = Cosigned By    Initials Name Provider Type    CF Mulu Villarreal, PT Physical Therapist    Linda Romero OT Occupational Therapist                             Physical Therapy Education                 Title: PT OT SLP Therapies (In Progress)     Topic: Physical Therapy (Done)     Point: Mobility training (Done)     Learning Progress Summary           Patient Acceptance, E, VU,NR by CF at 2/14/2022 1432                   Point: Home exercise program (Done)     Learning Progress Summary           Patient Acceptance, E, VU,NR by CF at 2/14/2022 1432                   Point: Body mechanics (Done)     Learning Progress Summary           Patient Acceptance, E, VU,NR by CF at 2/14/2022 1432                   Point: Precautions (Done)     Learning Progress Summary           Patient Acceptance, E, VU,NR by CF at 2/14/2022 1432                               User Key     Initials Effective Dates Name Provider Type Discipline     06/16/21 -  Mulu Villarreal, PT Physical Therapist PT              PT  Recommendation and Plan  Planned Therapy Interventions (PT): balance training, bed mobility training, gait training, ROM (range of motion), stair training, strengthening, patient/family education, transfer training, postural re-education  Plan of Care Reviewed With: patient  Outcome Summary: Pt is a 56 yo male admitted from SNF with AMS, acute renal failure and incidental COVID. Pt recently had ex lap on 1/17 and d/c to rehab. Pt with hx of bipolar, COPD, and hx of brain injury. Pt reports typically is IND and lives with  in single level home. pt states at rehab he was only ambulated a few steps with assist. Unsure if pt is reliable historian as pt was able to ambulate 25' in room with cga using walker. Pt demo decreased activity tolerance and insight into deficits. Pt may benefit from skilled PT to progress gait distance and general mobility. Anticipate return to SNF upon d/c.     Time Calculation:    PT Charges     Row Name 02/14/22 1414             Time Calculation    Start Time 0954  -CF      Stop Time 1018  -CF      Time Calculation (min) 24 min  -CF      PT Received On 02/14/22  -CF      PT - Next Appointment 02/16/22  -CF      PT Goal Re-Cert Due Date 02/28/22  -CF              Time Calculation- PT    Total Timed Code Minutes- PT 18 minute(s)  -CF              Timed Charges    47091 - PT Therapeutic Activity Minutes 18  -CF              Total Minutes    Timed Charges Total Minutes 18  -CF       Total Minutes 18  -CF            User Key  (r) = Recorded By, (t) = Taken By, (c) = Cosigned By    Initials Name Provider Type    CF Mulu Villarreal, PT Physical Therapist              Therapy Charges for Today     Code Description Service Date Service Provider Modifiers Qty    16982914931 HC PT EVAL MOD COMPLEXITY 3 2/14/2022 Mulu Villarreal, PT GP 1    49975601178 HC PT THERAPEUTIC ACT EA 15 MIN 2/14/2022 Mulu Villarreal, PT GP 1          PT G-Codes  Outcome Measure Options: AM-PAC 6 Clicks Basic  Mobility (PT)  AM-PAC 6 Clicks Score (PT): 18  AM-PAC 6 Clicks Score (OT): 19    Mulu Villarreal, PT  2/14/2022

## 2022-02-15 LAB
ALBUMIN SERPL-MCNC: 2.5 G/DL (ref 3.5–5.2)
ALBUMIN/GLOB SERPL: 0.7 G/DL
ALP SERPL-CCNC: 104 U/L (ref 39–117)
ALT SERPL W P-5'-P-CCNC: 36 U/L (ref 1–41)
ANION GAP SERPL CALCULATED.3IONS-SCNC: 8 MMOL/L (ref 5–15)
ANISOCYTOSIS BLD QL: ABNORMAL
AST SERPL-CCNC: 32 U/L (ref 1–40)
BACTERIA SPEC AEROBE CULT: ABNORMAL
BILIRUB CONJ SERPL-MCNC: <0.2 MG/DL (ref 0–0.3)
BILIRUB SERPL-MCNC: 0.2 MG/DL (ref 0–1.2)
BUN SERPL-MCNC: 11 MG/DL (ref 6–20)
BUN/CREAT SERPL: 15.9 (ref 7–25)
CALCIUM SPEC-SCNC: 8.3 MG/DL (ref 8.6–10.5)
CHLORIDE SERPL-SCNC: 106 MMOL/L (ref 98–107)
CO2 SERPL-SCNC: 24 MMOL/L (ref 22–29)
CREAT SERPL-MCNC: 0.69 MG/DL (ref 0.76–1.27)
CRP SERPL-MCNC: 1.2 MG/DL (ref 0–0.5)
DEPRECATED RDW RBC AUTO: 46.8 FL (ref 37–54)
ERYTHROCYTE [DISTWIDTH] IN BLOOD BY AUTOMATED COUNT: 14.9 % (ref 12.3–15.4)
FERRITIN SERPL-MCNC: 798 NG/ML (ref 30–400)
GFR SERPL CREATININE-BSD FRML MDRD: 117 ML/MIN/1.73
GLOBULIN UR ELPH-MCNC: 3.4 GM/DL
GLUCOSE SERPL-MCNC: 95 MG/DL (ref 65–99)
GRAM STN SPEC: ABNORMAL
HCT VFR BLD AUTO: 25.7 % (ref 37.5–51)
HGB BLD-MCNC: 8.1 G/DL (ref 13–17.7)
ISOLATED FROM: ABNORMAL
LDH SERPL-CCNC: 228 U/L (ref 135–225)
LYMPHOCYTES # BLD MANUAL: 1.18 10*3/MM3 (ref 0.7–3.1)
LYMPHOCYTES NFR BLD MANUAL: 3.7 % (ref 5–12)
MCH RBC QN AUTO: 27 PG (ref 26.6–33)
MCHC RBC AUTO-ENTMCNC: 31.5 G/DL (ref 31.5–35.7)
MCV RBC AUTO: 85.7 FL (ref 79–97)
METAMYELOCYTES NFR BLD MANUAL: 2.4 % (ref 0–0)
MICROCYTES BLD QL: ABNORMAL
MONOCYTES # BLD: 0.36 10*3/MM3 (ref 0.1–0.9)
MYELOCYTES NFR BLD MANUAL: 1.2 % (ref 0–0)
NEUTROPHILS # BLD AUTO: 7.8 10*3/MM3 (ref 1.7–7)
NEUTROPHILS NFR BLD MANUAL: 80.5 % (ref 42.7–76)
NRBC BLD AUTO-RTO: 0.2 /100 WBC (ref 0–0.2)
PLAT MORPH BLD: NORMAL
PLATELET # BLD AUTO: 423 10*3/MM3 (ref 140–450)
PMV BLD AUTO: 10.3 FL (ref 6–12)
POLYCHROMASIA BLD QL SMEAR: ABNORMAL
POTASSIUM SERPL-SCNC: 3.8 MMOL/L (ref 3.5–5.2)
PROCALCITONIN SERPL-MCNC: 0.07 NG/ML (ref 0–0.25)
PROT SERPL-MCNC: 5.9 G/DL (ref 6–8.5)
RBC # BLD AUTO: 3 10*6/MM3 (ref 4.14–5.8)
SMUDGE CELLS BLD QL SMEAR: ABNORMAL
SODIUM SERPL-SCNC: 138 MMOL/L (ref 136–145)
VARIANT LYMPHS NFR BLD MANUAL: 12.2 % (ref 19.6–45.3)
WBC NRBC COR # BLD: 9.69 10*3/MM3 (ref 3.4–10.8)

## 2022-02-15 PROCEDURE — 84145 PROCALCITONIN (PCT): CPT | Performed by: HOSPITALIST

## 2022-02-15 PROCEDURE — 85007 BL SMEAR W/DIFF WBC COUNT: CPT | Performed by: HOSPITALIST

## 2022-02-15 PROCEDURE — 85025 COMPLETE CBC W/AUTO DIFF WBC: CPT | Performed by: HOSPITALIST

## 2022-02-15 PROCEDURE — 25010000002 DEXAMETHASONE PER 1 MG: Performed by: HOSPITALIST

## 2022-02-15 PROCEDURE — 86140 C-REACTIVE PROTEIN: CPT | Performed by: HOSPITALIST

## 2022-02-15 PROCEDURE — 25010000002 REMDESIVIR 100 MG/20ML SOLUTION 1 EACH VIAL: Performed by: HOSPITALIST

## 2022-02-15 PROCEDURE — 82728 ASSAY OF FERRITIN: CPT | Performed by: HOSPITALIST

## 2022-02-15 PROCEDURE — 80053 COMPREHEN METABOLIC PANEL: CPT | Performed by: HOSPITALIST

## 2022-02-15 PROCEDURE — 83615 LACTATE (LD) (LDH) ENZYME: CPT | Performed by: HOSPITALIST

## 2022-02-15 PROCEDURE — 25010000002 NA FERRIC GLUC CPLX PER 12.5 MG: Performed by: HOSPITALIST

## 2022-02-15 PROCEDURE — 82248 BILIRUBIN DIRECT: CPT | Performed by: HOSPITALIST

## 2022-02-15 RX ADMIN — ATORVASTATIN CALCIUM 40 MG: 20 TABLET, FILM COATED ORAL at 08:30

## 2022-02-15 RX ADMIN — NYSTATIN: 100000 POWDER TOPICAL at 20:00

## 2022-02-15 RX ADMIN — MIRTAZAPINE 15 MG: 15 TABLET, FILM COATED ORAL at 08:31

## 2022-02-15 RX ADMIN — AMLODIPINE BESYLATE 2.5 MG: 5 TABLET ORAL at 08:30

## 2022-02-15 RX ADMIN — METOPROLOL SUCCINATE 100 MG: 100 TABLET, EXTENDED RELEASE ORAL at 08:31

## 2022-02-15 RX ADMIN — SODIUM CHLORIDE 250 MG: 9 INJECTION, SOLUTION INTRAVENOUS at 08:44

## 2022-02-15 RX ADMIN — QUETIAPINE FUMARATE 50 MG: 50 TABLET, FILM COATED ORAL at 08:31

## 2022-02-15 RX ADMIN — SODIUM CHLORIDE, PRESERVATIVE FREE 10 ML: 5 INJECTION INTRAVENOUS at 08:31

## 2022-02-15 RX ADMIN — OXYCODONE AND ACETAMINOPHEN 1 TABLET: 5; 325 TABLET ORAL at 14:03

## 2022-02-15 RX ADMIN — SODIUM CHLORIDE, PRESERVATIVE FREE 10 ML: 5 INJECTION INTRAVENOUS at 19:51

## 2022-02-15 RX ADMIN — SODIUM CHLORIDE 100 ML/HR: 9 INJECTION, SOLUTION INTRAVENOUS at 08:32

## 2022-02-15 RX ADMIN — NYSTATIN: 100000 POWDER TOPICAL at 08:28

## 2022-02-15 RX ADMIN — REMDESIVIR 100 MG: 100 INJECTION, POWDER, LYOPHILIZED, FOR SOLUTION INTRAVENOUS at 13:59

## 2022-02-15 RX ADMIN — QUETIAPINE FUMARATE 400 MG: 200 TABLET ORAL at 20:00

## 2022-02-15 RX ADMIN — SODIUM CHLORIDE, PRESERVATIVE FREE 10 ML: 5 INJECTION INTRAVENOUS at 20:00

## 2022-02-15 RX ADMIN — DEXAMETHASONE SODIUM PHOSPHATE 6 MG: 10 INJECTION INTRAMUSCULAR; INTRAVENOUS at 08:31

## 2022-02-15 RX ADMIN — OXYCODONE AND ACETAMINOPHEN 1 TABLET: 5; 325 TABLET ORAL at 19:49

## 2022-02-15 RX ADMIN — SODIUM CHLORIDE 100 ML/HR: 9 INJECTION, SOLUTION INTRAVENOUS at 21:55

## 2022-02-15 NOTE — PROGRESS NOTES
"DAILY PROGRESS NOTE  [facility]    Patient Identification:  Name: Atilio Recinos  Age: 57 y.o.  Sex: male  :  1965  MRN: [id number]         Primary Care Physician: William Mcdonald MD    Subjective:  Interval History: He is still very weak.    Objective:    Scheduled Meds:amLODIPine, 2.5 mg, Oral, Daily  atorvastatin, 40 mg, Oral, Daily  dexamethasone, 6 mg, Intravenous, Daily  metoprolol succinate XL, 100 mg, Oral, Q24H  mirtazapine, 15 mg, Oral, Daily  nystatin, , Topical, Q12H  QUEtiapine, 400 mg, Oral, Nightly  QUEtiapine, 50 mg, Oral, Daily  remdesivir, 100 mg, Intravenous, Q24H  sodium chloride, 10 mL, Intravenous, Q12H      Continuous Infusions:sodium chloride, 100 mL/hr, Last Rate: 100 mL/hr (02/15/22 0832)        Vital signs in last 24 hours:  Temp:  [96.7 °F (35.9 °C)-97.7 °F (36.5 °C)] 97.3 °F (36.3 °C)  Heart Rate:  [78-89] 84  Resp:  [18] 18  BP: (153-160)/(74-83) 160/83    Intake/Output:    Intake/Output Summary (Last 24 hours) at 2/15/2022 1503  Last data filed at 2/15/2022 1341  Gross per 24 hour   Intake --   Output 2475 ml   Net -2475 ml       Exam:  /83 (BP Location: Left arm, Patient Position: Lying)   Pulse 84   Temp 97.3 °F (36.3 °C) (Oral)   Resp 18   Ht 167.6 cm (65.98\")   Wt 95.7 kg (210 lb 15.7 oz) Comment: not weighed by RD  SpO2 92%   BMI 34.07 kg/m²     General Appearance:    Alert, cooperative, no distress   Head:    Normocephalic, without obvious abnormality, atraumatic   Eyes:       Throat:   Lips, tongue, gums normal   Neck:   Supple, symmetrical, trachea midline, no JVD   Lungs:     Clear to auscultation bilaterally, respirations unlabored   Chest Wall:    No tenderness or deformity    Heart:    Regular rate and rhythm, S1 and S2 normal, no murmur,no  Rub or gallop   Abdomen:     Soft, abdominal wounds with dressing in place, nontender, bowel sounds active, no masses, no organomegaly    Extremities:   Extremities normal, atraumatic, no cyanosis " or edema   Pulses:      Skin:   Skin is warm and dry,  no rashes or palpable lesions   Neurologic:   no focal deficits noted      Lab Results (last 72 hours)     Procedure Component Value Units Date/Time    TSH [[id number]]  (Abnormal) Collected: 02/13/22 1042    Specimen: Blood Updated: 02/13/22 1413     TSH 0.251 uIU/mL     Lactate Dehydrogenase [[id number]]  (Abnormal) Collected: 02/13/22 1042    Specimen: Blood Updated: 02/13/22 1412      U/L     Iron Profile [[id number]]  (Abnormal) Collected: 02/13/22 1042    Specimen: Blood Updated: 02/13/22 1412     Iron 28 mcg/dL      Iron Saturation 16 %      Transferrin 117 mg/dL      TIBC 174 mcg/dL     Bilirubin, Direct [[id number]]  (Normal) Collected: 02/13/22 1042    Specimen: Blood Updated: 02/13/22 1412     Bilirubin, Direct <0.2 mg/dL     C-reactive Protein [[id number]]  (Abnormal) Collected: 02/13/22 1042    Specimen: Blood Updated: 02/13/22 1412     C-Reactive Protein 4.56 mg/dL     Folate [[id number]]  (Abnormal) Collected: 02/13/22 1042    Specimen: Blood Updated: 02/13/22 1157     Folate 4.48 ng/mL     Narrative:      Results may be falsely increased if patient taking Biotin.      Vitamin B12 [[id number]]  (Normal) Collected: 02/13/22 1042    Specimen: Blood Updated: 02/13/22 1157     Vitamin B-12 888 pg/mL     Narrative:      Results may be falsely increased if patient taking Biotin.      Procalcitonin [[id number]]  (Normal) Collected: 02/13/22 1042    Specimen: Blood Updated: 02/13/22 1151     Procalcitonin 0.10 ng/mL     Narrative:      As a Marker for Sepsis (Non-Neonates):     1. <0.5 ng/mL represents a low risk of severe sepsis and/or septic shock.  2. >2 ng/mL represents a high risk of severe sepsis and/or septic shock.    As a Marker for Lower Respiratory Tract Infections that require antibiotic therapy:  PCT on Admission     Antibiotic Therapy             6-12 Hrs later  >0.5                          Strongly Recommended            >0.25 - <0.5         "     Recommended  0.1 - 0.25                  Discouraged                       Remeasure/reassess PCT  <0.1                         Strongly Discouraged         Remeasure/reassess PCT      As 28 day mortality risk marker: \"Change in Procalcitonin Result\" (>80% or <=80%) if Day 0 (or Day 1) and Day 4 values are available. Refer to http://www.Jdguanjiapct-calculator.com/    Change in PCT <=80 %   A decrease of PCT levels below or equal to 80% defines a positive change in PCT test result representing a higher risk for 28-day all-cause mortality of patients diagnosed with severe sepsis or septic shock.    Change in PCT >80 %   A decrease of PCT levels of more than 80% defines a negative change in PCT result representing a lower risk for 28-day all-cause mortality of patients diagnosed with severe sepsis or septic shock.                Ferritin [[id number]]  (Abnormal) Collected: 02/13/22 1042    Specimen: Blood Updated: 02/13/22 1151     Ferritin 583.00 ng/mL     Narrative:      Results may be falsely decreased if patient taking Biotin.      Comprehensive Metabolic Panel [[id number]]  (Abnormal) Collected: 02/13/22 1042    Specimen: Blood Updated: 02/13/22 1149     Glucose 110 mg/dL      BUN 13 mg/dL      Creatinine 0.91 mg/dL      Sodium 138 mmol/L      Potassium 4.5 mmol/L      Chloride 107 mmol/L      CO2 23.0 mmol/L      Calcium 8.7 mg/dL      Total Protein 6.3 g/dL      Albumin 2.70 g/dL      ALT (SGPT) 48 U/L      AST (SGOT) 35 U/L      Alkaline Phosphatase 128 U/L      Total Bilirubin 0.3 mg/dL      eGFR Non African Amer 85 mL/min/1.73      Globulin 3.6 gm/dL      A/G Ratio 0.8 g/dL      BUN/Creatinine Ratio 14.3     Anion Gap 8.0 mmol/L     Narrative:      GFR Normal >60  Chronic Kidney Disease <60  Kidney Failure <15      CBC & Differential [[id number]]  (Abnormal) Collected: 02/13/22 1042    Specimen: Blood Updated: 02/13/22 1125    Narrative:      The following orders were created for panel order CBC & " Differential.  Procedure                               Abnormality         Status                     ---------                               -----------         ------                     CBC Auto Differential[[id number]]        Abnormal            Final result                 Please view results for these tests on the individual orders.    CBC Auto Differential [[id number]]  (Abnormal) Collected: 02/13/22 1042    Specimen: Blood Updated: 02/13/22 1125     WBC 10.31 10*3/mm3      RBC 3.02 10*6/mm3      Hemoglobin 8.3 g/dL      Hematocrit 25.8 %      MCV 85.4 fL      MCH 27.5 pg      MCHC 32.2 g/dL      RDW 14.7 %      RDW-SD 45.2 fl      MPV 10.3 fL      Platelets 398 10*3/mm3      Neutrophil % 75.6 %      Lymphocyte % 15.2 %      Monocyte % 4.0 %      Eosinophil % 0.0 %      Basophil % 0.4 %      Immature Grans % 4.8 %      Neutrophils, Absolute 7.80 10*3/mm3      Lymphocytes, Absolute 1.57 10*3/mm3      Monocytes, Absolute 0.41 10*3/mm3      Eosinophils, Absolute 0.00 10*3/mm3      Basophils, Absolute 0.04 10*3/mm3      Immature Grans, Absolute 0.49 10*3/mm3      nRBC 0.0 /100 WBC     Blood Culture - Blood, Leg, Left [[id number]]  (Normal) Collected: 02/12/22 0421    Specimen: Blood from Leg, Left Updated: 02/13/22 0430     Blood Culture No growth at 24 hours    Blood Culture - Blood, Hand, Right [[id number]]  (Normal) Collected: 02/12/22 0421    Specimen: Blood from Hand, Right Updated: 02/13/22 0430     Blood Culture No growth at 24 hours    Hepatic Function Panel [[id number]]  (Abnormal) Collected: 02/12/22 1851    Specimen: Blood Updated: 02/12/22 1923     Total Protein 5.7 g/dL      Albumin 2.30 g/dL      ALT (SGPT) 41 U/L      AST (SGOT) 39 U/L      Alkaline Phosphatase 120 U/L      Total Bilirubin 0.2 mg/dL      Bilirubin, Direct <0.2 mg/dL      Bilirubin, Indirect --     Comment: Unable to calculate       Creatinine, Serum [[id number]]  (Normal) Collected: 02/12/22 1851    Specimen: Blood Updated: 02/12/22 1923      Creatinine 1.08 mg/dL      eGFR Non African Amer 70 mL/min/1.73     Narrative:      GFR Normal >60  Chronic Kidney Disease <60  Kidney Failure <15      D-dimer, Quantitative [[id number]]  (Abnormal) Collected: 02/12/22 1851    Specimen: Blood Updated: 02/12/22 1915     D-Dimer, Quantitative 2.18 MCGFEU/mL     Narrative:      The Stago D-Dimer test used in conjunction with a clinical pretest probability (PTP) assessment model, has been approved by the FDA to rule out the presence of venous thromboembolism (VTE) in outpatients suspected of deep venous thrombosis (DVT) or pulmonary embolism (PE). The cut-off for negative predictive value is <0.50 MCGFEU/mL.    Basic Metabolic Panel [[id number]]  (Abnormal) Collected: 02/12/22 0919    Specimen: Blood Updated: 02/12/22 1044     Glucose 88 mg/dL      BUN 27 mg/dL      Creatinine 1.57 mg/dL      Sodium 136 mmol/L      Potassium 4.5 mmol/L      Chloride 108 mmol/L      CO2 19.0 mmol/L      Calcium 7.8 mg/dL      eGFR Non African Amer 45 mL/min/1.73      BUN/Creatinine Ratio 17.2     Anion Gap 9.0 mmol/L     Narrative:      GFR Normal >60  Chronic Kidney Disease <60  Kidney Failure <15      CBC Auto Differential [[id number]]  (Abnormal) Collected: 02/12/22 0919    Specimen: Blood Updated: 02/12/22 1017     WBC 10.89 10*3/mm3      RBC 2.93 10*6/mm3      Hemoglobin 8.0 g/dL      Hematocrit 24.8 %      MCV 84.6 fL      MCH 27.3 pg      MCHC 32.3 g/dL      RDW 15.1 %      RDW-SD 45.6 fl      MPV 10.7 fL      Platelets 385 10*3/mm3      Neutrophil % 55.9 %      Lymphocyte % 28.6 %      Monocyte % 8.4 %      Eosinophil % 1.5 %      Basophil % 0.6 %      Immature Grans % 5.0 %      Neutrophils, Absolute 6.10 10*3/mm3      Lymphocytes, Absolute 3.11 10*3/mm3      Monocytes, Absolute 0.91 10*3/mm3      Eosinophils, Absolute 0.16 10*3/mm3      Basophils, Absolute 0.07 10*3/mm3      Immature Grans, Absolute 0.54 10*3/mm3      nRBC 0.1 /100 WBC     COVID PRE-OP / PRE-PROCEDURE  SCREENING ORDER (NO ISOLATION) - Swab, Nasopharynx [[id number]]  (Abnormal) Collected: 02/12/22 0338    Specimen: Swab from Nasopharynx Updated: 02/12/22 0521    Narrative:      The following orders were created for panel order COVID PRE-OP / PRE-PROCEDURE SCREENING ORDER (NO ISOLATION) - Swab, Nasopharynx.  Procedure                               Abnormality         Status                     ---------                               -----------         ------                     COVID-19,[facility]HOUSE...[[id number]]  Abnormal            Final result                 Please view results for these tests on the individual orders.    COVID-19,[facility]-HOU[facility] CEPHEID/ARIEL NP SWAB IN TRANSPORT MEDIA 8-12 HR TAT - Swab, Nasopharynx [[id number]]  (Abnormal) Collected: 02/12/22 0338    Specimen: Swab from Nasopharynx Updated: 02/12/22 0521     COVID19 Detected    Narrative:      Fact sheet for providers: https://www.fda.gov/media/013296/download     Fact sheet for patients: https://www.fda.gov/media/670842/download    Comprehensive Metabolic Panel [[id number]]  (Abnormal) Collected: 02/12/22 0217    Specimen: Blood Updated: 02/12/22 0307     Glucose 106 mg/dL      BUN 32 mg/dL      Creatinine 2.22 mg/dL      Sodium 135 mmol/L      Potassium 4.0 mmol/L      Chloride 102 mmol/L      CO2 22.1 mmol/L      Calcium 8.0 mg/dL      Total Protein 5.7 g/dL      Albumin 2.20 g/dL      ALT (SGPT) 48 U/L      AST (SGOT) 42 U/L      Alkaline Phosphatase 126 U/L      Total Bilirubin 0.2 mg/dL      eGFR Non African Amer 30 mL/min/1.73      Globulin 3.5 gm/dL      A/G Ratio 0.6 g/dL      BUN/Creatinine Ratio 14.4     Anion Gap 10.9 mmol/L     Narrative:      GFR Normal >60  Chronic Kidney Disease <60  Kidney Failure <15      Procalcitonin [[id number]]  (Normal) Collected: 02/12/22 0217    Specimen: Blood Updated: 02/12/22 0258     Procalcitonin 0.20 ng/mL     Narrative:      As a Marker for Sepsis (Non-Neonates):     1. <0.5 ng/mL represents a  "low risk of severe sepsis and/or septic shock.  2. >2 ng/mL represents a high risk of severe sepsis and/or septic shock.    As a Marker for Lower Respiratory Tract Infections that require antibiotic therapy:  PCT on Admission     Antibiotic Therapy             6-12 Hrs later  >0.5                          Strongly Recommended            >0.25 - <0.5             Recommended  0.1 - 0.25                  Discouraged                       Remeasure/reassess PCT  <0.1                         Strongly Discouraged         Remeasure/reassess PCT      As 28 day mortality risk marker: \"Change in Procalcitonin Result\" (>80% or <=80%) if Day 0 (or Day 1) and Day 4 values are available. Refer to http://www.[email or web address]id[facility]ical Sneakerspct-calculator.com/    Change in PCT <=80 %   A decrease of PCT levels below or equal to 80% defines a positive change in PCT test result representing a higher risk for 28-day all-cause mortality of patients diagnosed with severe sepsis or septic shock.    Change in PCT >80 %   A decrease of PCT levels of more than 80% defines a negative change in PCT result representing a lower risk for 28-day all-cause mortality of patients diagnosed with severe sepsis or septic shock.                Troponin [[id number]]  (Normal) Collected: 02/12/22 0217    Specimen: Blood Updated: 02/12/22 0258     Troponin T <0.010 ng/mL     Narrative:      Troponin T Reference Range:  <= 0.03 ng/mL-   Negative for AMI  >0.03 ng/mL-     Abnormal for myocardial necrosis.  Clinicians would have to utilize clinical acumen, EKG, Troponin and serial changes to determine if it is an Acute Myocardial Infarction or myocardial injury due to an underlying chronic condition.       Results may be falsely decreased if patient taking Biotin.      Protime-INR [[id number]]  (Abnormal) Collected: 02/12/22 0217    Specimen: Blood Updated: 02/12/22 0246     Protime 15.5 Seconds      INR 1.24    Lactic Acid, Plasma [[id number]]  (Normal) Collected: 02/12/22 0217    " Specimen: Blood Updated: 02/12/22 0245     Lactate 0.7 mmol/L     CBC & Differential [[id number]]  (Abnormal) Collected: 02/12/22 0217    Specimen: Blood Updated: 02/12/22 0232    Narrative:      The following orders were created for panel order CBC & Differential.  Procedure                               Abnormality         Status                     ---------                               -----------         ------                     CBC Auto Differential[[id number]]        Abnormal            Final result                 Please view results for these tests on the individual orders.    CBC Auto Differential [[id number]]  (Abnormal) Collected: 02/12/22 0217    Specimen: Blood Updated: 02/12/22 0232     WBC 11.96 10*3/mm3      RBC 2.75 10*6/mm3      Hemoglobin 7.7 g/dL      Hematocrit 23.3 %      MCV 84.7 fL      MCH 28.0 pg      MCHC 33.0 g/dL      RDW 15.1 %      RDW-SD 45.2 fl      MPV 10.7 fL      Platelets 327 10*3/mm3      Neutrophil % 64.9 %      Lymphocyte % 21.1 %      Monocyte % 7.8 %      Eosinophil % 1.1 %      Basophil % 0.3 %      Immature Grans % 4.8 %      Neutrophils, Absolute 7.76 10*3/mm3      Lymphocytes, Absolute 2.52 10*3/mm3      Monocytes, Absolute 0.93 10*3/mm3      Eosinophils, Absolute 0.13 10*3/mm3      Basophils, Absolute 0.04 10*3/mm3      Immature Grans, Absolute 0.58 10*3/mm3      nRBC 0.0 /100 WBC     POC Glucose Once [[id number]]  (Abnormal) Collected: 02/12/22 0152    Specimen: Blood Updated: 02/12/22 0153     Glucose 176 mg/dL      Comment: Meter: [id number] : [id number] Donis Wiley RN           Data Review:  Results from last 7 days   Lab Units 02/15/22  0433 02/14/22  0347 02/14/22  0347 02/13/22  1042 02/13/22  1042   SODIUM mmol/L 138  --  139  --  138   POTASSIUM mmol/L 3.8  --  4.3  --  4.5   CHLORIDE mmol/L 106  --  109*  --  107   CO2 mmol/L 24.0  --  21.0*  --  23.0   BUN mg/dL 11  --  12  --  13   CREATININE mg/dL 0.69*  --  0.86  --  0.91   GLUCOSE mg/dL 95   < >  89   < > 110*   CALCIUM mg/dL 8.3*  --  8.1*  --  8.7    < > = values in this interval not displayed.     Results from last 7 days   Lab Units 02/15/22  0433 02/14/22  0347 02/13/22  1042   WBC 10*3/mm3 9.69 11.07* 10.31   HEMOGLOBIN g/dL 8.1* 7.8* 8.3*   HEMATOCRIT % 25.7* 23.6* 25.8*   PLATELETS 10*3/mm3 423 396 398     Results from last 7 days   Lab Units 02/13/22  1042   TSH uIU/mL 0.251*         Lab Results   Lab Value Date/Time    TROPONINT <0.010 02/12/2022 0217    TROPONINT 0.039 (C) 01/16/2022 0855    TROPONINT <0.01 05/09/2020 0517    TROPONINT <0.01 07/17/2019 1643         Results from last 7 days   Lab Units 02/15/22  0433 02/14/22 0347 02/13/22  1042   ALK PHOS U/L 104 113 128*   BILIRUBIN mg/dL 0.2 0.2 0.3   BILIRUBIN DIRECT mg/dL <0.2 <0.2 <0.2   ALT (SGPT) U/L 36 38 48*   AST (SGOT) U/L 32 36 35     Results from last 7 days   Lab Units 02/13/22  1042   TSH uIU/mL 0.251*         No results found for: POCGLU  Results from last 7 days   Lab Units 02/12/22  0217   INR  1.24*       Past Medical History:   Diagnosis Date   • Allergies    • Anxiety    • Bipolar 1 disorder (HCC)    • COPD (chronic obstructive pulmonary disease) (HCC)    • Depression    • Forgetfulness    • Head injury    • Hepatitis    • High blood pressure    • High cholesterol    • Psychiatric illness    • S/P exploratory laparotomy, oversew of perforated ulcer, placement of Chaparro patch  1/17/2022   • Shortness of breath    • Sinus trouble        Assessment:  Active Hospital Problems    Diagnosis  POA   • **Altered mental status [R41.82]  Yes   • Hypotension due to hypovolemia [I95.89, E86.1]  Yes   • VERA (acute kidney injury) ([facility]) [N17.9]  Yes   • S/P exploratory laparotomy, oversew of perforated ulcer, placement of Chaparro patch  [Z98.890]  Not Applicable   • Pneumoperitoneum of unknown etiology [K66.8]  Yes   • Bowel perforation (HCC) [K63.1]  Yes   • Anxiety [F41.9]  Yes   • Bipolar 1 disorder (HCC) [F31.9]  Yes   • COPD (chronic  obstructive pulmonary disease) (HCC) [J44.9]  Yes   • High blood pressure [I10]  Yes      Resolved Hospital Problems   No resolved problems to display.       Plan:  Continue with Decadron and finish remdesivir for COVID-19.  Initially was hypoxic but breathing is doing better.  Will get follow-up Covid labs.    Jose F Gleason MD  2/15/2022  15:03 EST

## 2022-02-15 NOTE — PROGRESS NOTES
Continued Stay Note  [facility]     Patient Name: Atilio Recinos  MRN: [id number]  Today's Date: 2/15/2022    Admit Date: 2/12/2022     Discharge Plan     Row Name 02/15/22 1203       Plan    Plan Return to skilled rehab bed at [facility] pending continuing improvement, May need WC van transport    Plan Comments Spoke with Lesli and she states she [facility] has a bed for the patient and accepts back when medically ready and no flucuation with CRP and WBC.  Patient may need WC van transport, following therapy notes.  Packet in office......................Kellen Singh RN               Discharge Codes    No documentation.               Expected Discharge Date and Time     Expected Discharge Date Expected Discharge Time    Feb 15, 2022             Kellen Singh RN

## 2022-02-15 NOTE — CONSULTS
"Adult Nutrition  Assessment/PES    Patient Name:  Atilio Recinos  YOB: 1965  MRN: [id number]  Admit Date:  2/12/2022    Assessment Date:  2/15/2022    Comments:  MST 2. Pt admit with altered mental status, COVID, hypotension, VERA, hx: chronic airway obstruction, mood problem, anxiety problem. Pt on regular; cardiac diet. Pt reported he is eating pretty good and that his appetite is ok. No documented intake yet. Said he seems to be getting enough to eat. BMI 34.0. Continue to follow.     Reason for Assessment     Row Name 02/15/22 0851          Reason for Assessment    Reason For Assessment identified at risk by screening criteria     Diagnosis other (see comments)  admit w/: altered mental status, COVID, hypotension due to hypovolemia, VERA, hx: chronic airway obstruction, mood problem, anxiety problem     Identified At Risk by Screening Criteria MST SCORE 2+                Nutrition/Diet History     Row Name 02/15/22 0855          Nutrition/Diet History    Typical Food/Fluid Intake Pt reported he is eating pretty good and his appetite is ok. Said he seems to be getting enough to eat.                Anthropometrics     Row Name 02/15/22 0857          Anthropometrics    Height 167.6 cm (65.98\")     Weight 95.7 kg (210 lb 15.7 oz)  not weighed by RD            Ideal Body Weight (IBW)    Ideal Body Weight (IBW) (kg) 65.26     % Ideal Body Weight 146.64            Body Mass Index (BMI)    BMI (kg/m2) 34.14     BMI Assessment BMI 30-34.9: obesity grade I                Labs/Tests/Procedures/Meds     Row Name 02/15/22 0857          Labs/Procedures/Meds    Lab Results Reviewed reviewed     Lab Results Comments creatinine (low), albumin (low), c-reactive protein (high)            Diagnostic Tests/Procedures    Diagnostic Test/Procedure Reviewed reviewed     Diagnostic Test/Procedures Comments XR chest            Medications    Pertinent Medications Reviewed reviewed     Pertinent Medications Comments norvasc, " "lipitor, decadron, ferrlecit, metoprolol succinate, remeron, nystatin, seroquel, remdesivir, sodium chloride, continuous: sodium chloride, prn: zofran, percocet                  Estimated/Assessed Needs     Row Name 02/15/22 0857          Calculation Measurements    Height 167.6 cm (65.98\")                Nutrition Prescription Ordered     Row Name 02/15/22 0900          Nutrition Prescription PO    Current PO Diet Regular     Common Modifiers Cardiac                       Problem/Interventions:   Problem 1     Row Name 02/15/22 0900          Nutrition Diagnoses Problem 1    Problem 1 Predicted Suboptimal Intake     Etiology (related to) Medical Diagnosis     Pulmonary/Critical Care Other (comment)  COVID     Signs/Symptoms (evidenced by) Other (comment)  likely decreased appetite                      Intervention Goal     Row Name 02/15/22 0901          Intervention Goal    General Meet nutritional needs for age/condition     PO Establish PO; Tolerate PO; Meet estimated needs     Weight Appropriate weight loss                Nutrition Intervention     Row Name 02/15/22 0902          Nutrition Intervention    RD/Tech Action Follow Tx progress; Care plan reviewd; Encourage intake                  Education/Evaluation     Row Name 02/15/22 0902          Education    Education Will Instruct as appropriate            Monitor/Evaluation    Monitor Per protocol; I&O; PO intake; Pertinent labs; Weight; Skin status; Symptoms                 Electronically signed by:  Katherine De Leon RD  02/15/22 09:03 EST  "

## 2022-02-16 ENCOUNTER — TELEPHONE (OUTPATIENT)
Dept: UROLOGY | Facility: CLINIC | Age: 57
End: 2022-02-16

## 2022-02-16 VITALS
BODY MASS INDEX: 33.91 KG/M2 | HEART RATE: 87 BPM | TEMPERATURE: 96.8 F | HEIGHT: 66 IN | DIASTOLIC BLOOD PRESSURE: 81 MMHG | SYSTOLIC BLOOD PRESSURE: 175 MMHG | OXYGEN SATURATION: 93 % | RESPIRATION RATE: 18 BRPM | WEIGHT: 210.98 LBS

## 2022-02-16 PROBLEM — U07.1 COVID-19 VIRUS INFECTION: Status: ACTIVE | Noted: 2022-02-16

## 2022-02-16 LAB
ALBUMIN SERPL-MCNC: 2.7 G/DL (ref 3.5–5.2)
ALBUMIN/GLOB SERPL: 0.8 G/DL
ALP SERPL-CCNC: 105 U/L (ref 39–117)
ALT SERPL W P-5'-P-CCNC: 37 U/L (ref 1–41)
ANION GAP SERPL CALCULATED.3IONS-SCNC: 9.1 MMOL/L (ref 5–15)
ANISOCYTOSIS BLD QL: ABNORMAL
AST SERPL-CCNC: 29 U/L (ref 1–40)
BILIRUB CONJ SERPL-MCNC: <0.2 MG/DL (ref 0–0.3)
BILIRUB SERPL-MCNC: 0.2 MG/DL (ref 0–1.2)
BUN SERPL-MCNC: 10 MG/DL (ref 6–20)
BUN/CREAT SERPL: 14.5 (ref 7–25)
CALCIUM SPEC-SCNC: 8.8 MG/DL (ref 8.6–10.5)
CHLORIDE SERPL-SCNC: 103 MMOL/L (ref 98–107)
CO2 SERPL-SCNC: 23.9 MMOL/L (ref 22–29)
CREAT SERPL-MCNC: 0.69 MG/DL (ref 0.76–1.27)
CRP SERPL-MCNC: 0.94 MG/DL (ref 0–0.5)
DEPRECATED RDW RBC AUTO: 44.4 FL (ref 37–54)
ERYTHROCYTE [DISTWIDTH] IN BLOOD BY AUTOMATED COUNT: 15.1 % (ref 12.3–15.4)
FERRITIN SERPL-MCNC: 1011 NG/ML (ref 30–400)
GFR SERPL CREATININE-BSD FRML MDRD: 117 ML/MIN/1.73
GLOBULIN UR ELPH-MCNC: 3.3 GM/DL
GLUCOSE SERPL-MCNC: 77 MG/DL (ref 65–99)
HCT VFR BLD AUTO: 26.5 % (ref 37.5–51)
HGB BLD-MCNC: 8.8 G/DL (ref 13–17.7)
LYMPHOCYTES # BLD MANUAL: 2.56 10*3/MM3 (ref 0.7–3.1)
LYMPHOCYTES NFR BLD MANUAL: 10 % (ref 5–12)
MCH RBC QN AUTO: 27.2 PG (ref 26.6–33)
MCHC RBC AUTO-ENTMCNC: 33.2 G/DL (ref 31.5–35.7)
MCV RBC AUTO: 81.8 FL (ref 79–97)
METAMYELOCYTES NFR BLD MANUAL: 1 % (ref 0–0)
MICROCYTES BLD QL: ABNORMAL
MONOCYTES # BLD: 1.07 10*3/MM3 (ref 0.1–0.9)
MYELOCYTES NFR BLD MANUAL: 4 % (ref 0–0)
NEUTROPHILS # BLD AUTO: 6.51 10*3/MM3 (ref 1.7–7)
NEUTROPHILS NFR BLD MANUAL: 61 % (ref 42.7–76)
NRBC BLD AUTO-RTO: 0.7 /100 WBC (ref 0–0.2)
NRBC SPEC MANUAL: 2 /100 WBC (ref 0–0.2)
PLAT MORPH BLD: NORMAL
PLATELET # BLD AUTO: 428 10*3/MM3 (ref 140–450)
PMV BLD AUTO: 10 FL (ref 6–12)
POLYCHROMASIA BLD QL SMEAR: ABNORMAL
POTASSIUM SERPL-SCNC: 3.9 MMOL/L (ref 3.5–5.2)
PROT SERPL-MCNC: 6 G/DL (ref 6–8.5)
RBC # BLD AUTO: 3.24 10*6/MM3 (ref 4.14–5.8)
SMUDGE CELLS BLD QL SMEAR: ABNORMAL
SODIUM SERPL-SCNC: 136 MMOL/L (ref 136–145)
VARIANT LYMPHS NFR BLD MANUAL: 24 % (ref 19.6–45.3)
WBC NRBC COR # BLD: 10.68 10*3/MM3 (ref 3.4–10.8)

## 2022-02-16 PROCEDURE — 25010000002 REMDESIVIR 100 MG/20ML SOLUTION 1 EACH VIAL: Performed by: HOSPITALIST

## 2022-02-16 PROCEDURE — 86140 C-REACTIVE PROTEIN: CPT | Performed by: HOSPITALIST

## 2022-02-16 PROCEDURE — 82728 ASSAY OF FERRITIN: CPT | Performed by: HOSPITALIST

## 2022-02-16 PROCEDURE — 82248 BILIRUBIN DIRECT: CPT | Performed by: HOSPITALIST

## 2022-02-16 PROCEDURE — 97116 GAIT TRAINING THERAPY: CPT

## 2022-02-16 PROCEDURE — 25010000002 DEXAMETHASONE PER 1 MG: Performed by: HOSPITALIST

## 2022-02-16 PROCEDURE — 80053 COMPREHEN METABOLIC PANEL: CPT | Performed by: HOSPITALIST

## 2022-02-16 PROCEDURE — 85007 BL SMEAR W/DIFF WBC COUNT: CPT | Performed by: HOSPITALIST

## 2022-02-16 PROCEDURE — 97110 THERAPEUTIC EXERCISES: CPT

## 2022-02-16 PROCEDURE — 25010000002 ONDANSETRON PER 1 MG: Performed by: NURSE PRACTITIONER

## 2022-02-16 PROCEDURE — 85025 COMPLETE CBC W/AUTO DIFF WBC: CPT | Performed by: HOSPITALIST

## 2022-02-16 RX ORDER — OXYCODONE HYDROCHLORIDE AND ACETAMINOPHEN 5; 325 MG/1; MG/1
1 TABLET ORAL EVERY 6 HOURS PRN
Qty: 12 TABLET | Refills: 0 | Status: SHIPPED | OUTPATIENT
Start: 2022-02-16 | End: 2022-09-20

## 2022-02-16 RX ADMIN — SODIUM CHLORIDE, PRESERVATIVE FREE 10 ML: 5 INJECTION INTRAVENOUS at 09:22

## 2022-02-16 RX ADMIN — AMLODIPINE BESYLATE 2.5 MG: 5 TABLET ORAL at 09:22

## 2022-02-16 RX ADMIN — METOPROLOL SUCCINATE 100 MG: 100 TABLET, EXTENDED RELEASE ORAL at 09:22

## 2022-02-16 RX ADMIN — ACETAMINOPHEN 650 MG: 325 TABLET, FILM COATED ORAL at 11:23

## 2022-02-16 RX ADMIN — MIRTAZAPINE 15 MG: 15 TABLET, FILM COATED ORAL at 09:22

## 2022-02-16 RX ADMIN — ATORVASTATIN CALCIUM 40 MG: 20 TABLET, FILM COATED ORAL at 09:22

## 2022-02-16 RX ADMIN — NYSTATIN: 100000 POWDER TOPICAL at 09:23

## 2022-02-16 RX ADMIN — REMDESIVIR 100 MG: 100 INJECTION, POWDER, LYOPHILIZED, FOR SOLUTION INTRAVENOUS at 11:23

## 2022-02-16 RX ADMIN — QUETIAPINE FUMARATE 50 MG: 50 TABLET, FILM COATED ORAL at 09:22

## 2022-02-16 RX ADMIN — DEXAMETHASONE SODIUM PHOSPHATE 6 MG: 10 INJECTION INTRAMUSCULAR; INTRAVENOUS at 09:22

## 2022-02-16 RX ADMIN — ONDANSETRON 4 MG: 2 INJECTION INTRAMUSCULAR; INTRAVENOUS at 11:23

## 2022-02-16 NOTE — PLAN OF CARE
Goal Outcome Evaluation:  Plan of Care Reviewed With: patient           Outcome Summary: Pt participated with PT this morning. Pt increased ambulation distance to 120' in room using walker and sba. Pt states he is not comfortable trialling gait without AD yet, however completed transfer from the chair with sba and no AD. Discussed possibility of home with assist and HH, but pt would prefer to d/c to SNF as he will be alone >12 hours during the day.

## 2022-02-16 NOTE — DISCHARGE SUMMARY
PHYSICIAN DISCHARGE SUMMARY                                                                        [facility]    Patient Identification:  Name: Atilio Recinos  Age: 57 y.o.  Sex: male  :  1965  MRN: [id number]  Primary Care Physician: William Mcdonald MD    Admit date: 2022  Discharge date and time:2022  Discharged Condition: good    Discharge Diagnoses:  Active Hospital Problems    Diagnosis  POA   • **Altered mental status [R41.82]  Yes   • COVID-19 virus infection [U07.1]  Yes   • Hypotension due to hypovolemia [I95.89, E86.1]  Yes   • VERA (acute kidney injury) ([facility]) [N17.9]  Yes   • S/P exploratory laparotomy, oversew of perforated ulcer, placement of Chaparro patch  [Z98.890]  Not Applicable   • Pneumoperitoneum of unknown etiology [K66.8]  Yes   • Bowel perforation (HCC) [K63.1]  Yes   • Anxiety [F41.9]  Yes   • Bipolar 1 disorder (HCC) [F31.9]  Yes   • COPD (chronic obstructive pulmonary disease) (HCC) [J44.9]  Yes   • High blood pressure [I10]  Yes      Resolved Hospital Problems   No resolved problems to display.          PMHX:   Past Medical History:   Diagnosis Date   • Allergies    • Anxiety    • Bipolar 1 disorder (HCC)    • COPD (chronic obstructive pulmonary disease) (HCC)    • Depression    • Forgetfulness    • Head injury    • Hepatitis    • High blood pressure    • High cholesterol    • Psychiatric illness    • S/P exploratory laparotomy, oversew of perforated ulcer, placement of Chaparro patch  2022   • Shortness of breath    • Sinus trouble      PSHX:   Past Surgical History:   Procedure Laterality Date   • EXPLORATORY LAPAROTOMY N/A 2022    Procedure: EXPLORATORY LAPAROTOMY, OVERSEW OF PERFORATED GASTRIC ULCER WITH CHAPARRO PATCH;  Surgeon: Atilio Lindsay MD;  Location: [facility] MAIN OR;  Service: General;  Laterality: N/A;   • PLEURAL SCARIFICATION     • SPHINCTEROTOMY         Hospital  "Course: Atilio Recinos  is a 57 y.o. male with history of bipolar disorder, COPD, history of head injury with forgetfulness, hypertension, hyperlipidemia and history of recent exploratory laparotomy to oversew perforated ulcer who presents to the hospital complaining of altered mental status that was noticed at his skilled nursing facility.  Patient sent from nursing home for confusion.  According to EMS he had O2 saturations of 84% on room air which improved after administration of 2 L nasal cannula oxygen.  Here in the ED patient is oriented times \"[employer] [employer]\", 2020 and name.  He is able to answer simple questions and follow simple commands.  He denies chest pain, shortness of breath or abdominal pain.  He does report cough which is occasionally productive of yellow sputum.  The patient was positive for COVID-19.  The patient was admitted for further evaluation treatment.  He also had acute injury of the kidney with creatinine elevated to 2.2.        The patient was admitted to the hospital and treated with Decadron and remdesivir for COVID-19 infection.  He was given some IV fluids for hydration and supportive care.  He was feeling better and back to his baseline mental status after being in the hospital for a day or so and is going to finish about 5 days of remdesivir at discharge.  He will return to skilled nursing facility for rehab and follow-up with his primary care after released from rehab.  He is also recovering from abdominal surgery and will follow up with his surgeon for postop check.    Consults:     Consults     Date and Time Order Name Status Description    2/12/2022  4:06 AM [facility] (on-call MD unless specified) Details      1/17/2022  1:13 PM Inpatient Hospitalist Consult Completed         Results from last 7 days   Lab Units 02/16/22  0420   WBC 10*3/mm3 10.68   HEMOGLOBIN g/dL 8.8*   HEMATOCRIT % 26.5*   PLATELETS 10*3/mm3 428     Results from last 7 days   Lab Units 02/16/22  0420   SODIUM " mmol/L 136   POTASSIUM mmol/L 3.9   CHLORIDE mmol/L 103   CO2 mmol/L 23.9   BUN mg/dL 10   CREATININE mg/dL 0.69*   GLUCOSE mg/dL 77   CALCIUM mg/dL 8.8     Significant Diagnostic Studies:   WBC   Date Value Ref Range Status   02/16/2022 10.68 3.40 - 10.80 10*3/mm3 Final     Hemoglobin   Date Value Ref Range Status   02/16/2022 8.8 (L) 13.0 - 17.7 g/dL Final     Hematocrit   Date Value Ref Range Status   02/16/2022 26.5 (L) 37.5 - 51.0 % Final     Platelets   Date Value Ref Range Status   02/16/2022 428 140 - 450 10*3/mm3 Final     Sodium   Date Value Ref Range Status   02/16/2022 136 136 - 145 mmol/L Final     Potassium   Date Value Ref Range Status   02/16/2022 3.9 3.5 - 5.2 mmol/L Final     Chloride   Date Value Ref Range Status   02/16/2022 103 98 - 107 mmol/L Final     CO2   Date Value Ref Range Status   02/16/2022 23.9 22.0 - 29.0 mmol/L Final     BUN   Date Value Ref Range Status   02/16/2022 10 6 - 20 mg/dL Final     Creatinine   Date Value Ref Range Status   02/16/2022 0.69 (L) 0.76 - 1.27 mg/dL Final     Glucose   Date Value Ref Range Status   02/16/2022 77 65 - 99 mg/dL Final     Calcium   Date Value Ref Range Status   02/16/2022 8.8 8.6 - 10.5 mg/dL Final     AST (SGOT)   Date Value Ref Range Status   02/16/2022 29 1 - 40 U/L Final     ALT (SGPT)   Date Value Ref Range Status   02/16/2022 37 1 - 41 U/L Final     Alkaline Phosphatase   Date Value Ref Range Status   02/16/2022 105 39 - 117 U/L Final     No results found for: APTT, INR  No results found for: COLORU, CLARITYU, SPECGRAV, PHUR, PROTEINUR, GLUCOSEU, KETONESU, BLOODU, NITRITE, LEUKOCYTESUR, BILIRUBINUR, UROBILINOGEN, RBCUA, WBCUA, BACTERIA, UACOMMENT  No results found for: TROPONINT, TROPONINI, BNP  No components found for: HGBA1C;2  No components found for: TSH;2  Imaging Results (All)     Procedure Component Value Units Date/Time    XR Chest 1 View [[id number]] Collected: 02/12/22 0232     Updated: 02/12/22 0232    Narrative:        Patient:  FLOR LEYVA  Time Out: 02:31  Exam(s): FILM CXR 1 VIEW     EXAM:    XR Chest, 1 View    CLINICAL HISTORY:     Reason for exam: Shortness of breath.    TECHNIQUE:    Frontal view of the chest.    COMPARISON:    No previous studies.    FINDINGS:    Lungs:  Unremarkable.  No consolidation.    Pleural space:  Small to moderate left pleural effusion.  No   pneumothorax.    Heart:  Cardiomegaly.    Mediastinum:  Unremarkable.    Bones joints:  Osteopenia.    Soft tissues:  Soft tissues are within normal limits.    Other findings:  Mild hypoaeration.    IMPRESSION:       1.  Cardiomegaly.  2.  Small to moderate left pleural effusion.  3.  Clinical correlation is advised to assess for the possibility of   congestive heart failure.      Impression:          Electronically signed by Gregor White MD on 02-12-22 at 0231        Lab Results (last 7 days)     Procedure Component Value Units Date/Time    Manual Differential [[id number]]  (Abnormal) Collected: 02/16/22 0420    Specimen: Blood Updated: 02/16/22 0636     Neutrophil % 61.0 %      Lymphocyte % 24.0 %      Monocyte % 10.0 %      Metamyelocyte % 1.0 %      Myelocyte % 4.0 %      Neutrophils Absolute 6.51 10*3/mm3      Lymphocytes Absolute 2.56 10*3/mm3      Monocytes Absolute 1.07 10*3/mm3      nRBC 2.0 /100 WBC      Anisocytosis Mod/2+     Microcytes Mod/2+     Polychromasia Slight/1+     Smudge Cells Slight/1+     Platelet Morphology Normal    Comprehensive Metabolic Panel [[id number]]  (Abnormal) Collected: 02/16/22 0420    Specimen: Blood Updated: 02/16/22 0610     Glucose 77 mg/dL      BUN 10 mg/dL      Creatinine 0.69 mg/dL      Sodium 136 mmol/L      Potassium 3.9 mmol/L      Chloride 103 mmol/L      CO2 23.9 mmol/L      Calcium 8.8 mg/dL      Total Protein 6.0 g/dL      Albumin 2.70 g/dL      ALT (SGPT) 37 U/L      AST (SGOT) 29 U/L      Alkaline Phosphatase 105 U/L      Total Bilirubin 0.2 mg/dL      eGFR Non African Amer 117 mL/min/1.73      Globulin 3.3  gm/dL      A/G Ratio 0.8 g/dL      BUN/Creatinine Ratio 14.5     Anion Gap 9.1 mmol/L      Comment: Unable to calculate Anion Gap.       Narrative:      GFR Normal >60  Chronic Kidney Disease <60  Kidney Failure <15      Bilirubin, Direct [[id number]]  (Normal) Collected: 02/16/22 0420    Specimen: Blood Updated: 02/16/22 0604     Bilirubin, Direct <0.2 mg/dL     Ferritin [[id number]]  (Abnormal) Collected: 02/16/22 0420    Specimen: Blood Updated: 02/16/22 0521     Ferritin 1,011.00 ng/mL     Narrative:      Results may be falsely decreased if patient taking Biotin.      C-reactive Protein [[id number]]  (Abnormal) Collected: 02/16/22 0420    Specimen: Blood Updated: 02/16/22 0515     C-Reactive Protein 0.94 mg/dL     CBC & Differential [[id number]]  (Abnormal) Collected: 02/16/22 0420    Specimen: Blood Updated: 02/16/22 0503    Narrative:      The following orders were created for panel order CBC & Differential.  Procedure                               Abnormality         Status                     ---------                               -----------         ------                     CBC Auto Differential[[id number]]        Abnormal            Final result                 Please view results for these tests on the individual orders.    CBC Auto Differential [[id number]]  (Abnormal) Collected: 02/16/22 0420    Specimen: Blood Updated: 02/16/22 0503     WBC 10.68 10*3/mm3      RBC 3.24 10*6/mm3      Hemoglobin 8.8 g/dL      Hematocrit 26.5 %      MCV 81.8 fL      MCH 27.2 pg      MCHC 33.2 g/dL      RDW 15.1 %      RDW-SD 44.4 fl      MPV 10.0 fL      Platelets 428 10*3/mm3      nRBC 0.7 /100 WBC     Blood Culture - Blood, Hand, Right [[id number]]  (Normal) Collected: 02/12/22 0421    Specimen: Blood from Hand, Right Updated: 02/16/22 0431     Blood Culture No growth at 4 days    Blood Culture - Blood, Leg, Left [[id number]]  (Abnormal) Collected: 02/12/22 0421    Specimen: Blood from Leg, Left Updated: 02/15/22 1033      "Blood Culture Staphylococcus, coagulase negative     Isolated from Aerobic Bottle     Gram Stain Aerobic Bottle Gram positive cocci in clusters    Narrative:      Probable contaminant requires clinical correlation, susceptibility not performed unless requested by physician.      Manual Differential [[id number]]  (Abnormal) Collected: 02/15/22 0433    Specimen: Blood Updated: 02/15/22 0550     Neutrophil % 80.5 %      Lymphocyte % 12.2 %      Monocyte % 3.7 %      Metamyelocyte % 2.4 %      Myelocyte % 1.2 %      Neutrophils Absolute 7.80 10*3/mm3      Lymphocytes Absolute 1.18 10*3/mm3      Monocytes Absolute 0.36 10*3/mm3      Anisocytosis Large/3+     Microcytes Large/3+     Polychromasia Large/3+     Smudge Cells Slight/1+     Platelet Morphology Normal    Procalcitonin [[id number]]  (Normal) Collected: 02/15/22 0433    Specimen: Blood Updated: 02/15/22 0545     Procalcitonin 0.07 ng/mL     Narrative:      As a Marker for Sepsis (Non-Neonates):     1. <0.5 ng/mL represents a low risk of severe sepsis and/or septic shock.  2. >2 ng/mL represents a high risk of severe sepsis and/or septic shock.    As a Marker for Lower Respiratory Tract Infections that require antibiotic therapy:  PCT on Admission     Antibiotic Therapy             6-12 Hrs later  >0.5                          Strongly Recommended            >0.25 - <0.5             Recommended  0.1 - 0.25                  Discouraged                       Remeasure/reassess PCT  <0.1                         Strongly Discouraged         Remeasure/reassess PCT      As 28 day mortality risk marker: \"Change in Procalcitonin Result\" (>80% or <=80%) if Day 0 (or Day 1) and Day 4 values are available. Refer to http://www.[employer]-pct-calculator.com/    Change in PCT <=80 %   A decrease of PCT levels below or equal to 80% defines a positive change in PCT test result representing a higher risk for 28-day all-cause mortality of patients diagnosed with severe sepsis or septic " shock.    Change in PCT >80 %   A decrease of PCT levels of more than 80% defines a negative change in PCT result representing a lower risk for 28-day all-cause mortality of patients diagnosed with severe sepsis or septic shock.                Ferritin [[id number]]  (Abnormal) Collected: 02/15/22 0433    Specimen: Blood Updated: 02/15/22 0545     Ferritin 798.00 ng/mL     Narrative:      Results may be falsely decreased if patient taking Biotin.      Comprehensive Metabolic Panel [[id number]]  (Abnormal) Collected: 02/15/22 0433    Specimen: Blood Updated: 02/15/22 0542     Glucose 95 mg/dL      BUN 11 mg/dL      Creatinine 0.69 mg/dL      Sodium 138 mmol/L      Potassium 3.8 mmol/L      Chloride 106 mmol/L      CO2 24.0 mmol/L      Calcium 8.3 mg/dL      Total Protein 5.9 g/dL      Albumin 2.50 g/dL      ALT (SGPT) 36 U/L      AST (SGOT) 32 U/L      Alkaline Phosphatase 104 U/L      Total Bilirubin 0.2 mg/dL      eGFR Non African Amer 117 mL/min/1.73      Globulin 3.4 gm/dL      A/G Ratio 0.7 g/dL      BUN/Creatinine Ratio 15.9     Anion Gap 8.0 mmol/L     Narrative:      GFR Normal >60  Chronic Kidney Disease <60  Kidney Failure <15      Lactate Dehydrogenase [[id number]]  (Abnormal) Collected: 02/15/22 0433    Specimen: Blood Updated: 02/15/22 0542      U/L     Bilirubin, Direct [[id number]]  (Normal) Collected: 02/15/22 0433    Specimen: Blood Updated: 02/15/22 0542     Bilirubin, Direct <0.2 mg/dL     C-reactive Protein [[id number]]  (Abnormal) Collected: 02/15/22 0433    Specimen: Blood Updated: 02/15/22 0542     C-Reactive Protein 1.20 mg/dL     CBC & Differential [[id number]]  (Abnormal) Collected: 02/15/22 0433    Specimen: Blood Updated: 02/15/22 0518    Narrative:      The following orders were created for panel order CBC & Differential.  Procedure                               Abnormality         Status                     ---------                               -----------         ------                      CBC Auto Differential[[id number]]        Abnormal            Final result                 Please view results for these tests on the individual orders.    CBC Auto Differential [[id number]]  (Abnormal) Collected: 02/15/22 0433    Specimen: Blood Updated: 02/15/22 0518     WBC 9.69 10*3/mm3      RBC 3.00 10*6/mm3      Hemoglobin 8.1 g/dL      Hematocrit 25.7 %      MCV 85.7 fL      MCH 27.0 pg      MCHC 31.5 g/dL      RDW 14.9 %      RDW-SD 46.8 fl      MPV 10.3 fL      Platelets 423 10*3/mm3      nRBC 0.2 /100 WBC     Blood Culture ID, PCR - Blood, Leg, Left [[id number]]  (Abnormal) Collected: 02/12/22 0421    Specimen: Blood from Leg, Left Updated: 02/14/22 2317     BCID, PCR Staph spp, not aureus or lugdunesis. Identification by BCID2 PCR.     BOTTLE TYPE Aerobic Bottle    Manual Differential [[id number]]  (Abnormal) Collected: 02/14/22 0347    Specimen: Blood Updated: 02/14/22 0508     Neutrophil % 84.7 %      Lymphocyte % 11.2 %      Monocyte % 4.1 %      Neutrophils Absolute 9.38 10*3/mm3      Lymphocytes Absolute 1.24 10*3/mm3      Monocytes Absolute 0.45 10*3/mm3      Anisocytosis Mod/2+     Microcytes Mod/2+     Polychromasia Large/3+     WBC Morphology Normal     Platelet Morphology Normal    Ferritin [[id number]]  (Abnormal) Collected: 02/14/22 0347    Specimen: Blood Updated: 02/14/22 0459     Ferritin 568.00 ng/mL     Narrative:      Results may be falsely decreased if patient taking Biotin.      Comprehensive Metabolic Panel [[id number]]  (Abnormal) Collected: 02/14/22 0347    Specimen: Blood Updated: 02/14/22 0455     Glucose 89 mg/dL      BUN 12 mg/dL      Creatinine 0.86 mg/dL      Sodium 139 mmol/L      Potassium 4.3 mmol/L      Chloride 109 mmol/L      CO2 21.0 mmol/L      Calcium 8.1 mg/dL      Total Protein 5.8 g/dL      Albumin 2.60 g/dL      ALT (SGPT) 38 U/L      AST (SGOT) 36 U/L      Alkaline Phosphatase 113 U/L      Total Bilirubin 0.2 mg/dL      eGFR Non African Amer 91 mL/min/1.73       Globulin 3.2 gm/dL      A/G Ratio 0.8 g/dL      BUN/Creatinine Ratio 14.0     Anion Gap 9.0 mmol/L     Narrative:      GFR Normal >60  Chronic Kidney Disease <60  Kidney Failure <15      Bilirubin, Direct [[id number]]  (Normal) Collected: 02/14/22 0347    Specimen: Blood Updated: 02/14/22 0455     Bilirubin, Direct <0.2 mg/dL     C-reactive Protein [[id number]]  (Abnormal) Collected: 02/14/22 0347    Specimen: Blood Updated: 02/14/22 0455     C-Reactive Protein 2.20 mg/dL     CBC & Differential [[id number]]  (Abnormal) Collected: 02/14/22 0347    Specimen: Blood Updated: 02/14/22 0427    Narrative:      The following orders were created for panel order CBC & Differential.  Procedure                               Abnormality         Status                     ---------                               -----------         ------                     CBC Auto Differential[[id number]]        Abnormal            Final result                 Please view results for these tests on the individual orders.    CBC Auto Differential [[id number]]  (Abnormal) Collected: 02/14/22 0347    Specimen: Blood Updated: 02/14/22 0427     WBC 11.07 10*3/mm3      RBC 2.85 10*6/mm3      Hemoglobin 7.8 g/dL      Hematocrit 23.6 %      MCV 82.8 fL      MCH 27.4 pg      MCHC 33.1 g/dL      RDW 14.5 %      RDW-SD 43.5 fl      MPV 10.4 fL      Platelets 396 10*3/mm3      nRBC 0.1 /100 WBC     TSH [[id number]]  (Abnormal) Collected: 02/13/22 1042    Specimen: Blood Updated: 02/13/22 1413     TSH 0.251 uIU/mL     Lactate Dehydrogenase [[id number]]  (Abnormal) Collected: 02/13/22 1042    Specimen: Blood Updated: 02/13/22 1412      U/L     Iron Profile [[id number]]  (Abnormal) Collected: 02/13/22 1042    Specimen: Blood Updated: 02/13/22 1412     Iron 28 mcg/dL      Iron Saturation 16 %      Transferrin 117 mg/dL      TIBC 174 mcg/dL     Bilirubin, Direct [[id number]]  (Normal) Collected: 02/13/22 1042    Specimen: Blood Updated: 02/13/22 1412      "Bilirubin, Direct <0.2 mg/dL     C-reactive Protein [[id number]]  (Abnormal) Collected: 02/13/22 1042    Specimen: Blood Updated: 02/13/22 1412     C-Reactive Protein 4.56 mg/dL     Folate [[id number]]  (Abnormal) Collected: 02/13/22 1042    Specimen: Blood Updated: 02/13/22 1157     Folate 4.48 ng/mL     Narrative:      Results may be falsely increased if patient taking Biotin.      Vitamin B12 [[id number]]  (Normal) Collected: 02/13/22 1042    Specimen: Blood Updated: 02/13/22 1157     Vitamin B-12 888 pg/mL     Narrative:      Results may be falsely increased if patient taking Biotin.      Procalcitonin [[id number]]  (Normal) Collected: 02/13/22 1042    Specimen: Blood Updated: 02/13/22 1151     Procalcitonin 0.10 ng/mL     Narrative:      As a Marker for Sepsis (Non-Neonates):     1. <0.5 ng/mL represents a low risk of severe sepsis and/or septic shock.  2. >2 ng/mL represents a high risk of severe sepsis and/or septic shock.    As a Marker for Lower Respiratory Tract Infections that require antibiotic therapy:  PCT on Admission     Antibiotic Therapy             6-12 Hrs later  >0.5                          Strongly Recommended            >0.25 - <0.5             Recommended  0.1 - 0.25                  Discouraged                       Remeasure/reassess PCT  <0.1                         Strongly Discouraged         Remeasure/reassess PCT      As 28 day mortality risk marker: \"Change in Procalcitonin Result\" (>80% or <=80%) if Day 0 (or Day 1) and Day 4 values are available. Refer to http://www.[facility]-pct-calculator.com/    Change in PCT <=80 %   A decrease of PCT levels below or equal to 80% defines a positive change in PCT test result representing a higher risk for 28-day all-cause mortality of patients diagnosed with severe sepsis or septic shock.    Change in PCT >80 %   A decrease of PCT levels of more than 80% defines a negative change in PCT result representing a lower risk for 28-day all-cause mortality of " patients diagnosed with severe sepsis or septic shock.                Ferritin [[id number]]  (Abnormal) Collected: 02/13/22 1042    Specimen: Blood Updated: 02/13/22 1151     Ferritin 583.00 ng/mL     Narrative:      Results may be falsely decreased if patient taking Biotin.      Comprehensive Metabolic Panel [[id number]]  (Abnormal) Collected: 02/13/22 1042    Specimen: Blood Updated: 02/13/22 1149     Glucose 110 mg/dL      BUN 13 mg/dL      Creatinine 0.91 mg/dL      Sodium 138 mmol/L      Potassium 4.5 mmol/L      Chloride 107 mmol/L      CO2 23.0 mmol/L      Calcium 8.7 mg/dL      Total Protein 6.3 g/dL      Albumin 2.70 g/dL      ALT (SGPT) 48 U/L      AST (SGOT) 35 U/L      Alkaline Phosphatase 128 U/L      Total Bilirubin 0.3 mg/dL      eGFR Non African Amer 85 mL/min/1.73      Globulin 3.6 gm/dL      A/G Ratio 0.8 g/dL      BUN/Creatinine Ratio 14.3     Anion Gap 8.0 mmol/L     Narrative:      GFR Normal >60  Chronic Kidney Disease <60  Kidney Failure <15      CBC & Differential [[id number]]  (Abnormal) Collected: 02/13/22 1042    Specimen: Blood Updated: 02/13/22 1125    Narrative:      The following orders were created for panel order CBC & Differential.  Procedure                               Abnormality         Status                     ---------                               -----------         ------                     CBC Auto Differential[[id number]]        Abnormal            Final result                 Please view results for these tests on the individual orders.    CBC Auto Differential [[id number]]  (Abnormal) Collected: 02/13/22 1042    Specimen: Blood Updated: 02/13/22 1125     WBC 10.31 10*3/mm3      RBC 3.02 10*6/mm3      Hemoglobin 8.3 g/dL      Hematocrit 25.8 %      MCV 85.4 fL      MCH 27.5 pg      MCHC 32.2 g/dL      RDW 14.7 %      RDW-SD 45.2 fl      MPV 10.3 fL      Platelets 398 10*3/mm3      Neutrophil % 75.6 %      Lymphocyte % 15.2 %      Monocyte % 4.0 %      Eosinophil % 0.0 %       Basophil % 0.4 %      Immature Grans % 4.8 %      Neutrophils, Absolute 7.80 10*3/mm3      Lymphocytes, Absolute 1.57 10*3/mm3      Monocytes, Absolute 0.41 10*3/mm3      Eosinophils, Absolute 0.00 10*3/mm3      Basophils, Absolute 0.04 10*3/mm3      Immature Grans, Absolute 0.49 10*3/mm3      nRBC 0.0 /100 WBC     Hepatic Function Panel [[id number]]  (Abnormal) Collected: 02/12/22 1851    Specimen: Blood Updated: 02/12/22 1923     Total Protein 5.7 g/dL      Albumin 2.30 g/dL      ALT (SGPT) 41 U/L      AST (SGOT) 39 U/L      Alkaline Phosphatase 120 U/L      Total Bilirubin 0.2 mg/dL      Bilirubin, Direct <0.2 mg/dL      Bilirubin, Indirect --     Comment: Unable to calculate       Creatinine, Serum [[id number]]  (Normal) Collected: 02/12/22 1851    Specimen: Blood Updated: 02/12/22 1923     Creatinine 1.08 mg/dL      eGFR Non African Amer 70 mL/min/1.73     Narrative:      GFR Normal >60  Chronic Kidney Disease <60  Kidney Failure <15      D-dimer, Quantitative [[id number]]  (Abnormal) Collected: 02/12/22 1851    Specimen: Blood Updated: 02/12/22 1915     D-Dimer, Quantitative 2.18 MCGFEU/mL     Narrative:      The [employer]ago D-Dimer test used in conjunction with a clinical pretest probability (PTP) assessment model, has been approved by the FDA to rule out the presence of venous thromboembolism (VTE) in outpatients suspected of deep venous thrombosis (DVT) or pulmonary embolism (PE). The cut-off for negative predictive value is <0.50 MCGFEU/mL.    Basic Metabolic Panel [[id number]]  (Abnormal) Collected: 02/12/22 0919    Specimen: Blood Updated: 02/12/22 1044     Glucose 88 mg/dL      BUN 27 mg/dL      Creatinine 1.57 mg/dL      Sodium 136 mmol/L      Potassium 4.5 mmol/L      Chloride 108 mmol/L      CO2 19.0 mmol/L      Calcium 7.8 mg/dL      eGFR Non African Amer 45 mL/min/1.73      BUN/Creatinine Ratio 17.2     Anion Gap 9.0 mmol/L     Narrative:      GFR Normal >60  Chronic Kidney Disease <60  Kidney Failure  <15      CBC Auto Differential [[id number]]  (Abnormal) Collected: 02/12/22 0919    Specimen: Blood Updated: 02/12/22 1017     WBC 10.89 10*3/mm3      RBC 2.93 10*6/mm3      Hemoglobin 8.0 g/dL      Hematocrit 24.8 %      MCV 84.6 fL      MCH 27.3 pg      MCHC 32.3 g/dL      RDW 15.1 %      RDW-SD 45.6 fl      MPV 10.7 fL      Platelets 385 10*3/mm3      Neutrophil % 55.9 %      Lymphocyte % 28.6 %      Monocyte % 8.4 %      Eosinophil % 1.5 %      Basophil % 0.6 %      Immature Grans % 5.0 %      Neutrophils, Absolute 6.10 10*3/mm3      Lymphocytes, Absolute 3.11 10*3/mm3      Monocytes, Absolute 0.91 10*3/mm3      Eosinophils, Absolute 0.16 10*3/mm3      Basophils, Absolute 0.07 10*3/mm3      Immature Grans, Absolute 0.54 10*3/mm3      nRBC 0.1 /100 WBC     COVID PRE-OP / PRE-PROCEDURE SCREENING ORDER (NO ISOLATION) - Swab, Nasopharynx [[id number]]  (Abnormal) Collected: 02/12/22 0338    Specimen: Swab from Nasopharynx Updated: 02/12/22 0521    Narrative:      The following orders were created for panel order COVID PRE-OP / PRE-PROCEDURE SCREENING ORDER (NO ISOLATION) - Swab, Nasopharynx.  Procedure                               Abnormality         Status                     ---------                               -----------         ------                     COVID-19,[facility]HOU[facility]...[[id number]]  Abnormal            Final result                 Please view results for these tests on the individual orders.    COVID-19,[facility] CEPHEID/ARIEL NP SWAB IN TRANSPORT MEDIA 8-12 HR TAT - Swab, Nasopharynx [[id number]]  (Abnormal) Collected: 02/12/22 0338    Specimen: Swab from Nasopharynx Updated: 02/12/22 0521     COVID19 Detected    Narrative:      Fact sheet for providers: https://www.fda.gov/media/648256/download     Fact sheet for patients: https://www.fda.gov/media/052894/download    Comprehensive Metabolic Panel [[id number]]  (Abnormal) Collected: 02/12/22 0217    Specimen: Blood Updated: 02/12/22 0303      "Glucose 106 mg/dL      BUN 32 mg/dL      Creatinine 2.22 mg/dL      Sodium 135 mmol/L      Potassium 4.0 mmol/L      Chloride 102 mmol/L      CO2 22.1 mmol/L      Calcium 8.0 mg/dL      Total Protein 5.7 g/dL      Albumin 2.20 g/dL      ALT (SGPT) 48 U/L      AST (SGOT) 42 U/L      Alkaline Phosphatase 126 U/L      Total Bilirubin 0.2 mg/dL      eGFR Non African Amer 30 mL/min/1.73      Globulin 3.5 gm/dL      A/G Ratio 0.6 g/dL      BUN/Creatinine Ratio 14.4     Anion Gap 10.9 mmol/L     Narrative:      GFR Normal >60  Chronic Kidney Disease <60  Kidney Failure <15      Procalcitonin [[id number]]  (Normal) Collected: 02/12/22 0217    Specimen: Blood Updated: 02/12/22 0258     Procalcitonin 0.20 ng/mL     Narrative:      As a Marker for Sepsis (Non-Neonates):     1. <0.5 ng/mL represents a low risk of severe sepsis and/or septic shock.  2. >2 ng/mL represents a high risk of severe sepsis and/or septic shock.    As a Marker for Lower Respiratory Tract Infections that require antibiotic therapy:  PCT on Admission     Antibiotic Therapy             6-12 Hrs later  >0.5                          Strongly Recommended            >0.25 - <0.5             Recommended  0.1 - 0.25                  Discouraged                       Remeasure/reassess PCT  <0.1                         Strongly Discouraged         Remeasure/reassess PCT      As 28 day mortality risk marker: \"Change in Procalcitonin Result\" (>80% or <=80%) if Day 0 (or Day 1) and Day 4 values are available. Refer to http://www.Quickflix[employer]-pct-calculator.com/    Change in PCT <=80 %   A decrease of PCT levels below or equal to 80% defines a positive change in PCT test result representing a higher risk for 28-day all-cause mortality of patients diagnosed with severe sepsis or septic shock.    Change in PCT >80 %   A decrease of PCT levels of more than 80% defines a negative change in PCT result representing a lower risk for 28-day all-cause mortality of patients diagnosed " with severe sepsis or septic shock.                Troponin [[id number]]  (Normal) Collected: 02/12/22 0217    Specimen: Blood Updated: 02/12/22 0258     Troponin T <0.010 ng/mL     Narrative:      Troponin T Reference Range:  <= 0.03 ng/mL-   Negative for AMI  >0.03 ng/mL-     Abnormal for myocardial necrosis.  Clinicians would have to utilize clinical acumen, EKG, Troponin and serial changes to determine if it is an Acute Myocardial Infarction or myocardial injury due to an underlying chronic condition.       Results may be falsely decreased if patient taking Biotin.      Protime-INR [[id number]]  (Abnormal) Collected: 02/12/22 0217    Specimen: Blood Updated: 02/12/22 0246     Protime 15.5 Seconds      INR 1.24    Lactic Acid, Plasma [[id number]]  (Normal) Collected: 02/12/22 0217    Specimen: Blood Updated: 02/12/22 0245     Lactate 0.7 mmol/L     CBC & Differential [[id number]]  (Abnormal) Collected: 02/12/22 0217    Specimen: Blood Updated: 02/12/22 0232    Narrative:      The following orders were created for panel order CBC & Differential.  Procedure                               Abnormality         Status                     ---------                               -----------         ------                     CBC Auto Differential[[id number]]        Abnormal            Final result                 Please view results for these tests on the individual orders.    CBC Auto Differential [[id number]]  (Abnormal) Collected: 02/12/22 0217    Specimen: Blood Updated: 02/12/22 0232     WBC 11.96 10*3/mm3      RBC 2.75 10*6/mm3      Hemoglobin 7.7 g/dL      Hematocrit 23.3 %      MCV 84.7 fL      MCH 28.0 pg      MCHC 33.0 g/dL      RDW 15.1 %      RDW-SD 45.2 fl      MPV 10.7 fL      Platelets 327 10*3/mm3      Neutrophil % 64.9 %      Lymphocyte % 21.1 %      Monocyte % 7.8 %      Eosinophil % 1.1 %      Basophil % 0.3 %      Immature Grans % 4.8 %      Neutrophils, Absolute 7.76 10*3/mm3      Lymphocytes, Absolute 2.52  "10*3/mm3      Monocytes, Absolute 0.93 10*3/mm3      Eosinophils, Absolute 0.13 10*3/mm3      Basophils, Absolute 0.04 10*3/mm3      Immature Grans, Absolute 0.58 10*3/mm3      nRBC 0.0 /100 WBC     POC Glucose Once [[id number]]  (Abnormal) Collected: 02/12/22 0152    Specimen: Blood Updated: 02/12/22 0153     Glucose 176 mg/dL      Comment: Meter: [id number] : [id number] Donis Wiley RN           /81 (BP Location: Left arm, Patient Position: Lying)   Pulse 87   Temp 96.8 °F (36 °C) (Oral)   Resp 18   Ht 167.6 cm (65.98\")   Wt 95.7 kg (210 lb 15.7 oz) Comment: not weighed by RD  SpO2 93%   BMI 34.07 kg/m²     Discharge Exam:  General Appearance:    Alert, cooperative, no distress                          Head:    Normocephalic, without obvious abnormality, atraumatic                          Eyes:                            Throat:   Lips, tongue, gums normal                          Neck:   Supple, symmetrical, trachea midline, no JVD                        Lungs:     Clear to auscultation bilaterally, respirations unlabored                Chest Wall:    No tenderness or deformity                        Heart:    Regular rate and rhythm, S1 and S2 normal, no murmur,no  Rub or gallop                  Abdomen:     Soft, non-tender, bowel sounds active, no masses, no organomegaly                  Extremities:   Extremities normal, atraumatic, no cyanosis or edema                             Skin:   Skin is warm and dry,  no rashes or palpable lesions                  Neurologic:   no focal deficits noted     Disposition:  Skilled nursing facility    Patient Instructions:      Discharge Medications      Changes to Medications      Instructions Start Date   amLODIPine 5 MG tablet  Commonly known as: NORVASC  What changed: how much to take   5 mg, Oral, Daily      oxyCODONE-acetaminophen 5-325 MG per tablet  Commonly known as: PERCOCET  What changed: reasons to take this   1 tablet, Oral, Every 6 Hours " PRN         Continue These Medications      Instructions Start Date   atorvastatin 40 MG tablet  Commonly known as: LIPITOR   40 mg, Oral, Daily      Breo Ellipta 100-25 MCG/INH inhaler  Generic drug: Fluticasone Furoate-Vilanterol   1 puff, Inhalation, Daily - RT      fish oil 1000 MG capsule capsule   1,000 mg, Oral, 2 times daily      gemfibrozil 600 MG tablet  Commonly known as: LOPID   600 mg, Oral, 2 times daily      lisinopril 20 MG tablet  Commonly known as: PRINIVIL,ZESTRIL   20 mg, Oral, Every 24 Hours Scheduled      metoprolol succinate  MG 24 hr tablet  Commonly known as: TOPROL-XL   100 mg, Oral, Every 24 Hours Scheduled      mirtazapine 15 MG tablet  Commonly known as: REMERON   15 mg, Oral, Daily      QUEtiapine 400 MG tablet  Commonly known as: SEROquel   400 mg, Oral, Nightly      QUEtiapine 50 MG tablet  Commonly known as: SEROquel   50 mg, Oral, Daily      vitamin D 1.25 MG (16240 UT) capsule capsule  Commonly known as: ERGOCALCIFEROL   TAKE 1 CAPSULE EVERY WEEK         Stop These Medications    diclofenac 75 MG EC tablet  Commonly known as: VOLTAREN     doxycycline 100 MG capsule  Commonly known as: VIBRAMYCIN     gabapentin 800 MG tablet  Commonly known as: NEURONTIN     hydroCHLOROthiazide 12.5 MG tablet  Commonly known as: HYDRODIURIL          No future appointments.   Contact information for follow-up providers     William Mcdonald MD Follow up.    Specialty: Internal Medicine  Why: after released from rehab.  Contact information:  [address] [facility] [address] [phone]                   Contact information for after-discharge care     Destination     [facility] .    Service: Skilled Nursing  Contact information:  [address] [facility] [address] [phone]                           Discharge Order (From admission, onward)     Start     Ordered    02/16/22 1143  Discharge patient  Once        Expected Discharge Date: 02/16/22     Discharge Disposition: Skilled Nursing Facility (DC - External)    Physician of Record for Attribution - Please select from Treatment Team: JOSE F GLEASON [[id number]]    Review needed by CMO to determine Physician of Record: No       Question Answer Comment   Physician of Record for Attribution - Please select from Treatment Team JOSE F GLEASON    Review needed by CMO to determine Physician of Record No        02/16/22 1146                Total time spent discharging patient including evaluation,post hospitalization follow up,  medication and post hospitalization instructions and education total time exceeds 30 minutes.    Signed:  Jose F Gleason MD  2/16/2022  11:47 EST

## 2022-02-16 NOTE — PLAN OF CARE
Pt is alert x 4. Vitals stable. Room air. Voids per urinal. Pulled 2nd IV tonight. IVF infusing. Wound care completed to 2 abdominal sites. Pain medication provided x 1. Nystatin and barrier cream administered.     Problem: Fall Injury Risk  Goal: Absence of Fall and Fall-Related Injury  Outcome: Ongoing, Progressing  Intervention: Identify and Manage Contributors to Fall Injury Risk  Recent Flowsheet Documentation  Taken 2/15/2022 1949 by Adelaida Cunningham RN  Self-Care Promotion:   independence encouraged   BADL personal objects within reach   BADL personal routines maintained  Intervention: Promote Injury-Free Environment  Recent Flowsheet Documentation  Taken 2/16/2022 0143 by Adelaida Cunningham RN  Safety Promotion/Fall Prevention: safety round/check completed  Taken 2/15/2022 2348 by Adelaida Cunningham RN  Safety Promotion/Fall Prevention: safety round/check completed  Taken 2/15/2022 2220 by Adelaida Cunningham RN  Safety Promotion/Fall Prevention: safety round/check completed  Taken 2/15/2022 2202 by Adelaida Cunningham RN  Safety Promotion/Fall Prevention: safety round/check completed  Taken 2/15/2022 1949 by Adelaida Cunningham RN  Safety Promotion/Fall Prevention: safety round/check completed  Taken 2/15/2022 1947 by Adelaida Cunningham RN  Safety Promotion/Fall Prevention: safety round/check completed   Goal Outcome Evaluation:

## 2022-02-16 NOTE — THERAPY TREATMENT NOTE
Patient Name: Atilio Recinos  : 1965    MRN: [id number]                              Today's Date: 2022       Admit Date: 2022    Visit Dx:     ICD-10-CM ICD-9-CM   1. Hypotension due to hypovolemia  I95.89 458.8    E86.1 276.52   2. Acute renal failure, unspecified acute renal failure type (HCC)  N17.9 584.9   3. Anemia, unspecified type  D64.9 285.9   4. S/P exploratory laparotomy, oversew of perforated ulcer, placement of Chaparro patch   Z98.890 V45.89     Patient Active Problem List   Diagnosis   • Anxiety   • Bipolar 1 disorder (HCC)   • COPD (chronic obstructive pulmonary disease) ([facility])   • Depression   • High blood pressure   • High cholesterol   • Other acute sinusitis   • Obesity   • Cigarette nicotine dependence without complication   • Pneumoperitoneum of unknown etiology   • Bowel perforation ([facility])   • S/P exploratory laparotomy, oversew of perforated ulcer, placement of Chaparro patch    • Hypotension due to hypovolemia   • VERA (acute kidney injury) ([facility])   • Altered mental status   • COVID-19 virus infection     Past Medical History:   Diagnosis Date   • Allergies    • Anxiety    • Bipolar 1 disorder (HCC)    • COPD (chronic obstructive pulmonary disease) ([facility])    • Depression    • Forgetfulness    • Head injury    • Hepatitis    • High blood pressure    • High cholesterol    • Psychiatric illness    • S/P exploratory laparotomy, oversew of perforated ulcer, placement of Chaparro patch  2022   • Shortness of breath    • Sinus trouble      Past Surgical History:   Procedure Laterality Date   • EXPLORATORY LAPAROTOMY N/A 2022    Procedure: EXPLORATORY LAPAROTOMY, OVERSEW OF PERFORATED GASTRIC ULCER WITH CHAPARRO PATCH;  Surgeon: Atilio Lindsay MD;  [facility] MA[facility] OR;  Service: General;  Laterality: N/A;   • PLEURAL SCARIFICATION     • SPHINCTEROTOMY        General Information     Row Name 22 1215          Physical Therapy Time and Intention    Document Type therapy  note (daily note)  -CF     Mode of Treatment physical therapy; individual therapy  -CF     Row Name 02/16/22 1215          General Information    Patient Profile Reviewed yes  -CF     Existing Precautions/Restrictions fall  -CF     Row Name 02/16/22 1215          Cognition    Orientation Status (Cognition) oriented x 3  -CF     Row Name 02/16/22 1215          Safety Issues, Functional Mobility    Safety Issues Affecting Function (Mobility) insight into deficits/self-awareness  -CF     Impairments Affecting Function (Mobility) cognition; endurance/activity tolerance; pain; strength  -CF           User Key  (r) = Recorded By, (t) = Taken By, (c) = Cosigned By    Initials Name Provider Type    CF Mulu Villarreal, PT Physical Therapist               Mobility     Row Name 02/16/22 1216          Bed Mobility    Bed Mobility supine-sit; sit-supine  -CF     Supine-Sit Grady (Bed Mobility) modified independence  -CF     Sit-Supine Grady (Bed Mobility) modified independence  -CF     Assistive Device (Bed Mobility) bed rails  -CF     Row Name 02/16/22 1216          Bed-Chair Transfer    Bed-Chair Grady (Transfers) standby assist  -     Assistive Device (Bed-Chair Transfers) other (see comments)  none  -CF     Row Name 02/16/22 1216          Sit-Stand Transfer    Sit-Stand Grady (Transfers) standby assist  -CF     Assistive Device (Sit-Stand Transfers) walker, front-wheeled  -CF     Row Name 02/16/22 1216          Gait/Stairs (Locomotion)    Grady Level (Gait) standby assist  -CF     Assistive Device (Gait) walker, front-wheeled  -CF     Distance in Feet (Gait) 120'  -CF     Deviations/Abnormal Patterns (Gait) mayte decreased; gait speed decreased; stride length decreased  -CF     Bilateral Gait Deviations forward flexed posture  -CF     Comment (Gait/Stairs) No overt loss of balance, pt states he is not comfortable trialling gait without AD. Declined sitting up in chair  -CF            User Key  (r) = Recorded By, (t) = Taken By, (c) = Cosigned By    Initials Name Provider Type    CF Mulu Villarreal PT Physical Therapist               Obj/Interventions     Row Name 02/16/22 1217          Motor Skills    Therapeutic Exercise other (see comments)  CAROL konstantin martinez x15 reps  -CF     Row Name 02/16/22 1217          Balance    Dynamic Standing Balance WFL; supported  -CF           User Key  (r) = Recorded By, (t) = Taken By, (c) = Cosigned By    Initials Name Provider Type    CF Mulu Villarreal PT Physical Therapist               Goals/Plan    No documentation.                Clinical Impression     Row Name 02/16/22 1219          Pain Scale: Numbers Pre/Post-Treatment    Pretreatment Pain Rating 0/10 - no pain  -CF     Row Name 02/16/22 1219          Plan of Care Review    Plan of Care Reviewed With patient  -CF     Outcome Summary Pt participated with PT this morning. Pt increased ambulation distance to 120' in room using walker and sba. Pt states he is not comfortable trialling gait without AD yet, however completed transfer from the chair with sba and no AD. Discussed possibility of home with assist and HH, but pt would prefer to d/c to SNF as he will be alone >12 hours during the day.  -CF     Row Name 02/16/22 1219          Vital Signs    O2 Delivery Pre Treatment room air  -CF     O2 Delivery Intra Treatment room air  -CF     O2 Delivery Post Treatment room air  -CF     Row Name 02/16/22 1219          Positioning and Restraints    Pre-Treatment Position in bed  -CF     Post Treatment Position bed  declined sitting in chair  -CF     In Bed notified nsg; call light within reach; encouraged to call for assist; exit alarm on; fowlers  -CF           User Key  (r) = Recorded By, (t) = Taken By, (c) = Cosigned By    Initials Name Provider Type    Mulu Garcia, LILIAN Physical Therapist               Outcome Measures     Row Name 02/16/22 1221          How much help from another person do you  currently need...    Turning from your back to your side while in flat bed without using bedrails? 3  -CF     Moving from lying on back to sitting on the side of a flat bed without bedrails? 3  -CF     Moving to and from a bed to a chair (including a wheelchair)? 3  -CF     Standing up from a chair using your arms (e.g., wheelchair, bedside chair)? 3  -CF     Climbing 3-5 steps with a railing? 3  -CF     To walk in hospital room? 3  -CF     AM-PAC 6 Clicks Score (PT) 18  -CF     Row Name 02/16/22 1221          Functional Assessment    Outcome Measure Options AM-PAC 6 Clicks Basic Mobility (PT)  -CF           User Key  (r) = Recorded By, (t) = Taken By, (c) = Cosigned By    Initials Name Provider Type    CF Mulu Villarreal, PT Physical Therapist                             Physical Therapy Education                 Title: PT OT SLP Therapies (In Progress)     Topic: Physical Therapy (Done)     Point: Mobility training (Done)     Learning Progress Summary           Patient Acceptance, E, VU,NR by CF at 2/16/2022 1223    Acceptance, E, VU,NR by CF at 2/14/2022 1432                   Point: Home exercise program (Done)     Learning Progress Summary           Patient Acceptance, E, VU,NR by CF at 2/16/2022 1223    Acceptance, E, VU,NR by CF at 2/14/2022 1432                   Point: Body mechanics (Done)     Learning Progress Summary           Patient Acceptance, E, VU,NR by CF at 2/16/2022 1223    Acceptance, E, VU,NR by CF at 2/14/2022 1432                   Point: Precautions (Done)     Learning Progress Summary           Patient Acceptance, E, VU,NR by CF at 2/16/2022 1223    Acceptance, E, VU,NR by CF at 2/14/2022 1432                               User Key     Initials Effective Dates Name Provider Type Discipline    CF 06/16/21 -  Mulu Villarreal, LILIAN Physical Therapist PT              PT Recommendation and Plan  Planned Therapy Interventions (PT): balance training, bed mobility training, gait training, ROM  (range of motion), stair training, strengthening, patient/family education, transfer training, postural re-education  Plan of Care Reviewed With: patient  Outcome Summary: Pt participated with PT this morning. Pt increased ambulation distance to 120' in room using walker and sba. Pt states he is not comfortable trialling gait without AD yet, however completed transfer from the chair with sba and no AD. Discussed possibility of home with assist and HH, but pt would prefer to d/c to SNF as he will be alone >12 hours during the day.     Time Calculation:    PT Charges     Row Name 02/16/22 1201             Time Calculation    Start Time 1054  -CF      Stop Time 1117  -CF      Time Calculation (min) 23 min  -CF      PT Received On 02/16/22  -CF      PT - Next Appointment 02/18/22  -CF            User Key  (r) = Recorded By, (t) = Taken By, (c) = Cosigned By    Initials Name Provider Type    CF Mulu Villarreal, PT Physical Therapist              Therapy Charges for Today     Code Description Service Date Service Provider Modifiers Qty    97186597308  PT THER PROC EA 15 MIN 2/16/2022 Mulu Villarreal, PT GP 1    [id number] HC GAIT TRAINING EA 15 MIN 2/16/2022 Mulu Villarreal, PT GP 1          PT G-Codes  Outcome Measure Options: AM-PAC 6 Clicks Basic Mobility (PT)  AM-PAC 6 Clicks Score (PT): 18  AM-PAC 6 Clicks Score (OT): 19    Mulu Villarreal PT  2/16/2022

## 2022-02-16 NOTE — PROGRESS NOTES
Continued Stay Note  [facility]     Patient Name: Atilio Recinos  MRN: [id number]  Today's Date: 2/16/2022    Admit Date: 2/12/2022     Discharge Plan     Row Name 02/16/22 1135       Plan    Plan [facility] skilled rehab via [facility] [facility] at 2pm    Plan Comments Lesli accepts at [facility] and has a bed today.  [employer] van arranged for transport at 2pm Resevation # [id number] and will be charged to [facility] patient unable to afford tranpsort cost and has no one available to transport at [place].  Packet in chart for nursing.........................Kellen Singh RN               Discharge Codes    No documentation.               Expected Discharge Date and Time     Expected Discharge Date Expected Discharge Time    Feb 16, 2022             Kellen Singh RN

## 2022-02-17 LAB — BACTERIA SPEC AEROBE CULT: NORMAL

## 2022-02-17 NOTE — PROGRESS NOTES
Case Management Discharge Note      Final Note: [facility] via [facility]         Selected Continued Care - Discharged on 2/16/2022 Admission date: 2/12/2022 - Discharge disposition: Skilled Nursing Facility (DC - External)    Destination Coordination complete.    Service Provider Selected Services Address Phone Fax Patient Preferred    [facility]  Skilled Nursing [address] [facility] [id number] [phone] --          Durable Medical Equipment    No services have been selected for the patient.              Dialysis/Infusion    No services have been selected for the patient.              Home Medical Care    No services have been selected for the patient.              Therapy    No services have been selected for the patient.              Community Resources    No services have been selected for the patient.              Community & DME    No services have been selected for the patient.                  Transportation Services  W/C Van: [facility] ([facility] van [facility] to [facility])    Final Discharge Disposition Code: 03 - skilled nursing facility (SNF)

## 2022-02-21 ENCOUNTER — TELEPHONE (OUTPATIENT)
Dept: SURGERY | Facility: CLINIC | Age: 57
End: 2022-02-21

## 2022-02-21 NOTE — TELEPHONE ENCOUNTER
Pt had surgery in January, he is in rehab for strenthening. He has two areas of his incision that are infected and are draining. He needs an appt to be seen. His s/o Ashok will be bringing him and asks that you call him with the appt. The attending physician at the rehab is supposed to be starting an antibiotic today but they want him seen by Dr. ONEILL

## 2022-02-24 ENCOUNTER — OFFICE VISIT (OUTPATIENT)
Dept: SURGERY | Facility: CLINIC | Age: 57
End: 2022-02-24

## 2022-02-24 VITALS — BODY MASS INDEX: 26.84 KG/M2 | HEIGHT: 66 IN | RESPIRATION RATE: 16 BRPM | WEIGHT: 167 LBS

## 2022-02-24 DIAGNOSIS — Z98.890 S/P EXPLORATORY LAPAROTOMY: Primary | ICD-10-CM

## 2022-02-24 LAB — FUNGUS WND CULT: NORMAL

## 2022-02-24 PROCEDURE — 99024 POSTOP FOLLOW-UP VISIT: CPT | Performed by: SURGERY

## 2022-02-24 RX ORDER — LEVOFLOXACIN 750 MG/1
TABLET ORAL
Status: ON HOLD | COMMUNITY
Start: 2022-01-14 | End: 2022-09-22

## 2022-02-24 RX ORDER — IBUPROFEN 800 MG/1
800 TABLET ORAL EVERY 6 HOURS PRN
COMMUNITY
Start: 2021-12-15 | End: 2022-11-10

## 2022-02-24 NOTE — PROGRESS NOTES
Chief Complaint: Follow-up    Subjective         History of Present Illness  Atilio Recinos is a 57 y.o. male presents to [facility] GENERAL SURGERY. The patient is to be seen for follow-up status post exploratory laparotomy for perforated ulcer. He is accompanied today by his friend Ena.    He is doing well at rehabilitation and expects to return home by next Friday, 03/04/2022. He has lost weight due to decreased appetite and decreased food intake. The patient tries to eat at least half of his meal. He has a bowel movement every other day. He continues to have flatulence. The patient was started on doxycycline. He notes tenderness but denies rut pain to the incision site. The drainage does not seem to increase after eating. He has not noticed any change in drainage since taking the antibiotics. He is limiting his use of pain medication.    Objective     Past Medical History:   Diagnosis Date   • Allergies    • Anxiety    • Bipolar 1 disorder (HCC)    • COPD (chronic obstructive pulmonary disease) (HCC)    • Depression    • Forgetfulness    • Head injury    • Hepatitis    • High blood pressure    • High cholesterol    • Psychiatric illness    • S/P exploratory laparotomy, oversew of perforated ulcer, placement of Chaparro patch  1/17/2022   • Shortness of breath    • Sinus trouble        Past Surgical History:   Procedure Laterality Date   • EXPLORATORY LAPAROTOMY N/A 1/16/2022    Procedure: EXPLORATORY LAPAROTOMY, OVERSEW OF PERFORATED GASTRIC ULCER WITH CHAPARRO PATCH;  Surgeon: Atilio Lindsay MD;  Location: [facility];  Service: General;  Laterality: N/A;   • PLEURAL SCARIFICATION     • SPHINCTEROTOMY           Current Outpatient Medications:   •  amLODIPine (NORVASC) 5 MG tablet, Take 1 tablet by mouth Daily. (Patient taking differently: Take 2.5 mg by mouth Daily.), Disp: 90 tablet, Rfl: 1  •  atorvastatin (LIPITOR) 40 MG tablet, Take 40 mg by mouth Daily., Disp: , Rfl:   •   Fluticasone Furoate-Vilanterol (Breo Ellipta) 100-25 MCG/INH inhaler, Inhale 1 puff Daily., Disp: 60 each, Rfl: 2  •  gemfibrozil (LOPID) 600 MG tablet, Take 1 tablet by mouth 2 (two) times a day., Disp: 180 tablet, Rfl: 1  •  ibuprofen (ADVIL,MOTRIN) 800 MG tablet, Take 800 mg by mouth Every 6 (Six) Hours As Needed. for pain, Disp: , Rfl:   •  lisinopril (PRINIVIL,ZESTRIL) 20 MG tablet, Take 1 tablet by mouth Daily., Disp: 30 tablet, Rfl: 0.  •  metoprolol succinate XL (TOPROL-XL) 100 MG 24 hr tablet, Take 1 tablet by mouth Daily., Disp: 30 tablet, Rfl: 0  •  mirtazapine (REMERON) 15 MG tablet, Take 15 mg by mouth Daily., Disp: , Rfl:   •  Omega-3 Fatty Acids (fish oil) 1000 MG capsule capsule, Take 1 capsule by mouth 2 (two) times a day., Disp: 180 capsule, Rfl: 1  •  oxyCODONE-acetaminophen (PERCOCET) 5-325 MG per tablet, Take 1 tablet by mouth Every 6 (Six) Hours As Needed for Severe Pain ., Disp: 12 tablet, Rfl: 0  •  QUEtiapine (SEROquel) 400 MG tablet, Take 1 tablet by mouth Every Night., Disp: 30 tablet, Rfl: 0  •  vitamin D (ERGOCALCIFEROL) 1.25 MG (93988 UT) capsule capsule, TAKE 1 CAPSULE EVERY WEEK, Disp: 13 capsule, Rfl: 1  •  levoFLOXacin (LEVAQUIN) 750 MG tablet, TAKE 1 TABLET BY MOUTH once daily every 48 hours, Disp: , Rfl:   •  QUEtiapine (SEROquel) 50 MG tablet, Take 1 tablet by mouth Daily for 30 days., Disp: 30 tablet, Rfl: 0    Allergies   Allergen Reactions   • Antihistamines, Chlorpheniramine-Type Other (See Comments)   • Amoxicillin-Pot Clavulanate Hives   • Contrast Dye Hives and Nausea Only   • Diphenhydramine Hives        Family History   Problem Relation Age of Onset   • Stroke Other    • Heart disease Other    • Diabetes Other        Social History     Socioeconomic History   • Marital status: Single   Tobacco Use   • Smoking status: Former Smoker     Packs/day: 2.00     Years: 40.00     Pack years: 80.00     Types: Cigarettes   • Smokeless tobacco: Never Used   Vaping Use   • Vaping  Use: Never used   Substance and Sexual Activity   • Alcohol use: Not Currently   • Drug use: Never        Physical Exam  Constitutional:       General: He is not in acute distress.  Pulmonary:      Effort: Pulmonary effort is normal.   Abdominal:      Palpations: Abdomen is soft.        Post Surgical Incision  Surgical wound: He has an open area in the inferior portion of his midline incision and a small open area of his old drain site. The drain site has some purulent drainage. The midline incision appears to be healing. There is minimal drainage.    Result Review :               Assessment and Plan    There are no diagnoses linked to this encounter.   1. Status post exploratory laparotomy for perforated ulcer.     Follow Up   No follow-ups on file.  Patient was given instructions and counseling regarding his condition or for health maintenance advice. Please see specific information pulled into the AVS if appropriate.     He is to continue the antibiotics. I will follow up in 1 to 2 weeks for a wound check. If the drainage does not improve, we will consider a CT scan.    Transcribed from ambient dictation for Atilio Lindsay MD by Nany Warner.  02/24/22   15:54 EST    Patient verbalized consent to the visit recording.  I have personally performed the services described in this document as transcribed by the above individual, and it is both accurate and complete.  Atilio Lindsay MD  2/24/2022  19:28 EST

## 2022-03-02 ENCOUNTER — TELEPHONE (OUTPATIENT)
Dept: FAMILY MEDICINE CLINIC | Facility: CLINIC | Age: 57
End: 2022-03-02

## 2022-03-02 LAB — FUNGUS WND CULT: NORMAL

## 2022-03-02 NOTE — TELEPHONE ENCOUNTER
Provider: JONI DE SANTIAGO    Caller: PREM    Relationship to Patient: HOME HEALTH    Phone Number: [phone]    Reason for Call: PREM FROM [facility] STATED THEY WILL BE GIVING THE PATIENT SKILLED NURSING AND PHYSICAL THERAPY EFFECTIVE IMMEDIATELY

## 2022-03-09 ENCOUNTER — OFFICE VISIT (OUTPATIENT)
Dept: SURGERY | Facility: CLINIC | Age: 57
End: 2022-03-09

## 2022-03-09 VITALS — RESPIRATION RATE: 16 BRPM | HEIGHT: 66 IN | WEIGHT: 164.4 LBS | BODY MASS INDEX: 26.42 KG/M2

## 2022-03-09 DIAGNOSIS — Z98.890 S/P EXPLORATORY LAPAROTOMY: ICD-10-CM

## 2022-03-09 DIAGNOSIS — Z98.890 H/O ABDOMINAL SURGERY: Primary | ICD-10-CM

## 2022-03-09 PROCEDURE — 99024 POSTOP FOLLOW-UP VISIT: CPT | Performed by: SURGERY

## 2022-03-09 RX ORDER — HYDROCODONE BITARTRATE AND ACETAMINOPHEN 7.5; 325 MG/1; MG/1
1 TABLET ORAL EVERY 6 HOURS PRN
Qty: 20 TABLET | Refills: 0 | Status: SHIPPED | OUTPATIENT
Start: 2022-03-09 | End: 2022-03-29 | Stop reason: SDUPTHER

## 2022-03-09 NOTE — PROGRESS NOTES
Chief Complaint: Follow-up    Subjective         History of Present Illness  Atilio Recinos is a 57 y.o. male presents to [facility] GENERAL SURGERY. The patient is to be seen for status post exploratory laparotomy and wound check. He is accompanied by an adult male who contributes to his medical history.     The patient has consented to being recorded using [employer].    The patient is doing well overall, and notes improvement in his symptoms. Denies having drainage in 2 days. He is having regular bowel movements and a good appetite. The adult male inquires whether he can decrease his oxycodone dosage. He denies starting physical therapy, until 03/11/2022.       Objective     Past Medical History:   Diagnosis Date   • Allergies    • Anxiety    • Bipolar 1 disorder (HCC)    • COPD (chronic obstructive pulmonary disease) (HCC)    • Depression    • Forgetfulness    • Head injury    • Hepatitis    • High blood pressure    • High cholesterol    • Psychiatric illness    • S/P exploratory laparotomy, oversew of perforated ulcer, placement of Chaparro patch  1/17/2022   • Shortness of breath    • Sinus trouble        Past Surgical History:   Procedure Laterality Date   • EXPLORATORY LAPAROTOMY N/A 1/16/2022    Procedure: EXPLORATORY LAPAROTOMY, OVERSEW OF PERFORATED GASTRIC ULCER WITH CHAPARRO PATCH;  Surgeon: Atilio Lindsay MD;  Location: [facility]  Service: General;  Laterality: N/A;   • PLEURAL SCARIFICATION     • SPHINCTEROTOMY           Current Outpatient Medications:   •  amLODIPine (NORVASC) 5 MG tablet, Take 1 tablet by mouth Daily. (Patient taking differently: Take 2.5 mg by mouth Daily.), Disp: 90 tablet, Rfl: 1  •  atorvastatin (LIPITOR) 40 MG tablet, Take 40 mg by mouth Daily., Disp: , Rfl:   •  gemfibrozil (LOPID) 600 MG tablet, Take 1 tablet by mouth 2 (two) times a day., Disp: 180 tablet, Rfl: 1  •  lisinopril (PRINIVIL,ZESTRIL) 20 MG tablet, Take 1 tablet by mouth Daily., Disp: 30 tablet,  Rfl: 0.  •  metoprolol succinate XL (TOPROL-XL) 100 MG 24 hr tablet, Take 1 tablet by mouth Daily., Disp: 30 tablet, Rfl: 0  •  mirtazapine (REMERON) 15 MG tablet, Take 15 mg by mouth Daily., Disp: , Rfl:   •  Omega-3 Fatty Acids (fish oil) 1000 MG capsule capsule, Take 1 capsule by mouth 2 (two) times a day., Disp: 180 capsule, Rfl: 1  •  oxyCODONE-acetaminophen (PERCOCET) 5-325 MG per tablet, Take 1 tablet by mouth Every 6 (Six) Hours As Needed for Severe Pain ., Disp: 12 tablet, Rfl: 0  •  QUEtiapine (SEROquel) 400 MG tablet, Take 1 tablet by mouth Every Night., Disp: 30 tablet, Rfl: 0  •  QUEtiapine (SEROquel) 50 MG tablet, Take 1 tablet by mouth Daily for 30 days., Disp: 30 tablet, Rfl: 0  •  vitamin D (ERGOCALCIFEROL) 1.25 MG (41211 UT) capsule capsule, TAKE 1 CAPSULE EVERY WEEK, Disp: 13 capsule, Rfl: 1  •  Fluticasone Furoate-Vilanterol (Breo Ellipta) 100-25 MCG/INH inhaler, Inhale 1 puff Daily., Disp: 60 each, Rfl: 2  •  HYDROcodone-acetaminophen (Norco) 7.5-325 MG per tablet, Take 1 tablet by mouth Every 6 (Six) Hours As Needed for Moderate Pain ., Disp: 20 tablet, Rfl: 0  •  ibuprofen (ADVIL,MOTRIN) 800 MG tablet, Take 800 mg by mouth Every 6 (Six) Hours As Needed. for pain, Disp: , Rfl:   •  levoFLOXacin (LEVAQUIN) 750 MG tablet, TAKE 1 TABLET BY MOUTH once daily every 48 hours, Disp: , Rfl:     Allergies   Allergen Reactions   • Antihistamines, Chlorpheniramine-Type Other (See Comments)   • Amoxicillin-Pot Clavulanate Hives   • Contrast Dye Hives and Nausea Only   • Diphenhydramine Hives        Family History   Problem Relation Age of Onset   • Stroke Other    • Heart disease Other    • Diabetes Other        Social History     Socioeconomic History   • Marital status:    Tobacco Use   • Smoking status: Former Smoker     Packs/day: 2.00     Years: 40.00     Pack years: 80.00     Types: Cigarettes   • Smokeless tobacco: Never Used   Vaping Use   • Vaping Use: Never used   Substance and Sexual  Activity   • Alcohol use: Not Currently   • Drug use: Never        Physical Exam   Post Surgical Incision  Surgical wound: His left area still has mild drainage. Silver nitrate applied today and dressed it.  No acute distress  Nonlabored breathing  Abdomen is soft    Result Review :               Assessment and Plan    Diagnoses and all orders for this visit:    1. H/O abdominal surgery (Primary)  -     HYDROcodone-acetaminophen (Norco) 7.5-325 MG per tablet; Take 1 tablet by mouth Every 6 (Six) Hours As Needed for Moderate Pain .  Dispense: 20 tablet; Refill: 0  -  Follow up in 6 months for an EGD and possible colonoscopy    Discussed with the patient - all questions were answered they voiced understanding and agreed to proceed with above plan      Follow Up   No follow-ups on file.  Patient was given instructions and counseling regarding his condition or for health maintenance advice. Please see specific information pulled into the AVS if appropriate.       Transcribed from ambient dictation for Atilio Lindsay MD by Bao Campbell.  03/09/22   13:19 EST    Patient verbalized consent to the visit recording.  I have personally performed the services described in this document as transcribed by the above individual, and it is both accurate and complete.  Atilio Lidnsay MD  3/9/2022  18:20 EST

## 2022-03-10 LAB
MYCOBACTERIUM SPEC CULT: NORMAL
NIGHT BLUE STAIN TISS: NORMAL

## 2022-03-16 LAB
MYCOBACTERIUM SPEC CULT: NORMAL
NIGHT BLUE STAIN TISS: NORMAL

## 2022-03-29 ENCOUNTER — OFFICE VISIT (OUTPATIENT)
Dept: SURGERY | Facility: CLINIC | Age: 57
End: 2022-03-29

## 2022-03-29 VITALS — WEIGHT: 169.2 LBS | HEIGHT: 66 IN | BODY MASS INDEX: 27.19 KG/M2 | RESPIRATION RATE: 16 BRPM

## 2022-03-29 DIAGNOSIS — Z98.890 H/O ABDOMINAL SURGERY: ICD-10-CM

## 2022-03-29 DIAGNOSIS — Z98.890 S/P EXPLORATORY LAPAROTOMY: Primary | ICD-10-CM

## 2022-03-29 PROCEDURE — 99024 POSTOP FOLLOW-UP VISIT: CPT | Performed by: SURGERY

## 2022-03-29 RX ORDER — GABAPENTIN 400 MG/1
400 CAPSULE ORAL 3 TIMES DAILY
COMMUNITY
Start: 2022-03-15

## 2022-03-29 RX ORDER — HYDROCODONE BITARTRATE AND ACETAMINOPHEN 7.5; 325 MG/1; MG/1
1 TABLET ORAL EVERY 6 HOURS PRN
Qty: 20 TABLET | Refills: 0 | Status: SHIPPED | OUTPATIENT
Start: 2022-03-29 | End: 2022-09-20

## 2022-03-29 NOTE — PROGRESS NOTES
Chief Complaint: Follow-up    Subjective         History of Present Illness  Atilio Recinos is a 57 y.o. male presents to [facility] GENERAL SURGERY. The patient is to be seen for follow-up status post exploratory laparotomy. He is accompanied by an adult female today.    Status post exploratory laparotomy  The patient reports that his wound has healed well. He states that he removed the bandage and there was quite a bit of blood. The adult female states that the patient has been having a lot of fullness across his body. She reports that the patient is having regular bowel movements 2 to 3 times a day, and that he is taking a stool softener in the morning and late afternoon.  The adult female states that the patient took his last pain medication on Sunday 03/27/2022, night.    Objective     Past Medical History:   Diagnosis Date   • Allergies    • Anxiety    • Bipolar 1 disorder (HCC)    • COPD (chronic obstructive pulmonary disease) (HCC)    • Depression    • Forgetfulness    • Head injury    • Hepatitis    • High blood pressure    • High cholesterol    • Psychiatric illness    • S/P exploratory laparotomy, oversew of perforated ulcer, placement of Chaparro patch  1/17/2022   • Shortness of breath    • Sinus trouble        Past Surgical History:   Procedure Laterality Date   • EXPLORATORY LAPAROTOMY N/A 1/16/2022    Procedure: EXPLORATORY LAPAROTOMY, OVERSEW OF PERFORATED GASTRIC ULCER WITH CHAPARRO PATCH;  Surgeon: Atilio Lindsay MD;  Location: [facility] OR;  Service: General;  Laterality: N/A;   • PLEURAL SCARIFICATION     • SPHINCTEROTOMY           Current Outpatient Medications:   •  atorvastatin (LIPITOR) 40 MG tablet, Take 40 mg by mouth Daily., Disp: , Rfl:   •  Fluticasone Furoate-Vilanterol (Breo Ellipta) 100-25 MCG/INH inhaler, Inhale 1 puff Daily., Disp: 60 each, Rfl: 2  •  gabapentin (NEURONTIN) 400 MG capsule, , Disp: , Rfl:   •  gemfibrozil (LOPID) 600 MG tablet, Take 1 tablet by  mouth 2 (two) times a day., Disp: 180 tablet, Rfl: 1  •  HYDROcodone-acetaminophen (Norco) 7.5-325 MG per tablet, Take 1 tablet by mouth Every 6 (Six) Hours As Needed for Moderate Pain ., Disp: 20 tablet, Rfl: 0  •  lisinopril (PRINIVIL,ZESTRIL) 20 MG tablet, Take 1 tablet by mouth Daily., Disp: 30 tablet, Rfl: 0.  •  Omega-3 Fatty Acids (fish oil) 1000 MG capsule capsule, Take 1 capsule by mouth 2 (two) times a day., Disp: 180 capsule, Rfl: 1  •  QUEtiapine (SEROquel) 400 MG tablet, Take 1 tablet by mouth Every Night., Disp: 30 tablet, Rfl: 0  •  vitamin D (ERGOCALCIFEROL) 1.25 MG (78233 UT) capsule capsule, TAKE 1 CAPSULE EVERY WEEK, Disp: 13 capsule, Rfl: 1  •  amLODIPine (NORVASC) 5 MG tablet, Take 1 tablet by mouth Daily. (Patient taking differently: Take 2.5 mg by mouth Daily.), Disp: 90 tablet, Rfl: 1  •  ibuprofen (ADVIL,MOTRIN) 800 MG tablet, Take 800 mg by mouth Every 6 (Six) Hours As Needed. for pain, Disp: , Rfl:   •  levoFLOXacin (LEVAQUIN) 750 MG tablet, TAKE 1 TABLET BY MOUTH once daily every 48 hours, Disp: , Rfl:   •  metoprolol succinate XL (TOPROL-XL) 100 MG 24 hr tablet, Take 1 tablet by mouth Daily., Disp: 30 tablet, Rfl: 0  •  mirtazapine (REMERON) 15 MG tablet, Take 15 mg by mouth Daily., Disp: , Rfl:   •  oxyCODONE-acetaminophen (PERCOCET) 5-325 MG per tablet, Take 1 tablet by mouth Every 6 (Six) Hours As Needed for Severe Pain ., Disp: 12 tablet, Rfl: 0  •  QUEtiapine (SEROquel) 50 MG tablet, Take 1 tablet by mouth Daily for 30 days., Disp: 30 tablet, Rfl: 0    Allergies   Allergen Reactions   • Antihistamines, Chlorpheniramine-Type Other (See Comments)   • Amoxicillin-Pot Clavulanate Hives   • Contrast Dye Hives and Nausea Only   • Diphenhydramine Hives        Family History   Problem Relation Age of Onset   • Stroke Other    • Heart disease Other    • Diabetes Other        Social History     Socioeconomic History   • Marital status:    Tobacco Use   • Smoking status: Former Smoker      Packs/day: 2.00     Years: 40.00     Pack years: 80.00     Types: Cigarettes   • Smokeless tobacco: Never Used   Vaping Use   • Vaping Use: Never used   Substance and Sexual Activity   • Alcohol use: Not Currently   • Drug use: Never        Physical Exam  Constitutional:       General: He is not in acute distress.     Appearance: Normal appearance. He is well-developed and normal weight.   Pulmonary:      Effort: Pulmonary effort is normal.      Breath sounds: Normal air entry.   Abdominal:      General: There is no distension.      Palpations: Abdomen is soft.      Tenderness: There is no abdominal tenderness.        Post Surgical Incision  Surgical wound: Incision is healed well. I do not see any signs of drainage or infection at this point in time.    Result Review :               Assessment and Plan    Diagnoses and all orders for this visit:    1. H/O abdominal surgery  Assessment & Plan:  - We will give him a referral for Dr. Horn's office. We will send him in a refill on his pain medication and otherwise, he can follow-up with me as needed and continue his physical therapy.    Orders:  -     HYDROcodone-acetaminophen (Norco) 7.5-325 MG per tablet; Take 1 tablet by mouth Every 6 (Six) Hours As Needed for Moderate Pain .  Dispense: 20 tablet; Refill: 0      Follow Up   No follow-ups on file.  Patient was given instructions and counseling regarding his condition or for health maintenance advice. Please see specific information pulled into the AVS if appropriate.       Transcribed from ambient dictation for Atilio Lindsay MD by Mckenzie Godfrey.  03/29/22   19:03 EDT    Patient verbalized consent to the visit recording.  I have personally performed the services described in this document as transcribed by the above individual, and it is both accurate and complete.  Atilio Lindsay MD  3/30/2022  20:29 EDT

## 2022-03-29 NOTE — ASSESSMENT & PLAN NOTE
- We will give him a referral for Dr. Horn's office. We will send him in a refill on his pain medication and otherwise, he can follow-up with me as needed and continue his physical therapy.

## 2022-04-05 ENCOUNTER — TELEPHONE (OUTPATIENT)
Dept: SURGERY | Facility: CLINIC | Age: 57
End: 2022-04-05

## 2022-04-05 NOTE — TELEPHONE ENCOUNTER
Crystal, the patients home health nurse called and wanted Dr. Lindsay to know that the patient's drain tube wound is red/draining puss and there is a knot under the skin. She wanted to know if pt should be seen or if he needed antibiotics. CB# for Crystal [phone].

## 2022-04-05 NOTE — TELEPHONE ENCOUNTER
Tried calling Crystal to make appt no answer. LMOM.   Called pt and made him an appt for tomorrow 4-6 at 930am

## 2022-04-06 ENCOUNTER — TELEPHONE (OUTPATIENT)
Dept: FAMILY MEDICINE CLINIC | Facility: CLINIC | Age: 57
End: 2022-04-06

## 2022-04-06 ENCOUNTER — OFFICE VISIT (OUTPATIENT)
Dept: SURGERY | Facility: CLINIC | Age: 57
End: 2022-04-06

## 2022-04-06 VITALS — HEART RATE: 109 BPM | BODY MASS INDEX: 27.32 KG/M2 | OXYGEN SATURATION: 96 % | WEIGHT: 170 LBS | HEIGHT: 66 IN

## 2022-04-06 DIAGNOSIS — Z98.890 S/P EXPLORATORY LAPAROTOMY: Primary | ICD-10-CM

## 2022-04-06 PROCEDURE — 99024 POSTOP FOLLOW-UP VISIT: CPT | Performed by: NURSE PRACTITIONER

## 2022-04-06 NOTE — TELEPHONE ENCOUNTER
Caller: ESTEBAN- [facility]    Relationship: Other    Best call back number: [phone]    What form or medical record are you requesting: THE OTHER 2 PAGES MISSING FROM ORDER    Who is requesting this form or medical record from you: [facility]    How would you like to receive the form or medical records (pick-up, mail, fax): FAX  If fax, what is the fax number: [phone]    Timeframe paperwork needed: AS SOON AS POSSIBLE    Additional notes: ESTEBAN FROM [facility] CALLED TO LET US KNOW THAT SHE RECEIVED THE MIDDLE PAPER FROM THE ORDER FOR PATIENT FROM [facility], BUT THE FIRST AND LAST PAPER IS MISSING. SHE IS NEEDING IT REFAXED IF POSSIBLE.     ORDER NUMBER: [id number] AND IT WAS FAXED 04/01/2022

## 2022-04-06 NOTE — PROGRESS NOTES
Chief Complaint: Wound Check    Subjective      Wound check        History of Present Illness  Atilio Recinos is a 57 y.o. male presents to [facility] GENERAL SURGERY for a wound check.    Patient presents today with his partner with complaints of purulent drainage coming from previous drain site.     Patient denies any fever or chills.  Denies any pain.    Patient underwent a exploratory laparotomy with oversew of perforated gastric ulcer with Chaparro patch on 1/16/2022 performed by Dr. Atilio Lindsay.    Patient admits to tolerating diet well with no nausea.  Having bowel movements without difficulty.    Objective     Past Medical History:   Diagnosis Date   • Allergies    • Anxiety    • Bipolar 1 disorder (HCC)    • COPD (chronic obstructive pulmonary disease) (HCC)    • Depression    • Forgetfulness    • Head injury    • Hepatitis    • High blood pressure    • High cholesterol    • Psychiatric illness    • S/P exploratory laparotomy, oversew of perforated ulcer, placement of Chaparro patch  1/17/2022   • Shortness of breath    • Sinus trouble        Past Surgical History:   Procedure Laterality Date   • EXPLORATORY LAPAROTOMY N/A 1/16/2022    Procedure: EXPLORATORY LAPAROTOMY, OVERSEW OF PERFORATED GASTRIC ULCER WITH CHAPARRO PATCH;  Surgeon: Atilio Lindsay MD;  Location: [facility] MAIN OR;  Service: General;  Laterality: N/A;   • PLEURAL SCARIFICATION     • SPHINCTEROTOMY         Outpatient Medications Marked as Taking for the 4/6/22 encounter (Office Visit) with Dodie Callahan APRN   Medication Sig Dispense Refill   • atorvastatin (LIPITOR) 40 MG tablet Take 40 mg by mouth Daily.     • Fluticasone Furoate-Vilanterol (Breo Ellipta) 100-25 MCG/INH inhaler Inhale 1 puff Daily. 60 each 2   • gabapentin (NEURONTIN) 400 MG capsule      • gemfibrozil (LOPID) 600 MG tablet Take 1 tablet by mouth 2 (two) times a day. 180 tablet 1   • HYDROcodone-acetaminophen (Norco) 7.5-325 MG per tablet Take 1 tablet by  "mouth Every 6 (Six) Hours As Needed for Moderate Pain . 20 tablet 0   • mirtazapine (REMERON) 15 MG tablet Take 15 mg by mouth Daily.     • Omega-3 Fatty Acids (fish oil) 1000 MG capsule capsule Take 1 capsule by mouth 2 (two) times a day. 180 capsule 1   • QUEtiapine (SEROquel) 400 MG tablet Take 1 tablet by mouth Every Night. 30 tablet 0   • vitamin D (ERGOCALCIFEROL) 1.25 MG (47680 UT) capsule capsule TAKE 1 CAPSULE EVERY WEEK 13 capsule 1       Allergies   Allergen Reactions   • Antihistamines, Chlorpheniramine-Type Other (See Comments)   • Amoxicillin-Pot Clavulanate Hives   • Contrast Dye Hives and Nausea Only   • Diphenhydramine Hives        Family History   Problem Relation Age of Onset   • Stroke Other    • Heart disease Other    • Diabetes Other        Social History     Socioeconomic History   • Marital status:    Tobacco Use   • Smoking status: Former Smoker     Packs/day: 2.00     Years: 40.00     Pack years: 80.00     Types: Cigarettes   • Smokeless tobacco: Never Used   Vaping Use   • Vaping Use: Never used   Substance and Sexual Activity   • Alcohol use: Not Currently   • Drug use: Never       Review of Systems   Constitutional: Negative for chills and fever.   Gastrointestinal: Negative for abdominal distention, abdominal pain, anal bleeding, blood in stool, constipation, diarrhea and rectal pain.        Vital Signs:   Pulse 109   Ht 167.6 cm (66\")   Wt 77.1 kg (170 lb)   SpO2 96%   BMI 27.44 kg/m²      Physical Exam  Constitutional:       Appearance: He is obese.   HENT:      Head: Normocephalic.   Cardiovascular:      Rate and Rhythm: Normal rate.   Pulmonary:      Effort: Pulmonary effort is normal.   Abdominal:      Palpations: Abdomen is soft.      Comments: Surgical incision is clean dry and intact without erythema.    The area of concern has since scabbed over since yesterday.  I do not feel any masses above area of concern.  I could not reproduce any drainage today.   Skin:     " General: Skin is warm and dry.   Neurological:      General: No focal deficit present.      Mental Status: He is alert and oriented to person, place, and time.   Psychiatric:         Mood and Affect: Mood normal.         Thought Content: Thought content normal.          Result Review :    []  Laboratory  []  Radiology  [x]  Pathology  []  Microbiology  []  EKG/Telemetry   []  Cardiology/Vascular   [x]  Old records  Today I reviewed Dr. Lindsay's previous operative report.       Assessment and Plan    Diagnoses and all orders for this visit:    1. S/P exploratory laparotomy (Primary)        Follow Up   Return if symptoms worsen or fail to improve.     Seek medical emergency services:  Fever greater than 101 associated with chills.  Extreme pain.    Patient was given instructions and counseling regarding his condition or for health maintenance advice. Please see specific information pulled into the AVS if appropriate.

## 2022-04-14 ENCOUNTER — TELEPHONE (OUTPATIENT)
Dept: SURGERY | Facility: CLINIC | Age: 57
End: 2022-04-14

## 2022-04-14 NOTE — TELEPHONE ENCOUNTER
Pt called to ask if he could have a refill on his norco. Over the weekend he was in the shower and had his leg propped up and he felt like it pulled something in his abdomen and has caused some pain. No problem with his bowels or anything else going on. He also asked about the possibility of  Getting a CT scan just to evaluate everything and make sure everything is alright.

## 2022-04-15 DIAGNOSIS — Z98.890 S/P EXPLORATORY LAPAROTOMY: Primary | ICD-10-CM

## 2022-04-15 RX ORDER — HYDROCODONE BITARTRATE AND ACETAMINOPHEN 5; 325 MG/1; MG/1
1 TABLET ORAL EVERY 6 HOURS PRN
Qty: 15 TABLET | Refills: 0 | Status: SHIPPED | OUTPATIENT
Start: 2022-04-15 | End: 2022-09-20

## 2022-04-15 NOTE — TELEPHONE ENCOUNTER
I have refilled the pain meds. Patient is requesting a ct scan, he believes he has pulled a abdominal muscle. I advised the patient that he cant see pulled muscles on ct scan.    He is tolerating his diet well without nausea and is having good BM's without difficulty. I instructed him to monitor his pain over the weekend. If no improvement he will call the office on Monday.

## 2022-04-19 NOTE — TELEPHONE ENCOUNTER
Pt called back to report that he is still having the same tenderness in his abdomen and what feels like a small not near the incision. Would like to proceed with a CT scan or the appropriate test to see what is going on.

## 2022-04-20 DIAGNOSIS — R10.84 GENERALIZED ABDOMINAL PAIN: ICD-10-CM

## 2022-04-20 DIAGNOSIS — Z98.890 S/P EXPLORATORY LAPAROTOMY: Primary | ICD-10-CM

## 2022-04-26 ENCOUNTER — HOSPITAL ENCOUNTER (OUTPATIENT)
Dept: CT IMAGING | Facility: HOSPITAL | Age: 57
Discharge: HOME | End: 2022-04-26
Admitting: NURSE PRACTITIONER

## 2022-04-26 ENCOUNTER — TELEPHONE (OUTPATIENT)
Dept: FAMILY MEDICINE CLINIC | Facility: CLINIC | Age: 57
End: 2022-04-26

## 2022-04-26 PROCEDURE — 74176 CT ABD & PELVIS W/O CONTRAST: CPT

## 2022-04-26 NOTE — TELEPHONE ENCOUNTER
Caller: [facility]    Relationship to patient: Home Health    Best call back number: [phone]    Patient is needing: [facility] CALLING TO LET [facility]IR[facility] DE SANTIAGO KNOW THEY ARE DISCHARGING HIM FROM HOME HEALTH TODAY 04.26.2022.

## 2022-04-27 ENCOUNTER — TELEPHONE (OUTPATIENT)
Dept: SURGERY | Facility: CLINIC | Age: 57
End: 2022-04-27

## 2022-04-27 NOTE — TELEPHONE ENCOUNTER
I have called and reviewed this ct scan with the patient. He is not currently with diarrhea. He feels ok and is tolerating his diet without n/v. Having BM without difficulty. He will notify the office with any changes.

## 2022-07-28 ENCOUNTER — OFFICE VISIT (OUTPATIENT)
Dept: SURGERY | Facility: CLINIC | Age: 57
End: 2022-07-28

## 2022-07-28 ENCOUNTER — PREP FOR SURGERY (OUTPATIENT)
Dept: OTHER | Facility: HOSPITAL | Age: 57
End: 2022-07-28

## 2022-07-28 VITALS — WEIGHT: 174.4 LBS | HEIGHT: 66 IN | BODY MASS INDEX: 28.03 KG/M2 | HEART RATE: 108 BPM

## 2022-07-28 DIAGNOSIS — Z80.0 FAMILY HISTORY OF COLON CANCER: ICD-10-CM

## 2022-07-28 DIAGNOSIS — Z12.11 SCREENING FOR COLON CANCER: ICD-10-CM

## 2022-07-28 DIAGNOSIS — Z87.19 HISTORY OF PYLORIC CHANNEL ULCER: Primary | ICD-10-CM

## 2022-07-28 DIAGNOSIS — R13.10 DYSPHAGIA: ICD-10-CM

## 2022-07-28 DIAGNOSIS — R13.12 OROPHARYNGEAL DYSPHAGIA: ICD-10-CM

## 2022-07-28 DIAGNOSIS — K21.9 GERD WITHOUT ESOPHAGITIS: Primary | ICD-10-CM

## 2022-07-28 DIAGNOSIS — Z87.19 HISTORY OF PYLORIC CHANNEL ULCER: ICD-10-CM

## 2022-07-28 DIAGNOSIS — K21.9 GERD WITHOUT ESOPHAGITIS: ICD-10-CM

## 2022-07-28 PROCEDURE — 99213 OFFICE O/P EST LOW 20 MIN: CPT | Performed by: NURSE PRACTITIONER

## 2022-07-28 RX ORDER — QUETIAPINE FUMARATE 300 MG/1
TABLET, FILM COATED ORAL
Status: ON HOLD | COMMUNITY
Start: 2022-06-09 | End: 2022-09-22

## 2022-07-28 RX ORDER — DEXTROMETHORPHAN HYDROBROMIDE AND PROMETHAZINE HYDROCHLORIDE 15; 6.25 MG/5ML; MG/5ML
SYRUP ORAL
Status: ON HOLD | COMMUNITY
Start: 2022-04-28 | End: 2022-09-22

## 2022-07-28 RX ORDER — POLYETHYLENE GLYCOL 3350 17 G/17G
POWDER, FOR SOLUTION ORAL
Qty: 238 PACKET | Refills: 0 | Status: ON HOLD | OUTPATIENT
Start: 2022-07-28 | End: 2022-09-22

## 2022-07-28 RX ORDER — PREDNISONE 10 MG/1
TABLET ORAL
Status: ON HOLD | COMMUNITY
Start: 2022-04-28 | End: 2022-09-22

## 2022-07-28 RX ORDER — METOPROLOL SUCCINATE 25 MG/1
TABLET, EXTENDED RELEASE ORAL
COMMUNITY
Start: 2022-07-25 | End: 2022-09-20

## 2022-07-28 RX ORDER — IPRATROPIUM BROMIDE AND ALBUTEROL SULFATE 2.5; .5 MG/3ML; MG/3ML
SOLUTION RESPIRATORY (INHALATION)
Status: ON HOLD | COMMUNITY
Start: 2022-05-05 | End: 2022-09-22

## 2022-07-28 NOTE — PROGRESS NOTES
Chief Complaint: Colonoscopy and EGD Consult    Subjective      EGD and colonoscopy consultation       History of Present Illness  Atilio Recinos is a 57 y.o. male presents to [facility] for for EGD colonoscopy consultation.    Patient has a history of perforated prepyloric ulcer and underwent a Chaparro patch repair on 1/16/2022 performed by Dr. Atilio Lindsay.    Patient reports that he has with heartburn and indigestion.  He reports that he has some dysphagia issues when his anxiety starts.    Denies any epigastric pain.  Denies any pain before or after eating.    Patient's requesting colonoscopy along with EGD.    He denies any lower abdominal pain, change in bowel habit, rectal bleeding.    Admits to family history with his maternal grandmother.    Objective     Past Medical History:   Diagnosis Date   • Allergies    • Anemia 2/2022   • Anxiety    • Asthma 1992   • Bipolar 1 disorder (HCC)    • Chronic kidney disease 1/2022   • COPD (chronic obstructive pulmonary disease) (HCC)    • Depression    • Fissure, anal 2003   • Forgetfulness    • Hard to intubate     High gag reflex.   • Head injury    • Hepatitis    • High blood pressure    • High cholesterol    • History of transfusion 2/2022   • Pancreatitis [id number]   • PONV (postoperative nausea and vomiting) 1/2022   • Psychiatric illness    • Rectal bleeding 2003   • S/P exploratory laparotomy, oversew of perforated ulcer, placement of Chaparro patch  01/17/2022   • Shortness of breath    • Sinus trouble        Past Surgical History:   Procedure Laterality Date   • EXPLORATORY LAPAROTOMY N/A 01/16/2022    Procedure: EXPLORATORY LAPAROTOMY, OVERSEW OF PERFORATED GASTRIC ULCER WITH CHAPARRO PATCH;  Surgeon: Atilio Lindsay MD;  Location: [facility] OR;  Service: General;  Laterality: N/A;   • PLEURAL SCARIFICATION     • SPHINCTEROTOMY         Outpatient Medications Marked as Taking for the 7/28/22 encounter (Office Visit) with  [place], April, APRN   Medication Sig Dispense Refill   • atorvastatin (LIPITOR) 40 MG tablet Take 40 mg by mouth Daily.     • Fluticasone Furoate-Vilanterol (Breo Ellipta) 100-25 MCG/INH inhaler Inhale 1 puff Daily. 60 each 2   • gabapentin (NEURONTIN) 400 MG capsule      • gemfibrozil (LOPID) 600 MG tablet Take 1 tablet by mouth 2 (two) times a day. 180 tablet 1   • ipratropium-albuterol (DUO-NEB) 0.5-2.5 mg/3 ml nebulizer inhale contents of 1 vial in nebulizer FOUR TIMES DAILY AS NEEDED for copd exacerbation/ SHORTNESS OF BREATH     • mirtazapine (REMERON) 15 MG tablet Take 15 mg by mouth Daily.     • Omega-3 Fatty Acids (fish oil) 1000 MG capsule capsule Take 1 capsule by mouth 2 (two) times a day. 180 capsule 1   • QUEtiapine (SEROquel) 300 MG tablet      • vitamin D (ERGOCALCIFEROL) 1.25 MG (16853 UT) capsule capsule TAKE 1 CAPSULE EVERY WEEK 13 capsule 1       Allergies   Allergen Reactions   • Antihistamines, Chlorpheniramine-Type Other (See Comments)   • Amoxicillin-Pot Clavulanate Hives and Rash   • Contrast Dye Hives, Nausea Only and Rash   • Diphenhydramine Hives        Family History   Problem Relation Age of Onset   • Stroke Other    • Heart disease Other    • Diabetes Other    • Alcohol abuse Mother    • Asthma Mother    • COPD Mother    • Depression Mother    • Heart disease Mother    • Hyperlipidemia Mother    • Hypertension Mother    • Mental illness Mother    • Alcohol abuse Father    • Arthritis Father    • Hypertension Father        Social History     Socioeconomic History   • Marital status:    Tobacco Use   • Smoking status: Heavy Tobacco Smoker     Packs/day: 2.00     Years: 40.00     Pack years: 80.00     Types: Cigarettes   • Smokeless tobacco: Never Used   Vaping Use   • Vaping Use: Never used   Substance and Sexual Activity   • Alcohol use: Not Currently   • Drug use: Never   • Sexual activity: Yes     Partners: Male     Birth control/protection: Condom       Review of Systems  "  Constitutional: Negative for chills and fever.   HENT: Positive for trouble swallowing.    Gastrointestinal: Positive for GERD and indigestion. Negative for abdominal distention, abdominal pain, anal bleeding, blood in stool, constipation, diarrhea, nausea, rectal pain and vomiting.        Vital Signs:   Pulse 108   Ht 167.6 cm (66\")   Wt 79.1 kg (174 lb 6.4 oz)   BMI 28.15 kg/m²      Physical Exam  Vitals and nursing note reviewed.   Constitutional:       General: He is not in acute distress.     Appearance: He is obese.   HENT:      Head: Normocephalic.   Cardiovascular:      Rate and Rhythm: Normal rate.   Pulmonary:      Effort: Pulmonary effort is normal.      Breath sounds: No stridor. No wheezing.   Abdominal:      General: Abdomen is flat.      Palpations: Abdomen is soft.      Tenderness: There is no guarding.      Comments: Patient is with a large incisional hernia   Musculoskeletal:         General: No deformity. Normal range of motion.   Skin:     General: Skin is warm and dry.      Coloration: Skin is not jaundiced.   Neurological:      General: No focal deficit present.      Mental Status: He is alert and oriented to person, place, and time.   Psychiatric:         Mood and Affect: Mood normal.         Thought Content: Thought content normal.          Result Review :          []  Laboratory  []  Radiology  [x]  Pathology  []  Microbiology  []  EKG/Telemetry   []  Cardiology/Vascular   [x]  Old records  Today I reviewed Dr. Lindsay's previous operative and pathology report     Assessment and Plan    Diagnoses and all orders for this visit:    1. History of pyloric channel ulcer (Primary)    2. Family history of colon cancer    3. GERD without esophagitis    4. Oropharyngeal dysphagia    Other orders  -     polyethylene glycol (MIRALAX) 17 g packet; Take as directed.  Instructions given in office.  Dispense: 238 g bottle  Dispense: 238 packet; Refill: 0    white prep    Follow Up   Return for " Scheduled EGD and colonoscopy with Dr. Lindsay on 9/22/2022 at [facility].     Hospital arrival time: 11:30.    Possible risks/complications, benefits, and alternatives to surgical or invasive procedure have been explained to patient and/or legal guardian.     Patient has been evaluated and can tolerate anesthesia and/or sedation. Risks, benefits, and alternatives to anesthesia and sedation have been explained to patient and/or legal guardian.  Patient verbalizes understanding and is will proceed with above plan.    Patient was given instructions and counseling regarding his condition or for health maintenance advice. Please see specific information pulled into the AVS if appropriate.     EMR dragon/transcription disclaimer: Much of this encounter note is an electronic transcription/translation of spoken language to printed text. Electronic translation of spoken language may permit erroneous, or at times nonsensical words or phrases to be inadvertently transcribed; although I have reviewed the note for such errors, some may still exist.

## 2022-08-15 ENCOUNTER — TELEPHONE (OUTPATIENT)
Dept: SURGERY | Facility: CLINIC | Age: 57
End: 2022-08-15

## 2022-08-15 NOTE — TELEPHONE ENCOUNTER
Provider: FLOR PENDLETON    Caller: FLOR LEYVA    Phone Number: [phone]    Reason for Call: PT STATED HE SAW DR. CORDOVA AROUND April AND HE HAD SUGGESTED HE SEE A NEW PCP AND PT FORGOT THE NAME OF THE DOCTOR. LOOKED IN OFFICE VISIT NOTES FROM [facility] AND NOTICED THAT HE HAD IN THERE A DR. BLUNT. NOTIFIED PT. IF THIS IS INCORRECT, PLEASE CALL PT BACK.

## 2022-09-22 ENCOUNTER — HOSPITAL ENCOUNTER (OUTPATIENT)
Facility: HOSPITAL | Age: 57
Setting detail: HOSPITAL OUTPATIENT SURGERY
Discharge: HOME | End: 2022-09-22
Attending: SURGERY | Admitting: SURGERY

## 2022-09-22 ENCOUNTER — ANESTHESIA (OUTPATIENT)
Dept: GASTROENTEROLOGY | Facility: HOSPITAL | Age: 57
End: 2022-09-22

## 2022-09-22 ENCOUNTER — ANESTHESIA EVENT (OUTPATIENT)
Dept: GASTROENTEROLOGY | Facility: HOSPITAL | Age: 57
End: 2022-09-22

## 2022-09-22 VITALS
TEMPERATURE: 96.8 F | OXYGEN SATURATION: 95 % | HEART RATE: 78 BPM | RESPIRATION RATE: 16 BRPM | BODY MASS INDEX: 26.43 KG/M2 | SYSTOLIC BLOOD PRESSURE: 146 MMHG | WEIGHT: 164.46 LBS | HEIGHT: 66 IN | DIASTOLIC BLOOD PRESSURE: 79 MMHG

## 2022-09-22 DIAGNOSIS — Z87.19 HISTORY OF PYLORIC CHANNEL ULCER: ICD-10-CM

## 2022-09-22 DIAGNOSIS — Z80.0 FAMILY HISTORY OF COLON CANCER: ICD-10-CM

## 2022-09-22 DIAGNOSIS — Z12.11 SCREENING FOR COLON CANCER: ICD-10-CM

## 2022-09-22 DIAGNOSIS — R13.10 DYSPHAGIA: ICD-10-CM

## 2022-09-22 DIAGNOSIS — K21.9 GERD WITHOUT ESOPHAGITIS: ICD-10-CM

## 2022-09-22 PROCEDURE — 88305 TISSUE EXAM BY PATHOLOGIST: CPT | Performed by: SURGERY

## 2022-09-22 PROCEDURE — 25010000002 PROPOFOL 10 MG/ML EMULSION

## 2022-09-22 RX ORDER — DEXMEDETOMIDINE HYDROCHLORIDE 100 UG/ML
INJECTION, SOLUTION INTRAVENOUS AS NEEDED
Status: DISCONTINUED | OUTPATIENT
Start: 2022-09-22 | End: 2022-09-22 | Stop reason: SURG

## 2022-09-22 RX ORDER — SODIUM CHLORIDE, SODIUM LACTATE, POTASSIUM CHLORIDE, CALCIUM CHLORIDE 600; 310; 30; 20 MG/100ML; MG/100ML; MG/100ML; MG/100ML
30 INJECTION, SOLUTION INTRAVENOUS CONTINUOUS
Status: DISCONTINUED | OUTPATIENT
Start: 2022-09-22 | End: 2022-09-22 | Stop reason: HOSPADM

## 2022-09-22 RX ORDER — LIDOCAINE HYDROCHLORIDE 20 MG/ML
INJECTION, SOLUTION EPIDURAL; INFILTRATION; INTRACAUDAL; PERINEURAL AS NEEDED
Status: DISCONTINUED | OUTPATIENT
Start: 2022-09-22 | End: 2022-09-22 | Stop reason: SURG

## 2022-09-22 RX ORDER — PANTOPRAZOLE SODIUM 40 MG/1
40 TABLET, DELAYED RELEASE ORAL DAILY
Qty: 30 TABLET | Refills: 1 | Status: SHIPPED | OUTPATIENT
Start: 2022-09-22 | End: 2023-01-27 | Stop reason: SDUPTHER

## 2022-09-22 RX ORDER — PROPOFOL 10 MG/ML
VIAL (ML) INTRAVENOUS AS NEEDED
Status: DISCONTINUED | OUTPATIENT
Start: 2022-09-22 | End: 2022-09-22 | Stop reason: SURG

## 2022-09-22 RX ORDER — SUCRALFATE ORAL 1 G/10ML
1 SUSPENSION ORAL 4 TIMES DAILY
Qty: 420 ML | Refills: 1 | Status: SHIPPED | OUTPATIENT
Start: 2022-09-22 | End: 2022-10-26

## 2022-09-22 RX ADMIN — LIDOCAINE HYDROCHLORIDE 40 MG: 20 INJECTION, SOLUTION EPIDURAL; INFILTRATION; INTRACAUDAL; PERINEURAL at 13:43

## 2022-09-22 RX ADMIN — LIDOCAINE HYDROCHLORIDE 60 MG: 20 INJECTION, SOLUTION EPIDURAL; INFILTRATION; INTRACAUDAL; PERINEURAL at 14:07

## 2022-09-22 RX ADMIN — DEXMEDETOMIDINE HYDROCHLORIDE 20 MCG: 100 INJECTION, SOLUTION, CONCENTRATE INTRAVENOUS at 13:43

## 2022-09-22 RX ADMIN — PROPOFOL 250 MCG/KG/MIN: 10 INJECTION, EMULSION INTRAVENOUS at 13:43

## 2022-09-22 RX ADMIN — SODIUM CHLORIDE, POTASSIUM CHLORIDE, SODIUM LACTATE AND CALCIUM CHLORIDE 30 ML/HR: 600; 310; 30; 20 INJECTION, SOLUTION INTRAVENOUS at 13:32

## 2022-09-22 RX ADMIN — DEXMEDETOMIDINE HYDROCHLORIDE 20 MCG: 100 INJECTION, SOLUTION, CONCENTRATE INTRAVENOUS at 14:07

## 2022-09-22 RX ADMIN — PROPOFOL 20 MG: 10 INJECTION, EMULSION INTRAVENOUS at 14:07

## 2022-09-22 RX ADMIN — PROPOFOL 60 MG: 10 INJECTION, EMULSION INTRAVENOUS at 13:43

## 2022-09-22 NOTE — H&P
EGD and colonoscopy consultation        History of Present Illness  Atilio Recinos is a 57 y.o. male presents to [facility] for for EGD colonoscopy consultation.     Patient has a history of perforated prepyloric ulcer and underwent a Chaparro patch repair on 1/16/2022 performed by Dr. Atilio Lindsay.     Patient reports that he has with heartburn and indigestion.  He reports that he has some dysphagia issues when his anxiety starts.     Denies any epigastric pain.  Denies any pain before or after eating.     Patient's requesting colonoscopy along with EGD.     He denies any lower abdominal pain, change in bowel habit, rectal bleeding.     Admits to family history with his maternal grandmother.           Objective         Medical History        Past Medical History:   Diagnosis Date   • Allergies     • Anemia 2/2022   • Anxiety     • Asthma 1992   • Bipolar 1 disorder (HCC)     • Chronic kidney disease 1/2022   • COPD (chronic obstructive pulmonary disease) (HCC)     • Depression     • Fissure, anal 2003   • Forgetfulness     • Hard to intubate       High gag reflex.   • Head injury     • Hepatitis     • High blood pressure     • High cholesterol     • History of transfusion 2/2022   • Pancreatitis [id number]   • PONV (postoperative nausea and vomiting) 1/2022   • Psychiatric illness     • Rectal bleeding 2003   • S/P exploratory laparotomy, oversew of perforated ulcer, placement of Chaparro patch  01/17/2022   • Shortness of breath     • Sinus trouble              Surgical History         Past Surgical History:   Procedure Laterality Date   • EXPLORATORY LAPAROTOMY N/A 01/16/2022     Procedure: EXPLORATORY LAPAROTOMY, OVERSEW OF PERFORATED GASTRIC ULCER WITH CHAPARRO PATCH;  Surgeon: Atilio Lindsay MD;  Location: [facility] MAIN OR;  Service: General;  Laterality: N/A;   • PLEURAL SCARIFICATION       • SPHINCTEROTOMY                Medications Taking          Outpatient Medications Marked  as Taking for the 7/28/22 encounter (Office Visit) with Dodie Callahan APRN   Medication Sig Dispense Refill   • atorvastatin (LIPITOR) 40 MG tablet Take 40 mg by mouth Daily.       • Fluticasone Furoate-Vilanterol (Breo Ellipta) 100-25 MCG/INH inhaler Inhale 1 puff Daily. 60 each 2   • gabapentin (NEURONTIN) 400 MG capsule         • gemfibrozil (LOPID) 600 MG tablet Take 1 tablet by mouth 2 (two) times a day. 180 tablet 1   • ipratropium-albuterol (DUO-NEB) 0.5-2.5 mg/3 ml nebulizer inhale contents of 1 vial in nebulizer FOUR TIMES DAILY AS NEEDED for copd exacerbation/ SHORTNESS OF BREATH       • mirtazapine (REMERON) 15 MG tablet Take 15 mg by mouth Daily.       • Omega-3 Fatty Acids (fish oil) 1000 MG capsule capsule Take 1 capsule by mouth 2 (two) times a day. 180 capsule 1   • QUEtiapine (SEROquel) 300 MG tablet         • vitamin D (ERGOCALCIFEROL) 1.25 MG (65242 UT) capsule capsule TAKE 1 CAPSULE EVERY WEEK 13 capsule 1                 Allergies   Allergen Reactions   • Antihistamines, Chlorpheniramine-Type Other (See Comments)   • Amoxicillin-Pot Clavulanate Hives and Rash   • Contrast Dye Hives, Nausea Only and Rash   • Diphenhydramine Hives               Family History   Problem Relation Age of Onset   • Stroke Other     • Heart disease Other     • Diabetes Other     • Alcohol abuse Mother     • Asthma Mother     • COPD Mother     • Depression Mother     • Heart disease Mother     • Hyperlipidemia Mother     • Hypertension Mother     • Mental illness Mother     • Alcohol abuse Father     • Arthritis Father     • Hypertension Father           Social History   Social History            Socioeconomic History   • Marital status:    Tobacco Use   • Smoking status: Heavy Tobacco Smoker       Packs/day: 2.00       Years: 40.00       Pack years: 80.00       Types: Cigarettes   • Smokeless tobacco: Never Used   Vaping Use   • Vaping Use: Never used   Substance and Sexual Activity   • Alcohol use: Not  "Currently   • Drug use: Never   • Sexual activity: Yes       Partners: Male       Birth control/protection: Condom            Review of Systems   Constitutional: Negative for chills and fever.   HENT: Positive for trouble swallowing.    Gastrointestinal: Positive for GERD and indigestion. Negative for abdominal distention, abdominal pain, anal bleeding, blood in stool, constipation, diarrhea, nausea, rectal pain and vomiting.         Vital Signs:   Pulse 108   Ht 167.6 cm (66\")   Wt 79.1 kg (174 lb 6.4 oz)   BMI 28.15 kg/m²      Physical Exam  Vitals and nursing note reviewed.   Constitutional:       General: He is not in acute distress.     Appearance: He is obese.   HENT:      Head: Normocephalic.   Cardiovascular:      Rate and Rhythm: Normal rate.   Pulmonary:      Effort: Pulmonary effort is normal.      Breath sounds: No stridor. No wheezing.   Abdominal:      General: Abdomen is flat.      Palpations: Abdomen is soft.      Tenderness: There is no guarding.      Comments: Patient is with a large incisional hernia   Musculoskeletal:         General: No deformity. Normal range of motion.   Skin:     General: Skin is warm and dry.      Coloration: Skin is not jaundiced.   Neurological:      General: No focal deficit present.      Mental Status: He is alert and oriented to person, place, and time.   Psychiatric:         Mood and Affect: Mood normal.         Thought Content: Thought content normal.                  Result Review    :            []?  Laboratory  []?  Radiology  [x]?  Pathology  []?  Microbiology  []?  EKG/Telemetry   []?  Cardiology/Vascular   [x]?  Old records  Today I reviewed Dr. Lindsay's previous operative and pathology report        Assessment      Assessment and Plan    Diagnoses and all orders for this visit:     1. History of pyloric channel ulcer (Primary)     2. Family history of colon cancer     3. GERD without esophagitis     4. Oropharyngeal dysphagia     Other orders  -     " polyethylene glycol (MIRALAX) 17 g packet; Take as directed.  Instructions given in office.  Dispense: 238 g bottle  Dispense: 238 packet; Refill: 0     white prep     Follow Up   Return for Scheduled EGD and colonoscopy with Dr. Lindsay on 9/22/2022 at [facility].      Hospital arrival time: 11:30.     Possible risks/complications, benefits, and alternatives to surgical or invasive procedure have been explained to patient and/or legal guardian.     Patient has been evaluated and can tolerate anesthesia and/or sedation. Risks, benefits, and alternatives to anesthesia and sedation have been explained to patient and/or legal guardian.  Patient verbalizes understanding and is will proceed with above plan.     Patient was given instructions and counseling regarding his condition or for health maintenance advice. Please see specific information pulled into the AVS if appropriate.      EMR dragon/transcription disclaimer: Much of this encounter note is an electronic transcription/translation of spoken language to printed text. Electronic translation of spoken language may permit erroneous, or at times nonsensical words or phrases to be inadvertently transcribed; although I have reviewed the note for such errors, some may still exist.

## 2022-09-22 NOTE — ANESTHESIA PREPROCEDURE EVALUATION
Anesthesia Evaluation     Patient summary reviewed and Nursing notes reviewed   history of anesthetic complications: difficult airway               Airway   Mallampati: I  TM distance: >3 FB  Neck ROM: full  No difficulty expected  Dental    (+) poor dentition    Pulmonary - normal exam    breath sounds clear to auscultation  (+) COPD, asthma,shortness of breath,   Cardiovascular - normal exam    Rhythm: regular  Rate: normal    (+) hypertension, hyperlipidemia,       Neuro/Psych  (+) psychiatric history,    GI/Hepatic/Renal/Endo    (+)  GERD, GI bleeding , hepatitis, liver disease, renal disease,     Musculoskeletal (-) negative ROS    Abdominal    Substance History - negative use     OB/GYN negative ob/gyn ROS         Other                      Anesthesia Plan    ASA 4     general     intravenous induction     Anesthetic plan, risks, benefits, and alternatives have been provided, discussed and informed consent has been obtained with: patient.        CODE STATUS:

## 2022-09-22 NOTE — ANESTHESIA POSTPROCEDURE EVALUATION
Patient: Atilio Recinos    Procedure Summary     Date: 09/22/22 Room / Location: [facility] ENDOSCOPY 1 / [facility] ENDOSCOPY    Anesthesia Start: 1339 Anesthesia Stop: 1427    Procedures:       COLONOSCOPY (N/A )      ESOPHAGOGASTRODUODENOSCOPY WITH BIOPSIES (N/A ) Diagnosis:       GERD without esophagitis      History of pyloric channel ulcer      Dysphagia      Screening for colon cancer      Family history of colon cancer      (GERD without esophagitis [K21.9])      (History of pyloric channel ulcer [Z87.19])      (Dysphagia [R13.10])      (Screening for colon cancer [Z12.11])      (Family history of colon cancer [Z80.0])    Surgeons: Atilio Lindsay MD Provider: Marlon Rosado MD    Anesthesia Type: general ASA Status: 4          Anesthesia Type: general    Vitals  Vitals Value Taken Time   /84 09/22/22 1430   Temp     Pulse 75 09/22/22 1431   Resp     SpO2 96 % 09/22/22 1431   Vitals shown include unvalidated device data.        Post Anesthesia Care and Evaluation    Patient location during evaluation: bedside  Patient participation: complete - patient participated  Level of consciousness: awake    Airway patency: patent  PONV Status: none  Cardiovascular status: acceptable  Respiratory status: acceptable  Hydration status: acceptable

## 2022-09-26 ENCOUNTER — TELEPHONE (OUTPATIENT)
Dept: SURGERY | Facility: CLINIC | Age: 57
End: 2022-09-26

## 2022-09-26 LAB
CYTO UR: NORMAL
LAB AP CASE REPORT: NORMAL
LAB AP CLINICAL INFORMATION: NORMAL
PATH REPORT.FINAL DX SPEC: NORMAL
PATH REPORT.GROSS SPEC: NORMAL

## 2022-09-26 NOTE — TELEPHONE ENCOUNTER
Patient had colonoscopy by Dr. Lindsay on 09/22/22.  He prescribed Carafate liquid.  He said his insurance will not cover the liquid, but will cover the tablet.  He asked for it to be changed to a tablet.    Per Sara, okay to change to tablet, if patient can take tablets.    I called Adilene at the pharmacy, and gave verbal to change to tablet.    I notified the patient.

## 2022-10-26 ENCOUNTER — OFFICE VISIT (OUTPATIENT)
Dept: SURGERY | Facility: CLINIC | Age: 57
End: 2022-10-26

## 2022-10-26 VITALS — BODY MASS INDEX: 26.2 KG/M2 | HEIGHT: 66 IN | WEIGHT: 163 LBS | RESPIRATION RATE: 16 BRPM

## 2022-10-26 DIAGNOSIS — Z98.890 S/P EXPLORATORY LAPAROTOMY: Primary | ICD-10-CM

## 2022-10-26 PROCEDURE — 99212 OFFICE O/P EST SF 10 MIN: CPT | Performed by: SURGERY

## 2022-10-26 RX ORDER — SUCRALFATE 1 G/1
1 TABLET ORAL 4 TIMES DAILY
Qty: 360 TABLET | Refills: 3 | Status: SHIPPED | OUTPATIENT
Start: 2022-10-26 | End: 2023-10-26

## 2022-10-26 RX ORDER — PANTOPRAZOLE SODIUM 40 MG/1
40 TABLET, DELAYED RELEASE ORAL DAILY
Qty: 30 TABLET | Refills: 1 | Status: SHIPPED | OUTPATIENT
Start: 2022-10-26 | End: 2022-11-10 | Stop reason: SDUPTHER

## 2022-10-26 RX ORDER — SUCRALFATE ORAL 1 G/10ML
1 SUSPENSION ORAL 4 TIMES DAILY
Qty: 420 ML | Refills: 1 | Status: CANCELLED | OUTPATIENT
Start: 2022-10-26 | End: 2023-10-26

## 2022-10-26 NOTE — PROGRESS NOTES
Chief Complaint: Post-op    Subjective         History of Present Illness  Atilio Recinos is a 57 y.o. male presents to [facility] GENERAL SURGERY. The patient is to be seen for status post EGD.      The patient reports that he is doing well overall. He states that he does not have a bowel movement every day, sometimes 2 days. He is taking 3 stool softeners per day. He is unsure if he is eating enough because he went back to work. He does get some pains in the area when he does not have a bowel movement correctly. He had 3 bowel movements this morning. He is not taking any fiber supplementation. He is still taking Carafate and pantoprazole.      Objective     Past Medical History:   Diagnosis Date   • Allergies    • Anemia 2/2022   • Anxiety    • Asthma 1992   • Bipolar 1 disorder (HCC)    • Bowel perforation ([facility]) 01/16/2022   • Chronic kidney disease 1/2022   • COPD (chronic obstructive pulmonary disease) ([facility])    • COVID-19 02/19/2022   • Depression    • Fissure, anal 2003   • Forgetfulness    • Hard to intubate     High gag reflex.   • Head injury    • Hepatitis    • High blood pressure    • High cholesterol    • History of transfusion 2/2022   • Pancreatitis [id number]   • PONV (postoperative nausea and vomiting) 1/2022   • Psychiatric illness    • Rectal bleeding 2003   • S/P exploratory laparotomy, oversew of perforated ulcer, placement of Chaparro patch  01/17/2022   • Shortness of breath    • Sinus trouble        Past Surgical History:   Procedure Laterality Date   • COLONOSCOPY N/A 9/22/2022    Procedure: COLONOSCOPY;  Surgeon: Atilio Lindsay MD;  Location: [facility] ENDOSCOPY;  Service: General;  Laterality: N/A;  DIVERTICULOSIS   • ENDOSCOPY N/A 9/22/2022    Procedure: ESOPHAGOGASTRODUODENOSCOPY WITH BIOPSIES;  Surgeon: Atilio Lindsay MD;  Location: [facility] ENDOSCOPY;  Service: General;  Laterality: N/A;  DUODENAL ULCERS   • EXPLORATORY LAPAROTOMY N/A 01/16/2022    Procedure:  EXPLORATORY LAPAROTOMY, OVERSEW OF PERFORATED GASTRIC ULCER WITH [place] PATCH;  Surgeon: Atilio Lindsay MD;  Location: [facility] MAIN OR;  Service: General;  Laterality: N/A;   • PLEURAL SCARIFICATION Right 1990   • SPHINCTEROTOMY           Current Outpatient Medications:   •  atorvastatin (LIPITOR) 40 MG tablet, Take 40 mg by mouth Daily., Disp: , Rfl:   •  Fluticasone Furoate-Vilanterol (Breo Ellipta) 100-25 MCG/INH inhaler, Inhale 1 puff Daily., Disp: 60 each, Rfl: 2  •  gabapentin (NEURONTIN) 400 MG capsule, Take 400 mg by mouth 3 (Three) Times a Day., Disp: , Rfl:   •  gemfibrozil (LOPID) 600 MG tablet, Take 1 tablet by mouth 2 (two) times a day., Disp: 180 tablet, Rfl: 1  •  mirtazapine (REMERON) 15 MG tablet, Take 15 mg by mouth Daily., Disp: , Rfl:   •  Omega-3 Fatty Acids (fish oil) 1000 MG capsule capsule, Take 1 capsule by mouth 2 (two) times a day., Disp: 180 capsule, Rfl: 1  •  pantoprazole (Protonix) 40 MG EC tablet, Take 1 tablet by mouth Daily., Disp: 30 tablet, Rfl: 1  •  QUEtiapine (SEROquel) 400 MG tablet, Take 1 tablet by mouth Every Night., Disp: 30 tablet, Rfl: 0  •  sucralfate (Carafate) 1 GM/10ML suspension, Take 10 mL by mouth 4 (Four) Times a Day., Disp: 420 mL, Rfl: 1  •  vitamin D (ERGOCALCIFEROL) 1.25 MG (51686 UT) capsule capsule, TAKE 1 CAPSULE EVERY WEEK (Patient taking differently: EACH Monday), Disp: 13 capsule, Rfl: 1  •  ibuprofen (ADVIL,MOTRIN) 800 MG tablet, Take 800 mg by mouth Every 6 (Six) Hours As Needed. for pain, Disp: , Rfl:   •  QUEtiapine (SEROquel) 50 MG tablet, Take 1 tablet by mouth Daily for 30 days., Disp: 30 tablet, Rfl: 0    Allergies   Allergen Reactions   • Amoxicillin-Pot Clavulanate Hives and Rash   • Antihistamines, Chlorpheniramine-Type Rash   • Contrast Dye Hives, Nausea Only and Rash   • Diphenhydramine Hives        Family History   Problem Relation Age of Onset   • Alcohol abuse Mother    • Asthma Mother    • COPD Mother    • Depression Mother    •  Heart disease Mother    • Hyperlipidemia Mother    • Hypertension Mother    • Mental illness Mother    • Alcohol abuse Father    • Arthritis Father    • Hypertension Father    • Colon cancer Maternal Grandmother    • Stroke Other    • Heart disease Other    • Diabetes Other        Social History     Socioeconomic History   • Marital status:    Tobacco Use   • Smoking status: Heavy Smoker     Packs/day: 1.00     Years: 47.00     Pack years: 47.00     Types: Cigarettes   • Smokeless tobacco: Never   Vaping Use   • Vaping Use: Never used   Substance and Sexual Activity   • Alcohol use: Not Currently   • Drug use: Never   • Sexual activity: Yes     Partners: Male     Birth control/protection: Condom        Physical Exam    No acute distress. Nonlabored breathing. Abdomen is soft. He does have a large incisional hernia. It is nontender and easily reducible.        Result Review :               Assessment and Plan    There are no diagnoses linked to this encounter.       History of ulcer perforation        -  His pathology results showed some foveolar metaplasia consistent with his previous perforation. No signs of H. pylori. He does have some metaplasia changes in  his lower esophagus.        - In regards to the EGD findings, I have recommended a repeat EGD in 6 months. I will send him a recall for that.        - The patient will continue taking  Protonix and Carafate. We will call him in refills to his pharmacy.         -  I have recommended an EGD every 2 years after secondary to the findings in his esophagus.        - Additionally, he does have an incisional hernia. He would like to discuss hernia repair at some point in time, but I have recommended that we wait until after the  second EGD to get him scheduled for a hernia repair.        - I recommend that the patient avoid ibuprofen and acid        - Discussed with the patient. All questions were answered. He voiced understanding and agreed to proceed with the  above plan.      Follow Up   No follow-ups on file.  Patient was given instructions and counseling regarding his condition or for health maintenance advice. Please see specific information pulled into the AVS if appropriate.       Transcribed from ambient dictation for Atilio Lindsay MD by Imelda Patiño.  10/26/22   11:59 EDT    Patient or patient representative verbalized consent to the visit recording.  I have personally performed the services described in this document as transcribed by the above individual, and it is both accurate and complete.       Transcribed from ambient dictation for Atilio Lindsay MD by Imelda Patiño.  10/26/22   12:02 EDT    Patient or patient representative verbalized consent to the visit recording.

## 2022-11-10 ENCOUNTER — OFFICE VISIT (OUTPATIENT)
Dept: INTERNAL MEDICINE | Facility: CLINIC | Age: 57
End: 2022-11-10

## 2022-11-10 VITALS
WEIGHT: 159.4 LBS | SYSTOLIC BLOOD PRESSURE: 145 MMHG | TEMPERATURE: 97.3 F | BODY MASS INDEX: 25.62 KG/M2 | DIASTOLIC BLOOD PRESSURE: 84 MMHG | OXYGEN SATURATION: 97 % | HEART RATE: 96 BPM | RESPIRATION RATE: 18 BRPM | HEIGHT: 66 IN

## 2022-11-10 DIAGNOSIS — F32.A DEPRESSION, UNSPECIFIED DEPRESSION TYPE: ICD-10-CM

## 2022-11-10 DIAGNOSIS — Z12.5 PROSTATE CANCER SCREENING: ICD-10-CM

## 2022-11-10 DIAGNOSIS — I10 PRIMARY HYPERTENSION: ICD-10-CM

## 2022-11-10 DIAGNOSIS — E55.9 VITAMIN D DEFICIENCY: ICD-10-CM

## 2022-11-10 DIAGNOSIS — F17.219 CIGARETTE NICOTINE DEPENDENCE WITH NICOTINE-INDUCED DISORDER: ICD-10-CM

## 2022-11-10 DIAGNOSIS — F41.9 ANXIETY: ICD-10-CM

## 2022-11-10 DIAGNOSIS — E78.5 HYPERLIPIDEMIA, UNSPECIFIED HYPERLIPIDEMIA TYPE: Primary | ICD-10-CM

## 2022-11-10 DIAGNOSIS — J44.9 CHRONIC OBSTRUCTIVE PULMONARY DISEASE, UNSPECIFIED COPD TYPE: ICD-10-CM

## 2022-11-10 DIAGNOSIS — Z13.29 THYROID DISORDER SCREEN: ICD-10-CM

## 2022-11-10 DIAGNOSIS — K21.9 GERD WITHOUT ESOPHAGITIS: ICD-10-CM

## 2022-11-10 DIAGNOSIS — F31.9 BIPOLAR 1 DISORDER: ICD-10-CM

## 2022-11-10 PROBLEM — I95.89 HYPOTENSION DUE TO HYPOVOLEMIA: Status: RESOLVED | Noted: 2022-02-12 | Resolved: 2022-11-10

## 2022-11-10 PROBLEM — E86.1 HYPOTENSION DUE TO HYPOVOLEMIA: Status: RESOLVED | Noted: 2022-02-12 | Resolved: 2022-11-10

## 2022-11-10 PROCEDURE — G0008 ADMIN INFLUENZA VIRUS VAC: HCPCS | Performed by: NURSE PRACTITIONER

## 2022-11-10 PROCEDURE — 90686 IIV4 VACC NO PRSV 0.5 ML IM: CPT | Performed by: NURSE PRACTITIONER

## 2022-11-10 PROCEDURE — 99204 OFFICE O/P NEW MOD 45 MIN: CPT | Performed by: NURSE PRACTITIONER

## 2022-11-10 RX ORDER — ATORVASTATIN CALCIUM 40 MG/1
40 TABLET, FILM COATED ORAL DAILY
Qty: 90 TABLET | Refills: 1 | Status: SHIPPED | OUTPATIENT
Start: 2022-11-10 | End: 2023-03-02 | Stop reason: SDUPTHER

## 2022-11-10 RX ORDER — GEMFIBROZIL 600 MG/1
600 TABLET, FILM COATED ORAL 2 TIMES DAILY
Qty: 180 TABLET | Refills: 1 | Status: SHIPPED | OUTPATIENT
Start: 2022-11-10

## 2022-11-10 RX ORDER — ONDANSETRON 4 MG/1
4 TABLET, ORALLY DISINTEGRATING ORAL EVERY 8 HOURS PRN
Qty: 15 TABLET | Refills: 0 | Status: SHIPPED | OUTPATIENT
Start: 2022-11-10

## 2022-11-10 NOTE — PROGRESS NOTES
"Chief Complaint  Hyperlipidemia, Establish Care, Heartburn, Insomnia, and COPD    Subjective        Atilio Recinos presents to [facility] for History of Present Illness  Establishment of care, follow-up for chronic conditions including but not limited to hypertension, hyperlipidemia, heartburn, insomnia, and COPD.    Patient was seen by another PCP until recently, patient is wanting to transfer care to us.  No specific reason.    Patient has quite an eventful year, he had some extensive surgery done for an ulcer earlier in the year, he has been followed by general surgery, just recently had follow-up visit with them, EGD was performed, they recommended follow-up in 6 months with repeat EGD.  Patient also with incisional hernia, they did recommend fixing that, but wanted to wait until after second EGD.  He will follow along regularly with them.    Otherwise, he has chronic conditions he needs managed.  Hypertension, currently only on metoprolol, does well with that.  Recently decreased to 50 mg daily.    Hyperlipidemia, is on medications, needs labs checked.  Good compliance with medication.    He is on PPI chronically for his GERD and ulcers.    He sees psychiatry, they do treat his insomnia, bipolar, depression and anxiety.    COPD, he does use inhaler daily, he has home O2 to use as needed.  Still working on smoking cessation.       Objective   Vital Signs:   /84 (BP Location: Right arm, Patient Position: Sitting, Cuff Size: Adult)   Pulse 96   Temp 97.3 °F (36.3 °C)   Resp 18   Ht 167.6 cm (65.98\")   Wt 72.3 kg (159 lb 6.4 oz)   SpO2 97%   BMI 25.74 kg/m²     Physical Exam  Vitals and nursing note reviewed.   Constitutional:       Appearance: Normal appearance. He is normal weight.   HENT:      Head: Normocephalic and atraumatic.      Right Ear: Tympanic membrane and ear canal normal.      Left Ear: Tympanic membrane and ear canal normal.      Nose: Nose normal.      " Mouth/Throat:      Mouth: Mucous membranes are moist.      Pharynx: Oropharynx is clear.   Eyes:      Conjunctiva/sclera: Conjunctivae normal.      Pupils: Pupils are equal, round, and reactive to light.   Cardiovascular:      Rate and Rhythm: Normal rate and regular rhythm.   Pulmonary:      Effort: Pulmonary effort is normal.      Breath sounds: Normal breath sounds.   Neurological:      Mental Status: He is alert.   Psychiatric:         Mood and Affect: Mood normal.         Thought Content: Thought content normal.        Result Review :  {The following data was reviewed by DIRK Montelongo on 11/10/22                Diagnoses and all orders for this visit:    1. Hyperlipidemia, unspecified hyperlipidemia type (Primary)  -     Lipid Panel    2. GERD without esophagitis    3. Depression, unspecified depression type    4. Bipolar 1 disorder (HCC)    5. Anxiety    6. Chronic obstructive pulmonary disease, unspecified COPD type (HCC)    7. Cigarette nicotine dependence with nicotine-induced disorder    8. Vitamin D deficiency  -     Vitamin D,25-Hydroxy    9. Prostate cancer screening  -     PSA Screen    10. Thyroid disorder screen  -     TSH    11. Primary hypertension  -     Comprehensive Metabolic Panel  -     CBC & Differential    Other orders  -     atorvastatin (LIPITOR) 40 MG tablet; Take 1 tablet by mouth Daily.  Dispense: 90 tablet; Refill: 1  -     gemfibrozil (LOPID) 600 MG tablet; Take 1 tablet by mouth 2 (Two) Times a Day.  Dispense: 180 tablet; Refill: 1  -     FluLaval/Fluzone >6 mos ([id number])  -     Diclofenac Sodium (Voltaren) 1 % gel gel; Apply 4 g topically to the appropriate area as directed 4 (Four) Times a Day As Needed (hand pain).  Dispense: 100 g; Refill: 1  -     ondansetron ODT (ZOFRAN-ODT) 4 MG disintegrating tablet; Place 1 tablet under the tongue Every 8 (Eight) Hours As Needed for Nausea or Vomiting.  Dispense: 15 tablet; Refill: 0    Getting labs to follow-up on chronic conditions.   We will follow-up with patient once results are available.  Refilled medications as appropriate.  He uses Voltaren for chronic knee pain.  Smoking cessation provided today, approximately 7 minutes.  We will continue to follow with psychiatry and general surgery.  Follow-up with us in 3 months, sooner if needed.      Follow Up   Return in about 3 months (around 2/10/2023) for Recheck.  Patient was given instructions and counseling regarding his condition or for health maintenance advice. Please see specific information pulled into the AVS if appropriate.     Humberto Cox, DIRK  11/10/2022  This note was electronically signed.

## 2022-11-14 ENCOUNTER — OFFICE VISIT (OUTPATIENT)
Dept: INTERNAL MEDICINE | Facility: CLINIC | Age: 57
End: 2022-11-14

## 2022-11-14 VITALS
DIASTOLIC BLOOD PRESSURE: 90 MMHG | BODY MASS INDEX: 25.71 KG/M2 | SYSTOLIC BLOOD PRESSURE: 180 MMHG | TEMPERATURE: 97.6 F | HEART RATE: 85 BPM | HEIGHT: 66 IN | OXYGEN SATURATION: 98 % | WEIGHT: 160 LBS

## 2022-11-14 DIAGNOSIS — J44.9 CHRONIC OBSTRUCTIVE PULMONARY DISEASE, UNSPECIFIED COPD TYPE: ICD-10-CM

## 2022-11-14 DIAGNOSIS — F17.219 CIGARETTE NICOTINE DEPENDENCE WITH NICOTINE-INDUCED DISORDER: ICD-10-CM

## 2022-11-14 DIAGNOSIS — R05.1 ACUTE COUGH: Primary | ICD-10-CM

## 2022-11-14 DIAGNOSIS — I10 PRIMARY HYPERTENSION: ICD-10-CM

## 2022-11-14 PROBLEM — U07.1 COVID-19 VIRUS INFECTION: Status: RESOLVED | Noted: 2022-02-16 | Resolved: 2022-11-14

## 2022-11-14 PROBLEM — J01.80 OTHER ACUTE SINUSITIS: Status: RESOLVED | Noted: 2021-08-06 | Resolved: 2022-11-14

## 2022-11-14 LAB
EXPIRATION DATE: NORMAL
FLUAV AG UPPER RESP QL IA.RAPID: NOT DETECTED
FLUBV AG UPPER RESP QL IA.RAPID: NOT DETECTED
INTERNAL CONTROL: NORMAL
Lab: NORMAL
SARS-COV-2 AG UPPER RESP QL IA.RAPID: NOT DETECTED

## 2022-11-14 PROCEDURE — 87428 SARSCOV & INF VIR A&B AG IA: CPT | Performed by: PHYSICIAN ASSISTANT

## 2022-11-14 PROCEDURE — 99213 OFFICE O/P EST LOW 20 MIN: CPT | Performed by: PHYSICIAN ASSISTANT

## 2022-11-14 RX ORDER — PREDNISONE 20 MG/1
40 TABLET ORAL DAILY
Qty: 10 TABLET | Refills: 0 | Status: SHIPPED | OUTPATIENT
Start: 2022-11-14 | End: 2022-11-19

## 2022-11-14 RX ORDER — BENZONATATE 100 MG/1
100 CAPSULE ORAL 3 TIMES DAILY PRN
Qty: 30 CAPSULE | Refills: 0 | Status: SHIPPED | OUTPATIENT
Start: 2022-11-14 | End: 2022-11-24

## 2022-11-14 RX ORDER — AZITHROMYCIN 250 MG/1
TABLET, FILM COATED ORAL
Qty: 6 TABLET | Refills: 0 | Status: SHIPPED | OUTPATIENT
Start: 2022-11-14 | End: 2022-12-07

## 2022-11-14 NOTE — PROGRESS NOTES
Chief Complaint  Nasal Congestion (Head and lungs patient states he has copd ), Cough (Greenish yellow sinus), and Chills    Subjective          Atilio Recinos presents to [facility]  History of Present Illness  Pt here for congestion, productive cough with green phlegm, chills x 3 days  Body aches but no fever  Chest feels tight when coughing  Denies chest pain that moves to jaw/shoulder. Denies associated sweating or nausea/vomiting. Denies dizziness, syncope, loc, unilateral weakness.  Nasal congestion and runny nose   Denies wheezing  It is hard to take a deep breath without coughing.  Pt uses budesonide daily with nebulizer but has been off since May due to issue with machiene. He needs a new machine.    He has been using emergencyC, halls cough drops, mucinex  Pt states zpac does not help with sx in the past but he has not taken any recently.      Past Medical History:   Diagnosis Date   • Allergies    • Anemia 2/2022   • Anxiety    • Asthma 1992   • Bipolar 1 disorder ([facility])    • Bowel perforation ([facility]) 01/16/2022   • Chronic kidney disease 1/2022   • COPD (chronic obstructive pulmonary disease) (HCC)    • COVID-19 02/19/2022   • Depression    • Fissure, anal 2003   • Forgetfulness    • Hard to intubate     High gag reflex.   • Head injury    • Hepatitis    • High blood pressure    • High cholesterol    • History of transfusion 2/2022   • Pancreatitis [id number]   • PONV (postoperative nausea and vomiting) 1/2022   • Psychiatric illness    • Rectal bleeding 2003   • S/P exploratory laparotomy, oversew of perforated ulcer, placement of Chaparro patch  01/17/2022   • Shortness of breath    • Sinus trouble         Past Surgical History:   Procedure Laterality Date   • COLONOSCOPY N/A 9/22/2022    Procedure: COLONOSCOPY;  Surgeon: Atilio Lindsay MD;  Location: [facility] ENDOSCOPY;  Service: General;  Laterality: N/A;  DIVERTICULOSIS   • ENDOSCOPY N/A 9/22/2022     Procedure: ESOPHAGOGASTRODUODENOSCOPY WITH BIOPSIES;  Surgeon: Atilio Lindsay MD;  Location: [facility] ENDOSCOPY;  Service: General;  Laterality: N/A;  DUODENAL ULCERS   • EXPLORATORY LAPAROTOMY N/A 01/16/2022    Procedure: EXPLORATORY LAPAROTOMY, OVERSEW OF PERFORATED GASTRIC ULCER WITH [place] PATCH;  Surgeon: Atilio Lindsay MD;  Location: [facility] MAIN OR;  Service: General;  Laterality: N/A;   • PLEURAL SCARIFICATION Right 1990   • SPHINCTEROTOMY          Current Outpatient Medications on File Prior to Visit   Medication Sig Dispense Refill   • atorvastatin (LIPITOR) 40 MG tablet Take 1 tablet by mouth Daily. 90 tablet 1   • Diclofenac Sodium (Voltaren) 1 % gel gel Apply 4 g topically to the appropriate area as directed 4 (Four) Times a Day As Needed (hand pain). 100 g 1   • Fluticasone Furoate-Vilanterol (Breo Ellipta) 100-25 MCG/INH inhaler Inhale 1 puff Daily. 60 each 2   • gabapentin (NEURONTIN) 400 MG capsule Take 400 mg by mouth 3 (Three) Times a Day.     • gemfibrozil (LOPID) 600 MG tablet Take 1 tablet by mouth 2 (Two) Times a Day. 180 tablet 1   • mirtazapine (REMERON) 15 MG tablet Take 15 mg by mouth Daily.     • Omega-3 Fatty Acids (fish oil) 1000 MG capsule capsule Take 1 capsule by mouth 2 (two) times a day. 180 capsule 1   • ondansetron ODT (ZOFRAN-ODT) 4 MG disintegrating tablet Place 1 tablet under the tongue Every 8 (Eight) Hours As Needed for Nausea or Vomiting. 15 tablet 0   • pantoprazole (Protonix) 40 MG EC tablet Take 1 tablet by mouth Daily. 30 tablet 1   • QUEtiapine (SEROquel) 400 MG tablet Take 1 tablet by mouth Every Night. 30 tablet 0   • sucralfate (Carafate) 1 g tablet Take 1 tablet by mouth 4 (Four) Times a Day. 360 tablet 3   • QUEtiapine (SEROquel) 50 MG tablet Take 1 tablet by mouth Daily for 30 days. 30 tablet 0   • vitamin D (ERGOCALCIFEROL) 1.25 MG (76450 UT) capsule capsule TAKE 1 CAPSULE EVERY WEEK (Patient taking differently: EACH Monday) 13 capsule 1     No current  "facility-administered medications on file prior to visit.        Allergies   Allergen Reactions   • Amoxicillin-Pot Clavulanate Hives and Rash   • Antihistamines, Chlorpheniramine-Type Rash   • Contrast Dye Hives, Nausea Only and Rash   • Diphenhydramine Hives       Social History     Tobacco Use   Smoking Status Heavy Smoker   • Packs/day: 1.00   • Years: 47.00   • Pack years: 47.00   • Types: Cigarettes   Smokeless Tobacco Never          Objective   Vital Signs:   /90 (BP Location: Left arm, Patient Position: Sitting, Cuff Size: Adult)   Pulse 85   Temp 97.6 °F (36.4 °C) (Temporal)   Ht 167.6 cm (65.98\")   Wt 72.6 kg (160 lb)   SpO2 98%   BMI 25.84 kg/m²     Physical Exam  Vitals reviewed.   Constitutional:       Appearance: Normal appearance.   HENT:      Head: Normocephalic and atraumatic.      Right Ear: Tympanic membrane normal.      Left Ear: Tympanic membrane normal.      Nose: Congestion and rhinorrhea present.      Mouth/Throat:      Mouth: Mucous membranes are moist.   Eyes:      Extraocular Movements: Extraocular movements intact.      Conjunctiva/sclera: Conjunctivae normal.      Pupils: Pupils are equal, round, and reactive to light.   Cardiovascular:      Rate and Rhythm: Normal rate and regular rhythm.   Pulmonary:      Effort: Pulmonary effort is normal. No respiratory distress.      Breath sounds: Rhonchi present.   Abdominal:      General: Abdomen is flat. Bowel sounds are normal.      Palpations: Abdomen is soft.   Musculoskeletal:         General: Normal range of motion.   Neurological:      General: No focal deficit present.      Mental Status: He is alert and oriented to person, place, and time.   Psychiatric:         Mood and Affect: Mood normal.        Result Review :              Lab Results   Component Value Date    SARSANTIGEN Not Detected 11/14/2022    COVID19 Detected (C) 02/12/2022    FLUAAG Not Detected 11/14/2022    FLUBAG Not Detected 11/14/2022    INR 1.24 (H) " 02/12/2022    HGB 8.8 (L) 02/16/2022    BILIRUBINUR Negative 01/17/2022          Assessment and Plan    Diagnoses and all orders for this visit:    1. Acute cough (Primary)  -     Cancel: POCT DYLAN SARS-CoV-2 Antigen TIANNA  -     POCT SARS-CoV-2 Antigen TIANNA + Flu    2. Chronic obstructive pulmonary disease, unspecified COPD type (HCC)  Assessment & Plan:  Discussed bronchitis and need for antibiotics at this time. Increase fluids, rest, hand hygiene. Encouraged smoking cessation. Use albuterol inhaler PRN. Discussed need for steroids due to wheezing. Discussed side effects of steroid use (increased appetite and wgt gain, bone thinning with long term use.) Avoid cough suppressant until antibiotic is in the system to prevent pneumonia. RTC if symptoms not resolved in 10days-2wks. Pt understands and agrees        Orders:  -     Home Nebulizer    3. Cigarette nicotine dependence with nicotine-induced disorder    4. Primary hypertension  Assessment & Plan:  Discussed bp elevation at today's visit. Discussed pt in heart attack/stroke range. Discussed risks of blood pressure elevation including death, heart attack, stroke, kidney disease, blindness. Low salt diet, increase exercise. We will monitor at follow up with PCP once pt feeling better and discuss blood pressure medications if necessary. To er if chest pain, palpitations, vision loss, unilateral weakness, altered mental status. Pt understands and agrees with plan.        Other orders  -     predniSONE (DELTASONE) 20 MG tablet; Take 2 tablets by mouth Daily for 5 days.  Dispense: 10 tablet; Refill: 0  -     azithromycin (Zithromax Z-Aamir) 250 MG tablet; Take 2 tablets by mouth on day 1, then 1 tablet daily on days 2-5  Dispense: 6 tablet; Refill: 0  -     benzonatate (Tessalon Perles) 100 MG capsule; Take 1 capsule by mouth 3 (Three) Times a Day As Needed for Cough for up to 10 days.  Dispense: 30 capsule; Refill: 0      Follow Up   Return in about 1 month (around  12/14/2022), or if symptoms worsen or fail to improve, for with PCP.  Patient was given instructions and counseling regarding his condition or for health maintenance advice. Please see specific information pulled into the AVS if appropriate.

## 2022-11-14 NOTE — ASSESSMENT & PLAN NOTE
Discussed bronchitis and need for antibiotics at this time. Increase fluids, rest, hand hygiene. Encouraged smoking cessation. Use albuterol inhaler PRN. Discussed need for steroids due to wheezing. Discussed side effects of steroid use (increased appetite and wgt gain, bone thinning with long term use.) Avoid cough suppressant until antibiotic is in the system to prevent pneumonia. RTC if symptoms not resolved in 10days-2wks. Pt understands and agrees

## 2022-11-14 NOTE — ASSESSMENT & PLAN NOTE
Discussed bp elevation at today's visit. Discussed pt in heart attack/stroke range. Discussed risks of blood pressure elevation including death, heart attack, stroke, kidney disease, blindness. Low salt diet, increase exercise. We will monitor at follow up with PCP once pt feeling better and discuss blood pressure medications if necessary. To er if chest pain, palpitations, vision loss, unilateral weakness, altered mental status. Pt understands and agrees with plan.

## 2022-12-01 NOTE — TELEPHONE ENCOUNTER
Pt states he is taking 50mg of metoprolol 1 at bedtime. He is also wanting to know about obtaining a nebulizer please advise

## 2022-12-05 RX ORDER — METOPROLOL SUCCINATE 50 MG/1
50 TABLET, EXTENDED RELEASE ORAL DAILY
Qty: 90 TABLET | Refills: 1 | Status: SHIPPED | OUTPATIENT
Start: 2022-12-05 | End: 2023-03-02 | Stop reason: SDUPTHER

## 2022-12-07 ENCOUNTER — OFFICE VISIT (OUTPATIENT)
Dept: INTERNAL MEDICINE | Facility: CLINIC | Age: 57
End: 2022-12-07

## 2022-12-07 ENCOUNTER — TELEPHONE (OUTPATIENT)
Dept: INTERNAL MEDICINE | Facility: CLINIC | Age: 57
End: 2022-12-07

## 2022-12-07 VITALS
TEMPERATURE: 97.8 F | OXYGEN SATURATION: 95 % | BODY MASS INDEX: 23.9 KG/M2 | WEIGHT: 148 LBS | HEART RATE: 127 BPM | SYSTOLIC BLOOD PRESSURE: 160 MMHG | DIASTOLIC BLOOD PRESSURE: 90 MMHG

## 2022-12-07 DIAGNOSIS — B96.89 ACUTE BACTERIAL RHINOSINUSITIS: Primary | ICD-10-CM

## 2022-12-07 DIAGNOSIS — J01.90 ACUTE BACTERIAL RHINOSINUSITIS: Primary | ICD-10-CM

## 2022-12-07 PROCEDURE — 96372 THER/PROPH/DIAG INJ SC/IM: CPT | Performed by: NURSE PRACTITIONER

## 2022-12-07 PROCEDURE — 99213 OFFICE O/P EST LOW 20 MIN: CPT | Performed by: NURSE PRACTITIONER

## 2022-12-07 RX ORDER — METHYLPREDNISOLONE 4 MG/1
4 TABLET ORAL DAILY
COMMUNITY
Start: 2022-12-04 | End: 2023-03-02

## 2022-12-07 RX ORDER — ALBUTEROL SULFATE 90 UG/1
1 AEROSOL, METERED RESPIRATORY (INHALATION) EVERY 4 HOURS PRN
COMMUNITY
Start: 2022-12-04 | End: 2023-03-02 | Stop reason: SDUPTHER

## 2022-12-07 RX ORDER — CEFTRIAXONE 500 MG/1
500 INJECTION, POWDER, FOR SOLUTION INTRAMUSCULAR; INTRAVENOUS ONCE
Status: COMPLETED | OUTPATIENT
Start: 2022-12-07 | End: 2022-12-07

## 2022-12-07 RX ORDER — CEPHALEXIN 500 MG/1
500 CAPSULE ORAL 2 TIMES DAILY
Qty: 14 CAPSULE | Refills: 0 | Status: SHIPPED | OUTPATIENT
Start: 2022-12-07 | End: 2023-03-02

## 2022-12-07 RX ORDER — FLUTICASONE PROPIONATE 50 MCG
1 SPRAY, SUSPENSION (ML) NASAL DAILY
COMMUNITY
Start: 2022-12-04

## 2022-12-07 RX ADMIN — CEFTRIAXONE 500 MG: 500 INJECTION, POWDER, FOR SOLUTION INTRAMUSCULAR; INTRAVENOUS at 08:44

## 2022-12-07 NOTE — TELEPHONE ENCOUNTER
Caller: Atilio Recinos    Relationship: Self    Best call back number: [phone]    What is the best time to reach you: ANY    Who are you requesting to speak with (clinical staff, provider,  specific staff member): CLINICAL    What was the call regarding: PATIENT IS CALLING FOR AN UPDATE ON HIS NEBULIZER. HE IS IN NEED OF A NEW ONE. PLEASE CALL AND ADVISE.     Do you require a callback: YES

## 2022-12-07 NOTE — PROGRESS NOTES
Chief Complaint  Cough (Has been sick for 2 weeks since getting flu shot talked to  on phone on Sunday )    Mena Recinos presents to [facility] for History of Present Illness  Cough, congestion, sinus pressure.  Patient reports his symptoms have been ongoing for a little over 2 weeks.  He was treated for COPD exacerbation with azithromycin additionally, his symptoms never resolved.  His breathing is not too bad, he has uses oxygen intermittently.  His major complaints are more facial, with significant sinus pressure, congestion.  He feels tired and weak.  Still has cough, somewhat productive.  Was seen via telemedicine through his insurance on Sunday, he was started on steroids and cough medication.       Objective   Vital Signs:   /90 (BP Location: Left arm, Patient Position: Sitting, Cuff Size: Adult)   Pulse (!) 127   Temp 97.8 °F (36.6 °C) (Temporal)   Wt 67.1 kg (148 lb)   SpO2 95%   BMI 23.90 kg/m²     Physical Exam  Vitals and nursing note reviewed.   Constitutional:       Appearance: Normal appearance. He is normal weight.   HENT:      Head: Normocephalic and atraumatic.      Right Ear: Tympanic membrane, ear canal and external ear normal.      Left Ear: Tympanic membrane, ear canal and external ear normal.      Nose: Congestion and rhinorrhea present.      Mouth/Throat:      Mouth: Mucous membranes are moist.      Pharynx: Oropharynx is clear.   Eyes:      Conjunctiva/sclera: Conjunctivae normal.      Pupils: Pupils are equal, round, and reactive to light.   Cardiovascular:      Rate and Rhythm: Normal rate and regular rhythm.   Pulmonary:      Effort: Pulmonary effort is normal.      Breath sounds: Normal breath sounds.   Neurological:      Mental Status: He is alert.   Psychiatric:         Mood and Affect: Mood normal.         Thought Content: Thought content normal.        Result Review :  {The following data was reviewed by DIRK Montelongo on  12/07/22                Diagnoses and all orders for this visit:    1. Acute bacterial rhinosinusitis (Primary)  -     cefTRIAXone (ROCEPHIN) injection 500 mg    Other orders  -     cephalexin (Keflex) 500 MG capsule; Take 1 capsule by mouth 2 (Two) Times a Day.  Dispense: 14 capsule; Refill: 0    Symptoms are most consistent with acute bacterial rhinosinusitis.  Physical exam is reassuring, however due to length of symptoms and severity of symptoms I will go ahead and give Rocephin shot in office today.  We will follow that with 7-day course of oral antibiotics.  He can continue to use his steroids as previously prescribed.  Monitor symptoms closely, if he has any worsening shortness of breath, would recommend very close follow-up.  Lung sounds today are good, moving decent air, and no abnormal sounds heard.  Patient understands and agrees with plan of care.      Follow Up   Return if symptoms worsen or fail to improve.  Patient was given instructions and counseling regarding his condition or for health maintenance advice. Please see specific information pulled into the AVS if appropriate.     DIRK Montelongo  12/7/2022  This note was electronically signed.

## 2022-12-15 ENCOUNTER — TELEPHONE (OUTPATIENT)
Dept: INTERNAL MEDICINE | Facility: CLINIC | Age: 57
End: 2022-12-15

## 2022-12-15 NOTE — TELEPHONE ENCOUNTER
Pharmacy Name: [facility] - [facility] [address] - [phone]  - [phone]      Pharmacy representative name: PATRICIA    Pharmacy representative phone number: [phone]    What medication are you calling in regards to: Diclofenac Sodium (VOLTAREN) 1 % gel gel    What question does the pharmacy have: THE PHARMACY STATED THE DIRECTIONS ALLOW FOR UP TO 16 GRAMS PER DAY BUT THE MAXIMUM DOSAGE IS 8GRAMS. THE PHARMACY WOULD LIKE A CALL BACK TO DISCUSS OR A NEW PRESCRIPTION SENT ASAP    Who is the provider that prescribed the medication: ARGENTINA QUEZADA

## 2023-01-13 ENCOUNTER — OFFICE VISIT (OUTPATIENT)
Dept: INTERNAL MEDICINE | Facility: CLINIC | Age: 58
End: 2023-01-13
Payer: MEDICARE

## 2023-01-13 VITALS
WEIGHT: 148.4 LBS | SYSTOLIC BLOOD PRESSURE: 160 MMHG | TEMPERATURE: 97.5 F | HEART RATE: 97 BPM | BODY MASS INDEX: 23.97 KG/M2 | DIASTOLIC BLOOD PRESSURE: 91 MMHG | OXYGEN SATURATION: 98 %

## 2023-01-13 DIAGNOSIS — R09.81 NASAL CONGESTION: ICD-10-CM

## 2023-01-13 DIAGNOSIS — E78.5 HYPERLIPIDEMIA, UNSPECIFIED HYPERLIPIDEMIA TYPE: ICD-10-CM

## 2023-01-13 DIAGNOSIS — I10 PRIMARY HYPERTENSION: ICD-10-CM

## 2023-01-13 DIAGNOSIS — E55.9 VITAMIN D DEFICIENCY: ICD-10-CM

## 2023-01-13 DIAGNOSIS — F41.9 ANXIETY: Primary | ICD-10-CM

## 2023-01-13 PROBLEM — K75.9 HEPATITIS: Status: ACTIVE | Noted: 2023-01-13

## 2023-01-13 LAB
25(OH)D3 SERPL-MCNC: 66.3 NG/ML (ref 30–100)
ALBUMIN SERPL-MCNC: 4.6 G/DL (ref 3.5–5.2)
ALBUMIN/GLOB SERPL: 1.6 G/DL
ALP SERPL-CCNC: 143 U/L (ref 39–117)
ALT SERPL W P-5'-P-CCNC: 15 U/L (ref 1–41)
ANION GAP SERPL CALCULATED.3IONS-SCNC: 11 MMOL/L (ref 5–15)
AST SERPL-CCNC: 22 U/L (ref 1–40)
BASOPHILS # BLD AUTO: 0.03 10*3/MM3 (ref 0–0.2)
BASOPHILS NFR BLD AUTO: 0.5 % (ref 0–1.5)
BILIRUB SERPL-MCNC: 0.4 MG/DL (ref 0–1.2)
BUN SERPL-MCNC: 9 MG/DL (ref 8–23)
BUN/CREAT SERPL: 8.5 (ref 7–25)
CALCIUM SPEC-SCNC: 9.7 MG/DL (ref 8.6–10.5)
CHLORIDE SERPL-SCNC: 102 MMOL/L (ref 98–107)
CHOLEST SERPL-MCNC: 145 MG/DL (ref 0–200)
CO2 SERPL-SCNC: 26 MMOL/L (ref 22–29)
CREAT SERPL-MCNC: 1.06 MG/DL (ref 0.76–1.27)
DEPRECATED RDW RBC AUTO: 40.3 FL (ref 37–54)
EGFRCR SERPLBLD CKD-EPI 2021: 80.3 ML/MIN/1.73
EOSINOPHIL # BLD AUTO: 0.24 10*3/MM3 (ref 0–0.4)
EOSINOPHIL NFR BLD AUTO: 3.7 % (ref 0.3–6.2)
ERYTHROCYTE [DISTWIDTH] IN BLOOD BY AUTOMATED COUNT: 13.7 % (ref 12.3–15.4)
GLOBULIN UR ELPH-MCNC: 2.8 GM/DL
GLUCOSE SERPL-MCNC: 89 MG/DL (ref 65–99)
HCT VFR BLD AUTO: 41.2 % (ref 37.5–51)
HDLC SERPL-MCNC: 47 MG/DL (ref 40–60)
HGB BLD-MCNC: 13.8 G/DL (ref 13–17.7)
IMM GRANULOCYTES # BLD AUTO: 0.02 10*3/MM3 (ref 0–0.05)
IMM GRANULOCYTES NFR BLD AUTO: 0.3 % (ref 0–0.5)
LDLC SERPL CALC-MCNC: 84 MG/DL (ref 0–100)
LDLC/HDLC SERPL: 1.8 {RATIO}
LYMPHOCYTES # BLD AUTO: 1.76 10*3/MM3 (ref 0.7–3.1)
LYMPHOCYTES NFR BLD AUTO: 26.9 % (ref 19.6–45.3)
MCH RBC QN AUTO: 27.8 PG (ref 26.6–33)
MCHC RBC AUTO-ENTMCNC: 33.5 G/DL (ref 31.5–35.7)
MCV RBC AUTO: 82.9 FL (ref 79–97)
MONOCYTES # BLD AUTO: 0.52 10*3/MM3 (ref 0.1–0.9)
MONOCYTES NFR BLD AUTO: 7.9 % (ref 5–12)
NEUTROPHILS NFR BLD AUTO: 3.98 10*3/MM3 (ref 1.7–7)
NEUTROPHILS NFR BLD AUTO: 60.7 % (ref 42.7–76)
NRBC BLD AUTO-RTO: 0 /100 WBC (ref 0–0.2)
PLATELET # BLD AUTO: 307 10*3/MM3 (ref 140–450)
PMV BLD AUTO: 11.9 FL (ref 6–12)
POTASSIUM SERPL-SCNC: 3.9 MMOL/L (ref 3.5–5.2)
PROT SERPL-MCNC: 7.4 G/DL (ref 6–8.5)
PSA SERPL-MCNC: 0.57 NG/ML (ref 0–4)
RBC # BLD AUTO: 4.97 10*6/MM3 (ref 4.14–5.8)
SODIUM SERPL-SCNC: 139 MMOL/L (ref 136–145)
TRIGL SERPL-MCNC: 68 MG/DL (ref 0–150)
TSH SERPL DL<=0.05 MIU/L-ACNC: 1.43 UIU/ML (ref 0.27–4.2)
VLDLC SERPL-MCNC: 14 MG/DL (ref 5–40)
WBC NRBC COR # BLD: 6.55 10*3/MM3 (ref 3.4–10.8)

## 2023-01-13 PROCEDURE — 80061 LIPID PANEL: CPT | Performed by: NURSE PRACTITIONER

## 2023-01-13 PROCEDURE — 80053 COMPREHEN METABOLIC PANEL: CPT | Performed by: NURSE PRACTITIONER

## 2023-01-13 PROCEDURE — 36415 COLL VENOUS BLD VENIPUNCTURE: CPT

## 2023-01-13 PROCEDURE — 84443 ASSAY THYROID STIM HORMONE: CPT | Performed by: NURSE PRACTITIONER

## 2023-01-13 PROCEDURE — 99213 OFFICE O/P EST LOW 20 MIN: CPT

## 2023-01-13 PROCEDURE — 85025 COMPLETE CBC W/AUTO DIFF WBC: CPT | Performed by: NURSE PRACTITIONER

## 2023-01-13 PROCEDURE — G0103 PSA SCREENING: HCPCS | Performed by: NURSE PRACTITIONER

## 2023-01-13 PROCEDURE — 82306 VITAMIN D 25 HYDROXY: CPT | Performed by: NURSE PRACTITIONER

## 2023-01-13 RX ORDER — BUSPIRONE HYDROCHLORIDE 5 MG/1
5 TABLET ORAL 3 TIMES DAILY
Qty: 90 TABLET | Refills: 1 | Status: SHIPPED | OUTPATIENT
Start: 2023-01-13 | End: 2023-01-26 | Stop reason: SDUPTHER

## 2023-01-13 NOTE — LETTER
January 13, 2023     Patient: Atilio Recinos   YOB: 1965   Date of Visit: 1/13/2023       To Whom It May Concern:    It is my medical opinion that Atilio Recinos may return to work on 1/17/2023.           Sincerely,        Claudia Chatterjee PA-C

## 2023-01-13 NOTE — PROGRESS NOTES
Chief Complaint  Fatigue (Thinks its from stress.) and COPD (Patient says it feel like he is congested in his chest)    Subjective       Atilio Recinos presents to [facility]    HPI     Fatigue/anxiety- has been going on for 1-2 weeks, patient knows it is coming from work. 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 But for the last two weeks patient can not handle stress from work, he is being asked to do multiple new tasks at work, becoming overwhelming. Patient notes they are talking bad about him, talking that they might fire him. Patient is looking for new job, has interview at "[employer]s [employer].  Patient notes work is causing significant amount of stress, patient does not want to go into work due the negative effects on his mental health.  Patient is requesting work excuse note for the next few days due to anxiety flareup caused by work.  Denies SI/HI. Patient see's psychiatrist and counselor,  at  [facility]. Patient notes he has tried to call to get an appointment with psychiatrist . Last appointment last month, doing well. With counselor next Wednesday, sees counselor twice a month.  Patient on Seroquel and mirtazapine for bipolar disorder.    Nasal congestion- 2-3 days, mild increase sputum production, sinus pressure. At home COVID test negative. Denies fever, nausea, vomiting, diarrhea, fatigue, decreased appetite, diarrhea, cough.        Objective     Vitals:    01/13/23 1109   BP: 160/91   Pulse: 97   Temp: 97.5 °F (36.4 °C)   TempSrc: Temporal   SpO2: 98%   Weight: 67.3 kg (148 lb 6.4 oz)      Wt Readings from Last 3 Encounters:   01/13/23 67.3 kg (148 lb 6.4 oz)   12/07/22 67.1 kg (148 lb)   11/14/22 72.6 kg (160 lb)      BP Readings from Last 3 Encounters:   01/13/23 160/91   12/07/22 160/90   11/14/22 180/90        Body mass index is 23.97 kg/m².    BMI is within normal parameters. No other follow-up  for BMI required.       Physical Exam  Constitutional:       Appearance: Normal appearance.   HENT:      Head: Normocephalic and atraumatic.      Right Ear: Tympanic membrane, ear canal and external ear normal.      Left Ear: Tympanic membrane, ear canal and external ear normal.      Nose: Nose normal.      Mouth/Throat:      Mouth: Mucous membranes are moist.      Pharynx: Oropharynx is clear. No posterior oropharyngeal erythema.   Eyes:      Conjunctiva/sclera: Conjunctivae normal.   Cardiovascular:      Rate and Rhythm: Normal rate and regular rhythm.      Pulses: Normal pulses.      Heart sounds: Normal heart sounds.   Pulmonary:      Effort: Pulmonary effort is normal.      Breath sounds: Normal breath sounds.   Lymphadenopathy:      Cervical: No cervical adenopathy.   Skin:     General: Skin is warm and dry.   Neurological:      Mental Status: He is alert and oriented to person, place, and time.   Psychiatric:         Thought Content: Thought content normal.      Comments: Tearful           Result Review :   The following data was reviewed by: Claudia Chatterjee PA-C on 01/13/2023:        Procedures    Assessment and Plan   Diagnoses and all orders for this visit:    1. Anxiety (Primary)  Assessment & Plan:  - We will prescribe BuSpar 5 mg 3 times daily to help with anxiety flareup.  Discussed possibly adding hydroxyzine to help with anxiety symptoms, however patient notes he is allergic to antihistamines.  Advised patient to follow-up with psychiatrist soon as possible.  -Provided excuse note for work for the next 4.  Inform patient blood work has been placed by PCP, will like to make sure her thyroid function is still within normal range.  Patient notes he will get blood work done today.  -Please return to office if symptoms or not well controlled or are progressing.      2. Primary hypertension  Assessment & Plan:  - Discussed with patient blood pressure elevated in office today, has been consistently elevated for  the last few visits in office.  Informed patient current increased stress could be contributing to  elevated blood pressure.  Advised patient to monitor blood pressure at home.  Patient notes he is taking his medication.  Advised patient to schedule appointment with PCP to discuss blood pressure management.  Patient agrees to follow-up with PCP to discuss blood pressure management.  -Discussed with patient long-term complications of uncontrolled hypertension.      3. Hyperlipidemia, unspecified hyperlipidemia type  Assessment & Plan:  - Blood work has been placed by PCP from previous visit.  Advised patient to get blood work done today.      4. Vitamin D deficiency  Assessment & Plan:  Could be contributing to fatigue, patient has vitamin D blood work pending.  We will check      5. Nasal congestion  Assessment & Plan:  Allergies vs upper respiratory infection.  Discussed with patient testing for COVID flu, patient would like to decline.  Encourage conservative treatment.  However asked patient to follow-up if symptoms are progressing.      Other orders  -     busPIRone (BUSPAR) 5 MG tablet; Take 1 tablet by mouth 3 (Three) Times a Day for 30 days.  Dispense: 90 tablet; Refill: 1        Follow Up   Return in about 2 weeks (around 1/27/2023) for HTN.  Patient was given instructions and counseling regarding his condition or for health maintenance advice. Please see specific information pulled into the AVS if appropriate.

## 2023-01-19 PROBLEM — R09.81 NASAL CONGESTION: Status: ACTIVE | Noted: 2023-01-19

## 2023-01-19 NOTE — ASSESSMENT & PLAN NOTE
Allergies vs upper respiratory infection.  Discussed with patient testing for COVID flu, patient would like to decline.  Encourage conservative treatment.  However asked patient to follow-up if symptoms are progressing.

## 2023-01-19 NOTE — ASSESSMENT & PLAN NOTE
- Blood work has been placed by PCP from previous visit.  Advised patient to get blood work done today.

## 2023-01-19 NOTE — ASSESSMENT & PLAN NOTE
- Discussed with patient blood pressure elevated in office today, has been consistently elevated for the last few visits in office.  Informed patient current increased stress could be contributing to  elevated blood pressure.  Advised patient to monitor blood pressure at home.  Patient notes he is taking his medication.  Advised patient to schedule appointment with PCP to discuss blood pressure management.  Patient agrees to follow-up with PCP to discuss blood pressure management.  -Discussed with patient long-term complications of uncontrolled hypertension.

## 2023-01-19 NOTE — ASSESSMENT & PLAN NOTE
- We will prescribe BuSpar 5 mg 3 times daily to help with anxiety flareup.  Discussed possibly adding hydroxyzine to help with anxiety symptoms, however patient notes he is allergic to antihistamines.  Advised patient to follow-up with psychiatrist soon as possible.  -Provided excuse note for work for the next 4.  Inform patient blood work has been placed by PCP, will like to make sure her thyroid function is still within normal range.  Patient notes he will get blood work done today.  -Please return to office if symptoms or not well controlled or are progressing.

## 2023-01-26 ENCOUNTER — TELEPHONE (OUTPATIENT)
Dept: INTERNAL MEDICINE | Facility: CLINIC | Age: 58
End: 2023-01-26
Payer: MEDICARE

## 2023-01-26 ENCOUNTER — TELEPHONE (OUTPATIENT)
Dept: SURGERY | Facility: CLINIC | Age: 58
End: 2023-01-26
Payer: MEDICARE

## 2023-01-26 RX ORDER — BUSPIRONE HYDROCHLORIDE 5 MG/1
5 TABLET ORAL 3 TIMES DAILY
Qty: 90 TABLET | Refills: 1 | Status: SHIPPED | OUTPATIENT
Start: 2023-01-26 | End: 2023-03-02

## 2023-01-26 NOTE — TELEPHONE ENCOUNTER
Caller: [facility] - [place], [place] - [address] - [phone] [facility] [phone] FX    Relationship: Pharmacy    Best call back number:[phone]    Requested Prescriptions:   Requested Prescriptions     Pending Prescriptions Disp Refills   • busPIRone (BUSPAR) 5 MG tablet 90 tablet 1     Sig: Take 1 tablet by mouth 3 (Three) Times a Day for 30 days.        Pharmacy where request should be sent: [facility] - [place], [place] - [address] - [phone] [facility] [phone] FX     Does the patient have less than a 3 day supply:  [] Yes  [x] No    Would you like a call back once the refill request has been completed: [] Yes [x] No    If the office needs to give you a call back, can they leave a voicemail: [] Yes [x] No    Danisha Hernandez, PCT   01/26/23 12:41 EST

## 2023-01-26 NOTE — TELEPHONE ENCOUNTER
Sumi called from [facility].  Patient requests 90-day refill on Pantoprazole 40 mg daily.

## 2023-01-27 RX ORDER — PANTOPRAZOLE SODIUM 40 MG/1
40 TABLET, DELAYED RELEASE ORAL DAILY
Qty: 90 TABLET | Refills: 1 | Status: SHIPPED | OUTPATIENT
Start: 2023-01-27 | End: 2024-01-27

## 2023-03-02 ENCOUNTER — OFFICE VISIT (OUTPATIENT)
Dept: INTERNAL MEDICINE | Facility: CLINIC | Age: 58
End: 2023-03-02
Payer: MEDICARE

## 2023-03-02 VITALS
TEMPERATURE: 98.2 F | HEART RATE: 100 BPM | DIASTOLIC BLOOD PRESSURE: 76 MMHG | HEIGHT: 66 IN | WEIGHT: 149.6 LBS | BODY MASS INDEX: 24.04 KG/M2 | SYSTOLIC BLOOD PRESSURE: 144 MMHG | OXYGEN SATURATION: 97 %

## 2023-03-02 DIAGNOSIS — K21.9 GERD WITHOUT ESOPHAGITIS: ICD-10-CM

## 2023-03-02 DIAGNOSIS — F31.9 BIPOLAR 1 DISORDER: ICD-10-CM

## 2023-03-02 DIAGNOSIS — I10 PRIMARY HYPERTENSION: Primary | ICD-10-CM

## 2023-03-02 DIAGNOSIS — Z87.891 PERSONAL HISTORY OF NICOTINE DEPENDENCE: ICD-10-CM

## 2023-03-02 DIAGNOSIS — E55.9 VITAMIN D DEFICIENCY: ICD-10-CM

## 2023-03-02 DIAGNOSIS — F32.A DEPRESSION, UNSPECIFIED DEPRESSION TYPE: ICD-10-CM

## 2023-03-02 DIAGNOSIS — E78.5 HYPERLIPIDEMIA, UNSPECIFIED HYPERLIPIDEMIA TYPE: ICD-10-CM

## 2023-03-02 DIAGNOSIS — F41.9 ANXIETY: ICD-10-CM

## 2023-03-02 DIAGNOSIS — Z12.2 ENCOUNTER FOR SCREENING FOR LUNG CANCER: ICD-10-CM

## 2023-03-02 DIAGNOSIS — J44.9 CHRONIC OBSTRUCTIVE PULMONARY DISEASE, UNSPECIFIED COPD TYPE: ICD-10-CM

## 2023-03-02 PROCEDURE — 99214 OFFICE O/P EST MOD 30 MIN: CPT | Performed by: NURSE PRACTITIONER

## 2023-03-02 RX ORDER — ERGOCALCIFEROL 1.25 MG/1
50000 CAPSULE ORAL
Qty: 13 CAPSULE | Refills: 1 | Status: SHIPPED | OUTPATIENT
Start: 2023-03-02

## 2023-03-02 RX ORDER — ALBUTEROL SULFATE 90 UG/1
2 AEROSOL, METERED RESPIRATORY (INHALATION) EVERY 4 HOURS PRN
Qty: 8 G | Refills: 3 | Status: SHIPPED | OUTPATIENT
Start: 2023-03-02

## 2023-03-02 RX ORDER — METOPROLOL SUCCINATE 50 MG/1
50 TABLET, EXTENDED RELEASE ORAL DAILY
Qty: 90 TABLET | Refills: 1 | Status: SHIPPED | OUTPATIENT
Start: 2023-03-02

## 2023-03-02 RX ORDER — ATORVASTATIN CALCIUM 40 MG/1
40 TABLET, FILM COATED ORAL DAILY
Qty: 90 TABLET | Refills: 1 | Status: SHIPPED | OUTPATIENT
Start: 2023-03-02

## 2023-03-02 RX ORDER — CHLORAL HYDRATE 500 MG
1000 CAPSULE ORAL
Qty: 180 CAPSULE | Refills: 1 | Status: SHIPPED | OUTPATIENT
Start: 2023-03-02

## 2023-03-02 NOTE — PROGRESS NOTES
"Chief Complaint  Hypertension and Med Refill (Pt here for f/u on HTN as well as med refills on Vitamin D, Diclofenac cream and Metoprolol. Pt also c/o pain in the sternum area x 1 week)    Subjective        Atilio Recinos [facility] for History of Present Illness  Follow-up for hypertension, COPD, vitamin D deficiency, hyperlipidemia, chronic joint pain.    Hypertension, has been pretty well controlled with metoprolol.  He is needing a refill.  Also needs lab work.    Hyperlipidemia, on statin, takes with good compliance, also on omega-3 fatty acids.  Due for lab work.    COPD, has home O2 as needed, needs refill on his albuterol, also on Breo, doing well.    Is due for low-dose CT scan for lung cancer screening.  Okay to order.    Would like refill on his diclofenac gel, very helpful for his joint pains.    Patient also with a discomfort noted in his sternum, stating at the base of his sternum there is a small knot, feels like bone, tender to touch.  No reported injury, no other symptoms reported.       Objective   Vital Signs:   /76 (BP Location: Left arm, Patient Position: Sitting, Cuff Size: Adult)   Pulse 100   Temp 98.2 °F (36.8 °C) (Temporal)   Ht 167.6 cm (65.98\")   Wt 67.9 kg (149 lb 9.6 oz)   SpO2 97%   BMI 24.16 kg/m²     Physical Exam  Vitals and nursing note reviewed.   Constitutional:       Appearance: Normal appearance. He is normal weight.   HENT:      Head: Normocephalic and atraumatic.      Right Ear: External ear normal.      Left Ear: External ear normal.      Nose: Nose normal.   Eyes:      Pupils: Pupils are equal, round, and reactive to light.   Cardiovascular:      Rate and Rhythm: Normal rate and regular rhythm.      Comments: Patient with tenderness to deep palpation to his sternum, at the base, right above the xiphoid process, there is a slight raised area, but would be consistent with bone.  Pulmonary:      Effort: Pulmonary effort is " normal.      Breath sounds: Normal breath sounds.   Neurological:      Mental Status: He is alert.   Psychiatric:         Mood and Affect: Mood normal.         Thought Content: Thought content normal.        Result Review :  {The following data was reviewed by DIRK Montelongo on 03/02/23                Diagnoses and all orders for this visit:    1. Primary hypertension (Primary)    2. Encounter for screening for lung cancer  -      CT Chest Low Dose Cancer Screening WO; Future    3. Personal history of nicotine dependence  -      CT Chest Low Dose Cancer Screening WO; Future    4. Hyperlipidemia, unspecified hyperlipidemia type    5. Vitamin D deficiency    6. GERD without esophagitis    7. Anxiety    8. Bipolar 1 disorder (HCC)    9. Depression, unspecified depression type    10. Chronic obstructive pulmonary disease, unspecified COPD type (HCC)    Other orders  -     vitamin D (ERGOCALCIFEROL) 1.25 MG (75955 UT) capsule capsule; Take 1 capsule by mouth Every 7 (Seven) Days.  Dispense: 13 capsule; Refill: 1  -     albuterol sulfate  (90 Base) MCG/ACT inhaler; Inhale 2 puffs Every 4 (Four) Hours As Needed for Wheezing or Shortness of Air.  Dispense: 8 g; Refill: 3  -     atorvastatin (LIPITOR) 40 MG tablet; Take 1 tablet by mouth Daily.  Dispense: 90 tablet; Refill: 1  -     Diclofenac Sodium (VOLTAREN) 1 % gel gel; Apply  topically to the appropriate area as directed 4 (Four) Times a Day. Please have pharmacy change prescription to 2 g as needed, apply to affected area 4 times a day.  Dispense: 150 g; Refill: 1  -     metoprolol succinate XL (TOPROL-XL) 50 MG 24 hr tablet; Take 1 tablet by mouth Daily.  Dispense: 90 tablet; Refill: 1  -     Omega-3 Fatty Acids (fish oil) 1000 MG capsule capsule; Take 1 capsule by mouth Daily With Breakfast.  Dispense: 180 capsule; Refill: 1    Medications all refilled today, all labs were recently done, after an acute visit.  Those were all reviewed today, he had already  seen them and saw my message regarding them.  Appropriate to continue all medicines at previous dosing.  Patient continues to follow-up with his mental health provider, no changes to his medications.  They did not want him to take the BuSpar, only increased his gabapentin for now.  His pain to his xiphoid process, I do not think is anything concerning.  Is only tender to palpation, we will continue to monitor, for worsening symptoms would recommend close follow-up.  Otherwise follow-up in 6 months.      Follow Up   Return in about 6 months (around 9/2/2023) for Recheck, Medicare Wellness.  Patient was given instructions and counseling regarding his condition or for health maintenance advice. Please see specific information pulled into the AVS if appropriate.     Humberto Cox, APRN  3/2/2023  This note was electronically signed.

## 2023-03-24 ENCOUNTER — PREP FOR SURGERY (OUTPATIENT)
Dept: OTHER | Facility: HOSPITAL | Age: 58
End: 2023-03-24
Payer: MEDICARE

## 2023-03-24 ENCOUNTER — OFFICE VISIT (OUTPATIENT)
Dept: SURGERY | Facility: CLINIC | Age: 58
End: 2023-03-24
Payer: MEDICARE

## 2023-03-24 VITALS — HEIGHT: 65 IN | BODY MASS INDEX: 24.83 KG/M2 | HEART RATE: 96 BPM | WEIGHT: 149 LBS

## 2023-03-24 DIAGNOSIS — K21.00 GASTROESOPHAGEAL REFLUX DISEASE WITH ESOPHAGITIS WITHOUT HEMORRHAGE: Primary | ICD-10-CM

## 2023-03-24 DIAGNOSIS — K21.9 GASTROESOPHAGEAL REFLUX DISEASE WITHOUT ESOPHAGITIS: Primary | ICD-10-CM

## 2023-03-24 DIAGNOSIS — Z87.19 HISTORY OF PYLORIC CHANNEL ULCER: ICD-10-CM

## 2023-03-24 PROCEDURE — 1160F RVW MEDS BY RX/DR IN RCRD: CPT | Performed by: NURSE PRACTITIONER

## 2023-03-24 PROCEDURE — 99203 OFFICE O/P NEW LOW 30 MIN: CPT | Performed by: NURSE PRACTITIONER

## 2023-03-24 PROCEDURE — 1159F MED LIST DOCD IN RCRD: CPT | Performed by: NURSE PRACTITIONER

## 2023-03-24 NOTE — PROGRESS NOTES
Chief Complaint: EGD (consult)    Subjective      EGD consultation       History of Present Illness  Atilio Recinos is a 58 y.o. male presents to [facility] for EGD consultation.    Patient presents today with complaints of heartburn and indigestion despite taking pantoprazole and Carafate.  Patient has a history of a perforated gastric ulcer with a Chaparro patch, was performed on 1/16/2022 performed by Dr. Atilio Lindsay.  Patient reports that he is with occasional epigastric pain.  He admits to some dysphagia issues with medications.  Denies any issues with food or liquids.  Patient denies any pain before or after eating.    Patient uses O2 as needed.    9/22: EGD (neftali): Few non-bleeding superficial duodenal ulcers found in the first part of the duodenum. GE junction: Squamocolumnar mucosa with intestinal metaplasia.     1/16/22: Exploratory lap with perforated gastric ulcer and Chaparro patch (Neftali).  Perforated gastric ulcer    Objective     Past Medical History:   Diagnosis Date   • Allergies    • Anemia 2/2022   • Anxiety    • Asthma 1992   • Bipolar 1 disorder (HCC)    • Bowel perforation (HCC) 01/16/2022   • Chronic kidney disease 1/2022   • COPD (chronic obstructive pulmonary disease) (HCC)    • COVID-19 02/19/2022   • Depression    • Fissure, anal 2003   • Forgetfulness    • Hard to intubate     High gag reflex.   • Head injury    • Hepatitis    • High blood pressure    • High cholesterol    • History of transfusion 2/2022   • Pancreatitis [id number]   • PONV (postoperative nausea and vomiting) 1/2022   • Psychiatric illness    • Rectal bleeding 2003   • S/P exploratory laparotomy, oversew of perforated ulcer, placement of Chaparro patch  01/17/2022   • Shortness of breath    • Sinus trouble        Past Surgical History:   Procedure Laterality Date   • COLONOSCOPY N/A 9/22/2022    Procedure: COLONOSCOPY;  Surgeon: Atilio Lindsay MD;  Location: [facility] ENDOSCOPY;   Service: General;  Laterality: N/A;  DIVERTICULOSIS   • ENDOSCOPY N/A 9/22/2022    Procedure: ESOPHAGOGASTRODUODENOSCOPY WITH BIOPSIES;  Surgeon: Atilio Lindsay MD;  Location: [facility] ENDOSCOPY;  Service: General;  Laterality: N/A;  DUODENAL ULCERS   • EXPLORATORY LAPAROTOMY N/A 01/16/2022    Procedure: EXPLORATORY LAPAROTOMY, OVERSEW OF PERFORATED GASTRIC ULCER WITH [place] PATCH;  Surgeon: Atilio Lindsay MD;  Location: [facility] MAIN OR;  Service: General;  Laterality: N/A;   • PLEURAL SCARIFICATION Right 1990   • SPHINCTEROTOMY         Outpatient Medications Marked as Taking for the 3/24/23 encounter (Office Visit) with [place] April, APRN   Medication Sig Dispense Refill   • albuterol sulfate  (90 Base) MCG/ACT inhaler Inhale 2 puffs Every 4 (Four) Hours As Needed for Wheezing or Shortness of Air. 8 g 3   • atorvastatin (LIPITOR) 40 MG tablet Take 1 tablet by mouth Daily. 90 tablet 1   • Diclofenac Sodium (VOLTAREN) 1 % gel gel Apply  topically to the appropriate area as directed 4 (Four) Times a Day. Please have pharmacy change prescription to 2 g as needed, apply to affected area 4 times a day. 150 g 1   • fluticasone (FLONASE) 50 MCG/ACT nasal spray 1 spray by Each Nare route Daily. Shake before using.     • Fluticasone Furoate-Vilanterol (Breo Ellipta) 100-25 MCG/INH inhaler Inhale 1 puff Daily. 60 each 2   • gabapentin (NEURONTIN) 400 MG capsule Take 1 capsule by mouth 3 (Three) Times a Day.     • gemfibrozil (LOPID) 600 MG tablet Take 1 tablet by mouth 2 (Two) Times a Day. 180 tablet 1   • metoprolol succinate XL (TOPROL-XL) 50 MG 24 hr tablet Take 1 tablet by mouth Daily. 90 tablet 1   • mirtazapine (REMERON) 15 MG tablet Take 1 tablet by mouth Daily.     • Omega-3 Fatty Acids (fish oil) 1000 MG capsule capsule Take 1 capsule by mouth Daily With Breakfast. 180 capsule 1   • ondansetron ODT (ZOFRAN-ODT) 4 MG disintegrating tablet Place 1 tablet under the tongue Every 8 (Eight) Hours As Needed for  "Nausea or Vomiting. 15 tablet 0   • pantoprazole (Protonix) 40 MG EC tablet Take 1 tablet by mouth Daily. 90 tablet 1   • QUEtiapine (SEROquel) 400 MG tablet Take 1 tablet by mouth Every Night. 30 tablet 0   • sucralfate (Carafate) 1 g tablet Take 1 tablet by mouth 4 (Four) Times a Day. 360 tablet 3   • vitamin D (ERGOCALCIFEROL) 1.25 MG (69450 UT) capsule capsule Take 1 capsule by mouth Every 7 (Seven) Days. 13 capsule 1       Allergies   Allergen Reactions   • Amoxicillin-Pot Clavulanate Hives and Rash   • Antihistamines, Chlorpheniramine-Type Rash   • Contrast Dye (Echo Or Unknown Ct/Mr) Hives, Nausea Only and Rash   • Diphenhydramine Hives        Family History   Problem Relation Age of Onset   • Alcohol abuse Mother    • Asthma Mother    • COPD Mother    • Depression Mother    • Heart disease Mother    • Hyperlipidemia Mother    • Hypertension Mother    • Mental illness Mother    • Alcohol abuse Father    • Arthritis Father    • Hypertension Father    • Colon cancer Maternal Grandmother    • Stroke Other    • Heart disease Other    • Diabetes Other        Social History     Socioeconomic History   • Marital status:    Tobacco Use   • Smoking status: Heavy Smoker     Packs/day: 1.00     Years: 47.00     Pack years: 47.00     Types: Cigarettes   • Smokeless tobacco: Never   Vaping Use   • Vaping Use: Never used   Substance and Sexual Activity   • Alcohol use: Not Currently   • Drug use: Never   • Sexual activity: Yes     Partners: Male     Birth control/protection: Condom       Review of Systems   Constitutional: Negative for chills and fever.   Gastrointestinal: Positive for abdominal pain, GERD and indigestion. Negative for nausea and vomiting.        Vital Signs:   Pulse 96   Ht 165.1 cm (65\")   Wt 67.6 kg (149 lb)   BMI 24.79 kg/m²      Physical Exam  Vitals and nursing note reviewed.   Constitutional:       General: He is not in acute distress.     Appearance: Normal appearance.   HENT:      Head: " Normocephalic.   Cardiovascular:      Rate and Rhythm: Normal rate.   Pulmonary:      Effort: Pulmonary effort is normal.      Breath sounds: No stridor.   Abdominal:      Palpations: Abdomen is soft.      Tenderness: There is abdominal tenderness.   Musculoskeletal:         General: No deformity. Normal range of motion.   Skin:     General: Skin is warm and dry.      Coloration: Skin is not jaundiced.   Neurological:      General: No focal deficit present.      Mental Status: He is alert and oriented to person, place, and time.   Psychiatric:         Mood and Affect: Mood normal.         Thought Content: Thought content normal.          Result Review :          []  Laboratory  []  Radiology  []  Pathology  []  Microbiology  []  EKG/Telemetry   []  Cardiology/Vascular   []  Old records  I spent 20 minutes caring for Atilio on this date of service. This time includes time spent by me in the following activities: reviewing tests, obtaining and/or reviewing a separately obtained history, performing a medically appropriate examination and/or evaluation, ordering medications, tests, or procedures, and documenting information in the medical record.     Assessment and Plan    Diagnoses and all orders for this visit:    1. Gastroesophageal reflux disease without esophagitis (Primary)    2. History of pyloric channel ulcer        Follow Up   Return for EGD with Dr. Lindsay on 7/6/2023 at [facility].     Continue with Protonix and Carafate until EGD.    Phone PAT.  Hospital call with arrival time      Possible risks/complications, benefits, and alternatives to surgical or invasive procedure have been explained to patient and/or legal guardian.     Patient has been evaluated and can tolerate anesthesia and/or sedation. Risks, benefits, and alternatives to anesthesia and sedation have been explained to patient and/or legal guardian.  Patient verbalizes understanding and is willing to proceed with above  plan.    Patient was given instructions and counseling regarding his condition or for health maintenance advice. Please see specific information pulled into the AVS if appropriate.

## 2023-04-22 ENCOUNTER — APPOINTMENT (OUTPATIENT)
Dept: GENERAL RADIOLOGY | Facility: HOSPITAL | Age: 58
End: 2023-04-22
Payer: MEDICARE

## 2023-04-22 ENCOUNTER — HOSPITAL ENCOUNTER (EMERGENCY)
Facility: HOSPITAL | Age: 58
Discharge: HOME | End: 2023-04-22
Attending: EMERGENCY MEDICINE
Payer: MEDICARE

## 2023-04-22 ENCOUNTER — APPOINTMENT (OUTPATIENT)
Dept: CT IMAGING | Facility: HOSPITAL | Age: 58
End: 2023-04-22
Payer: MEDICARE

## 2023-04-22 VITALS
WEIGHT: 142.64 LBS | DIASTOLIC BLOOD PRESSURE: 95 MMHG | TEMPERATURE: 98 F | OXYGEN SATURATION: 100 % | HEIGHT: 66 IN | SYSTOLIC BLOOD PRESSURE: 182 MMHG | BODY MASS INDEX: 22.92 KG/M2 | HEART RATE: 91 BPM | RESPIRATION RATE: 16 BRPM

## 2023-04-22 DIAGNOSIS — R42 DIZZINESS: Primary | ICD-10-CM

## 2023-04-22 DIAGNOSIS — I10 HYPERTENSION, UNSPECIFIED TYPE: ICD-10-CM

## 2023-04-22 DIAGNOSIS — J20.9 ACUTE BRONCHITIS, UNSPECIFIED ORGANISM: ICD-10-CM

## 2023-04-22 DIAGNOSIS — J44.1 COPD EXACERBATION: ICD-10-CM

## 2023-04-22 DIAGNOSIS — J01.00 ACUTE MAXILLARY SINUSITIS, RECURRENCE NOT SPECIFIED: ICD-10-CM

## 2023-04-22 LAB
ALBUMIN SERPL-MCNC: 4.1 G/DL (ref 3.5–5.2)
ALBUMIN/GLOB SERPL: 1.5 G/DL
ALP SERPL-CCNC: 117 U/L (ref 39–117)
ALT SERPL W P-5'-P-CCNC: 8 U/L (ref 1–41)
ANION GAP SERPL CALCULATED.3IONS-SCNC: 9.1 MMOL/L (ref 5–15)
AST SERPL-CCNC: 14 U/L (ref 1–40)
BASOPHILS # BLD AUTO: 0.03 10*3/MM3 (ref 0–0.2)
BASOPHILS NFR BLD AUTO: 0.4 % (ref 0–1.5)
BILIRUB SERPL-MCNC: 0.6 MG/DL (ref 0–1.2)
BUN SERPL-MCNC: 6 MG/DL (ref 8–23)
BUN/CREAT SERPL: 6.1 (ref 7–25)
CALCIUM SPEC-SCNC: 9 MG/DL (ref 8.6–10.5)
CHLORIDE SERPL-SCNC: 101 MMOL/L (ref 98–107)
CO2 SERPL-SCNC: 26.9 MMOL/L (ref 22–29)
CREAT SERPL-MCNC: 0.98 MG/DL (ref 0.76–1.27)
DEPRECATED RDW RBC AUTO: 40.8 FL (ref 37–54)
EGFRCR SERPLBLD CKD-EPI 2021: 88.3 ML/MIN/1.73
EOSINOPHIL # BLD AUTO: 0.09 10*3/MM3 (ref 0–0.4)
EOSINOPHIL NFR BLD AUTO: 1.2 % (ref 0.3–6.2)
ERYTHROCYTE [DISTWIDTH] IN BLOOD BY AUTOMATED COUNT: 13.2 % (ref 12.3–15.4)
GLOBULIN UR ELPH-MCNC: 2.7 GM/DL
GLUCOSE SERPL-MCNC: 107 MG/DL (ref 65–99)
HCT VFR BLD AUTO: 40.3 % (ref 37.5–51)
HGB BLD-MCNC: 13.6 G/DL (ref 13–17.7)
HOLD SPECIMEN: NORMAL
HOLD SPECIMEN: NORMAL
IMM GRANULOCYTES # BLD AUTO: 0.03 10*3/MM3 (ref 0–0.05)
IMM GRANULOCYTES NFR BLD AUTO: 0.4 % (ref 0–0.5)
LYMPHOCYTES # BLD AUTO: 1.16 10*3/MM3 (ref 0.7–3.1)
LYMPHOCYTES NFR BLD AUTO: 16 % (ref 19.6–45.3)
MAGNESIUM SERPL-MCNC: 1.9 MG/DL (ref 1.6–2.4)
MCH RBC QN AUTO: 28.5 PG (ref 26.6–33)
MCHC RBC AUTO-ENTMCNC: 33.7 G/DL (ref 31.5–35.7)
MCV RBC AUTO: 84.3 FL (ref 79–97)
MONOCYTES # BLD AUTO: 0.47 10*3/MM3 (ref 0.1–0.9)
MONOCYTES NFR BLD AUTO: 6.5 % (ref 5–12)
NEUTROPHILS NFR BLD AUTO: 5.45 10*3/MM3 (ref 1.7–7)
NEUTROPHILS NFR BLD AUTO: 75.5 % (ref 42.7–76)
NRBC BLD AUTO-RTO: 0 /100 WBC (ref 0–0.2)
NT-PROBNP SERPL-MCNC: 652.9 PG/ML (ref 0–900)
PLATELET # BLD AUTO: 210 10*3/MM3 (ref 140–450)
PMV BLD AUTO: 10.8 FL (ref 6–12)
POTASSIUM SERPL-SCNC: 3.6 MMOL/L (ref 3.5–5.2)
PROT SERPL-MCNC: 6.8 G/DL (ref 6–8.5)
QT INTERVAL: 373 MS
RBC # BLD AUTO: 4.78 10*6/MM3 (ref 4.14–5.8)
SODIUM SERPL-SCNC: 137 MMOL/L (ref 136–145)
TROPONIN T SERPL HS-MCNC: 8 NG/L
WBC NRBC COR # BLD: 7.23 10*3/MM3 (ref 3.4–10.8)
WHOLE BLOOD HOLD COAG: NORMAL
WHOLE BLOOD HOLD SPECIMEN: NORMAL

## 2023-04-22 PROCEDURE — 99284 EMERGENCY DEPT VISIT MOD MDM: CPT

## 2023-04-22 PROCEDURE — 85025 COMPLETE CBC W/AUTO DIFF WBC: CPT

## 2023-04-22 PROCEDURE — 70450 CT HEAD/BRAIN W/O DYE: CPT

## 2023-04-22 PROCEDURE — 84484 ASSAY OF TROPONIN QUANT: CPT | Performed by: EMERGENCY MEDICINE

## 2023-04-22 PROCEDURE — 83735 ASSAY OF MAGNESIUM: CPT | Performed by: EMERGENCY MEDICINE

## 2023-04-22 PROCEDURE — 83880 ASSAY OF NATRIURETIC PEPTIDE: CPT | Performed by: EMERGENCY MEDICINE

## 2023-04-22 PROCEDURE — 80053 COMPREHEN METABOLIC PANEL: CPT | Performed by: EMERGENCY MEDICINE

## 2023-04-22 PROCEDURE — 93005 ELECTROCARDIOGRAM TRACING: CPT | Performed by: EMERGENCY MEDICINE

## 2023-04-22 RX ORDER — SODIUM CHLORIDE 0.9 % (FLUSH) 0.9 %
10 SYRINGE (ML) INJECTION AS NEEDED
Status: DISCONTINUED | OUTPATIENT
Start: 2023-04-22 | End: 2023-04-22 | Stop reason: HOSPADM

## 2023-04-22 RX ORDER — BENZONATATE 100 MG/1
100 CAPSULE ORAL 3 TIMES DAILY PRN
Qty: 21 CAPSULE | Refills: 0 | Status: SHIPPED | OUTPATIENT
Start: 2023-04-22

## 2023-04-22 RX ORDER — METHYLPREDNISOLONE 4 MG/1
TABLET ORAL
Qty: 1 EACH | Refills: 0 | Status: SHIPPED | OUTPATIENT
Start: 2023-04-22

## 2023-04-22 RX ORDER — DOXYCYCLINE 100 MG/1
100 CAPSULE ORAL 2 TIMES DAILY
Qty: 14 CAPSULE | Refills: 0 | Status: SHIPPED | OUTPATIENT
Start: 2023-04-22 | End: 2023-04-29

## 2023-04-22 NOTE — DISCHARGE INSTRUCTIONS
You can try taking the doxycycline antibiotics and the steroid Dosepak to cover for any bacterial sinusitis of bronchitis and also steroid Dosepak will help with any inflammation in the sinuses or lungs for now.    Your CT scan of the head looked okay and nothing concerning was found, and all of your blood work and EKG look normal today.    Follow-up with your primary care doctor if you are not feeling any better in a few days and you can also call our cardiology clinic for a follow-up appointment to evaluate you further for your chest x-ray showing enlarged heart and also for your elevated blood pressures here.

## 2023-04-22 NOTE — ED PROVIDER NOTES
Time: 2:03 PM EDT  Date of encounter:  4/22/2023  Independent Historian/Clinical History and Information was obtained by:   Patient  Chief Complaint   Patient presents with   • Dizziness     Pt reports it started Thursday, increasingly worse went to urgent care told to come to ER due to chest xray showing enlarged heart and COPD exacerbation       History is limited by: N/A    History of Present Illness:  Patient is a 58 y.o. year old male who presents to the emergency department for evaluation of dizziness.  Patient states the dizziness is intermittent and has been over the last 3 days.  Patient describes the dizziness as it feels like he has an ear infection.  Patient also states he had increased his O2 at home.  Patient states he only wears oxygen as needed and arrived on 2 L nasal cannula.  (Provider in triage, Seamus Nolasco PA-C)    HPI    Patient Care Team  Primary Care Provider: Humberto Cox APRN    Past Medical History:     Allergies   Allergen Reactions   • Dye [Ct Contrast] Hives   • Amoxicillin-Pot Clavulanate Hives and Rash   • Antihistamines, Chlorpheniramine-Type Rash   • Contrast Dye (Echo Or Unknown Ct/Mr) Hives, Nausea Only and Rash   • Diphenhydramine Hives     Past Medical History:   Diagnosis Date   • Allergies    • Anemia 2/2022   • Anxiety    • Asthma 1992   • Bipolar 1 disorder    • Bowel perforation 01/16/2022   • Chronic kidney disease 1/2022   • COPD (chronic obstructive pulmonary disease)    • COVID-19 02/19/2022   • Depression    • Fissure, anal 2003   • Forgetfulness    • Hard to intubate     High gag reflex.   • Head injury    • Hepatitis    • High blood pressure    • High cholesterol    • History of transfusion 2/2022   • Pancreatitis [id number]   • PONV (postoperative nausea and vomiting) 1/2022   • Psychiatric illness    • Rectal bleeding 2003   • S/P exploratory laparotomy, oversew of perforated ulcer, placement of Chapraro patch  01/17/2022   • Shortness of breath    • Sinus  trouble      Past Surgical History:   Procedure Laterality Date   • COLONOSCOPY N/A 9/22/2022    Procedure: COLONOSCOPY;  Surgeon: Atilio Lindsay MD;  Location: [facility] ENDOSCOPY;  Service: General;  Laterality: N/A;  DIVERTICULOSIS   • ENDOSCOPY N/A 9/22/2022    Procedure: ESOPHAGOGASTRODUODENOSCOPY WITH BIOPSIES;  Surgeon: Atilio Lindsay MD;  Location: [facility] ENDOSCOPY;  Service: General;  Laterality: N/A;  DUODENAL ULCERS   • EXPLORATORY LAPAROTOMY N/A 01/16/2022    Procedure: EXPLORATORY LAPAROTOMY, OVERSEW OF PERFORATED GASTRIC ULCER WITH [place] PATCH;  Surgeon: Atilio Lindsay MD;  Location: [facility] MAIN OR;  Service: General;  Laterality: N/A;   • PLEURAL SCARIFICATION Right 1990   • SPHINCTEROTOMY       Family History   Problem Relation Age of Onset   • Alcohol abuse Mother    • Asthma Mother    • COPD Mother    • Depression Mother    • Heart disease Mother    • Hyperlipidemia Mother    • Hypertension Mother    • Mental illness Mother    • Alcohol abuse Father    • Arthritis Father    • Hypertension Father    • Colon cancer Maternal Grandmother    • Stroke Other    • Heart disease Other    • Diabetes Other        Home Medications:  Prior to Admission medications    Medication Sig Start Date End Date Taking? Authorizing Provider   albuterol sulfate  (90 Base) MCG/ACT inhaler Inhale 2 puffs Every 4 (Four) Hours As Needed for Wheezing or Shortness of Air. 3/2/23   Humberto Cox APRN   atorvastatin (LIPITOR) 40 MG tablet Take 1 tablet by mouth Daily. 3/2/23   Humberto Cox APRN   benzonatate (TESSALON) 100 MG capsule Take 1 capsule by mouth 3 (Three) Times a Day As Needed for Cough for up to 21 doses. 4/22/23   Melissa Hernández APRN   Diclofenac Sodium (VOLTAREN) 1 % gel gel Apply  topically to the appropriate area as directed 4 (Four) Times a Day. Please have pharmacy change prescription to 2 g as needed, apply to affected area 4 times a day. 3/2/23   Humberto Cox APRN   doxycycline  (MONODOX) 100 MG capsule Take 1 capsule by mouth 2 (Two) Times a Day for 7 days. 4/22/23 4/29/23  Melissa Hernández APRN   fluticasone (FLONASE) 50 MCG/ACT nasal spray 1 spray by Each Nare route Daily. Shake before using. 12/4/22   Ren Boswell MD   Fluticasone Furoate-Vilanterol (Breo Ellipta) 100-25 MCG/INH inhaler Inhale 1 puff Daily. 12/12/21   Ronaldo Prater APRN   gabapentin (NEURONTIN) 400 MG capsule Take 1 capsule by mouth 3 (Three) Times a Day. 3/15/22   Ren Boswell MD   gemfibrozil (LOPID) 600 MG tablet Take 1 tablet by mouth 2 (Two) Times a Day. 11/10/22   Humberto Cox APRN   methylPREDNISolone (MEDROL) 4 MG dose pack Take as directed on package instructions. 4/22/23   Melissa Hernández APRN   metoprolol succinate XL (TOPROL-XL) 50 MG 24 hr tablet Take 1 tablet by mouth Daily. 3/2/23   Humberto Cox APRN   mirtazapine (REMERON) 15 MG tablet Take 1 tablet by mouth Daily.    Ren Boswell MD   Omega-3 Fatty Acids (fish oil) 1000 MG capsule capsule Take 1 capsule by mouth Daily With Breakfast. 3/2/23   Humberto Cox APRN   ondansetron ODT (ZOFRAN-ODT) 4 MG disintegrating tablet Place 1 tablet under the tongue Every 8 (Eight) Hours As Needed for Nausea or Vomiting. 11/10/22   Humberto Cox APRN   pantoprazole (Protonix) 40 MG EC tablet Take 1 tablet by mouth Daily. 1/27/23 1/27/24  Atilio Lindsay MD   QUEtiapine (SEROquel) 300 MG tablet  3/8/23   Ren Boswell MD   QUEtiapine (SEROquel) 400 MG tablet Take 1 tablet by mouth Every Night. 2/8/22   Josy Fletcher APRN   sucralfate (Carafate) 1 g tablet Take 1 tablet by mouth 4 (Four) Times a Day. 10/26/22 10/26/23  Atilio Lindsay MD   vitamin D (ERGOCALCIFEROL) 1.25 MG (05673 UT) capsule capsule Take 1 capsule by mouth Every 7 (Seven) Days. 3/2/23   Humberto Cox APRN        Social History:   Social History     Tobacco Use   • Smoking status: Heavy Smoker     Packs/day: 1.00     Years: 47.00     Pack years:  "47.00     Types: Cigarettes   • Smokeless tobacco: Never   Vaping Use   • Vaping Use: Never used   Substance Use Topics   • Alcohol use: Not Currently   • Drug use: Never         Review of Systems:  Review of Systems     Physical Exam:  /88 (BP Location: Left arm, Patient Position: Lying)   Pulse 86   Temp 98 °F (36.7 °C) (Oral)   Resp 16   Ht 167.6 cm (66\")   Wt 64.7 kg (142 lb 10.2 oz)   SpO2 97%   BMI 23.02 kg/m²     Physical Exam  Constitutional:       Appearance: Normal appearance.   HENT:      Head: Normocephalic.   Eyes:      Extraocular Movements: Extraocular movements intact.      Conjunctiva/sclera: Conjunctivae normal.   Pulmonary:      Effort: Pulmonary effort is normal.      Breath sounds: Examination of the right-lower field reveals decreased breath sounds. Examination of the left-lower field reveals decreased breath sounds. Decreased breath sounds present.   Abdominal:      General: There is no distension.   Skin:     General: Skin is warm.      Coloration: Skin is not cyanotic.   Neurological:      Mental Status: He is alert and oriented to person, place, and time.   Psychiatric:         Attention and Perception: Attention and perception normal.         Mood and Affect: Mood normal.                  Procedures:  Procedures      Medical Decision Making:      Comorbidities that affect care:    {Comorbidities that affect care:39091}    External Notes reviewed:    {External Note review (Optional):01303}      The following orders were placed and all results were independently analyzed by me:  Orders Placed This Encounter   Procedures   • [place] Draw   • Comprehensive Metabolic Panel   • Single High Sensitivity Troponin T   • Magnesium   • Urinalysis With Microscopic If Indicated (No Culture) - Urine, Clean Catch   • CBC Auto Differential   • NPO Diet NPO Type: Strict NPO   • Undress & Gown   • Cardiac Monitoring   • Continuous Pulse Oximetry   • Vital Signs   • Orthostatic Blood Pressure   • " Oxygen Therapy- Nasal Cannula; 2 LPM; Titrate for SPO2: 92%, Greater Than or Equal To   • POC Glucose Once   • ECG 12 Lead ED Triage Standing Order; Weak / Dizzy / AMS   • Insert Peripheral IV   • Fall Precautions   • CBC & Differential   • Green Top (Gel)   • Lavender Top   • Gold Top - SST   • Light Blue Top       Medications Given in the Emergency Department:  Medications   sodium chloride 0.9 % flush 10 mL (has no administration in time range)        ED Course:    The patient was initially evaluated in the triage area where orders were placed. The patient was later dispositioned by Seamus Nolasco PA-C.      The patient was advised to stay for completion of workup which includes but is not limited to communication of labs and radiological results, reassessment and plan. The patient was advised that leaving prior to disposition by a provider could result in critical findings that are not communicated to the patient.     ED Course as of 04/22/23 1405   Sat Apr 22, 2023   1404 PROVIDER IN TRIAGE  Patient was evaluated by me in triage, Seamus Nolasco PA-C.  Orders were placed and patient is currently awaiting final results and disposition.      [MD]   1404 I canceled chest x-ray in the emergency department, patient had 2 view chest x-ray completed at urgent care directly prior to arrival that shows small pleural effusion and cardiomegaly. [MD]      ED Course User Index  [MD] Seamus Nolasco PA-C       Labs:    Lab Results (last 24 hours)     Procedure Component Value Units Date/Time    CBC & Differential [[id number]]  (Abnormal) Collected: 04/22/23 1354    Specimen: Blood Updated: 04/22/23 1402    Narrative:      The following orders were created for panel order CBC & Differential.  Procedure                               Abnormality         Status                     ---------                               -----------         ------                     CBC Auto Differential[[id number]]        Abnormal             Final result                 Please view results for these tests on the individual orders.    Comprehensive Metabolic Panel [[id number]] Collected: 04/22/23 1354    Specimen: Blood Updated: 04/22/23 1358    Single High Sensitivity Troponin T [[id number]] Collected: 04/22/23 1354    Specimen: Blood Updated: 04/22/23 1358    Magnesium [[id number]] Collected: 04/22/23 1354    Specimen: Blood Updated: 04/22/23 1358    CBC Auto Differential [[id number]]  (Abnormal) Collected: 04/22/23 1354    Specimen: Blood Updated: 04/22/23 1402     WBC 7.23 10*3/mm3      RBC 4.78 10*6/mm3      Hemoglobin 13.6 g/dL      Hematocrit 40.3 %      MCV 84.3 fL      MCH 28.5 pg      MCHC 33.7 g/dL      RDW 13.2 %      RDW-SD 40.8 fl      MPV 10.8 fL      Platelets 210 10*3/mm3      Neutrophil % 75.5 %      Lymphocyte % 16.0 %      Monocyte % 6.5 %      Eosinophil % 1.2 %      Basophil % 0.4 %      Immature Grans % 0.4 %      Neutrophils, Absolute 5.45 10*3/mm3      Lymphocytes, Absolute 1.16 10*3/mm3      Monocytes, Absolute 0.47 10*3/mm3      Eosinophils, Absolute 0.09 10*3/mm3      Basophils, Absolute 0.03 10*3/mm3      Immature Grans, Absolute 0.03 10*3/mm3      nRBC 0.0 /100 WBC            Imaging:    XR Chest 2 View    Result Date: 4/22/2023  PROCEDURE: XR CHEST 2 VW  COMPARISON: [facility], CR, XR CHEST 1 VW, 2/06/2022, 5:06.  [facility], CR, CHEST PA/AP & LAT 2V, 3/03/2017, 14:05.  [facility], CR, CHEST AP/PA 1 VIEW, 11/22/2020, 6:50.  INDICATIONS: cough history of copd  FINDINGS:   Cardiomegaly is present.  The pulmonary vasculature is within normal limits.  There is a small left pleural effusion.  No focal areas of airspace consolidation are identified.  No evidence of pneumothorax.  IMPRESSION: Cardiomegaly.  Small left pleural effusion.   STEPHANIE MILLER MD       Electronically Signed and Approved By: STEPHANIE MILLER MD on 4/22/2023 at 12:11                 Differential Diagnosis and  Discussion:      {Differentials:81197}    {Independent Review of (Optional):44049}    MDM     {Critical Care:74802}    Patient Care Considerations:    {Considerations (Optional):57067}      Consultants/Shared Management Plan:    {Shared Management Plan (Optional):33049}    Social Determinants of Health:    Patient is independent, reliable, and has access to care.       Disposition and Care Coordination:    {Admission consideration:03925}    {Discharge (Optional):62764}    Final diagnoses:   None        ED Disposition     None          This medical record created using voice recognition software.

## 2023-04-22 NOTE — Clinical Note
[facility] EMERGENCY ROOM  [address]  [place] [address]  Phone: [phone]    Atilio Recinos was seen and treated in our emergency department on 4/22/2023.  He may return to work on 04/25/2023.         Thank you for choosing [facility].    Jayy Olivo MD

## 2023-04-22 NOTE — ED PROVIDER NOTES
Time: 2:05 PM EDT  Date of encounter:  4/22/2023  Independent Historian/Clinical History and Information was obtained by:   Patient  Chief Complaint: dizziness    History is limited by: N/A    History of Present Illness:  Patient is a 58 y.o. year old male who presents to the emergency department for evaluation of dizziness with onset 3 days. Pt states that dizziness only occurs when he tries to stand up. Pt went to urgent care, and provider there told Pt to come to ED due to enlarged heart in chest x-ray. Pt has a hx of COPD and also has been having COPD exacerbation. Pt denies any cough. Pt denies any difficulty moving his extremities. Pt has had a recent sinus infection. Pt denies any fever, chills, N/V/D. Pt has been coughing up greenish sputum.    HPI    Patient Care Team  Primary Care Provider: Humberto Cox APRN    Past Medical History:     Allergies   Allergen Reactions   • Dye [Ct Contrast] Hives   • Amoxicillin-Pot Clavulanate Hives and Rash   • Antihistamines, Chlorpheniramine-Type Rash   • Contrast Dye (Echo Or Unknown Ct/Mr) Hives, Nausea Only and Rash   • Diphenhydramine Hives     Past Medical History:   Diagnosis Date   • Allergies    • Anemia 2/2022   • Anxiety    • Asthma 1992   • Bipolar 1 disorder    • Bowel perforation 01/16/2022   • Chronic kidney disease 1/2022   • COPD (chronic obstructive pulmonary disease)    • COVID-19 02/19/2022   • Depression    • Fissure, anal 2003   • Forgetfulness    • Hard to intubate     High gag reflex.   • Head injury    • Hepatitis    • High blood pressure    • High cholesterol    • History of transfusion 2/2022   • Pancreatitis [id number]   • PONV (postoperative nausea and vomiting) 1/2022   • Psychiatric illness    • Rectal bleeding 2003   • S/P exploratory laparotomy, oversew of perforated ulcer, placement of [place] patch  01/17/2022   • Shortness of breath    • Sinus trouble      Past Surgical History:   Procedure Laterality Date   • COLONOSCOPY N/A 9/22/2022     Procedure: COLONOSCOPY;  Surgeon: Atilio Lindsay MD;  Location: [facility] ENDOSCOPY;  Service: General;  Laterality: N/A;  DIVERTICULOSIS   • ENDOSCOPY N/A 9/22/2022    Procedure: ESOPHAGOGASTRODUODENOSCOPY WITH BIOPSIES;  Surgeon: Atilio Lindsay MD;  Location: [facility] ENDOSCOPY;  Service: General;  Laterality: N/A;  DUODENAL ULCERS   • EXPLORATORY LAPAROTOMY N/A 01/16/2022    Procedure: EXPLORATORY LAPAROTOMY, OVERSEW OF PERFORATED GASTRIC ULCER WITH [place] PATCH;  Surgeon: Atilio Lindsay MD;  Location: [facility] MAIN OR;  Service: General;  Laterality: N/A;   • PLEURAL SCARIFICATION Right 1990   • SPHINCTEROTOMY       Family History   Problem Relation Age of Onset   • Alcohol abuse Mother    • Asthma Mother    • COPD Mother    • Depression Mother    • Heart disease Mother    • Hyperlipidemia Mother    • Hypertension Mother    • Mental illness Mother    • Alcohol abuse Father    • Arthritis Father    • Hypertension Father    • Colon cancer Maternal Grandmother    • Stroke Other    • Heart disease Other    • Diabetes Other        Home Medications:  Prior to Admission medications    Medication Sig Start Date End Date Taking? Authorizing Provider   albuterol sulfate  (90 Base) MCG/ACT inhaler Inhale 2 puffs Every 4 (Four) Hours As Needed for Wheezing or Shortness of Air. 3/2/23   Humberto Cox APRN   atorvastatin (LIPITOR) 40 MG tablet Take 1 tablet by mouth Daily. 3/2/23   Humberto Cox APRN   benzonatate (TESSALON) 100 MG capsule Take 1 capsule by mouth 3 (Three) Times a Day As Needed for Cough for up to 21 doses. 4/22/23   Melissa Hernández APRN   Diclofenac Sodium (VOLTAREN) 1 % gel gel Apply  topically to the appropriate area as directed 4 (Four) Times a Day. Please have pharmacy change prescription to 2 g as needed, apply to affected area 4 times a day. 3/2/23   Humberto Cox APRN   doxycycline (MONODOX) 100 MG capsule Take 1 capsule by mouth 2 (Two) Times a Day for 7 days. 4/22/23 4/29/23   Melissa Hernández APRN   fluticasone (FLONASE) 50 MCG/ACT nasal spray 1 spray by Each Nare route Daily. Shake before using. 12/4/22   Ren Boswell MD   Fluticasone Furoate-Vilanterol (Breo Ellipta) 100-25 MCG/INH inhaler Inhale 1 puff Daily. 12/12/21   Ronaldo Prater APRN   gabapentin (NEURONTIN) 400 MG capsule Take 1 capsule by mouth 3 (Three) Times a Day. 3/15/22   Ren Boswell MD   gemfibrozil (LOPID) 600 MG tablet Take 1 tablet by mouth 2 (Two) Times a Day. 11/10/22   Humberto Cox APRN   methylPREDNISolone (MEDROL) 4 MG dose pack Take as directed on package instructions. 4/22/23   Melissa Hernández APRN   metoprolol succinate XL (TOPROL-XL) 50 MG 24 hr tablet Take 1 tablet by mouth Daily. 3/2/23   Humberto Cox APRN   mirtazapine (REMERON) 15 MG tablet Take 1 tablet by mouth Daily.    Ren Boswell MD   Omega-3 Fatty Acids (fish oil) 1000 MG capsule capsule Take 1 capsule by mouth Daily With Breakfast. 3/2/23   Humberto Cox APRN   ondansetron ODT (ZOFRAN-ODT) 4 MG disintegrating tablet Place 1 tablet under the tongue Every 8 (Eight) Hours As Needed for Nausea or Vomiting. 11/10/22   Humberto Cox APRN   pantoprazole (Protonix) 40 MG EC tablet Take 1 tablet by mouth Daily. 1/27/23 1/27/24  Atilio Lindsay MD   QUEtiapine (SEROquel) 300 MG tablet  3/8/23   Ren Boswell MD   QUEtiapine (SEROquel) 400 MG tablet Take 1 tablet by mouth Every Night. 2/8/22   Josy Fletcher APRN   sucralfate (Carafate) 1 g tablet Take 1 tablet by mouth 4 (Four) Times a Day. 10/26/22 10/26/23  Atilio Lindsay MD   vitamin D (ERGOCALCIFEROL) 1.25 MG (15074 UT) capsule capsule Take 1 capsule by mouth Every 7 (Seven) Days. 3/2/23   Humberto Cox APRN        Social History:   Social History     Tobacco Use   • Smoking status: Heavy Smoker     Packs/day: 1.00     Years: 47.00     Pack years: 47.00     Types: Cigarettes   • Smokeless tobacco: Never   Vaping Use   • Vaping Use: Never used  "  Substance Use Topics   • Alcohol use: Not Currently   • Drug use: Never         Review of Systems:  Review of Systems   Constitutional: Negative for chills and fever.   HENT: Negative for congestion, ear pain and sore throat.    Eyes: Negative for pain.   Respiratory: Positive for cough. Negative for chest tightness and shortness of breath.    Cardiovascular: Negative for chest pain.   Gastrointestinal: Negative for abdominal pain, diarrhea, nausea and vomiting.   Genitourinary: Negative for flank pain and hematuria.   Musculoskeletal: Negative for joint swelling.   Skin: Negative for pallor.   Neurological: Positive for dizziness. Negative for seizures and headaches.   All other systems reviewed and are negative.       Physical Exam:  BP (!) 182/95   Pulse 91   Temp 98 °F (36.7 °C) (Oral)   Resp 16   Ht 167.6 cm (66\")   Wt 64.7 kg (142 lb 10.2 oz)   SpO2 100%   BMI 23.02 kg/m²     Physical Exam  Vitals and nursing note reviewed.   Constitutional:       General: He is not in acute distress.     Appearance: Normal appearance. He is not toxic-appearing.   HENT:      Head: Normocephalic and atraumatic.      Mouth/Throat:      Mouth: Mucous membranes are moist.   Eyes:      General: No visual field deficit or scleral icterus.  Cardiovascular:      Rate and Rhythm: Normal rate and regular rhythm.      Pulses: Normal pulses.      Heart sounds: Normal heart sounds.   Pulmonary:      Effort: Pulmonary effort is normal. No respiratory distress.      Breath sounds: Normal breath sounds.   Abdominal:      General: Abdomen is flat.      Palpations: Abdomen is soft.      Tenderness: There is no abdominal tenderness.   Musculoskeletal:         General: Normal range of motion.      Cervical back: Normal range of motion and neck supple.   Skin:     General: Skin is warm and dry.   Neurological:      Mental Status: He is alert and oriented to person, place, and time. Mental status is at baseline.      Cranial Nerves: " Cranial nerves 2-12 are intact. No cranial nerve deficit.      Sensory: Sensation is intact. No sensory deficit.      Motor: Motor function is intact.      Coordination: Coordination normal. Finger-Nose-Finger Test normal.                  Procedures:  Procedures      Medical Decision Making:      Comorbidities that affect care:    COPD, Hypertension, smoking    External Notes reviewed:    Past clinic notes      The following orders were placed and all results were independently analyzed by me:  Orders Placed This Encounter   Procedures   • CT Head Without Contrast   • [place] Draw   • Comprehensive Metabolic Panel   • Single High Sensitivity Troponin T   • Magnesium   • Urinalysis With Microscopic If Indicated (No Culture) - Urine, Clean Catch   • CBC Auto Differential   • BNP   • NPO Diet NPO Type: Strict NPO   • Undress & Gown   • Cardiac Monitoring   • Continuous Pulse Oximetry   • Vital Signs   • Orthostatic Blood Pressure   • Oxygen Therapy- Nasal Cannula; 2 LPM; Titrate for SPO2: 92%, Greater Than or Equal To   • POC Glucose Once   • ECG 12 Lead ED Triage Standing Order; Weak / Dizzy / AMS   • Insert Peripheral IV   • Fall Precautions   • CBC & Differential   • Green Top (Gel)   • Lavender Top   • Gold Top - SST   • Light Blue Top       Medications Given in the Emergency Department:  Medications   sodium chloride 0.9 % flush 10 mL (has no administration in time range)        ED Course:    ED Course as of 04/22/23 1816   Sat Apr 22, 2023   1404 PROVIDER IN TRIAGE  Patient was evaluated by me in triage, Seamus Nolasco PA-C.  Orders were placed and patient is currently awaiting final results and disposition.      [MD]   1343 I canceled chest x-ray in the emergency department, patient had 2 view chest x-ray completed at urgent care directly prior to arrival that shows small pleural effusion and cardiomegaly. [MD]   2310 EKG: I reviewed and interpreted his twelve-lead EKG is normal sinus rhythm 85 bpm, normal P  waves, normal QRS, normal ST segments and T waves; no acute ischemia or ectopy. [VS]      ED Course User Index  [MD] Seamus Nolasco PA-C  [VS] Jayy Olivo MD       Labs:    Lab Results (last 24 hours)     Procedure Component Value Units Date/Time    CBC & Differential [[id number]]  (Abnormal) Collected: 04/22/23 1354    Specimen: Blood Updated: 04/22/23 1402    Narrative:      The following orders were created for panel order CBC & Differential.  Procedure                               Abnormality         Status                     ---------                               -----------         ------                     CBC Auto Differential[[id number]]        Abnormal            Final result                 Please view results for these tests on the individual orders.    Comprehensive Metabolic Panel [[id number]]  (Abnormal) Collected: 04/22/23 1354    Specimen: Blood Updated: 04/22/23 1421     Glucose 107 mg/dL      BUN 6 mg/dL      Creatinine 0.98 mg/dL      Sodium 137 mmol/L      Potassium 3.6 mmol/L      Chloride 101 mmol/L      CO2 26.9 mmol/L      Calcium 9.0 mg/dL      Total Protein 6.8 g/dL      Albumin 4.1 g/dL      ALT (SGPT) 8 U/L      AST (SGOT) 14 U/L      Alkaline Phosphatase 117 U/L      Total Bilirubin 0.6 mg/dL      Globulin 2.7 gm/dL      A/G Ratio 1.5 g/dL      BUN/Creatinine Ratio 6.1     Anion Gap 9.1 mmol/L      eGFR 88.3 mL/min/1.73     Narrative:      GFR Normal >60  Chronic Kidney Disease <60  Kidney Failure <15      Single High Sensitivity Troponin T [[id number]]  (Normal) Collected: 04/22/23 1354    Specimen: Blood Updated: 04/22/23 1421     HS Troponin T 8 ng/L     Narrative:      High Sensitive Troponin T Reference Range:  <10.0 ng/L- Negative Female for AMI  <15.0 ng/L- Negative Male for AMI  >=10 - Abnormal Female indicating possible myocardial injury.  >=15 - Abnormal Male indicating possible myocardial injury.   Clinicians would have to utilize clinical acumen, EKG, Troponin, and  serial changes to determine if it is an Acute Myocardial Infarction or myocardial injury due to an underlying chronic condition.         Magnesium [[id number]]  (Normal) Collected: 04/22/23 1354    Specimen: Blood Updated: 04/22/23 1421     Magnesium 1.9 mg/dL     CBC Auto Differential [[id number]]  (Abnormal) Collected: 04/22/23 1354    Specimen: Blood Updated: 04/22/23 1402     WBC 7.23 10*3/mm3      RBC 4.78 10*6/mm3      Hemoglobin 13.6 g/dL      Hematocrit 40.3 %      MCV 84.3 fL      MCH 28.5 pg      MCHC 33.7 g/dL      RDW 13.2 %      RDW-SD 40.8 fl      MPV 10.8 fL      Platelets 210 10*3/mm3      Neutrophil % 75.5 %      Lymphocyte % 16.0 %      Monocyte % 6.5 %      Eosinophil % 1.2 %      Basophil % 0.4 %      Immature Grans % 0.4 %      Neutrophils, Absolute 5.45 10*3/mm3      Lymphocytes, Absolute 1.16 10*3/mm3      Monocytes, Absolute 0.47 10*3/mm3      Eosinophils, Absolute 0.09 10*3/mm3      Basophils, Absolute 0.03 10*3/mm3      Immature Grans, Absolute 0.03 10*3/mm3      nRBC 0.0 /100 WBC     BNP [[id number]]  (Normal) Collected: 04/22/23 1354    Specimen: Blood Updated: 04/22/23 1523     proBNP 652.9 pg/mL     Narrative:      Among patients with dyspnea, NT-proBNP is highly sensitive for the detection of acute congestive heart failure. In addition NT-proBNP of <300 pg/ml effectively rules out acute congestive heart failure with 99% negative predictive value.    Results may be falsely decreased if patient taking Biotin.             Imaging:    XR Chest 2 View    Result Date: 4/22/2023  PROCEDURE: XR CHEST 2 VW  COMPARISON: [facility], CR, XR CHEST 1 VW, 2/06/2022, 5:06.  [facility], CR, CHEST PA/AP & LAT 2V, 3/03/2017, 14:05.  [facility], CR, CHEST AP/PA 1 VIEW, 11/22/2020, 6:50.  INDICATIONS: cough history of copd  FINDINGS:   Cardiomegaly is present.  The pulmonary vasculature is within normal limits.  There is a small left pleural effusion.  No focal areas of airspace  consolidation are identified.  No evidence of pneumothorax.  IMPRESSION: Cardiomegaly.  Small left pleural effusion.   STEPHANIE MILLER MD       Electronically Signed and Approved By: STEPHANIE MILLER MD on 4/22/2023 at 12:11             CT Head Without Contrast    Result Date: 4/22/2023  PROCEDURE: CT HEAD WO CONTRAST  COMPARISON:  None INDICATIONS: dizziness, left sinus pressure; eval left maxillary sinusitis  PROTOCOL:   Standard imaging protocol performed    RADIATION:   DLP: 1018.2 mGy*cm   MA and/or KV was adjusted to minimize radiation dose.     TECHNIQUE: Axial images of the head without intravenous contrast.  FINDINGS:  The ventricles have a normal size and configuration. There is no evidence of acute intracranial hemorrhage, mass or midline shift. No extra-axial fluid collections are identified. There are no skull fractures. The visualized paranasal sinuses and mastoid air cells are grossly clear.  IMPRESSION: Normal exam.  STEPHANIE MILLER MD       Electronically Signed and Approved By: STEPHANIE MILLER MD on 4/22/2023 at 15:12                 Differential Diagnosis and Discussion:    Dizziness: Based on the patient's history, signs, and symptoms, the diffential diagnosis includes but is not limited to meningitis, stroke, sepsis, subarachnoid hemorrhage, intracranial bleeding, encephalitis, vertigo, electrolyte imbalance, and metabolic disorders.    All labs were reviewed and interpreted by me.  EKG was interpreted by me.  CT scan radiology impression was interpreted by me.    MDM           This patient is a 58-year-old male who reports that he has had some productive yellowish-green cough and also some yellowish-green nasal discharge and left maxillary sinus pressure and feels like he probably has sinusitis and bronchitis again but also having dizziness for the past 3 days.    The dizziness is intermittent usually when he gets up and moves about and he occasionally feels lightheaded.    He has a  completely normal nonfocal neurologic exam including normal finger-nose-finger and a steady gait.    He has a unremarkable lab work-up here and CT of the head is unremarkable and EKG normal sinus rhythm.    While I considered vertigo versus sinus related or eustachian tube issues causing his dizziness I also considered possibility of posterior circulation stroke.    Unfortunately he is highly allergic to CT contrast dye and also was not wanting to undergo MRI of the brain due to his severe claustrophobia issues.    Given that I do not see any obvious issues of ischemic stroke at this time I will have him start a course of steroid Dosepak and antibiotics to cover for sinusitis and bronchitis and follow-up closely with his primary care physician if he is not any better in a couple of days                  Patient Care Considerations:    MRI: I considered ordering an MRI however Patient reports significant claustrophobia and unable to tolerate MRI well      Consultants/Shared Management Plan:    None    Social Determinants of Health:    Patient is independent, reliable, and has access to care.       Disposition and Care Coordination:    Discharged: I considered escalation of care by admitting this patient for observation, however the patient has improved and is suitable and  stable for discharge.    I have explained the patient´s condition, diagnoses and treatment plan based on the information available to me at this time. I have answered questions and addressed any concerns. The patient has a good  understanding of the patient´s diagnosis, condition, and treatment plan as can be expected at this point. The vital signs have been stable. The patient´s condition is stable and appropriate for discharge from the emergency department.      The patient will pursue further outpatient evaluation with the primary care physician or other designated or consulting physician as outlined in the discharge instructions. They are  agreeable to this plan of care and follow-up instructions have been explained in detail. The patient has received these instructions in written format and have expressed an understanding of the discharge instructions. The patient is aware that any significant change in condition or worsening of symptoms should prompt an immediate return to this or the closest emergency department or call to 911.  I have explained discharge medications and the need for follow up with the patient/caretakers. This was also printed in the discharge instructions. Patient was discharged with the following medications and follow up:      Where to Get Your Medications      These medications were sent to [facility]/pharmacy #[id number] - [place], [place] - [address] - [phone]  - [phone] FX  [address] [place] [address]    Hours: 24-hours Phone: [phone]   · benzonatate 100 MG capsule  · doxycycline 100 MG capsule  · methylPREDNISolone 4 MG dose pack        Medication List      No changes were made to your prescriptions during this visit.      Peewee Caceres MD  [facility] [place] [id number] [phone]    Call in 2 days  for a follow-up appointment for further cardiac work-up, and to discuss your chest x-ray showing enlarged heart.    Humberto Cox, APRN  [address]  [address]  [facility] [address] [phone]    Call in 2 days  As needed, If symptoms worsen, for a follow-up appointment       Final diagnoses:   Dizziness   Acute maxillary sinusitis, recurrence not specified   Acute bronchitis, unspecified organism   Hypertension, unspecified type        ED Disposition     ED Disposition   Discharge    Condition   Stable    Comment   --             This medical record created using voice recognition software.             Lamont Rmoo  04/22/23 1426       Lamont Romo  04/22/23 1424       Jayy Olivo MD  04/22/23 [id number]

## 2023-04-25 ENCOUNTER — OFFICE VISIT (OUTPATIENT)
Dept: INTERNAL MEDICINE | Facility: CLINIC | Age: 58
End: 2023-04-25
Payer: MEDICARE

## 2023-04-25 VITALS
HEART RATE: 121 BPM | TEMPERATURE: 98.1 F | SYSTOLIC BLOOD PRESSURE: 122 MMHG | DIASTOLIC BLOOD PRESSURE: 88 MMHG | BODY MASS INDEX: 23.14 KG/M2 | OXYGEN SATURATION: 98 % | RESPIRATION RATE: 16 BRPM | WEIGHT: 144 LBS | HEIGHT: 66 IN

## 2023-04-25 DIAGNOSIS — I51.7 CARDIOMEGALY: Primary | ICD-10-CM

## 2023-04-25 DIAGNOSIS — J20.9 ACUTE BRONCHITIS, UNSPECIFIED ORGANISM: ICD-10-CM

## 2023-04-25 DIAGNOSIS — R42 DIZZINESS: ICD-10-CM

## 2023-04-25 RX ORDER — QUETIAPINE FUMARATE 200 MG/1
TABLET, FILM COATED ORAL
COMMUNITY
Start: 2023-04-24

## 2023-05-07 LAB — QT INTERVAL: 373 MS

## 2023-05-09 ENCOUNTER — TELEPHONE (OUTPATIENT)
Dept: SURGERY | Facility: CLINIC | Age: 58
End: 2023-05-09
Payer: MEDICARE

## 2023-05-09 ENCOUNTER — OFFICE VISIT (OUTPATIENT)
Dept: INTERNAL MEDICINE | Facility: CLINIC | Age: 58
End: 2023-05-09
Payer: MEDICARE

## 2023-05-09 VITALS
HEIGHT: 66 IN | TEMPERATURE: 97.6 F | HEART RATE: 89 BPM | BODY MASS INDEX: 22.31 KG/M2 | SYSTOLIC BLOOD PRESSURE: 150 MMHG | WEIGHT: 138.8 LBS | OXYGEN SATURATION: 96 % | DIASTOLIC BLOOD PRESSURE: 80 MMHG

## 2023-05-09 DIAGNOSIS — R53.83 OTHER FATIGUE: ICD-10-CM

## 2023-05-09 DIAGNOSIS — R06.09 DYSPNEA ON EXERTION: Primary | ICD-10-CM

## 2023-05-09 DIAGNOSIS — F41.9 ANXIETY: ICD-10-CM

## 2023-05-09 DIAGNOSIS — I10 PRIMARY HYPERTENSION: ICD-10-CM

## 2023-05-09 DIAGNOSIS — F32.A DEPRESSION, UNSPECIFIED DEPRESSION TYPE: ICD-10-CM

## 2023-05-09 DIAGNOSIS — J44.9 CHRONIC OBSTRUCTIVE PULMONARY DISEASE, UNSPECIFIED COPD TYPE: ICD-10-CM

## 2023-05-09 DIAGNOSIS — F17.219 CIGARETTE NICOTINE DEPENDENCE WITH NICOTINE-INDUCED DISORDER: ICD-10-CM

## 2023-05-09 DIAGNOSIS — K21.9 GERD WITHOUT ESOPHAGITIS: ICD-10-CM

## 2023-05-09 DIAGNOSIS — R13.10 DYSPHAGIA, UNSPECIFIED TYPE: ICD-10-CM

## 2023-05-09 DIAGNOSIS — E78.5 HYPERLIPIDEMIA, UNSPECIFIED HYPERLIPIDEMIA TYPE: ICD-10-CM

## 2023-05-09 DIAGNOSIS — F31.9 BIPOLAR 1 DISORDER: ICD-10-CM

## 2023-05-09 RX ORDER — QUETIAPINE FUMARATE 300 MG/1
300 TABLET, FILM COATED ORAL NIGHTLY
COMMUNITY

## 2023-05-09 RX ORDER — BENZONATATE 100 MG
CAPSULE ORAL
COMMUNITY
Start: 2023-04-28

## 2023-05-09 NOTE — TELEPHONE ENCOUNTER
PATIENT CALLED AND SAID HE IS HAVING A REALLY HARD TIME SWALLOWING.  HE SAID HIS PCP, ARGENTINA QUEZADA, TOLD HIM TO CALL AND FIND OUT IF HIS EGD ON 07/06/23 CAN BE MOVED UP SOONER.      HE SAID YOU MAY CALL HIM OR HIS , LAUREN COLEON.

## 2023-05-10 RX ORDER — GEMFIBROZIL 600 MG/1
TABLET, FILM COATED ORAL
Qty: 180 TABLET | Refills: 1 | Status: SHIPPED | OUTPATIENT
Start: 2023-05-10

## 2023-05-10 NOTE — PROGRESS NOTES
Chief Complaint  Fatigue and Anorexia (Pt c/o loss of appetite, extreme fatigue and issues swallowing)    Subjective        Atilio Recinos presents to [facility] for follow-up for fatigue, loss of appetite, problems with swallowing.    Patient was last seen by me on 4/25/2023, patient was here to follow-up on cardiomegaly.  Patient was recently seen on 4/22/2023 for cough, shortness of breath, COPD exacerbation, chest x-ray showed cardiomegaly, sent to the emergency room, patient was treated with antibiotics, steroids, discharged in stable condition.  On his visit with me on 4/25/2023 we establish that his cardiomegaly had been present for several years based on x-ray and previous testing that had been completed over the years.  Patient has had hypertension for several years, treated, typically within normal limits.  Patient is here today reporting that he feels that his fatigue is worsening.  Patient gets winded more easily these days, he is tired all the time, wanting to rest.  He gets dyspneic with any exertion.  Patient is longtime smoker, diagnosed with COPD, currently using Breo daily and albuterol as needed, does not feel like he is required his albuterol significantly more than typical.  He was concerned that maybe his heart is causing some of his symptoms.  Patient has also been working with his psychiatrist on his mental health medications.  They have been titrating his Seroquel dose over the last few months, they went down on his Seroquel, which has been controlling his anxiety fairly well, and patient is now concerned that maybe his anxiety is the biggest factor in his current symptoms.  He is also on gabapentin, he takes 400 mg 3-4 times a day.  He uses mirtazapine also daily at night.  He is currently working, but having issues making it to work due to his reported symptoms.  Patient wants to continue working, but is finding it difficult right now.  Patient had lab work  "completed in the last couple of weeks, there were no major concerns on blood work.  He was referred to cardiology, however appointment is not until June.    Patient is also reporting swallowing problems, this has been more long-term, reports a reduced appetite, feels like he is losing weight, approximately 8 to 10 pounds over the last couple of months.  Has trouble swallowing, feels like food gets stuck in his throat.  He sees Dr. Lindsay, he has EGD scheduled for July due to this complaint.  He is on Protonix daily.    Objective   Vital Signs:   /80 (BP Location: Left arm, Patient Position: Sitting, Cuff Size: Adult)   Pulse 89   Temp 97.6 °F (36.4 °C) (Temporal)   Ht 167.6 cm (66\")   Wt 63 kg (138 lb 12.8 oz)   SpO2 96%   BMI 22.40 kg/m²     Physical Exam  Vitals and nursing note reviewed.   Constitutional:       Appearance: Normal appearance. He is normal weight.   HENT:      Head: Normocephalic and atraumatic.      Right Ear: External ear normal.      Left Ear: External ear normal.      Nose: Nose normal.   Eyes:      Pupils: Pupils are equal, round, and reactive to light.   Cardiovascular:      Rate and Rhythm: Normal rate and regular rhythm.      Pulses: Normal pulses.      Heart sounds: Normal heart sounds.   Pulmonary:      Effort: Pulmonary effort is normal.      Breath sounds: Normal breath sounds.   Neurological:      Mental Status: He is alert.   Psychiatric:         Mood and Affect: Mood normal.        Result Review :  {The following data was reviewed by DIRK Montelongo on 05/10/23                Diagnoses and all orders for this visit:    1. Dyspnea on exertion (Primary)  -     Adult Transthoracic Echo Complete w/ Color, Spectral and Contrast if necessary per protocol; Future    2. Primary hypertension    3. Hyperlipidemia, unspecified hyperlipidemia type    4. Dysphagia, unspecified type    5. GERD without esophagitis    6. Anxiety    7. Bipolar 1 disorder (HCC)    8. Depression, " unspecified depression type    9. Chronic obstructive pulmonary disease, unspecified COPD type    10. Cigarette nicotine dependence with nicotine-induced disorder    11. Other fatigue    Spent ample time reviewing patient's lab work over the last 12 months, we also reviewed previous echocardiogram, x-rays, imaging of chest, patient is still needing to get his low-dose CT scan completed.  Previous did show emphysematous changes, and what of my concerns is his COPD may be worsening, we may need to consider getting in with pulmonology if this continues to worsen.  His blood pressure was elevated today, which we did discuss, but he is typically normal.  I will not make any aggressive changes today, we will monitor closely.  Most recent lab work was reviewed his cholesterol is stable.  We discussed his swallowing and weight loss, he is going to contact Dr. Lindsay's office to further discuss, he wants to get his appointment for EGD moved up.  We discussed him using supplemental meal replacement shakes, which she will entertain, we discussed that with his COPD he likely is not getting enough caloric intake, so would encourage him to supplement his meals 2-3 times a day with a shake.  We discussed his COPD, his current smoking status, and strongly encouraged him to cut back on his smoking as COPD is progressive and we will continue to worsen if he continues to smoke.  Patient understands this and will continue to look at ways to quit, defers any assistance at this time.  Patient also with significant anxiety with bipolar depression.  His medications have been titrated over the last few months, unsure if this would be the only cause of his symptoms, but he will further discuss with his psychiatrist.  We will go ahead and order echocardiogram, his appointment with cardiology is in June.  On exam today, I do not have any significant concerns for his heart, but definitely need to look further into this.  His previous echo  done a year ago looked good, his ejection fraction was 65% estimated.  This was all discussed at length with the patient.  Most recent lab work did not reveal any significant abnormal findings.  We will work on getting echo, cardiology referral, and he will discuss medication and dysphagia with his specialist.  We will follow along with him closely, he is encouraged to follow-up as needed based on his symptoms, so if worsening, would recommend getting seen sooner.  Patient understands and agrees with plan of care.    I spent greater than 60 minutes caring for Atilio on this date of service. This time includes time spent by me in the following activities:preparing for the visit, reviewing tests, obtaining and/or reviewing a separately obtained history, performing a medically appropriate examination and/or evaluation , counseling and educating the patient/family/caregiver, ordering medications, tests, or procedures and documenting information in the medical record  Follow Up   Return if symptoms worsen or fail to improve.  Patient was given instructions and counseling regarding his condition or for health maintenance advice. Please see specific information pulled into the AVS if appropriate.     Humberto Cox, APRN  5/10/2023  This note was electronically signed.

## 2023-05-17 ENCOUNTER — APPOINTMENT (OUTPATIENT)
Dept: GENERAL RADIOLOGY | Facility: HOSPITAL | Age: 58
End: 2023-05-17
Payer: MEDICARE

## 2023-05-17 ENCOUNTER — APPOINTMENT (OUTPATIENT)
Dept: CARDIOLOGY | Facility: HOSPITAL | Age: 58
End: 2023-05-17
Payer: MEDICARE

## 2023-05-17 ENCOUNTER — APPOINTMENT (OUTPATIENT)
Dept: CT IMAGING | Facility: HOSPITAL | Age: 58
End: 2023-05-17
Payer: MEDICARE

## 2023-05-17 ENCOUNTER — TELEPHONE (OUTPATIENT)
Dept: INTERNAL MEDICINE | Facility: CLINIC | Age: 58
End: 2023-05-17

## 2023-05-17 ENCOUNTER — HOSPITAL ENCOUNTER (EMERGENCY)
Facility: HOSPITAL | Age: 58
Discharge: ANOTHER HOSPITAL | End: 2023-05-17
Attending: EMERGENCY MEDICINE | Admitting: EMERGENCY MEDICINE
Payer: MEDICARE

## 2023-05-17 VITALS
OXYGEN SATURATION: 94 % | HEART RATE: 100 BPM | DIASTOLIC BLOOD PRESSURE: 99 MMHG | BODY MASS INDEX: 22.18 KG/M2 | WEIGHT: 138 LBS | SYSTOLIC BLOOD PRESSURE: 149 MMHG | RESPIRATION RATE: 23 BRPM | TEMPERATURE: 98.6 F | HEIGHT: 66 IN

## 2023-05-17 DIAGNOSIS — I31.39 PERICARDIAL EFFUSION: Primary | ICD-10-CM

## 2023-05-17 LAB
ALBUMIN SERPL-MCNC: 3.7 G/DL (ref 3.5–5.2)
ALBUMIN/GLOB SERPL: 1.1 G/DL
ALP SERPL-CCNC: 102 U/L (ref 39–117)
ALT SERPL W P-5'-P-CCNC: 6 U/L (ref 1–41)
ANION GAP SERPL CALCULATED.3IONS-SCNC: 10.4 MMOL/L (ref 5–15)
AST SERPL-CCNC: 10 U/L (ref 1–40)
BASOPHILS # BLD AUTO: 0.03 10*3/MM3 (ref 0–0.2)
BASOPHILS NFR BLD AUTO: 0.3 % (ref 0–1.5)
BH CV ECHO MEAS - AO MEAN PG: 2.6 MMHG
BH CV ECHO MEAS - AO ROOT DIAM: 3 CM
BH CV ECHO MEAS - AO V2 VTI: 17 CM
BH CV ECHO MEAS - AVA(I,D): 2.9 CM2
BH CV ECHO MEAS - EDV(CUBED): 41.1 ML
BH CV ECHO MEAS - EDV(MOD-SP2): 62.4 ML
BH CV ECHO MEAS - EDV(MOD-SP4): 67.9 ML
BH CV ECHO MEAS - EF(MOD-BP): 57 %
BH CV ECHO MEAS - EF(MOD-SP2): 57.7 %
BH CV ECHO MEAS - EF(MOD-SP4): 57.1 %
BH CV ECHO MEAS - ESV(CUBED): 31.8 ML
BH CV ECHO MEAS - ESV(MOD-SP2): 26.4 ML
BH CV ECHO MEAS - ESV(MOD-SP4): 29.1 ML
BH CV ECHO MEAS - FS: 8.2 %
BH CV ECHO MEAS - IVS/LVPW: 0.89 CM
BH CV ECHO MEAS - IVSD: 1.25 CM
BH CV ECHO MEAS - LA DIMENSION: 3.1 CM
BH CV ECHO MEAS - LAT PEAK E' VEL: 6.1 CM/SEC
BH CV ECHO MEAS - LV MASS(C)D: 155.4 GRAMS
BH CV ECHO MEAS - LV MAX PG: 3.5 MMHG
BH CV ECHO MEAS - LV MEAN PG: 2.02 MMHG
BH CV ECHO MEAS - LV V1 MAX: 93.2 CM/SEC
BH CV ECHO MEAS - LV V1 VTI: 16.3 CM
BH CV ECHO MEAS - LVIDD: 3.5 CM
BH CV ECHO MEAS - LVIDS: 3.2 CM
BH CV ECHO MEAS - LVOT AREA: 3.1 CM2
BH CV ECHO MEAS - LVOT DIAM: 1.97 CM
BH CV ECHO MEAS - LVPWD: 1.4 CM
BH CV ECHO MEAS - MED PEAK E' VEL: 6.9 CM/SEC
BH CV ECHO MEAS - MV A MAX VEL: 89.3 CM/SEC
BH CV ECHO MEAS - MV DEC SLOPE: 382.3 CM/SEC2
BH CV ECHO MEAS - MV DEC TIME: 0.22 MSEC
BH CV ECHO MEAS - MV E MAX VEL: 79.8 CM/SEC
BH CV ECHO MEAS - MV E/A: 0.89
BH CV ECHO MEAS - MV MEAN PG: 1.8 MMHG
BH CV ECHO MEAS - MV P1/2T: 64.1 MSEC
BH CV ECHO MEAS - MV V2 VTI: 20.9 CM
BH CV ECHO MEAS - MVA(P1/2T): 3.4 CM2
BH CV ECHO MEAS - MVA(VTI): 2.38 CM2
BH CV ECHO MEAS - RVDD: 1.99 CM
BH CV ECHO MEAS - SV(LVOT): 49.7 ML
BH CV ECHO MEAS - SV(MOD-SP2): 36 ML
BH CV ECHO MEAS - SV(MOD-SP4): 38.8 ML
BH CV ECHO MEASUREMENTS AVERAGE E/E' RATIO: 12.28
BILIRUB SERPL-MCNC: 0.8 MG/DL (ref 0–1.2)
BUN SERPL-MCNC: 16 MG/DL (ref 8–23)
BUN/CREAT SERPL: 13 (ref 7–25)
CALCIUM SPEC-SCNC: 9.2 MG/DL (ref 8.6–10.5)
CHLORIDE SERPL-SCNC: 103 MMOL/L (ref 98–107)
CO2 SERPL-SCNC: 25.6 MMOL/L (ref 22–29)
CREAT SERPL-MCNC: 1.23 MG/DL (ref 0.76–1.27)
DEPRECATED RDW RBC AUTO: 42.4 FL (ref 37–54)
EGFRCR SERPLBLD CKD-EPI 2021: 67.2 ML/MIN/1.73
EOSINOPHIL # BLD AUTO: 0.13 10*3/MM3 (ref 0–0.4)
EOSINOPHIL NFR BLD AUTO: 1.4 % (ref 0.3–6.2)
ERYTHROCYTE [DISTWIDTH] IN BLOOD BY AUTOMATED COUNT: 13.4 % (ref 12.3–15.4)
GEN 5 2HR TROPONIN T REFLEX: 9 NG/L
GLOBULIN UR ELPH-MCNC: 3.3 GM/DL
GLUCOSE SERPL-MCNC: 125 MG/DL (ref 65–99)
HCT VFR BLD AUTO: 41.4 % (ref 37.5–51)
HGB BLD-MCNC: 13.8 G/DL (ref 13–17.7)
HOLD SPECIMEN: NORMAL
HOLD SPECIMEN: NORMAL
IMM GRANULOCYTES # BLD AUTO: 0.04 10*3/MM3 (ref 0–0.05)
IMM GRANULOCYTES NFR BLD AUTO: 0.4 % (ref 0–0.5)
LEFT ATRIUM VOLUME INDEX: 23.5 ML/M2
LIPASE SERPL-CCNC: 28 U/L (ref 13–60)
LYMPHOCYTES # BLD AUTO: 1.4 10*3/MM3 (ref 0.7–3.1)
LYMPHOCYTES NFR BLD AUTO: 15.5 % (ref 19.6–45.3)
MAGNESIUM SERPL-MCNC: 2.1 MG/DL (ref 1.6–2.4)
MAXIMAL PREDICTED HEART RATE: 160 BPM
MCH RBC QN AUTO: 28.8 PG (ref 26.6–33)
MCHC RBC AUTO-ENTMCNC: 33.3 G/DL (ref 31.5–35.7)
MCV RBC AUTO: 86.4 FL (ref 79–97)
MONOCYTES # BLD AUTO: 0.85 10*3/MM3 (ref 0.1–0.9)
MONOCYTES NFR BLD AUTO: 9.4 % (ref 5–12)
NEUTROPHILS NFR BLD AUTO: 6.61 10*3/MM3 (ref 1.7–7)
NEUTROPHILS NFR BLD AUTO: 73 % (ref 42.7–76)
NRBC BLD AUTO-RTO: 0 /100 WBC (ref 0–0.2)
NT-PROBNP SERPL-MCNC: 561.6 PG/ML (ref 0–900)
PLATELET # BLD AUTO: 215 10*3/MM3 (ref 140–450)
PMV BLD AUTO: 11.2 FL (ref 6–12)
POTASSIUM SERPL-SCNC: 4 MMOL/L (ref 3.5–5.2)
PROT SERPL-MCNC: 7 G/DL (ref 6–8.5)
QT INTERVAL: 310 MS
RBC # BLD AUTO: 4.79 10*6/MM3 (ref 4.14–5.8)
SODIUM SERPL-SCNC: 139 MMOL/L (ref 136–145)
STRESS TARGET HR: 136 BPM
TROPONIN T DELTA: -1 NG/L
TROPONIN T SERPL HS-MCNC: 10 NG/L
WBC NRBC COR # BLD: 9.06 10*3/MM3 (ref 3.4–10.8)
WHOLE BLOOD HOLD COAG: NORMAL
WHOLE BLOOD HOLD SPECIMEN: NORMAL

## 2023-05-17 PROCEDURE — 83690 ASSAY OF LIPASE: CPT

## 2023-05-17 PROCEDURE — 93306 TTE W/DOPPLER COMPLETE: CPT | Performed by: SPECIALIST

## 2023-05-17 PROCEDURE — 96374 THER/PROPH/DIAG INJ IV PUSH: CPT

## 2023-05-17 PROCEDURE — 71250 CT THORAX DX C-: CPT

## 2023-05-17 PROCEDURE — 36415 COLL VENOUS BLD VENIPUNCTURE: CPT

## 2023-05-17 PROCEDURE — 93306 TTE W/DOPPLER COMPLETE: CPT

## 2023-05-17 PROCEDURE — 80053 COMPREHEN METABOLIC PANEL: CPT

## 2023-05-17 PROCEDURE — 85025 COMPLETE CBC W/AUTO DIFF WBC: CPT

## 2023-05-17 PROCEDURE — 84484 ASSAY OF TROPONIN QUANT: CPT

## 2023-05-17 PROCEDURE — 93005 ELECTROCARDIOGRAM TRACING: CPT

## 2023-05-17 PROCEDURE — 99284 EMERGENCY DEPT VISIT MOD MDM: CPT

## 2023-05-17 PROCEDURE — 83735 ASSAY OF MAGNESIUM: CPT

## 2023-05-17 PROCEDURE — 83880 ASSAY OF NATRIURETIC PEPTIDE: CPT

## 2023-05-17 PROCEDURE — 71045 X-RAY EXAM CHEST 1 VIEW: CPT

## 2023-05-17 PROCEDURE — 93005 ELECTROCARDIOGRAM TRACING: CPT | Performed by: EMERGENCY MEDICINE

## 2023-05-17 PROCEDURE — 84484 ASSAY OF TROPONIN QUANT: CPT | Performed by: EMERGENCY MEDICINE

## 2023-05-17 PROCEDURE — 25010000002 MORPHINE PER 10 MG: Performed by: EMERGENCY MEDICINE

## 2023-05-17 RX ORDER — ASPIRIN 81 MG/1
324 TABLET, CHEWABLE ORAL ONCE
Status: COMPLETED | OUTPATIENT
Start: 2023-05-17 | End: 2023-05-17

## 2023-05-17 RX ORDER — SODIUM CHLORIDE 0.9 % (FLUSH) 0.9 %
10 SYRINGE (ML) INJECTION AS NEEDED
Status: DISCONTINUED | OUTPATIENT
Start: 2023-05-17 | End: 2023-05-17 | Stop reason: HOSPADM

## 2023-05-17 RX ORDER — MORPHINE SULFATE 2 MG/ML
2 INJECTION, SOLUTION INTRAMUSCULAR; INTRAVENOUS ONCE
Status: COMPLETED | OUTPATIENT
Start: 2023-05-17 | End: 2023-05-17

## 2023-05-17 RX ADMIN — MORPHINE SULFATE 2 MG: 2 INJECTION, SOLUTION INTRAMUSCULAR; INTRAVENOUS at 19:26

## 2023-05-17 RX ADMIN — ASPIRIN 324 MG: 81 TABLET, CHEWABLE ORAL at 12:07

## 2023-05-17 NOTE — TELEPHONE ENCOUNTER
Caller: LAUREN ZAVALA    Relationship: Emergency Contact    Best call back number: [phone]    What is the best time to reach you: ANY    Who are you requesting to speak with (clinical staff, provider,  specific staff member): CLINICAL    What was the call regarding: PATIENT'S SIGNIFICANT OTHER CALLED TO REPORT THAT HE IS STILL HAVING SICK SYMPTOMS SUCH AS WHEEZING AND FATIGUE. HE STATED HE DID RESEARCH ON CARDIAC ASTHMA AND WOULD LIKE TO DISCUSS WITH A MEMBER OF THE CLINICAL STAFF.     Do you require a callback: YES

## 2023-05-17 NOTE — ED PROVIDER NOTES
Time: 4:30 PM EDT  Date of encounter:  5/17/2023  Independent Historian/Clinical History and Information was obtained by:   Patient  Chief Complaint: Shortness of breath and chest pain    History is limited by: N/A    History of Present Illness:  Patient is a 58 y.o. year old male who presents to the emergency department for evaluation of shortness of breath and chest pain.  Patient states for the past month he has been having increasing shortness of breath.  Patient states that his exertional capacity is significantly decreased.  He also reports he has no energy and overall increased weakness.  Patient states he cannot walk the distance he normally should or could build to walk.  Patient also reports chest pain over the last couple days.  Patient states his chest pain is worse when he takes a deep breath.  Patient was seen in urgent care 2 to 3 weeks ago and has outpatient follow-ups with her cardiologist as a new patient on June 30 with Dr. Singh and he has an echocardiogram scheduled for June 19.  Patient for that he is getting worse and cannot wait until that point to be seen.        HPI    Patient Care Team  Primary Care Provider: Humberto Cox APRN    Past Medical History:     Allergies   Allergen Reactions   • Amoxicillin-Pot Clavulanate Hives   • Diphenhydramine Hives   • Dye [Ct Contrast] Hives     Past Medical History:   Diagnosis Date   • Allergies    • Anemia 2/2022   • Anxiety    • Asthma 1992   • Bipolar 1 disorder    • Bowel perforation 01/16/2022   • Chronic kidney disease 1/2022   • COPD (chronic obstructive pulmonary disease)    • COVID-19 02/19/2022   • Depression    • Fissure, anal 2003   • Forgetfulness    • Hard to intubate     High gag reflex.   • Head injury    • Hepatitis    • High blood pressure    • High cholesterol    • History of transfusion 2/2022   • Pancreatitis [id number]   • PONV (postoperative nausea and vomiting) 1/2022   • Psychiatric illness    • Rectal bleeding 2003   • S/P  exploratory laparotomy, oversew of perforated ulcer, placement of [place] patch  01/17/2022   • Shortness of breath    • Sinus trouble      Past Surgical History:   Procedure Laterality Date   • COLONOSCOPY N/A 9/22/2022    Procedure: COLONOSCOPY;  Surgeon: Atilio Lindsay MD;  Location: [facility] ENDOSCOPY;  Service: General;  Laterality: N/A;  DIVERTICULOSIS   • ENDOSCOPY N/A 9/22/2022    Procedure: ESOPHAGOGASTRODUODENOSCOPY WITH BIOPSIES;  Surgeon: Atilio Lindsay MD;  Location: [facility] ENDOSCOPY;  Service: General;  Laterality: N/A;  DUODENAL ULCERS   • EXPLORATORY LAPAROTOMY N/A 01/16/2022    Procedure: EXPLORATORY LAPAROTOMY, OVERSEW OF PERFORATED GASTRIC ULCER WITH [place] PATCH;  Surgeon: Atilio Lindsay MD;  Location: [facility] MAIN OR;  Service: General;  Laterality: N/A;   • PLEURAL SCARIFICATION Right 1990   • SPHINCTEROTOMY       Family History   Problem Relation Age of Onset   • Alcohol abuse Mother    • Asthma Mother    • COPD Mother    • Depression Mother    • Heart disease Mother    • Hyperlipidemia Mother    • Hypertension Mother    • Mental illness Mother    • Alcohol abuse Father    • Arthritis Father    • Hypertension Father    • Colon cancer Maternal Grandmother    • Stroke Other    • Heart disease Other    • Diabetes Other        Home Medications:  Prior to Admission medications    Medication Sig Start Date End Date Taking? Authorizing Provider   albuterol sulfate  (90 Base) MCG/ACT inhaler Inhale 2 puffs Every 4 (Four) Hours As Needed for Wheezing or Shortness of Air. 3/2/23  Yes Humberto Cox APRN   atorvastatin (LIPITOR) 40 MG tablet Take 1 tablet by mouth Daily. 3/2/23  Yes Humberto Cox APRN   gabapentin (NEURONTIN) 400 MG capsule Take 1 capsule by mouth 3 (Three) Times a Day. 3/15/22  Yes Provider, MD Ren   gemfibrozil (LOPID) 600 MG tablet TAKE 1 TABLET TWICE DAILY 5/10/23  Yes Humberto Cox APRN   methylPREDNISolone (MEDROL) 4 MG dose pack Take as directed on package  instructions. 4/22/23  Yes Jayy Olivo MD   metoprolol succinate XL (TOPROL-XL) 50 MG 24 hr tablet Take 1 tablet by mouth Daily. 3/2/23  Yes Humberto Cox APRN   mirtazapine (REMERON) 15 MG tablet Take 1 tablet by mouth Daily.   Yes Ren Boswell MD   ondansetron ODT (ZOFRAN-ODT) 4 MG disintegrating tablet Place 1 tablet under the tongue Every 8 (Eight) Hours As Needed for Nausea or Vomiting. 11/10/22  Yes Humberto Cox APRN   pantoprazole (Protonix) 40 MG EC tablet Take 1 tablet by mouth Daily. 1/27/23 1/27/24 Yes Atilio Lindsay MD   QUEtiapine (SEROquel) 300 MG tablet Take 1 tablet by mouth Every Night.   Yes Ren Boswell MD   vitamin D (ERGOCALCIFEROL) 1.25 MG (08208 UT) capsule capsule Take 1 capsule by mouth Every 7 (Seven) Days. 3/2/23  Yes Humberto Cox APRN   benzonatate (TESSALON) 100 MG capsule Take 1 capsule by mouth 3 (Three) Times a Day As Needed for Cough for up to 21 doses.  Patient not taking: Reported on 5/9/2023 4/22/23   Jayy Olivo MD   Diclofenac Sodium (VOLTAREN) 1 % gel gel Apply  topically to the appropriate area as directed 4 (Four) Times a Day. Please have pharmacy change prescription to 2 g as needed, apply to affected area 4 times a day. 3/2/23   Humberto Cox APRN   fluticasone (FLONASE) 50 MCG/ACT nasal spray 1 spray by Each Nare route Daily. Shake before using. 12/4/22   Ren Boswell MD   Fluticasone Furoate-Vilanterol (Breo Ellipta) 100-25 MCG/INH inhaler Inhale 1 puff Daily. 12/12/21   Ronaldo Prater APRN   Omega-3 Fatty Acids (fish oil) 1000 MG capsule capsule Take 1 capsule by mouth Daily With Breakfast. 3/2/23   Humberto Cox APRN   QUEtiapine (SEROquel) 200 MG tablet  4/24/23   Ren Boswell MD Tessalon Perles 100 MG capsule  4/28/23   Provider, Historical, MD        Social History:   Social History     Tobacco Use   • Smoking status: Heavy Smoker     Packs/day: 1.00     Years: 47.00     Pack years: 47.00     Types:  "Cigarettes   • Smokeless tobacco: Never   Vaping Use   • Vaping Use: Never used   Substance Use Topics   • Alcohol use: Not Currently   • Drug use: Never         Review of Systems:  Review of Systems   Constitutional: Negative for chills and fever.   HENT: Positive for trouble swallowing. Negative for congestion, ear pain and sore throat.    Eyes: Negative for pain.   Respiratory: Positive for shortness of breath and wheezing. Negative for cough and chest tightness.    Cardiovascular: Positive for chest pain.   Gastrointestinal: Negative for abdominal pain, diarrhea, nausea and vomiting.   Genitourinary: Negative for flank pain and hematuria.   Musculoskeletal: Negative for joint swelling.   Skin: Negative for pallor.   Neurological: Positive for weakness. Negative for seizures and headaches.   All other systems reviewed and are negative.       Physical Exam:  /99   Pulse 100   Temp 98.6 °F (37 °C) (Oral)   Resp 23   Ht 167.6 cm (66\")   Wt 62.6 kg (138 lb)   SpO2 94%   BMI 22.27 kg/m²     Physical Exam       Vital signs were reviewed under triage note.  General appearance - Patient appears well-developed and well-nourished.  Patient is in no acute distress.  Head - Normocephalic, atraumatic.  Pupils - Equal, round, reactive to light.  Extraocular muscles are intact.  Conjunctiva is clear.  Nasal - Normal inspection.  No evidence of trauma or epistaxis.  Tympanic membranes - Gray, intact without erythema or retractions.  Oral mucosa - Pink and moist without lesions or erythema.  Uvula is midline.  Chest wall - Atraumatic.  Chest wall is nontender.  There are no vesicular rashes noted.  Neck - Supple.  Trachea was midline.  There is no palpable lymphadenopathy or thyromegaly.  There are no meningeal signs  Lungs - Auscultation reveals some faint scattered wheezes..  Heart - Regular rate and rhythm without any murmurs, clicks, or gallops.  Abdomen - Soft.  Bowel sounds are present.  There is no palpable " tenderness.  There is no rebound, guarding, or rigidity.  There are no palpable masses.  There are no pulsatile masses.  Back - Spine is straight and midline.  There is no CVA tenderness.  Extremities - Intact x4 with full range of motion.  There is no palpable edema.  Pulses are intact x4 and equal.  Neurologic - Patient is awake, alert, and oriented x3.  Cranial nerves II through XII are grossly intact.  Motor and sensory functions grossly intact.  Cerebellar function was normal.  Integument - There are no rashes.  There are no petechia or purpura lesions noted.  There are no vesicular lesions noted.      Procedures:  Procedures      Medical Decision Making:      Comorbidities that affect care:    Bipolar disorder, depression, anxiety, hypertension, COPD, Smoking    External Notes reviewed:    Previous Clinic Note: Office note from 5/9/2023 and 4/25/2023 was reviewed by me.      The following orders were placed and all results were independently analyzed by me:  Orders Placed This Encounter   Procedures   • XR Chest 1 View   • CT Chest Without Contrast Diagnostic   • [place] Draw   • High Sensitivity Troponin T   • Comprehensive Metabolic Panel   • Lipase   • BNP   • Magnesium   • CBC Auto Differential   • High Sensitivity Troponin T 2Hr   • Undress & Gown   • Continuous Pulse Oximetry   • Adult Transthoracic Echo Complete w/ Color, Spectral and Contrast if necessary per protocol   • CBC & Differential   • Green Top (Gel)   • Lavender Top   • Gold Top - SST   • Light Blue Top       Medications Given in the Emergency Department:  Medications   aspirin chewable tablet 324 mg (324 mg Oral Given 5/17/23 1207)   morphine injection 2 mg (2 mg Intravenous Given 5/17/23 1926)        ED Course:    ED Course as of 05/18/23 0908   u May 18, 2023   0907 EKG performed at 1018 was interpreted by me to show a sinus tachycardia with a ventricular rate of 120 bpm.  The MS interval is 113 ms.  P waves are normal.  QRS interval is  normal.  Axis is a 73 degrees.  There is no acute ischemic ST or T wave change identified.  QT corrected is 430 ms.  This EKG was compared to prior dated 4/22/2023 and is essentially unchanged. [TB]      ED Course User Index  [TB] [email or web address]   Patient was seen and evaluated in the ED by me.  The above history and physical examination was performed as documented.  Diagnostic data was obtained.  Results reviewed.  Ultimately patient was found to have a large pericardial effusion that required emergent evaluation probable pericardial window.  Dr. Gallegos, cardiologist, who read the echo contacted CT surgery at [facility] after reading the echo and arrange for transfer.  I discussed the findings with the patient he is aware of the findings and the need for emergent transfer.  I explained to the patient that this is a potentially life-threatening condition that would require cardiothoracic surgery.  Patient is remained hemodynamically stable during ED course.  Patient be transferred once a bed is available at [facility].    Labs:    Lab Results (last 24 hours)     Procedure Component Value Units Date/Time    High Sensitivity Troponin T [[id number]]  (Normal) Collected: 05/17/23 1024    Specimen: Blood Updated: 05/17/23 1109     HS Troponin T 10 ng/L     Narrative:      High Sensitive Troponin T Reference Range:  <10.0 ng/L- Negative Female for AMI  <15.0 ng/L- Negative Male for AMI  >=10 - Abnormal Female indicating possible myocardial injury.  >=15 - Abnormal Male indicating possible myocardial injury.   Clinicians would have to utilize clinical acumen, EKG, Troponin, and serial changes to determine if it is an Acute Myocardial Infarction or myocardial injury due to an underlying chronic condition.         CBC & Differential [[id number]]  (Abnormal) Collected: 05/17/23 1024    Specimen: Blood Updated: 05/17/23 1029    Narrative:      The following orders were created for panel order CBC &  Differential.  Procedure                               Abnormality         Status                     ---------                               -----------         ------                     CBC Auto Differential[[id number]]        Abnormal            Final result                 Please view results for these tests on the individual orders.    Comprehensive Metabolic Panel [[id number]]  (Abnormal) Collected: 05/17/23 1024    Specimen: Blood Updated: 05/17/23 1054     Glucose 125 mg/dL      BUN 16 mg/dL      Creatinine 1.23 mg/dL      Sodium 139 mmol/L      Potassium 4.0 mmol/L      Chloride 103 mmol/L      CO2 25.6 mmol/L      Calcium 9.2 mg/dL      Total Protein 7.0 g/dL      Albumin 3.7 g/dL      ALT (SGPT) 6 U/L      AST (SGOT) 10 U/L      Alkaline Phosphatase 102 U/L      Total Bilirubin 0.8 mg/dL      Globulin 3.3 gm/dL      A/G Ratio 1.1 g/dL      BUN/Creatinine Ratio 13.0     Anion Gap 10.4 mmol/L      eGFR 67.2 mL/min/1.73     Narrative:      GFR Normal >60  Chronic Kidney Disease <60  Kidney Failure <15      Lipase [[id number]]  (Normal) Collected: 05/17/23 1024    Specimen: Blood Updated: 05/17/23 1054     Lipase 28 U/L     BNP [[id number]]  (Normal) Collected: 05/17/23 1024    Specimen: Blood Updated: 05/17/23 1109     proBNP 561.6 pg/mL     Narrative:      Among patients with dyspnea, NT-proBNP is highly sensitive for the detection of acute congestive heart failure. In addition NT-proBNP of <300 pg/ml effectively rules out acute congestive heart failure with 99% negative predictive value.    Results may be falsely decreased if patient taking Biotin.      Magnesium [[id number]]  (Normal) Collected: 05/17/23 1024    Specimen: Blood Updated: 05/17/23 1054     Magnesium 2.1 mg/dL     CBC Auto Differential [[id number]]  (Abnormal) Collected: 05/17/23 1024    Specimen: Blood Updated: 05/17/23 1029     WBC 9.06 10*3/mm3      RBC 4.79 10*6/mm3      Hemoglobin 13.8 g/dL      Hematocrit 41.4 %      MCV 86.4 fL      MCH  28.8 pg      MCHC 33.3 g/dL      RDW 13.4 %      RDW-SD 42.4 fl      MPV 11.2 fL      Platelets 215 10*3/mm3      Neutrophil % 73.0 %      Lymphocyte % 15.5 %      Monocyte % 9.4 %      Eosinophil % 1.4 %      Basophil % 0.3 %      Immature Grans % 0.4 %      Neutrophils, Absolute 6.61 10*3/mm3      Lymphocytes, Absolute 1.40 10*3/mm3      Monocytes, Absolute 0.85 10*3/mm3      Eosinophils, Absolute 0.13 10*3/mm3      Basophils, Absolute 0.03 10*3/mm3      Immature Grans, Absolute 0.04 10*3/mm3      nRBC 0.0 /100 WBC     High Sensitivity Troponin T 2Hr [[id number]]  (Normal) Collected: 05/17/23 1332    Specimen: Blood Updated: 05/17/23 1405     HS Troponin T 9 ng/L      Troponin T Delta -1 ng/L     Narrative:      High Sensitive Troponin T Reference Range:  <10.0 ng/L- Negative Female for AMI  <15.0 ng/L- Negative Male for AMI  >=10 - Abnormal Female indicating possible myocardial injury.  >=15 - Abnormal Male indicating possible myocardial injury.   Clinicians would have to utilize clinical acumen, EKG, Troponin, and serial changes to determine if it is an Acute Myocardial Infarction or myocardial injury due to an underlying chronic condition.                Imaging:    Adult Transthoracic Echo Complete w/ Color, Spectral and Contrast if necessary per protocol    Result Date: 5/17/2023  Normal left ventricular systolic function with left ventricular hypertrophy. Large pericardial effusion with possible RA and RV collapse Trace MR and trace TR Recommendation: Discussed the results with Dr. Gale.  Will need pericardial window.     CT Chest Without Contrast Diagnostic    Result Date: 5/17/2023  PROCEDURE: CT CHEST WO CONTRAST DIAGNOSTIC  COMPARISON: [facility], CT, CT CHEST WO CONTRAST DIAGNOSTIC, 2/01/2022, 21:46.  [facility], CT, CT ABDOMEN PELVIS WO CONTRAST, 4/26/2022, 10:59.  INDICATIONS: Chest pain, shortness of breath  TECHNIQUE: CT images were created without the  administration of contrast material.   PROTOCOL:   Standard imaging protocol performed    RADIATION:   DLP: 223.6 mGy*cm   Automated exposure control was utilized to minimize radiation dose.  FINDINGS:  There is a small layering left pleural effusion reduced in size from the previous exam.  There has been interval development of a large pericardial effusion.  The heart is borderline enlarged.  There is no layering right pleural effusion.  There is compressive atelectasis on the left lower lobe secondary to the pleural effusion.  There is chronic subpleural scarring in the apical segments of the upper lobes.  There is a bandlike linear scar in the anterior segment of the right upper lobe.  There are bilateral hilar and mediastinal lymph nodes likely due to reactive hyperplasia.  There are calcified gallstones.  There is a round fluid density mass partially imaged in the upper pole of the left kidney felt to represent an incidental cyst.  There are tiny hypodensities in the liver which are unchanged and felt to represent hepatic cysts.  There are no suspicious osteolytic or sclerotic lesions within the bony thorax.        1. Small layering left pleural effusion reduced in size from the previous exam.  There is compressive atelectasis on the left lower lobe. 2. Interval development of a large pericardial effusion.  The heart is borderline enlarged.  3. Bilateral hilar and mediastinal lymph nodes felt to be due to reactive hyperplasia. 4. Chronic scarring in the pulmonary apices and the anterior segment of the right upper lobe. 5. Additional findings as noted above.     LIANE PEDROZA MD       Electronically Signed and Approved By: LIANE PEDROZA MD on 5/17/2023 at 13:20             XR Chest 1 View    Result Date: 5/17/2023  PROCEDURE: XR CHEST 1 VW  COMPARISON: [facility], CR, XR CHEST 2 VW, 4/22/2023, 11:35.  INDICATIONS: cp, soa,  FINDINGS:  There is cardiac enlargement.  The pulmonary vascular markings are normal.  There is  an increase in left pleural fluid compared with the last study.  The lungs are clear.        1. Increase in left pleural fluid since the last radiograph. 2. Stable cardiomegaly       Atilio Hamm MD       Electronically Signed and Approved By: Atilio Hamm MD on 5/17/2023 at 11:14                 Differential Diagnosis and Discussion:    Chest Pain:  Based on the patient's signs and symptoms, I considered aortic dissection, myocardial infaction, pulmonary embolism, cardiac tamponade, pericarditis, pneumothorax, musculoskeletal chest pain and other differential diagnosis as an etiology of the patient's chest pain.   Dyspnea: Differential diagnosis includes but is not limited to metabolic acidosis, neurological disorders, psychogenic, asthma, pneumothorax, upper airway obstruction, COPD, pneumonia, noncardiogenic pulmonary edema, interstitial lung disease, anemia, congestive heart failure, and pulmonary embolism    All labs were reviewed and interpreted by me.  All X-rays impressions were independently interpreted by me.  EKG was interpreted by me.  CT scan radiology impression was interpreted by me.    [facility]     Critical Care Note: Total Critical Care time of 35 minutes. Total critical care time documented does not include time spent on separately billed procedures for services of nurses or physician assistants. I personally saw and examined the patient. I have reviewed all diagnostic interpretations and treatment plans as written. I was present for the key portions of any procedures performed and the inclusive time noted in any critical care statement. Critical care time includes patient management by me, time spent at the patients bedside,  time to review lab and imaging results, discussing patient care, documentation in the medical record, and time spent with family or caregiver.    Patient Care Considerations:    ANTIBIOTICS: I considered prescribing antibiotics as an outpatient however There is no evidence of  acute bacterial infection      Consultants/Shared Management Plan:    Consultant: I have discussed the case with Dr. Gallegos who states Upon his reading the echo the patient has a large pericardial effusion that requires emergent evaluation.  He states that he will contact CT surgery at [facility] for transfer.  Transfer Provider: I have discussed the case with Dr. Valente at [facility] who agrees to accept the patient as a transfer.    Social Determinants of Health:    Patient is independent, reliable, and has access to care.       Disposition and Care Coordination:    Transferred: Through independent evaluation of the patient's history, physical, and imperical data, the patient meets criteria to be transferred to another hospital for evaluation/admission.        Final diagnoses:   Pericardial effusion        ED Disposition     ED Disposition   Transfer to Another Facility     Condition   --    Comment   --             This medical record created using voice recognition software.           Sterling Gale DO  05/18/23 0908

## 2023-05-22 ENCOUNTER — TELEPHONE (OUTPATIENT)
Dept: CASE MANAGEMENT | Facility: OTHER | Age: 58
End: 2023-05-22
Payer: MEDICARE

## 2023-05-22 NOTE — TELEPHONE ENCOUNTER
Spoke with patient he states he is currently in [facility] patient state he does  not know when he will be released but will follow up when he does

## 2023-05-22 NOTE — TELEPHONE ENCOUNTER
Contacted patient regarding CCM program. Patient reports he is currently hospitalized at [facility].     Asked if ACM could follow up next week.

## 2023-05-24 ENCOUNTER — TELEPHONE (OUTPATIENT)
Dept: SURGERY | Facility: CLINIC | Age: 58
End: 2023-05-24
Payer: MEDICARE

## 2023-05-24 NOTE — TELEPHONE ENCOUNTER
Pt is scheduled for an EGD on 6/2. He was just released from [facility] after a pericardial effusion. The doctor was Dr Cm Valente. They weren't sure if you needed to get a clearance before his EGD or not and wanted you to know about the current situation.

## 2023-05-31 ENCOUNTER — OFFICE VISIT (OUTPATIENT)
Dept: INTERNAL MEDICINE | Facility: CLINIC | Age: 58
End: 2023-05-31

## 2023-05-31 VITALS
HEART RATE: 103 BPM | WEIGHT: 131.8 LBS | RESPIRATION RATE: 18 BRPM | OXYGEN SATURATION: 97 % | TEMPERATURE: 97.7 F | HEIGHT: 66 IN | SYSTOLIC BLOOD PRESSURE: 100 MMHG | DIASTOLIC BLOOD PRESSURE: 57 MMHG | BODY MASS INDEX: 21.18 KG/M2

## 2023-05-31 DIAGNOSIS — Z98.890 STATUS POST CREATION OF PERICARDIAL WINDOW: ICD-10-CM

## 2023-05-31 DIAGNOSIS — F17.219 CIGARETTE NICOTINE DEPENDENCE WITH NICOTINE-INDUCED DISORDER: ICD-10-CM

## 2023-05-31 DIAGNOSIS — R53.1 WEAKNESS: ICD-10-CM

## 2023-05-31 DIAGNOSIS — K21.9 GERD WITHOUT ESOPHAGITIS: ICD-10-CM

## 2023-05-31 DIAGNOSIS — J44.9 CHRONIC OBSTRUCTIVE PULMONARY DISEASE, UNSPECIFIED COPD TYPE: ICD-10-CM

## 2023-05-31 DIAGNOSIS — R07.89 CHEST DISCOMFORT: ICD-10-CM

## 2023-05-31 DIAGNOSIS — Z09 HOSPITAL DISCHARGE FOLLOW-UP: Primary | ICD-10-CM

## 2023-05-31 DIAGNOSIS — I10 PRIMARY HYPERTENSION: ICD-10-CM

## 2023-05-31 DIAGNOSIS — R26.89 DECREASED MOBILITY: ICD-10-CM

## 2023-05-31 RX ORDER — AMLODIPINE BESYLATE 10 MG/1
10 TABLET ORAL DAILY
COMMUNITY

## 2023-05-31 RX ORDER — LISINOPRIL 40 MG/1
40 TABLET ORAL DAILY
COMMUNITY

## 2023-05-31 NOTE — PROGRESS NOTES
"Chief Complaint  Follow-up (HOSPITAL F/U)    Subjective        Atilio Recinos presents to [facility] for hospital discharge follow-up.  Patient was recently admitted [facility], this was after a visit to the emergency room locally on 5/17/2023 for increasing shortness of breath and chest pain.  Patient was evaluated, and via echocardiogram patient was found to have pericardial effusion.  Patient was transferred to [facility] for pericardial window procedure.  Patient underwent procedure on 5/18/2023.  Patient was managed inpatient 5 days postop, and discharged.  Patient did not undergo any physical therapy evaluation or treatment while there.  Patient reports that he has continued to get weaker since discharge, and is using a cane for mobility.  Patient was started back on lisinopril, amlodipine, already on a beta-blocker and statin.  Patient has been dealing with some postoperative pain, today has been the worst, he is out of his Norco pain medication that was prescribed by cardiothoracic surgery.  He did take his last one this morning, which helped minimally, which had been working better previously.  He has been reluctant to cough, he does have severe underlying COPD, and has had difficulty clearing secretions.  He denies any other significant issues today.    Objective   Vital Signs:   /57 (BP Location: Left arm, Patient Position: Sitting, Cuff Size: Adult)   Pulse 103   Temp 97.7 °F (36.5 °C) (Temporal)   Resp 18   Ht 167.6 cm (66\")   Wt 59.8 kg (131 lb 12.8 oz)   SpO2 97%   BMI 21.27 kg/m²     Physical Exam  Vitals and nursing note reviewed.   Constitutional:       Appearance: Normal appearance.   HENT:      Head: Normocephalic and atraumatic.      Right Ear: External ear normal.      Left Ear: External ear normal.      Nose: Nose normal.   Eyes:      Pupils: Pupils are equal, round, and reactive to light.   Cardiovascular:      Rate and Rhythm: " Normal rate and regular rhythm.      Pulses: Normal pulses.      Heart sounds: Normal heart sounds.   Pulmonary:      Effort: Pulmonary effort is normal.      Breath sounds: Wheezing present.   Neurological:      Mental Status: He is alert.   Psychiatric:         Mood and Affect: Mood normal.        Result Review :  {The following data was reviewed by DIRK Montelongo on 05/31/23                Diagnoses and all orders for this visit:    1. Hospital discharge follow-up (Primary)    2. Status post creation of pericardial window    3. Chest discomfort  -     XR Chest PA & Lateral    4. Primary hypertension    5. GERD without esophagitis    6. Chronic obstructive pulmonary disease, unspecified COPD type    7. Cigarette nicotine dependence with nicotine-induced disorder    8. Decreased mobility  -     Ambulatory Referral to Physical Therapy Evaluate and treat    9. Weakness  -     Ambulatory Referral to Physical Therapy Evaluate and treat    Overall, patient has done fairly well since discharge.  He underwent pericardial window procedure, he has follow-up with cardiology and cardiothoracic surgery in the next few weeks.  He will be establishing with cardiologist here locally.  Patient is having some just discomfort, unsure if it is secondary to procedure or if he is having possible developing pneumonia or any complication.  We will get chest x-ray while in the office today, recommendations to follow.  Patient's blood pressure is on the lower end today, he is back on his amlodipine, lisinopril, metoprolol.  Continue to be managed by cardiology moving forward.  GERD, he is having to reschedule his procedure that was coming up next week, unfortunate, but necessary.  Concern for his COPD and tobacco use, strongly encouraged tobacco cessation.  We will monitor for any COPD complications exacerbations today via x-ray.  Referral to physical therapy for decreased mobility in weakness.  We will determine follow-up based on  findings.      Follow Up   No follow-ups on file.  Patient was given instructions and counseling regarding his condition or for health maintenance advice. Please see specific information pulled into the AVS if appropriate.     Humberto Cox, APRN  5/31/2023  This note was electronically signed.

## 2023-06-05 ENCOUNTER — PATIENT OUTREACH (OUTPATIENT)
Dept: CASE MANAGEMENT | Facility: OTHER | Age: 58
End: 2023-06-05
Payer: MEDICARE

## 2023-06-05 DIAGNOSIS — I10 PRIMARY HYPERTENSION: Primary | ICD-10-CM

## 2023-06-05 NOTE — OUTREACH NOTE
AMBULATORY CASE MANAGEMENT NOTE    Name and Relationship of Patient/Support Person:  -     Patient Outreach    Contacted Patient on 5/22/23 about CCM Program d/t ER report. Patient declined during phone call and wanted to follow up with PCP office first.     Patient followed up with PCP on 5/31/23 - confirmed patient not interested in CCM Program at this time.       Josselyn LOMELI  Ambulatory Case Management    6/5/2023, 08:23 EDT

## 2023-07-27 RX ORDER — METOPROLOL SUCCINATE 50 MG/1
TABLET, EXTENDED RELEASE ORAL
Qty: 90 TABLET | Refills: 1 | Status: SHIPPED | OUTPATIENT
Start: 2023-07-27

## 2023-07-27 RX ORDER — PANTOPRAZOLE SODIUM 40 MG/1
TABLET, DELAYED RELEASE ORAL
Qty: 90 TABLET | Refills: 1 | Status: SHIPPED | OUTPATIENT
Start: 2023-07-27

## 2023-08-14 RX ORDER — INDOMETHACIN 50 MG/1
CAPSULE ORAL
Qty: 60 CAPSULE | Refills: 0 | Status: SHIPPED | OUTPATIENT
Start: 2023-08-14

## 2023-08-14 NOTE — LETTER
August 14, 2023     AVTAR Singh MD  [address] [place] [address] [address]  [place] [address]    Patient: Atilio Recinos  YOB: 1965        This patient is waiting to have an Esophagogastroduodenoscopy which I will perform at [facility] on 09/07/23.    Please respond to this request noting your recommendations regarding clearance from a cardiology standpoint.  You may contact our office at [phone] option 2) with any questions.    I appreciate your prompt response in this matter. Please return this form to our office as soon as possible to [phone].    ____ I approve my patient from a cardiolgy standpoint    ____ I do NOT approve my patient from a cardiology standpoint at this time    Approving physician name (please print):     _____________________________________________    Approving physician signature:       ________________________________      Date:________________          Sincerely,  [facility]   [facility]

## 2023-08-15 NOTE — PROGRESS NOTES
The patient is low to moderate stable cardiac risk for endoscopy    Continue Metoprolol as directed including the day of the procedure    No additional Cardiac testing is needed preop    Davian Singh MD

## 2023-08-17 RX ORDER — ATORVASTATIN CALCIUM 40 MG/1
TABLET, FILM COATED ORAL
Qty: 90 TABLET | Refills: 1 | OUTPATIENT
Start: 2023-08-17

## 2023-08-18 ENCOUNTER — TELEPHONE (OUTPATIENT)
Dept: CARDIOLOGY | Facility: CLINIC | Age: 58
End: 2023-08-18
Payer: MEDICARE

## 2023-08-18 NOTE — TELEPHONE ENCOUNTER
"  Caller: Atilio Recinos \"Durga\"    Relationship: Self    Best call back number:[phone]     What is the best time to reach you: ANY    Who are you requesting to speak with (clinical staff, provider,  specific staff member): CLINICAL    Do you know the name of the person who called: ALKA    What was the call regarding: CALLING TO SPEAK WITH ALKA ABOUT MEDICATION UPDATES    Is it okay if the provider responds through MyChart: CALL PLEASE        "

## 2023-08-21 NOTE — TELEPHONE ENCOUNTER
SW patient. Patient states he is doing well and no symptoms. Advised if he started having symptoms again to notify office.

## 2023-09-01 ENCOUNTER — TELEPHONE (OUTPATIENT)
Dept: SURGERY | Facility: CLINIC | Age: 58
End: 2023-09-01
Payer: MEDICARE

## 2023-09-01 NOTE — TELEPHONE ENCOUNTER
"Patient called the office on 08/31/23. I spoke with him and went over his bowel prep and made sure he was clear on the instructions. Patient was given his arrival time of 12pm on 09/07/23 for his colonoscopy. Patient was advised that he will need to begin a clear liquid diet for the entire day before his procedure and to be NPO 8 hours prior to his arrival time. Patient required cardiac clearance and has been cleared by Dr. Singh. Clearance is scanned into patient's chart under \"media\". Patient is aware of obtaining clearance. Patient was advised that he will need a  home.   "

## 2023-09-07 ENCOUNTER — ANESTHESIA EVENT (OUTPATIENT)
Dept: GASTROENTEROLOGY | Facility: HOSPITAL | Age: 58
End: 2023-09-07
Payer: MEDICARE

## 2023-09-07 ENCOUNTER — HOSPITAL ENCOUNTER (OUTPATIENT)
Facility: HOSPITAL | Age: 58
Setting detail: HOSPITAL OUTPATIENT SURGERY
Discharge: HOME | End: 2023-09-07
Attending: SURGERY | Admitting: SURGERY
Payer: MEDICARE

## 2023-09-07 ENCOUNTER — ANESTHESIA (OUTPATIENT)
Dept: GASTROENTEROLOGY | Facility: HOSPITAL | Age: 58
End: 2023-09-07
Payer: MEDICARE

## 2023-09-07 VITALS
RESPIRATION RATE: 23 BRPM | WEIGHT: 133.82 LBS | BODY MASS INDEX: 21.51 KG/M2 | SYSTOLIC BLOOD PRESSURE: 166 MMHG | OXYGEN SATURATION: 96 % | DIASTOLIC BLOOD PRESSURE: 87 MMHG | HEIGHT: 66 IN | HEART RATE: 77 BPM | TEMPERATURE: 97.1 F

## 2023-09-07 DIAGNOSIS — K21.00 GASTROESOPHAGEAL REFLUX DISEASE WITH ESOPHAGITIS WITHOUT HEMORRHAGE: ICD-10-CM

## 2023-09-07 PROCEDURE — 88305 TISSUE EXAM BY PATHOLOGIST: CPT | Performed by: SURGERY

## 2023-09-07 PROCEDURE — 25010000002 PROPOFOL 10 MG/ML EMULSION: Performed by: NURSE ANESTHETIST, CERTIFIED REGISTERED

## 2023-09-07 RX ORDER — SODIUM CHLORIDE, SODIUM LACTATE, POTASSIUM CHLORIDE, CALCIUM CHLORIDE 600; 310; 30; 20 MG/100ML; MG/100ML; MG/100ML; MG/100ML
30 INJECTION, SOLUTION INTRAVENOUS CONTINUOUS
Status: DISCONTINUED | OUTPATIENT
Start: 2023-09-07 | End: 2023-09-07 | Stop reason: HOSPADM

## 2023-09-07 RX ORDER — LIDOCAINE HYDROCHLORIDE 20 MG/ML
INJECTION, SOLUTION EPIDURAL; INFILTRATION; INTRACAUDAL; PERINEURAL AS NEEDED
Status: DISCONTINUED | OUTPATIENT
Start: 2023-09-07 | End: 2023-09-07 | Stop reason: SURG

## 2023-09-07 RX ORDER — PROPOFOL 10 MG/ML
VIAL (ML) INTRAVENOUS AS NEEDED
Status: DISCONTINUED | OUTPATIENT
Start: 2023-09-07 | End: 2023-09-07 | Stop reason: SURG

## 2023-09-07 RX ADMIN — PROPOFOL 150 MG: 10 INJECTION, EMULSION INTRAVENOUS at 15:29

## 2023-09-07 RX ADMIN — LIDOCAINE HYDROCHLORIDE 60 MG: 20 INJECTION, SOLUTION EPIDURAL; INFILTRATION; INTRACAUDAL; PERINEURAL at 15:23

## 2023-09-07 RX ADMIN — PROPOFOL 200 MCG/KG/MIN: 10 INJECTION, EMULSION INTRAVENOUS at 15:23

## 2023-09-07 RX ADMIN — PROPOFOL 100 MG: 10 INJECTION, EMULSION INTRAVENOUS at 15:23

## 2023-09-07 RX ADMIN — LIDOCAINE HYDROCHLORIDE 100 MG: 20 INJECTION, SOLUTION EPIDURAL; INFILTRATION; INTRACAUDAL; PERINEURAL at 15:30

## 2023-09-07 NOTE — ANESTHESIA POSTPROCEDURE EVALUATION
Patient: Atilio Recinos    Procedure Summary       Date: 09/07/23 Room / Location: [facility] ENDOSCOPY 1 / [facility]    Anesthesia Start: 1519 Anesthesia Stop: 1548    Procedure: ESOPHAGOGASTRODUODENOSCOPY WITH BIOPSIES Diagnosis:       Gastroesophageal reflux disease with esophagitis without hemorrhage      (Gastroesophageal reflux disease with esophagitis without hemorrhage [K21.00])    Surgeons: Atilio Lindsay MD Provider: Viridiana Alexander CRNA    Anesthesia Type: general ASA Status: 3            Anesthesia Type: general    Vitals  Vitals Value Taken Time   /87 09/07/23 1602   Temp 36.2 °C (97.1 °F) 09/07/23 1602   Pulse 77 09/07/23 1602   Resp 23 09/07/23 1602   SpO2 96 % 09/07/23 1602           Post Anesthesia Care and Evaluation    Patient location during evaluation: bedside  Patient participation: complete - patient cannot participate  Level of consciousness: awake and alert  Pain score: 0    Airway patency: patent  Anesthetic complications: No anesthetic complications  PONV Status: none  Cardiovascular status: acceptable  Respiratory status: acceptable  Hydration status: acceptable  No anesthesia care post op

## 2023-09-07 NOTE — H&P
EGD consultation        History of Present Illness  Atilio Recinos is a 58 y.o. male presents to [facility] for EGD consultation.     Patient presents today with complaints of heartburn and indigestion despite taking pantoprazole and Carafate.  Patient has a history of a perforated gastric ulcer with a Chaparro patch, was performed on 1/16/2022 performed by Dr. Atilio Lindsay.  Patient reports that he is with occasional epigastric pain.  He admits to some dysphagia issues with medications.  Denies any issues with food or liquids.  Patient denies any pain before or after eating.     Patient uses O2 as needed.     9/22: EGD ([facility]): Few non-bleeding superficial duodenal ulcers found in the first part of the duodenum. GE junction: Squamocolumnar mucosa with intestinal metaplasia.      1/16/22: Exploratory lap with perforated gastric ulcer and Chaparro patch ([facility]).  Perforated gastric ulcer           Objective         Medical History        Past Medical History:   Diagnosis Date    Allergies      Anemia 2/2022    Anxiety      Asthma 1992    Bipolar 1 disorder ([facility])      Bowel perforation ([facility]) 01/16/2022    Chronic kidney disease 1/2022    COPD (chronic obstructive pulmonary disease) ([facility])      COVID-19 02/19/2022    Depression      Fissure, anal 2003    Forgetfulness      Hard to intubate       High gag reflex.    Head injury      Hepatitis      High blood pressure      High cholesterol      History of transfusion 2/2022    Pancreatitis [id number]    PONV (postoperative nausea and vomiting) 1/2022    Psychiatric illness      Rectal bleeding 2003    S/P exploratory laparotomy, oversew of perforated ulcer, placement of Chaparro patch  01/17/2022    Shortness of breath      Sinus trouble              Surgical History         Past Surgical History:   Procedure Laterality Date    COLONOSCOPY N/A 9/22/2022     Procedure: COLONOSCOPY;  Surgeon: Atilio Lindsay MD;  Location: [facility]  ENDOSCOPY;  Service: General;  Laterality: N/A;  DIVERTICULOSIS    ENDOSCOPY N/A 9/22/2022     Procedure: ESOPHAGOGASTRODUODENOSCOPY WITH BIOPSIES;  Surgeon: Atilio Lindsay MD;  [facility];  Service: General;  Laterality: N/A;  DUODENAL ULCERS    EXPLORATORY LAPAROTOMY N/A 01/16/2022     Procedure: EXPLORATORY LAPAROTOMY, OVERSEW OF PERFORATED GASTRIC ULCER WITH [place] PATCH;  Surgeon: Atilio Lindsay MD;  Location: [facility] MA[facility] OR;  Service: General;  Laterality: N/A;    PLEURAL SCARIFICATION Right 1990    SPHINCTEROTOMY                Medications Taking          Outpatient Medications Marked as Taking for the 3/24/23 encounter (Office Visit) with Dodie Callahan APRN   Medication Sig Dispense Refill    albuterol sulfate  (90 Base) MCG/ACT inhaler Inhale 2 puffs Every 4 (Four) Hours As Needed for Wheezing or Shortness of Air. 8 g 3    atorvastatin (LIPITOR) 40 MG tablet Take 1 tablet by mouth Daily. 90 tablet 1    Diclofenac Sodium (VOLTAREN) 1 % gel gel Apply  topically to the appropriate area as directed 4 (Four) Times a Day. Please have pharmacy change prescription to 2 g as needed, apply to affected area 4 times a day. 150 g 1    fluticasone (FLONASE) 50 MCG/ACT nasal spray 1 spray by Each Nare route Daily. Shake before using.        Fluticasone Furoate-Vilanterol (Breo Ellipta) 100-25 MCG/INH inhaler Inhale 1 puff Daily. 60 each 2    gabapentin (NEURONTIN) 400 MG capsule Take 1 capsule by mouth 3 (Three) Times a Day.        gemfibrozil (LOPID) 600 MG tablet Take 1 tablet by mouth 2 (Two) Times a Day. 180 tablet 1    metoprolol succinate XL (TOPROL-XL) 50 MG 24 hr tablet Take 1 tablet by mouth Daily. 90 tablet 1    mirtazapine (REMERON) 15 MG tablet Take 1 tablet by mouth Daily.        Omega-3 Fatty Acids (fish oil) 1000 MG capsule capsule Take 1 capsule by mouth Daily With Breakfast. 180 capsule 1    ondansetron ODT (ZOFRAN-ODT) 4 MG disintegrating tablet Place 1 tablet under the  "tongue Every 8 (Eight) Hours As Needed for Nausea or Vomiting. 15 tablet 0    pantoprazole (Protonix) 40 MG EC tablet Take 1 tablet by mouth Daily. 90 tablet 1    QUEtiapine (SEROquel) 400 MG tablet Take 1 tablet by mouth Every Night. 30 tablet 0    sucralfate (Carafate) 1 g tablet Take 1 tablet by mouth 4 (Four) Times a Day. 360 tablet 3    vitamin D (ERGOCALCIFEROL) 1.25 MG (99670 UT) capsule capsule Take 1 capsule by mouth Every 7 (Seven) Days. 13 capsule 1                 Allergies   Allergen Reactions    Amoxicillin-Pot Clavulanate Hives and Rash    Antihistamines, Chlorpheniramine-Type Rash    Contrast Dye (Echo Or Unknown Ct/Mr) Hives, Nausea Only and Rash    Diphenhydramine Hives               Family History   Problem Relation Age of Onset    Alcohol abuse Mother      Asthma Mother      COPD Mother      Depression Mother      Heart disease Mother      Hyperlipidemia Mother      Hypertension Mother      Mental illness Mother      Alcohol abuse Father      Arthritis Father      Hypertension Father      Colon cancer Maternal Grandmother      Stroke Other      Heart disease Other      Diabetes Other           Social History   Social History            Socioeconomic History    Marital status:    Tobacco Use    Smoking status: Heavy Smoker       Packs/day: 1.00       Years: 47.00       Pack years: 47.00       Types: Cigarettes    Smokeless tobacco: Never   Vaping Use    Vaping Use: Never used   Substance and Sexual Activity    Alcohol use: Not Currently    Drug use: Never    Sexual activity: Yes       Partners: Male       Birth control/protection: Condom            Review of Systems  Constitutional: Negative for chills and fever.  Gastrointestinal: Positive for abdominal pain, GERD and indigestion. Negative for nausea and vomiting.         Vital Signs:   Pulse 96   Ht 165.1 cm (65\")   Wt 67.6 kg (149 lb)   BMI 24.79 kg/m²      Physical Exam  Vitals and nursing note reviewed.   Constitutional:       " General: He is not in acute distress.     Appearance: Normal appearance.   HENT:      Head: Normocephalic.   Cardiovascular:      Rate and Rhythm: Normal rate.   Pulmonary:      Effort: Pulmonary effort is normal.      Breath sounds: No stridor.   Abdominal:      Palpations: Abdomen is soft.      Tenderness: There is abdominal tenderness.   Musculoskeletal:         General: No deformity. Normal range of motion.   Skin:     General: Skin is warm and dry.      Coloration: Skin is not jaundiced.   Neurological:      General: No focal deficit present.      Mental Status: He is alert and oriented to person, place, and time.   Psychiatric:         Mood and Affect: Mood normal.         Thought Content: Thought content normal.                  Result Review    :            []  Laboratory  []  Radiology  []  Pathology  []  Microbiology  []  EKG/Telemetry   []  Cardiology/Vascular   []  Old records  I spent 20 minutes caring for Atilio on this date of service. This time includes time spent by me in the following activities: reviewing tests, obtaining and/or reviewing a separately obtained history, performing a medically appropriate examination and/or evaluation, ordering medications, tests, or procedures, and documenting information in the medical record.        Assessment      Assessment and Plan    Diagnoses and all orders for this visit:     1. Gastroesophageal reflux disease without esophagitis (Primary)     2. History of pyloric channel ulcer           Follow Up   Return for EGD with Dr. Lindsay on 7/6/2023 at [facility].      Continue with Protonix and Carafate until EGD.     Phone PAT.  Hospital call with arrival time        Possible risks/complications, benefits, and alternatives to surgical or invasive procedure have been explained to patient and/or legal guardian.     Patient has been evaluated and can tolerate anesthesia and/or sedation. Risks, benefits, and alternatives to anesthesia and sedation have  been explained to patient and/or legal guardian.  Patient verbalizes understanding and is willing to proceed with above plan.     Patient was given instructions and counseling regarding his condition or for health maintenance advice. Please see specific information pulled into the AVS if appropriate.

## 2023-09-07 NOTE — ANESTHESIA PREPROCEDURE EVALUATION
Anesthesia Evaluation     history of anesthetic complications:  PONV difficult airway  NPO Solid Status: > 8 hours  NPO Liquid Status: > 6 hours           Airway   Mallampati: II  TM distance: >3 FB  Neck ROM: full  Possible difficult intubation  Comment: Documented history of difficult airway.  Dental    (+) poor dentition        Pulmonary    (+) pulmonary embolism, COPD moderate, asthma,home oxygen, shortness of breath, decreased breath sounds    ROS comment: Patient states he uses home 02 prn   Cardiovascular   Exercise tolerance: poor (<4 METS)    Rhythm: regular  Rate: normal    (+) hypertension, hyperlipidemia      Neuro/Psych  (+) psychiatric history Anxiety, Bipolar and Depression  GI/Hepatic/Renal/Endo    (+) GERD well controlled, GI bleeding resolved, hepatitis, liver disease, renal disease    Musculoskeletal     Abdominal    Substance History      OB/GYN          Other   arthritis,                     Anesthesia Plan    ASA 3     general   total IV anesthesia  intravenous induction     Anesthetic plan, risks, benefits, and alternatives have been provided, discussed and informed consent has been obtained with: patient and other.  Pre-procedure education provided  Plan discussed with CRNA.      CODE STATUS:

## 2023-09-22 ENCOUNTER — OFFICE VISIT (OUTPATIENT)
Dept: SURGERY | Facility: CLINIC | Age: 58
End: 2023-09-22
Payer: MEDICARE

## 2023-09-22 VITALS — HEIGHT: 66 IN | WEIGHT: 133 LBS | HEART RATE: 86 BPM | BODY MASS INDEX: 21.38 KG/M2

## 2023-09-22 DIAGNOSIS — Z98.890 S/P ENDOSCOPY: Primary | ICD-10-CM

## 2023-09-22 DIAGNOSIS — K29.30 CHRONIC SUPERFICIAL GASTRITIS WITHOUT BLEEDING: ICD-10-CM

## 2023-09-22 PROCEDURE — 1159F MED LIST DOCD IN RCRD: CPT | Performed by: NURSE PRACTITIONER

## 2023-09-22 PROCEDURE — 99212 OFFICE O/P EST SF 10 MIN: CPT | Performed by: NURSE PRACTITIONER

## 2023-09-22 PROCEDURE — 1160F RVW MEDS BY RX/DR IN RCRD: CPT | Performed by: NURSE PRACTITIONER

## 2023-09-22 NOTE — PROGRESS NOTES
Chief Complaint: Post-op Follow-up    Subjective      Follow-up visit       History of Present Illness  Atilio Recinos is a 58 y.o. male presents to [facility] GENERAL SURGERY for follow-up visit.    Patient presents today for follow-up visit after undergoing EGD on 9/7/2023 performed by Dr. Atilio Lindsay.    Patient was with congestion mucosa without active bleeding and with no sigmata of bleeding was found in the duodenal bulb; healed ulcer per EGD/pathology.  Pathology was negative for intestinal metaplasia, dysplasia or malignancy.    Results for orders placed or performed during the hospital encounter of 09/07/23   Tissue Pathology Exam    Specimen: A: Small Intestine, Duodenum; Tissue    B: Gastric, Antrum; Tissue    C: GE Junction; Tissue   Result Value Ref Range    Case Report       Surgical Pathology Report                         Case: [id number]                                  Authorizing Provider:  Atilio Lindsay MD      Collected:           09/07/2023 03:34 PM          Ordering Location:     [facility] Received:            09/08/2023 11:39 AM                                 SUITES                                                                       Pathologist:           Melissa Mccain MD                                                     Specimens:   1) - Small Intestine, Duodenum, DUODENUM BIOPSIES                                                   2) - Gastric, Antrum, ANTRUM BIOPSIES                                                               3) - GE Junction, GE JUNCTION BIOPSIES                                                     Clinical Information       Gastroesophageal reflux disease with esophagitis without hemorrhage      Final Diagnosis       1. Duodenum, biopsy:   - Mildly increased intraepithelial lymphocytes   - Preserved villous architecture   - Fragments of gastric antrum mucosa with no significant pathologic change   - See  "comment    Comment:  The changes seen within the duodenum biopsy are mild and non-specific.  Correlation with clinical/endoscopic findings is recommended.        2. Stomach, antrum, biopsy:   - Gastric antrum mucosa with no significant pathologic change   - Negative for Helicobacter pylori on routine H&E stain   - Negative for intestinal metaplasia, dysplasia and malignancy      3. Gastroesophageal junction, biopsy:   - Squamocolumnar mucosa with mild chronic inflammation   - Negative for intestinal metaplasia, dysplasia and malignancy          Gross Description       1. Small Intestine, Duodenum.  Received in formalin and labeled \" \"duodenum\" are three fragments of tan soft tissue measuring 0.3-0.4 cm in greatest dimension. The specimen is entirely submitted in one cassette.    2. Gastric, Antrum.  Received in formalin and labeled \" antrum\" are three fragments of tan soft tissue measuring 0.3-0.4 cm in greatest dimension. The specimen is entirely submitted in one cassette.    3. GE Junction.  Received in formalin and labeled \" GE junction\" is a 0.7 cm aggregate of tan soft tissue fragments. The specimen is entirely submitted in one cassette.   DYLAN      Microscopic Description       Microscopic examination performed.         EGD was performed without difficulty.  The patient tolerated the procedure well.    Patient denies any postoperative complications.  Objective     Past Medical History:   Diagnosis Date    Allergic     Antihistamines    Allergies     Anemia 2/2022    Anxiety     Arthritis     Bipolar 1 disorder     Bowel perforation 01/16/2022    Chronic kidney disease 1/2022    COPD (chronic obstructive pulmonary disease)     COVID-19 02/19/2022    Depression     Fissure, anal 2003    Forgetfulness     GERD (gastroesophageal reflux disease)     Hard to intubate     High gag reflex.    Head injury     Hepatitis     High blood pressure     High cholesterol     History of transfusion 2/2022    Pancreatitis " [id number]    PONV (postoperative nausea and vomiting) 1/2022    Psychiatric illness     Pulmonary embolism     Rectal bleeding 2003    S/P exploratory laparotomy, oversew of perforated ulcer, placement of Chaparro patch  01/17/2022    Shortness of breath     Sinus trouble     TIA (transient ischemic attack)        Past Surgical History:   Procedure Laterality Date    COLONOSCOPY N/A 09/22/2022    Procedure: COLONOSCOPY;  Surgeon: Atilio Lindsay MD;  Location: [facility] ENDOSCOPY;  Service: General;  Laterality: N/A;  DIVERTICULOSIS    ENDOSCOPY N/A 09/22/2022    Procedure: ESOPHAGOGASTRODUODENOSCOPY WITH BIOPSIES;  Surgeon: Atilio Lindsay MD;  Location: [facility] ENDOSCOPY;  Service: General;  Laterality: N/A;  DUODENAL ULCERS    ENDOSCOPY N/A 9/7/2023    Procedure: ESOPHAGOGASTRODUODENOSCOPY WITH BIOPSIES;  Surgeon: Atilio Lindsay MD;  Location: [facility] ENDOSCOPY;  Service: General;  Laterality: N/A;  HEALED DUODENAL ULCER    EXPLORATORY LAPAROTOMY N/A 01/16/2022    Procedure: EXPLORATORY LAPAROTOMY, OVERSEW OF PERFORATED GASTRIC ULCER WITH [place] PATCH;  Surgeon: Atilio Lindsay MD;  Location: [facility] MAIN OR;  Service: General;  Laterality: N/A;    INGUINAL HERNIA REPAIR      PERICARDIAL WINDOW      PLEURAL SCARIFICATION Right 1990    SPHINCTEROTOMY         Outpatient Medications Marked as Taking for the 9/22/23 encounter (Office Visit) with Dodie Callahan APRN   Medication Sig Dispense Refill    albuterol sulfate  (90 Base) MCG/ACT inhaler Inhale 2 puffs Every 4 (Four) Hours As Needed for Wheezing or Shortness of Air. 8 g 3    aspirin 81 MG EC tablet 1 tablet.      atorvastatin (LIPITOR) 40 MG tablet Take 1 tablet by mouth Daily. 90 tablet 1    colchicine 0.6 MG tablet Take 1 tablet by mouth Daily. Take 3 pills the first day then one pill twice a day after that 180 tablet 1    Diclofenac Sodium (VOLTAREN) 1 % gel gel Apply  topically to the appropriate area as directed 4 (Four) Times a Day. Please  have pharmacy change prescription to 2 g as needed, apply to affected area 4 times a day. 150 g 1    Fluticasone Furoate-Vilanterol (Breo Ellipta) 100-25 MCG/INH inhaler Inhale 1 puff Daily. 60 each 2    gabapentin (NEURONTIN) 400 MG capsule Take 1 capsule by mouth 3 (Three) Times a Day.      gemfibrozil (LOPID) 600 MG tablet TAKE 1 TABLET TWICE DAILY 180 tablet 1    indomethacin (INDOCIN) 50 MG capsule TAKE 1 CAPSULE BY MOUTH THREE TIMES DAILY with meals 60 capsule 0    linaclotide (LINZESS) 145 MCG capsule capsule 2 capsules.      metoprolol succinate XL (TOPROL-XL) 25 MG 24 hr tablet Take 1 tablet by mouth 2 (Two) Times a Day. 180 tablet 3    metoprolol succinate XL (TOPROL-XL) 50 MG 24 hr tablet TAKE 1 TABLET EVERY DAY 90 tablet 1    mirtazapine (REMERON) 15 MG tablet Take 1 tablet by mouth Daily.      Omega-3 Fatty Acids (fish oil) 1000 MG capsule capsule Take 1 capsule by mouth Daily With Breakfast. 180 capsule 1    ondansetron ODT (ZOFRAN-ODT) 4 MG disintegrating tablet Place 1 tablet under the tongue Every 8 (Eight) Hours As Needed for Nausea or Vomiting. 15 tablet 0    pantoprazole (PROTONIX) 40 MG EC tablet TAKE 1 TABLET EVERY DAY 90 tablet 1    QUEtiapine (SEROquel) 300 MG tablet Take 1 tablet by mouth Every Night.      vitamin D (ERGOCALCIFEROL) 1.25 MG (68699 UT) capsule capsule Take 1 capsule by mouth Every 7 (Seven) Days. 13 capsule 1       Allergies   Allergen Reactions    Lorazepam Mental Status Change     Other reaction(s): Hyperactivity    Amoxicillin-Pot Clavulanate Hives    Diphenhydramine Hives    Dye [Ct Contrast] Hives        Family History   Problem Relation Age of Onset    Alcohol abuse Mother     Asthma Mother     COPD Mother     Depression Mother     Heart disease Mother     Hyperlipidemia Mother     Hypertension Mother     Mental illness Mother     Anxiety disorder Mother     Vision loss Mother     Alcohol abuse Father     Arthritis Father     Hypertension Father     Liver disease Father  "    Stroke Father     Vision loss Father     Colon cancer Maternal Grandmother     Stroke Other     Heart disease Other     Diabetes Other        Social History     Socioeconomic History    Marital status:    Tobacco Use    Smoking status: Every Day     Packs/day: 1.00     Years: 47.00     Pack years: 47.00     Types: Cigarettes    Smokeless tobacco: Never   Vaping Use    Vaping Use: Never used   Substance and Sexual Activity    Alcohol use: Not Currently    Drug use: Never    Sexual activity: Yes     Partners: Male     Birth control/protection: Condom       Review of Systems   Constitutional:  Negative for chills and fever.   HENT:  Negative for trouble swallowing.    Gastrointestinal:  Positive for GERD and indigestion. Negative for diarrhea, nausea and vomiting.      Vital Signs:   Pulse 86   Ht 167.6 cm (66\")   Wt 60.3 kg (133 lb)   BMI 21.47 kg/m²      Physical Exam  Vitals and nursing note reviewed.   Constitutional:       General: He is not in acute distress.     Appearance: Normal appearance.   HENT:      Head: Normocephalic.   Pulmonary:      Effort: Pulmonary effort is normal.   Abdominal:      Palpations: Abdomen is soft.      Tenderness: There is no guarding.   Musculoskeletal:         General: No deformity. Normal range of motion.   Skin:     General: Skin is warm and dry.      Coloration: Skin is not jaundiced.   Neurological:      General: No focal deficit present.      Mental Status: He is alert and oriented to person, place, and time.   Psychiatric:         Mood and Affect: Mood normal.         Thought Content: Thought content normal.        Result Review :          []  Laboratory  []  Radiology  []  Pathology  []  Microbiology  []  EKG/Telemetry   []  Cardiology/Vascular   []  Old records  Today I reviewed Dr. Lindsay's operative pathology report.     Assessment and Plan    Diagnoses and all orders for this visit:    1. S/P endoscopy (Primary)    2. Chronic superficial gastritis without " bleeding        Follow Up   Return for Repeat EGD in 2 years; follow-up in the interim as needed.      Take PPI or H2 blockers as prescribed    Avoid ASA and other NSAIDs such as ibuprofen    Avoid spicy foods and caffeine    Avoid smoking and excessive alcohol intake    Reduce stress/start stress reduction techniques      Patient was given instructions and counseling regarding his condition or for health maintenance advice. Please see specific information pulled into the AVS if appropriate.

## 2023-10-25 RX ORDER — SUCRALFATE 1 G/1
1 TABLET ORAL 4 TIMES DAILY
Qty: 360 TABLET | Refills: 10 | Status: SHIPPED | OUTPATIENT
Start: 2023-10-25

## 2023-12-19 RX ORDER — METOPROLOL SUCCINATE 50 MG/1
50 TABLET, EXTENDED RELEASE ORAL DAILY
Qty: 90 TABLET | Refills: 1 | OUTPATIENT
Start: 2023-12-19

## 2024-03-06 RX ORDER — PANTOPRAZOLE SODIUM 40 MG/1
TABLET, DELAYED RELEASE ORAL
Qty: 90 TABLET | Refills: 3 | Status: SHIPPED | OUTPATIENT
Start: 2024-03-06

## 2024-04-04 ENCOUNTER — PREP FOR SURGERY (OUTPATIENT)
Dept: OTHER | Facility: HOSPITAL | Age: 59
End: 2024-04-04
Payer: MEDICARE

## 2024-04-04 ENCOUNTER — OFFICE VISIT (OUTPATIENT)
Dept: SURGERY | Facility: CLINIC | Age: 59
End: 2024-04-04
Payer: MEDICARE

## 2024-04-04 VITALS
BODY MASS INDEX: 23.78 KG/M2 | DIASTOLIC BLOOD PRESSURE: 90 MMHG | HEIGHT: 66 IN | WEIGHT: 148 LBS | SYSTOLIC BLOOD PRESSURE: 167 MMHG

## 2024-04-04 DIAGNOSIS — K43.2 INCISIONAL HERNIA, WITHOUT OBSTRUCTION OR GANGRENE: Primary | ICD-10-CM

## 2024-04-04 RX ORDER — SODIUM CHLORIDE 0.9 % (FLUSH) 0.9 %
10 SYRINGE (ML) INJECTION EVERY 12 HOURS SCHEDULED
OUTPATIENT
Start: 2024-04-04

## 2024-04-04 RX ORDER — SODIUM CHLORIDE 9 MG/ML
40 INJECTION, SOLUTION INTRAVENOUS AS NEEDED
OUTPATIENT
Start: 2024-04-04

## 2024-04-04 RX ORDER — CEFAZOLIN SODIUM 2 G/100ML
2000 INJECTION, SOLUTION INTRAVENOUS ONCE
OUTPATIENT
Start: 2024-04-04 | End: 2024-04-04

## 2024-04-04 RX ORDER — METOPROLOL SUCCINATE 25 MG/1
25 TABLET, EXTENDED RELEASE ORAL
COMMUNITY
Start: 2024-01-09

## 2024-04-04 RX ORDER — SODIUM CHLORIDE 0.9 % (FLUSH) 0.9 %
10 SYRINGE (ML) INJECTION AS NEEDED
OUTPATIENT
Start: 2024-04-04

## 2024-04-04 NOTE — PROGRESS NOTES
Chief Complaint: Hernia    Subjective         Hernia      Atilio Recinos is a 59 y.o. male presents to [facility] GENERAL SURGERY. The patient is to be seen for an incisional hernia. Mr. Recinos agrees to the recording of this visit for transcription purposes. He is accompanied by an adult male who is present via telephone.     The patient reports the presence of a hernia, which he attributes to his previous surgery. He has observed a progressive increase in the size of the hernia over the past 30 to 45 days. He has not undergone a CT scan.     Surgical history  An EGD was performed during his last visit.    Objective     Past Medical History:   Diagnosis Date    Allergic     Antihistamines    Allergies     Anemia 2/2022    Anxiety     Arthritis     Bipolar 1 disorder     Bowel perforation 01/16/2022    Chronic kidney disease 1/2022    COPD (chronic obstructive pulmonary disease)     COVID-19 02/19/2022    Depression     Fissure, anal 2003    Forgetfulness     GERD (gastroesophageal reflux disease)     Hard to intubate     High gag reflex.    Head injury     Hepatitis     High blood pressure     High cholesterol     History of transfusion 2/2022    Pancreatitis [id number]    PONV (postoperative nausea and vomiting) 1/2022    Psychiatric illness     Pulmonary embolism     Rectal bleeding 2003    S/P exploratory laparotomy, oversew of perforated ulcer, placement of Chaparro patch  01/17/2022    Shortness of breath     Sinus trouble     TIA (transient ischemic attack)        Past Surgical History:   Procedure Laterality Date    COLONOSCOPY N/A 09/22/2022    Procedure: COLONOSCOPY;  Surgeon: Atilio Lindsay MD;  Location: [facility] ENDOSCOPY;  Service: General;  Laterality: N/A;  DIVERTICULOSIS    ENDOSCOPY N/A 09/22/2022    Procedure: ESOPHAGOGASTRODUODENOSCOPY WITH BIOPSIES;  Surgeon: Atilio Lindsay MD;  Location: [facility] ENDOSCOPY;  Service: General;  Laterality: N/A;  DUODENAL ULCERS    ENDOSCOPY N/A  9/7/2023    Procedure: ESOPHAGOGASTRODUODENOSCOPY WITH BIOPSIES;  Surgeon: Atilio Lindsay MD;  Location: [facility] ENDOSCOPY;  Service: General;  Laterality: N/A;  HEALED DUODENAL ULCER    EXPLORATORY LAPAROTOMY N/A 01/16/2022    Procedure: EXPLORATORY LAPAROTOMY, OVERSEW OF PERFORATED GASTRIC ULCER WITH [place] PATCH;  Surgeon: Atilio Lindsay MD;  Location: [facility] MAIN OR;  Service: General;  Laterality: N/A;    INGUINAL HERNIA REPAIR      PERICARDIAL WINDOW      PLEURAL SCARIFICATION Right 1990    SPHINCTEROTOMY           Current Outpatient Medications:     albuterol sulfate  (90 Base) MCG/ACT inhaler, Inhale 2 puffs Every 4 (Four) Hours As Needed for Wheezing or Shortness of Air., Disp: 8 g, Rfl: 3    aspirin 81 MG EC tablet, 1 tablet., Disp: , Rfl:     atorvastatin (LIPITOR) 40 MG tablet, Take 1 tablet by mouth Daily., Disp: 90 tablet, Rfl: 1    Budeson-Glycopyrrol-Formoterol (Breztri Aerosphere) 160-9-4.8 MCG/ACT aerosol inhaler, Inhale., Disp: , Rfl:     Diclofenac Sodium (VOLTAREN) 1 % gel gel, Apply  topically to the appropriate area as directed 4 (Four) Times a Day. Please have pharmacy change prescription to 2 g as needed, apply to affected area 4 times a day., Disp: 150 g, Rfl: 1    Diclofenac Sodium (VOLTAREN) 1 % gel gel, As Needed., Disp: , Rfl:     Fluticasone Furoate-Vilanterol (Breo Ellipta) 100-25 MCG/INH inhaler, Inhale 1 puff Daily., Disp: 60 each, Rfl: 2    gabapentin (NEURONTIN) 400 MG capsule, Take 1 capsule by mouth 3 (Three) Times a Day., Disp: , Rfl:     metoprolol succinate XL (TOPROL-XL) 25 MG 24 hr tablet, Take 1 tablet by mouth., Disp: , Rfl:     mirtazapine (REMERON) 15 MG tablet, Take 1 tablet by mouth Daily., Disp: , Rfl:     montelukast (SINGULAIR) 10 MG tablet, Take 1 tablet by mouth every night at bedtime., Disp: , Rfl:     Omega-3 Fatty Acids (fish oil) 1000 MG capsule capsule, Take 1 capsule by mouth Daily With Breakfast., Disp: 180 capsule, Rfl: 1    ondansetron ODT  (ZOFRAN-ODT) 4 MG disintegrating tablet, Place 1 tablet under the tongue Every 8 (Eight) Hours As Needed for Nausea or Vomiting., Disp: 15 tablet, Rfl: 0    pantoprazole (PROTONIX) 40 MG EC tablet, TAKE 1 TABLET EVERY DAY, Disp: 90 tablet, Rfl: 3    QUEtiapine (SEROquel) 300 MG tablet, Take 1 tablet by mouth Every Night., Disp: , Rfl:     azithromycin (Zithromax Z-Aamir) 250 MG tablet, Take 2 tablets by mouth on day 1, then 1 tablet daily on days 2-5 (Patient not taking: Reported on 4/4/2024), Disp: 6 tablet, Rfl: 0    methylPREDNISolone (MEDROL) 4 MG dose pack, Take as directed on package instructions. (Patient not taking: Reported on 4/4/2024), Disp: 1 each, Rfl: 0    Naloxegol Oxalate (Movantik) 25 MG tablet, Oral for 30 Days, Disp: , Rfl:     vitamin D (ERGOCALCIFEROL) 1.25 MG (93969 UT) capsule capsule, Take 1 capsule by mouth Every 7 (Seven) Days. (Patient not taking: Reported on 4/4/2024), Disp: 13 capsule, Rfl: 1    Allergies   Allergen Reactions    Lorazepam Mental Status Change     Other reaction(s): Hyperactivity    Amoxicillin-Pot Clavulanate Hives    Diphenhydramine Hives    Dye [Ct Contrast] Hives        Family History   Problem Relation Age of Onset    Alcohol abuse Mother     Asthma Mother     COPD Mother     Depression Mother     Heart disease Mother     Hyperlipidemia Mother     Hypertension Mother     Mental illness Mother     Anxiety disorder Mother     Vision loss Mother     Alcohol abuse Father     Arthritis Father     Hypertension Father     Liver disease Father     Stroke Father     Vision loss Father     Colon cancer Maternal Grandmother     Stroke Other     Heart disease Other     Diabetes Other        Social History     Socioeconomic History    Marital status:    Tobacco Use    Smoking status: Every Day     Current packs/day: 1.00     Average packs/day: 1 pack/day for 47.0 years (47.0 ttl pk-yrs)     Types: Cigarettes    Smokeless tobacco: Never   Vaping Use    Vaping status: Never  "Used   Substance and Sexual Activity    Alcohol use: Not Currently    Drug use: Never    Sexual activity: Yes     Partners: Male     Birth control/protection: Condom       Vital Signs:   /90   Ht 167.6 cm (66\")   Wt 67.1 kg (148 lb)   BMI 23.89 kg/m²      Physical Exam  Constitutional:       General: He is not in acute distress.  Pulmonary:      Effort: Pulmonary effort is normal. No respiratory distress.   Abdominal:      Palpations: Abdomen is soft.      Comments: He has a large incisional hernia in the upper midline of the abdomen. No overlying skin changes are present.          Result Review :            Procedures        Assessment and Plan    There are no diagnoses linked to this encounter.    Incisional hernia.  - We will plan for a robotic incisional hernia repair. We discussed that this may require an open repair due to the size of the hernia. Risk, benefits, and alternatives of the procedure were discussed extensively.     All questions were answered. Patient voiced understanding and agreed to the above plan.      Follow Up   No follow-ups on file.  Patient was given instructions and counseling regarding his condition or for health maintenance advice. Please see specific information pulled into the AVS if appropriate.       Transcribed from ambient dictation for Atilio Lindsay MD by Gama Kraft.  04/04/24   13:54 EDT    Patient or patient representative verbalized consent to the visit recording.  I have personally performed the services described in this document as transcribed by the above individual, and it is both accurate and complete.   "

## 2024-04-11 DIAGNOSIS — R10.13 EPIGASTRIC PAIN: Primary | ICD-10-CM

## 2024-04-11 RX ORDER — OXYCODONE HYDROCHLORIDE AND ACETAMINOPHEN 5; 325 MG/1; MG/1
1 TABLET ORAL EVERY 4 HOURS PRN
Qty: 20 TABLET | Refills: 0 | Status: SHIPPED | OUTPATIENT
Start: 2024-04-11

## 2024-05-16 NOTE — PRE-PROCEDURE INSTRUCTIONS
IMPORTANT INSTRUCTIONS - PRE-ADMISSION TESTING  DO NOT EAT OR CHEW anything after midnight the night before your procedure.    You may have CLEAR liquids up to 2 hours prior to ARRIVAL time.   Take the following medications the morning of your procedure with JUST A SIP OF WATER: METOPROLOL, ZOFRAN  IF NEEDED, PANTOPRAZOLE, INHALERS, PERCOCET IF NEEDED, GABAPENTIN_    DO NOT BRING your medications to the hospital with you, UNLESS something has changed since your PRE-Admission Testing appointment.  Hold all vitamins, supplements, and NSAIDS (Non- steroidal anti-inflammatory meds) for one week prior to surgery (you MAY take Tylenol or Acetaminophen).  If you are diabetic, check your blood sugar the morning of your procedure. If it is less than 70 or if you are feeling symptomatic, call the following number for further instructions: [phone] SAME  DAY SURGERY_.  Use your inhalers/nebulizers as usual, the morning of your procedure. BRING YOUR INHALERS with you.   Bring your CPAP or BIPAP to hospital, ONLY IF YOU WILL BE SPENDING THE NIGHT.   Make sure you have a ride home and have someone who will stay with you the day of your procedure after you go home.  If you have any questions, please call your Pre-Admission Testing NurseCAIT at [phone]_.   Per anesthesia request, do not smoke for 24 hours before your procedure or as instructed by your surgeon.    Clear Liquid Diet        Find out when you need to start a clear liquid diet.   Think of “clear liquids” as anything you could read a newspaper through. This includes things like water, broth, sports drinks, or tea WITHOUT any kind of milk or cream.           Once you are told to start a clear liquid diet, only drink these things until 2 hours before arrival to the hospital or when the hospital says to stop. Total volume limitation: 8 oz.       Clear liquids you CAN drink:   Water   Clear broth: beef, chicken, vegetable, or bone broth with nothing in it    Gatorade   Lemonade or Moshe-aid   Soda   Tea, coffee (NO cream or honey)   Jell-O (without fruit)   Popsicles (without fruit or cream)   Italian ices   Juice without pulp: apple, white, grape   You may use salt, pepper, and sugar    Do NOT drink:   Milk or cream   Soy milk, almond milk, coconut milk, or other non-dairy drinks and   creamers   Milkshakes or smoothies   Tomato juice   Orange juice   Grapefruit juice   Cream soups or any other than broth         Clear Liquid Diet:  Do NOT eat any solid food.  Do NOT eat or suck on mints or candy.  Do NOT chew gum.  Do NOT drink thick liquids like milk or juice with pulp in it.  Do NOT add milk, cream, or anything like soy milk or almond milk to coffee or tea.   PREOPERATIVE (BEFORE SURGERY)              BATHING INSTRUCTIONS  Instructions:    You will need to shower 1 TIME   Wash your hair and face with normal shampoo and soap, rinse it well before using the surgical soap.      In the shower, wet the skin completely with water from your neck to your feet. Apply the cleanser to your   body ONLY FROM THE NECK TO YOUR FEET.     Do NOT USE THE CLEANSER ON YOUR FACE, HEAD, OR GENITAL (PRIVATE) AREAS.   Keep it out of your eyes, ears, and mouth because of the risk of injury to those areas.      Scrub with a clean washcloth for each bath utilizing the soap provided from the top of your body to the   bottom starting at the neck area.      Pay close attention to your armpits, groin area, and the site of surgery.      Wash your body gently for 5 minutes. Stand outside the stream or turn off the water while scrubbing your   body. Do NOT wash with your regular soap after the surgical cleanser is used.      RINSE THE CLEANSER OFF COMPLETELY with plenty of water. Rinse the area again thoroughly.      Dry off with a clean towel. The surgical soap can cause dryness; however do NOT APPLY LOTION,   CREAM, POWDER, and/or DEODORANT AFTER SHOWERING.     Be sure to where clean clothes  after showering.      Ensure CLEAN BED LINENS AFTER FIRST wash with the surgical soap.      NO PETS ALLOWED IN THE BED with you after utilizing the surgical soap.

## 2024-05-17 ENCOUNTER — ANESTHESIA (OUTPATIENT)
Dept: PERIOP | Facility: HOSPITAL | Age: 59
DRG: 337 | End: 2024-05-17
Payer: MEDICARE

## 2024-05-17 ENCOUNTER — HOSPITAL ENCOUNTER (INPATIENT)
Facility: HOSPITAL | Age: 59
LOS: 2 days | Discharge: HOME | DRG: 337 | End: 2024-05-19
Attending: SURGERY | Admitting: SURGERY
Payer: MEDICARE

## 2024-05-17 ENCOUNTER — ANESTHESIA EVENT (OUTPATIENT)
Dept: PERIOP | Facility: HOSPITAL | Age: 59
DRG: 337 | End: 2024-05-17
Payer: MEDICARE

## 2024-05-17 DIAGNOSIS — Z98.890 S/P REPAIR OF VENTRAL HERNIA: Primary | ICD-10-CM

## 2024-05-17 DIAGNOSIS — Z87.19 S/P REPAIR OF VENTRAL HERNIA: Primary | ICD-10-CM

## 2024-05-17 DIAGNOSIS — J44.9 CHRONIC OBSTRUCTIVE PULMONARY DISEASE, UNSPECIFIED COPD TYPE: ICD-10-CM

## 2024-05-17 DIAGNOSIS — K43.2 INCISIONAL HERNIA, WITHOUT OBSTRUCTION OR GANGRENE: ICD-10-CM

## 2024-05-17 LAB
ANION GAP SERPL CALCULATED.3IONS-SCNC: 6.8 MMOL/L (ref 5–15)
BASOPHILS # BLD AUTO: 0.09 10*3/MM3 (ref 0–0.2)
BASOPHILS NFR BLD AUTO: 1.1 % (ref 0–1.5)
BUN SERPL-MCNC: 8 MG/DL (ref 8–23)
BUN/CREAT SERPL: 7.5 (ref 7–25)
CALCIUM SPEC-SCNC: 9 MG/DL (ref 8.6–10.5)
CHLORIDE SERPL-SCNC: 106 MMOL/L (ref 98–107)
CLUMPED PLATELETS: PRESENT
CO2 SERPL-SCNC: 27.2 MMOL/L (ref 22–29)
CREAT SERPL-MCNC: 1.06 MG/DL (ref 0.76–1.27)
DEPRECATED RDW RBC AUTO: 41.9 FL (ref 37–54)
EGFRCR SERPLBLD CKD-EPI 2021: 79.8 ML/MIN/1.73
EOSINOPHIL # BLD AUTO: 2.12 10*3/MM3 (ref 0–0.4)
EOSINOPHIL NFR BLD AUTO: 25.3 % (ref 0.3–6.2)
ERYTHROCYTE [DISTWIDTH] IN BLOOD BY AUTOMATED COUNT: 13.1 % (ref 12.3–15.4)
GLUCOSE SERPL-MCNC: 99 MG/DL (ref 65–99)
HCT VFR BLD AUTO: 41.5 % (ref 37.5–51)
HGB BLD-MCNC: 13.8 G/DL (ref 13–17.7)
IMM GRANULOCYTES # BLD AUTO: 0.03 10*3/MM3 (ref 0–0.05)
IMM GRANULOCYTES NFR BLD AUTO: 0.4 % (ref 0–0.5)
LYMPHOCYTES # BLD AUTO: 1.61 10*3/MM3 (ref 0.7–3.1)
LYMPHOCYTES NFR BLD AUTO: 19.2 % (ref 19.6–45.3)
MCH RBC QN AUTO: 29 PG (ref 26.6–33)
MCHC RBC AUTO-ENTMCNC: 33.3 G/DL (ref 31.5–35.7)
MCV RBC AUTO: 87.2 FL (ref 79–97)
MONOCYTES # BLD AUTO: 0.67 10*3/MM3 (ref 0.1–0.9)
MONOCYTES NFR BLD AUTO: 8 % (ref 5–12)
NEUTROPHILS NFR BLD AUTO: 3.85 10*3/MM3 (ref 1.7–7)
NEUTROPHILS NFR BLD AUTO: 46 % (ref 42.7–76)
NRBC BLD AUTO-RTO: 0 /100 WBC (ref 0–0.2)
PLATELET # BLD AUTO: 215 10*3/MM3 (ref 140–450)
PMV BLD AUTO: 10.4 FL (ref 6–12)
POIKILOCYTOSIS BLD QL SMEAR: NORMAL
POTASSIUM SERPL-SCNC: 4.9 MMOL/L (ref 3.5–5.2)
RBC # BLD AUTO: 4.76 10*6/MM3 (ref 4.14–5.8)
SMALL PLATELETS BLD QL SMEAR: ADEQUATE
SODIUM SERPL-SCNC: 140 MMOL/L (ref 136–145)
WBC MORPH BLD: NORMAL
WBC NRBC COR # BLD AUTO: 8.37 10*3/MM3 (ref 3.4–10.8)

## 2024-05-17 PROCEDURE — 25010000002 KETOROLAC TROMETHAMINE PER 15 MG: Performed by: NURSE ANESTHETIST, CERTIFIED REGISTERED

## 2024-05-17 PROCEDURE — 25010000002 ONDANSETRON PER 1 MG: Performed by: NURSE ANESTHETIST, CERTIFIED REGISTERED

## 2024-05-17 PROCEDURE — 25810000003 SODIUM CHLORIDE 0.9 % SOLUTION: Performed by: SURGERY

## 2024-05-17 PROCEDURE — 85025 COMPLETE CBC W/AUTO DIFF WBC: CPT | Performed by: SURGERY

## 2024-05-17 PROCEDURE — 25010000002 SUGAMMADEX 200 MG/2ML SOLUTION: Performed by: NURSE ANESTHETIST, CERTIFIED REGISTERED

## 2024-05-17 PROCEDURE — 25810000003 LACTATED RINGERS PER 1000 ML: Performed by: ANESTHESIOLOGY

## 2024-05-17 PROCEDURE — 25010000002 MEPERIDINE PER 100 MG: Performed by: NURSE ANESTHETIST, CERTIFIED REGISTERED

## 2024-05-17 PROCEDURE — 85007 BL SMEAR W/DIFF WBC COUNT: CPT | Performed by: SURGERY

## 2024-05-17 PROCEDURE — 25010000002 FENTANYL CITRATE (PF) 50 MCG/ML SOLUTION: Performed by: NURSE ANESTHETIST, CERTIFIED REGISTERED

## 2024-05-17 PROCEDURE — 25010000002 BUPIVACAINE (PF) 0.25 % SOLUTION 10 ML VIAL: Performed by: SURGERY

## 2024-05-17 PROCEDURE — 0WUF0JZ SUPPLEMENT ABDOMINAL WALL WITH SYNTHETIC SUBSTITUTE, OPEN APPROACH: ICD-10-PCS | Performed by: SURGERY

## 2024-05-17 PROCEDURE — 80048 BASIC METABOLIC PNL TOTAL CA: CPT | Performed by: ANESTHESIOLOGY

## 2024-05-17 PROCEDURE — 49593 RPR AA HRN 1ST 3-10 RDC: CPT | Performed by: SPECIALIST/TECHNOLOGIST, OTHER

## 2024-05-17 PROCEDURE — 0DNW0ZZ RELEASE PERITONEUM, OPEN APPROACH: ICD-10-PCS | Performed by: SURGERY

## 2024-05-17 PROCEDURE — 25010000002 HYDROMORPHONE 1 MG/ML SOLUTION: Performed by: NURSE ANESTHETIST, CERTIFIED REGISTERED

## 2024-05-17 PROCEDURE — C1781 MESH (IMPLANTABLE): HCPCS | Performed by: SURGERY

## 2024-05-17 PROCEDURE — C1765 ADHESION BARRIER: HCPCS | Performed by: SURGERY

## 2024-05-17 PROCEDURE — 25010000002 PROPOFOL 10 MG/ML EMULSION: Performed by: NURSE ANESTHETIST, CERTIFIED REGISTERED

## 2024-05-17 PROCEDURE — 25010000002 CEFAZOLIN PER 500 MG: Performed by: SURGERY

## 2024-05-17 PROCEDURE — 25010000002 HYDRALAZINE PER 20 MG: Performed by: ANESTHESIOLOGY

## 2024-05-17 PROCEDURE — 25010000002 MORPHINE PER 10 MG: Performed by: SURGERY

## 2024-05-17 PROCEDURE — 25010000002 DEXAMETHASONE PER 1 MG: Performed by: NURSE ANESTHETIST, CERTIFIED REGISTERED

## 2024-05-17 PROCEDURE — 25010000002 MIDAZOLAM PER 1MG: Performed by: ANESTHESIOLOGY

## 2024-05-17 PROCEDURE — 25010000002 HYDROMORPHONE 1 MG/ML SOLUTION: Performed by: SURGERY

## 2024-05-17 PROCEDURE — 49593 RPR AA HRN 1ST 3-10 RDC: CPT | Performed by: SURGERY

## 2024-05-17 DEVICE — PHASIX MESH, 6" X 8" (15.2 CM X 20.3 CM), RECTANGLE
Type: IMPLANTABLE DEVICE | Site: ABDOMEN | Status: FUNCTIONAL
Brand: PHASIX

## 2024-05-17 RX ORDER — SODIUM CHLORIDE 9 MG/ML
50 INJECTION, SOLUTION INTRAVENOUS CONTINUOUS
Status: DISCONTINUED | OUTPATIENT
Start: 2024-05-17 | End: 2024-05-19 | Stop reason: HOSPADM

## 2024-05-17 RX ORDER — METOPROLOL SUCCINATE 25 MG/1
25 TABLET, EXTENDED RELEASE ORAL 2 TIMES DAILY
Status: DISCONTINUED | OUTPATIENT
Start: 2024-05-17 | End: 2024-05-19 | Stop reason: HOSPADM

## 2024-05-17 RX ORDER — NALOXONE HCL 0.4 MG/ML
0.1 VIAL (ML) INJECTION
Status: DISCONTINUED | OUTPATIENT
Start: 2024-05-17 | End: 2024-05-19 | Stop reason: HOSPADM

## 2024-05-17 RX ORDER — MORPHINE SULFATE 2 MG/ML
2 INJECTION, SOLUTION INTRAMUSCULAR; INTRAVENOUS
Status: DISCONTINUED | OUTPATIENT
Start: 2024-05-17 | End: 2024-05-19 | Stop reason: HOSPADM

## 2024-05-17 RX ORDER — ATORVASTATIN CALCIUM 40 MG/1
40 TABLET, FILM COATED ORAL NIGHTLY
Status: DISCONTINUED | OUTPATIENT
Start: 2024-05-17 | End: 2024-05-19 | Stop reason: HOSPADM

## 2024-05-17 RX ORDER — SODIUM CHLORIDE 0.9 % (FLUSH) 0.9 %
10 SYRINGE (ML) INJECTION AS NEEDED
Status: DISCONTINUED | OUTPATIENT
Start: 2024-05-17 | End: 2024-05-17 | Stop reason: HOSPADM

## 2024-05-17 RX ORDER — ROCURONIUM BROMIDE 10 MG/ML
INJECTION, SOLUTION INTRAVENOUS AS NEEDED
Status: DISCONTINUED | OUTPATIENT
Start: 2024-05-17 | End: 2024-05-17 | Stop reason: SURG

## 2024-05-17 RX ORDER — METOPROLOL TARTRATE 1 MG/ML
INJECTION, SOLUTION INTRAVENOUS AS NEEDED
Status: DISCONTINUED | OUTPATIENT
Start: 2024-05-17 | End: 2024-05-17 | Stop reason: SURG

## 2024-05-17 RX ORDER — ONDANSETRON 4 MG/1
4 TABLET, ORALLY DISINTEGRATING ORAL EVERY 6 HOURS PRN
Status: DISCONTINUED | OUTPATIENT
Start: 2024-05-17 | End: 2024-05-19 | Stop reason: HOSPADM

## 2024-05-17 RX ORDER — SODIUM CHLORIDE 0.9 % (FLUSH) 0.9 %
10 SYRINGE (ML) INJECTION EVERY 12 HOURS SCHEDULED
Status: DISCONTINUED | OUTPATIENT
Start: 2024-05-17 | End: 2024-05-17 | Stop reason: HOSPADM

## 2024-05-17 RX ORDER — HEPARIN SODIUM 5000 [USP'U]/ML
5000 INJECTION, SOLUTION INTRAVENOUS; SUBCUTANEOUS EVERY 12 HOURS SCHEDULED
Status: DISCONTINUED | OUTPATIENT
Start: 2024-05-18 | End: 2024-05-19 | Stop reason: HOSPADM

## 2024-05-17 RX ORDER — ONDANSETRON 2 MG/ML
INJECTION INTRAMUSCULAR; INTRAVENOUS AS NEEDED
Status: DISCONTINUED | OUTPATIENT
Start: 2024-05-17 | End: 2024-05-17 | Stop reason: SURG

## 2024-05-17 RX ORDER — PROMETHAZINE HYDROCHLORIDE 12.5 MG/1
25 TABLET ORAL ONCE AS NEEDED
Status: DISCONTINUED | OUTPATIENT
Start: 2024-05-17 | End: 2024-05-17

## 2024-05-17 RX ORDER — GABAPENTIN 400 MG/1
400 CAPSULE ORAL 4 TIMES DAILY
Status: DISCONTINUED | OUTPATIENT
Start: 2024-05-17 | End: 2024-05-19 | Stop reason: HOSPADM

## 2024-05-17 RX ORDER — KETOROLAC TROMETHAMINE 30 MG/ML
INJECTION, SOLUTION INTRAMUSCULAR; INTRAVENOUS AS NEEDED
Status: DISCONTINUED | OUTPATIENT
Start: 2024-05-17 | End: 2024-05-17 | Stop reason: SURG

## 2024-05-17 RX ORDER — PROMETHAZINE HYDROCHLORIDE 25 MG/1
25 SUPPOSITORY RECTAL ONCE AS NEEDED
Status: DISCONTINUED | OUTPATIENT
Start: 2024-05-17 | End: 2024-05-17

## 2024-05-17 RX ORDER — HYDRALAZINE HYDROCHLORIDE 20 MG/ML
5 INJECTION INTRAMUSCULAR; INTRAVENOUS ONCE
Status: COMPLETED | OUTPATIENT
Start: 2024-05-17 | End: 2024-05-17

## 2024-05-17 RX ORDER — HYDROCODONE BITARTRATE AND ACETAMINOPHEN 10; 325 MG/1; MG/1
1 TABLET ORAL EVERY 4 HOURS PRN
Status: DISCONTINUED | OUTPATIENT
Start: 2024-05-17 | End: 2024-05-19 | Stop reason: HOSPADM

## 2024-05-17 RX ORDER — MEPERIDINE HYDROCHLORIDE 25 MG/ML
12.5 INJECTION INTRAMUSCULAR; INTRAVENOUS; SUBCUTANEOUS
Status: DISCONTINUED | OUTPATIENT
Start: 2024-05-17 | End: 2024-05-17

## 2024-05-17 RX ORDER — MIDAZOLAM HYDROCHLORIDE 2 MG/2ML
2 INJECTION, SOLUTION INTRAMUSCULAR; INTRAVENOUS ONCE
Status: COMPLETED | OUTPATIENT
Start: 2024-05-17 | End: 2024-05-17

## 2024-05-17 RX ORDER — ACETAMINOPHEN 500 MG
1000 TABLET ORAL ONCE
Status: COMPLETED | OUTPATIENT
Start: 2024-05-17 | End: 2024-05-17

## 2024-05-17 RX ORDER — ALBUTEROL SULFATE 90 UG/1
2 AEROSOL, METERED RESPIRATORY (INHALATION) EVERY 4 HOURS PRN
Status: DISCONTINUED | OUTPATIENT
Start: 2024-05-17 | End: 2024-05-19 | Stop reason: HOSPADM

## 2024-05-17 RX ORDER — OXYCODONE HYDROCHLORIDE 5 MG/1
5 TABLET ORAL
Status: DISCONTINUED | OUTPATIENT
Start: 2024-05-17 | End: 2024-05-17

## 2024-05-17 RX ORDER — QUETIAPINE FUMARATE 100 MG/1
300 TABLET, FILM COATED ORAL NIGHTLY
Status: DISCONTINUED | OUTPATIENT
Start: 2024-05-17 | End: 2024-05-19 | Stop reason: HOSPADM

## 2024-05-17 RX ORDER — MIRTAZAPINE 15 MG/1
15 TABLET, FILM COATED ORAL NIGHTLY
Status: DISCONTINUED | OUTPATIENT
Start: 2024-05-17 | End: 2024-05-19 | Stop reason: HOSPADM

## 2024-05-17 RX ORDER — DEXAMETHASONE SODIUM PHOSPHATE 4 MG/ML
INJECTION, SOLUTION INTRA-ARTICULAR; INTRALESIONAL; INTRAMUSCULAR; INTRAVENOUS; SOFT TISSUE AS NEEDED
Status: DISCONTINUED | OUTPATIENT
Start: 2024-05-17 | End: 2024-05-17 | Stop reason: SURG

## 2024-05-17 RX ORDER — LIDOCAINE HYDROCHLORIDE 20 MG/ML
INJECTION, SOLUTION EPIDURAL; INFILTRATION; INTRACAUDAL; PERINEURAL AS NEEDED
Status: DISCONTINUED | OUTPATIENT
Start: 2024-05-17 | End: 2024-05-17 | Stop reason: SURG

## 2024-05-17 RX ORDER — MAGNESIUM HYDROXIDE 1200 MG/15ML
LIQUID ORAL AS NEEDED
Status: DISCONTINUED | OUTPATIENT
Start: 2024-05-17 | End: 2024-05-17 | Stop reason: HOSPADM

## 2024-05-17 RX ORDER — MONTELUKAST SODIUM 10 MG/1
10 TABLET ORAL DAILY
Status: DISCONTINUED | OUTPATIENT
Start: 2024-05-17 | End: 2024-05-19 | Stop reason: HOSPADM

## 2024-05-17 RX ORDER — HYDROCODONE BITARTRATE AND ACETAMINOPHEN 5; 325 MG/1; MG/1
1 TABLET ORAL EVERY 4 HOURS PRN
Status: DISCONTINUED | OUTPATIENT
Start: 2024-05-17 | End: 2024-05-19 | Stop reason: HOSPADM

## 2024-05-17 RX ORDER — ONDANSETRON 2 MG/ML
4 INJECTION INTRAMUSCULAR; INTRAVENOUS EVERY 6 HOURS PRN
Status: DISCONTINUED | OUTPATIENT
Start: 2024-05-17 | End: 2024-05-19 | Stop reason: HOSPADM

## 2024-05-17 RX ORDER — NALOXONE HCL 0.4 MG/ML
0.4 VIAL (ML) INJECTION
Status: DISCONTINUED | OUTPATIENT
Start: 2024-05-17 | End: 2024-05-19 | Stop reason: HOSPADM

## 2024-05-17 RX ORDER — ONDANSETRON 2 MG/ML
4 INJECTION INTRAMUSCULAR; INTRAVENOUS ONCE AS NEEDED
Status: DISCONTINUED | OUTPATIENT
Start: 2024-05-17 | End: 2024-05-17

## 2024-05-17 RX ORDER — SODIUM CHLORIDE, SODIUM LACTATE, POTASSIUM CHLORIDE, CALCIUM CHLORIDE 600; 310; 30; 20 MG/100ML; MG/100ML; MG/100ML; MG/100ML
9 INJECTION, SOLUTION INTRAVENOUS CONTINUOUS PRN
Status: DISCONTINUED | OUTPATIENT
Start: 2024-05-17 | End: 2024-05-17

## 2024-05-17 RX ORDER — SODIUM CHLORIDE 9 MG/ML
40 INJECTION, SOLUTION INTRAVENOUS AS NEEDED
Status: DISCONTINUED | OUTPATIENT
Start: 2024-05-17 | End: 2024-05-17 | Stop reason: HOSPADM

## 2024-05-17 RX ORDER — PANTOPRAZOLE SODIUM 40 MG/10ML
40 INJECTION, POWDER, LYOPHILIZED, FOR SOLUTION INTRAVENOUS
Status: DISCONTINUED | OUTPATIENT
Start: 2024-05-18 | End: 2024-05-19 | Stop reason: HOSPADM

## 2024-05-17 RX ORDER — PROPOFOL 10 MG/ML
VIAL (ML) INTRAVENOUS AS NEEDED
Status: DISCONTINUED | OUTPATIENT
Start: 2024-05-17 | End: 2024-05-17 | Stop reason: SURG

## 2024-05-17 RX ORDER — FENTANYL CITRATE 50 UG/ML
INJECTION, SOLUTION INTRAMUSCULAR; INTRAVENOUS AS NEEDED
Status: DISCONTINUED | OUTPATIENT
Start: 2024-05-17 | End: 2024-05-17 | Stop reason: SURG

## 2024-05-17 RX ADMIN — ACETAMINOPHEN 1000 MG: 500 TABLET ORAL at 09:14

## 2024-05-17 RX ADMIN — ROCURONIUM BROMIDE 50 MG: 10 INJECTION, SOLUTION INTRAVENOUS at 09:41

## 2024-05-17 RX ADMIN — ROCURONIUM BROMIDE 20 MG: 10 INJECTION, SOLUTION INTRAVENOUS at 11:31

## 2024-05-17 RX ADMIN — HYDROMORPHONE HYDROCHLORIDE 0.5 MG: 1 INJECTION, SOLUTION INTRAMUSCULAR; INTRAVENOUS; SUBCUTANEOUS at 16:07

## 2024-05-17 RX ADMIN — HYDROMORPHONE HYDROCHLORIDE 0.5 MG: 1 INJECTION, SOLUTION INTRAMUSCULAR; INTRAVENOUS; SUBCUTANEOUS at 10:59

## 2024-05-17 RX ADMIN — MEPERIDINE HYDROCHLORIDE 12.5 MG: 25 INJECTION INTRAMUSCULAR; INTRAVENOUS; SUBCUTANEOUS at 12:35

## 2024-05-17 RX ADMIN — SODIUM CHLORIDE, POTASSIUM CHLORIDE, SODIUM LACTATE AND CALCIUM CHLORIDE: 600; 310; 30; 20 INJECTION, SOLUTION INTRAVENOUS at 12:19

## 2024-05-17 RX ADMIN — HYDROCODONE BITARTRATE AND ACETAMINOPHEN 1 TABLET: 5; 325 TABLET ORAL at 18:06

## 2024-05-17 RX ADMIN — DEXAMETHASONE SODIUM PHOSPHATE 8 MG: 4 INJECTION, SOLUTION INTRAMUSCULAR; INTRAVENOUS at 09:36

## 2024-05-17 RX ADMIN — METOPROLOL TARTRATE 2 MG: 1 INJECTION, SOLUTION INTRAVENOUS at 10:58

## 2024-05-17 RX ADMIN — MIDAZOLAM HYDROCHLORIDE 2 MG: 1 INJECTION, SOLUTION INTRAMUSCULAR; INTRAVENOUS at 09:14

## 2024-05-17 RX ADMIN — ONDANSETRON 4 MG: 2 INJECTION INTRAMUSCULAR; INTRAVENOUS at 13:22

## 2024-05-17 RX ADMIN — ONDANSETRON HYDROCHLORIDE 4 MG: 2 SOLUTION INTRAMUSCULAR; INTRAVENOUS at 12:02

## 2024-05-17 RX ADMIN — HYDRALAZINE HYDROCHLORIDE 5 MG: 20 INJECTION, SOLUTION INTRAMUSCULAR; INTRAVENOUS at 12:47

## 2024-05-17 RX ADMIN — HYDROMORPHONE HYDROCHLORIDE 0.25 MG: 1 INJECTION, SOLUTION INTRAMUSCULAR; INTRAVENOUS; SUBCUTANEOUS at 13:14

## 2024-05-17 RX ADMIN — FENTANYL CITRATE 100 MCG: 50 INJECTION, SOLUTION INTRAMUSCULAR; INTRAVENOUS at 09:41

## 2024-05-17 RX ADMIN — HYDROMORPHONE HYDROCHLORIDE 0.5 MG: 1 INJECTION, SOLUTION INTRAMUSCULAR; INTRAVENOUS; SUBCUTANEOUS at 12:35

## 2024-05-17 RX ADMIN — ATORVASTATIN CALCIUM 40 MG: 40 TABLET, FILM COATED ORAL at 21:29

## 2024-05-17 RX ADMIN — SODIUM CHLORIDE, POTASSIUM CHLORIDE, SODIUM LACTATE AND CALCIUM CHLORIDE 9 ML/HR: 600; 310; 30; 20 INJECTION, SOLUTION INTRAVENOUS at 09:14

## 2024-05-17 RX ADMIN — GABAPENTIN 400 MG: 400 CAPSULE ORAL at 21:28

## 2024-05-17 RX ADMIN — HYDROMORPHONE HYDROCHLORIDE 0.5 MG: 1 INJECTION, SOLUTION INTRAMUSCULAR; INTRAVENOUS; SUBCUTANEOUS at 12:56

## 2024-05-17 RX ADMIN — SODIUM CHLORIDE 2 G: 9 INJECTION, SOLUTION INTRAVENOUS at 09:35

## 2024-05-17 RX ADMIN — MORPHINE SULFATE 2 MG: 2 INJECTION, SOLUTION INTRAMUSCULAR; INTRAVENOUS at 21:29

## 2024-05-17 RX ADMIN — METOPROLOL TARTRATE 3 MG: 1 INJECTION, SOLUTION INTRAVENOUS at 10:48

## 2024-05-17 RX ADMIN — SUGAMMADEX 200 MG: 100 INJECTION, SOLUTION INTRAVENOUS at 12:14

## 2024-05-17 RX ADMIN — METOPROLOL SUCCINATE 25 MG: 25 TABLET, EXTENDED RELEASE ORAL at 21:28

## 2024-05-17 RX ADMIN — GABAPENTIN 400 MG: 400 CAPSULE ORAL at 18:02

## 2024-05-17 RX ADMIN — PROPOFOL 160 MG: 10 INJECTION, EMULSION INTRAVENOUS at 09:41

## 2024-05-17 RX ADMIN — HYDROMORPHONE HYDROCHLORIDE 0.5 MG: 1 INJECTION, SOLUTION INTRAMUSCULAR; INTRAVENOUS; SUBCUTANEOUS at 10:10

## 2024-05-17 RX ADMIN — SODIUM CHLORIDE, POTASSIUM CHLORIDE, SODIUM LACTATE AND CALCIUM CHLORIDE: 600; 310; 30; 20 INJECTION, SOLUTION INTRAVENOUS at 10:34

## 2024-05-17 RX ADMIN — LIDOCAINE HYDROCHLORIDE 100 MG: 20 INJECTION, SOLUTION INTRAVENOUS at 09:41

## 2024-05-17 RX ADMIN — ROCURONIUM BROMIDE 30 MG: 10 INJECTION, SOLUTION INTRAVENOUS at 10:45

## 2024-05-17 RX ADMIN — SODIUM CHLORIDE 50 ML/HR: 9 INJECTION, SOLUTION INTRAVENOUS at 14:04

## 2024-05-17 RX ADMIN — QUETIAPINE FUMARATE 300 MG: 100 TABLET ORAL at 21:28

## 2024-05-17 RX ADMIN — HYDROCODONE BITARTRATE AND ACETAMINOPHEN 1 TABLET: 10; 325 TABLET ORAL at 23:10

## 2024-05-17 RX ADMIN — KETOROLAC TROMETHAMINE 30 MG: 30 INJECTION, SOLUTION INTRAMUSCULAR; INTRAVENOUS at 12:02

## 2024-05-17 RX ADMIN — MIRTAZAPINE 15 MG: 15 TABLET, FILM COATED ORAL at 21:28

## 2024-05-17 RX ADMIN — HYDRALAZINE HYDROCHLORIDE 5 MG: 20 INJECTION, SOLUTION INTRAMUSCULAR; INTRAVENOUS at 13:31

## 2024-05-17 RX ADMIN — SODIUM CHLORIDE 2000 MG: 9 INJECTION, SOLUTION INTRAVENOUS at 16:07

## 2024-05-17 NOTE — ANESTHESIA POSTPROCEDURE EVALUATION
Patient: Atilio Recinos    Procedure Summary       Date: 05/17/24 Room / Location: [facility] OR 08 / [facility] MAIN OR    Anesthesia Start: 0935 Anesthesia Stop: 1224    Procedure: OPEN VENTRAL / INCISIONAL HERNIA REPAIR (Abdomen) Diagnosis:       Incisional hernia, without obstruction or gangrene      (Incisional hernia, without obstruction or gangrene [K43.2])    Surgeons: Atilio Lindsay MD Provider: Rohith El CRNA    Anesthesia Type: general ASA Status: 3            Anesthesia Type: general    Vitals  Vitals Value Taken Time   /77 05/17/24 1346   Temp 36.4 °C (97.6 °F) 05/17/24 1341   Pulse 82 05/17/24 1346   Resp 14 05/17/24 1346   SpO2 96 % 05/17/24 1346           Post Anesthesia Care and Evaluation    Patient location during evaluation: bedside  Patient participation: complete - patient participated  Level of consciousness: awake  Pain management: adequate    Airway patency: patent  PONV Status: none  Cardiovascular status: acceptable and stable  Respiratory status: acceptable  Hydration status: acceptable

## 2024-05-17 NOTE — H&P
Hernia        Atilio Recinos is a 59 y.o. male presents to [facility] GENERAL SURGERY. The patient is to be seen for an incisional hernia. Mr. Recinos agrees to the recording of this visit for transcription purposes. He is accompanied by an adult male who is present via telephone.      The patient reports the presence of a hernia, which he attributes to his previous surgery. He has observed a progressive increase in the size of the hernia over the past 30 to 45 days. He has not undergone a CT scan.      Surgical history  An EGD was performed during his last visit.           Objective  Medical History        Past Medical History:   Diagnosis Date    Allergic       Antihistamines    Allergies      Anemia 2/2022    Anxiety      Arthritis      Bipolar 1 disorder      Bowel perforation 01/16/2022    Chronic kidney disease 1/2022    COPD (chronic obstructive pulmonary disease)      COVID-19 02/19/2022    Depression      Fissure, anal 2003    Forgetfulness      GERD (gastroesophageal reflux disease)      Hard to intubate       High gag reflex.    Head injury      Hepatitis      High blood pressure      High cholesterol      History of transfusion 2/2022    Pancreatitis [id number]    PONV (postoperative nausea and vomiting) 1/2022    Psychiatric illness      Pulmonary embolism      Rectal bleeding 2003    S/P exploratory laparotomy, oversew of perforated ulcer, placement of [place] patch  01/17/2022    Shortness of breath      Sinus trouble      TIA (transient ischemic attack)              Surgical History         Past Surgical History:   Procedure Laterality Date    COLONOSCOPY N/A 09/22/2022     Procedure: COLONOSCOPY;  Surgeon: Atilio Lindsay MD;  Location: [facility] ENDOSCOPY;  Service: General;  Laterality: N/A;  DIVERTICULOSIS    ENDOSCOPY N/A 09/22/2022     Procedure: ESOPHAGOGASTRODUODENOSCOPY WITH BIOPSIES;  Surgeon: Atilio Lindsay MD;  Location: [facility] ENDOSCOPY;  Service: General;  Laterality:  N/A;  DUODENAL ULCERS    ENDOSCOPY N/A 9/7/2023     Procedure: ESOPHAGOGASTRODUODENOSCOPY WITH BIOPSIES;  Surgeon: Atilio Lindsay MD;  Location: [facility] ENDOSCOPY;  Service: General;  Laterality: N/A;  HEALED DUODENAL ULCER    EXPLORATORY LAPAROTOMY N/A 01/16/2022     Procedure: EXPLORATORY LAPAROTOMY, OVERSEW OF PERFORATED GASTRIC ULCER WITH [place] PATCH;  Surgeon: Atilio Lindsay MD;  Location: [facility] MAIN OR;  Service: General;  Laterality: N/A;    INGUINAL HERNIA REPAIR        PERICARDIAL WINDOW        PLEURAL SCARIFICATION Right 1990    SPHINCTEROTOMY                  Current Medications      Current Outpatient Medications:     albuterol sulfate  (90 Base) MCG/ACT inhaler, Inhale 2 puffs Every 4 (Four) Hours As Needed for Wheezing or Shortness of Air., Disp: 8 g, Rfl: 3    aspirin 81 MG EC tablet, 1 tablet., Disp: , Rfl:     atorvastatin (LIPITOR) 40 MG tablet, Take 1 tablet by mouth Daily., Disp: 90 tablet, Rfl: 1    Budeson-Glycopyrrol-Formoterol (Breztri Aerosphere) 160-9-4.8 MCG/ACT aerosol inhaler, Inhale., Disp: , Rfl:     Diclofenac Sodium (VOLTAREN) 1 % gel gel, Apply  topically to the appropriate area as directed 4 (Four) Times a Day. Please have pharmacy change prescription to 2 g as needed, apply to affected area 4 times a day., Disp: 150 g, Rfl: 1    Diclofenac Sodium (VOLTAREN) 1 % gel gel, As Needed., Disp: , Rfl:     Fluticasone Furoate-Vilanterol (Breo Ellipta) 100-25 MCG/INH inhaler, Inhale 1 puff Daily., Disp: 60 each, Rfl: 2    gabapentin (NEURONTIN) 400 MG capsule, Take 1 capsule by mouth 3 (Three) Times a Day., Disp: , Rfl:     metoprolol succinate XL (TOPROL-XL) 25 MG 24 hr tablet, Take 1 tablet by mouth., Disp: , Rfl:     mirtazapine (REMERON) 15 MG tablet, Take 1 tablet by mouth Daily., Disp: , Rfl:     montelukast (SINGULAIR) 10 MG tablet, Take 1 tablet by mouth every night at bedtime., Disp: , Rfl:     Omega-3 Fatty Acids (fish oil) 1000 MG capsule capsule, Take 1 capsule  by mouth Daily With Breakfast., Disp: 180 capsule, Rfl: 1    ondansetron ODT (ZOFRAN-ODT) 4 MG disintegrating tablet, Place 1 tablet under the tongue Every 8 (Eight) Hours As Needed for Nausea or Vomiting., Disp: 15 tablet, Rfl: 0    pantoprazole (PROTONIX) 40 MG EC tablet, TAKE 1 TABLET EVERY DAY, Disp: 90 tablet, Rfl: 3    QUEtiapine (SEROquel) 300 MG tablet, Take 1 tablet by mouth Every Night., Disp: , Rfl:     azithromycin (Zithromax Z-Aamir) 250 MG tablet, Take 2 tablets by mouth on day 1, then 1 tablet daily on days 2-5 (Patient not taking: Reported on 4/4/2024), Disp: 6 tablet, Rfl: 0    methylPREDNISolone (MEDROL) 4 MG dose pack, Take as directed on package instructions. (Patient not taking: Reported on 4/4/2024), Disp: 1 each, Rfl: 0    Naloxegol Oxalate (Movantik) 25 MG tablet, Oral for 30 Days, Disp: , Rfl:     vitamin D (ERGOCALCIFEROL) 1.25 MG (07020 UT) capsule capsule, Take 1 capsule by mouth Every 7 (Seven) Days. (Patient not taking: Reported on 4/4/2024), Disp: 13 capsule, Rfl: 1        Allergies         Allergies   Allergen Reactions    Lorazepam Mental Status Change       Other reaction(s): Hyperactivity    Amoxicillin-Pot Clavulanate Hives    Diphenhydramine Hives    Dye [Ct Contrast] Hives                  Family History   Problem Relation Age of Onset    Alcohol abuse Mother      Asthma Mother      COPD Mother      Depression Mother      Heart disease Mother      Hyperlipidemia Mother      Hypertension Mother      Mental illness Mother      Anxiety disorder Mother      Vision loss Mother      Alcohol abuse Father      Arthritis Father      Hypertension Father      Liver disease Father      Stroke Father      Vision loss Father      Colon cancer Maternal Grandmother      Stroke Other      Heart disease Other      Diabetes Other           Social History   Social History            Socioeconomic History    Marital status:    Tobacco Use    Smoking status: Every Day       Current packs/day:  "1.00       Average packs/day: 1 pack/day for 47.0 years (47.0 ttl pk-yrs)       Types: Cigarettes    Smokeless tobacco: Never   Vaping Use    Vaping status: Never Used   Substance and Sexual Activity    Alcohol use: Not Currently    Drug use: Never    Sexual activity: Yes       Partners: Male       Birth control/protection: Condom            Vital Signs:   /90   Ht 167.6 cm (66\")   Wt 67.1 kg (148 lb)   BMI 23.89 kg/m²      Physical Exam  Constitutional:       General: He is not in acute distress.  Pulmonary:      Effort: Pulmonary effort is normal. No respiratory distress.   Abdominal:      Palpations: Abdomen is soft.      Comments: He has a large incisional hernia in the upper midline of the abdomen. No overlying skin changes are present.                  Result Review  :               Procedures            Assessment  Assessment and Plan    There are no diagnoses linked to this encounter.     Incisional hernia.  - We will plan for a robotic incisional hernia repair. We discussed that this may require an open repair due to the size of the hernia. Risk, benefits, and alternatives of the procedure were discussed extensively.      All questions were answered. Patient voiced understanding and agreed to the above plan.        Follow Up   No follow-ups on file.  Patient was given instructions and counseling regarding his condition or for health maintenance advice. Please see specific information pulled into the AVS if appropriate.         Transcribed from ambient dictation for Atilio Lindsay MD by Gama Kraft.  04/04/24   13:54 EDT     Patient or patient representative verbalized consent to the visit recording.  I have personally performed the services described in this document as transcribed by the above individual, and it is both accurate and complete.         "

## 2024-05-17 NOTE — ANESTHESIA PREPROCEDURE EVALUATION
Anesthesia Evaluation     history of anesthetic complications:  PONV difficult airway  NPO Solid Status: > 8 hours  NPO Liquid Status: > 6 hours           Airway   Mallampati: III  TM distance: >3 FB  Neck ROM: full  Difficult intubation highly probable  Comment: Documented history of difficult airway.  Dental    (+) poor dentition    Comment: 2 teeth      Pulmonary    (+) pulmonary embolism (2022 at time of perforated ulcer), a smoker Current, Abstained day of surgery, cigarettes, COPD moderate, asthma,home oxygen (2L need with exertion), shortness of breath, decreased breath sounds    ROS comment: Patient states he uses home 02 prn   Cardiovascular   Exercise tolerance: poor (<4 METS)    Rhythm: regular  Rate: normal    (+) hypertension (metoprolol), pericardial effusion (2022; s/p pericardial window; occured after perforated ulcer, thought to be due to viral infection), hyperlipidemia      Neuro/Psych  (+) TIA (2001, uncontrolled HTN and triglycerides per patient), psychiatric history Anxiety, Bipolar and Depression  GI/Hepatic/Renal/Endo    (+) GERD well controlled, GI bleeding resolved, hepatitis B, liver disease, renal disease- CRI    Musculoskeletal     Abdominal    Substance History      OB/GYN          Other   arthritis,                       Anesthesia Plan    ASA 3     general     intravenous induction     Anesthetic plan, risks, benefits, and alternatives have been provided, discussed and informed consent has been obtained with: patient, other and spouse/significant other.  Pre-procedure education provided  Plan discussed with CRNA.        CODE STATUS:

## 2024-05-17 NOTE — OP NOTE
Preoperative diagnosis: Incisional hernia    Postoperative diagnosis: Same    Findings: 15 x 15 cm incisional hernia    Procedure: Open incisional hernia repair    Surgeon: Neftali    Anesthesia: General    Assistant: Ellie Solares    EBL: 7 mL    Specimens: None    Complications: None    Indications: 59-year-old male with a large incisional hernia.  Presents today for elective repair.    PROCEDURE    Patient was seen in the preoperative holding area.  Risks, benefits, alternatives of the operation were again discussed in detail.  All of the patient and family's questions were answered.  They voiced understanding, and agreed to proceed.    Patient was brought to the operating room.  Monitoring devices and Jesse stockings were placed.  Anesthesia was administered.  Patient was prepped and draped in standard surgical fashion.  After prepping and draping a timeout was performed to verify both the correct patient and correct procedure.    We began by injecting local anesthesia in the left upper quadrant.  An incision to accommodate a 12 mm trocar was made in the left upper quadrant, and the Veress needle was inserted into the abdomen.  Intra-abdominal placement was verified with a normal saline drop test.  After verification, the abdomen was insufflated to 15 mmHg pressure.  Once the abdomen was insufflated, we removed the Veress needle.  Then using the Visiport technique the abdomen was entered in the left upper quadrant.  Once the abdomen was entered, we were able to see the liver and that pretty much it.  I did not see any obvious underlying injury, however in the left upper quadrant everything was so densely adhered that it was really impossible to see much of anything.  Could not make any space.  At this point I elected to go ahead and proceed with an open operation.  I do based on the patient's body size and the size of the hernia that it was a potential we would have to proceed with open anyway because I felt  they would be not enough space to do a robotic repair given the size of the hernia and with the poor visualization it sealed the decision to go ahead and go open.  Made an incision along his previous midline incision.  Carried dissection down carefully with a combination of sharp dissection and electrocautery.  I entered the abdomen sharply with a pair of Metzenbaum scissors.  I then opened the abdomen on top my finger in order to protect any underlying abdominal contents.    His intra-abdominal contents were adhered copiously throughout the abdomen.  Many of these adhesions were somewhat filmy but he did have a lot of dense adhesions as well.  We carefully dissected free all of the contents of the hernia sac.  We able to dissect out the abdominal contents and get them reduced.  Dissection and reduction and lysis of adhesions took greater than 2 hours.  I was able to make space laterally underneath the abdominal hernia.  With the size of the hernia I knew I was not going to be able to get back in robotically to place an IPOM mesh.  I consider trying to do a TAR repair but his abdominal wall was very thin and friable.  The posterior rectus sheath was very difficult to manage and we had multiple tears and holes as I was even beginning to make the space in the posterior rectus.  He did have enough give on his abdominal fascia that I could close the defect primarily.  I did not want to ruin his remaining fascia by trying to create rectus space as I feared that this tissue would be too friable to hold.    I placed some Seprafilm in the abdomen.  I then closed the fascia primarily with a running looped PDS.  Then opened the rectus sheath anteriorly and placed a large piece of 6 x 8 inch phasix mesh in the anterior rectus space.  This was secured into place with multiple PDS sutures.  We then closed the space over top of the phasix.  His abdomen was quite small and 6 x 8 inches covered pretty much his entire upper  abdominal wall.  We then closed soft tissue over top of this closure using a running 0 Vicryl suture.    With the closure and approximation of the fascia he had and the soft tissue he had some excess skin.  I did excise excess skin from the entirety of the incision and I did take his umbilicus with this excision.  We then closed to the skin with staples.    Wound was dressed with an island dressing.  Abdominal binder was placed.    The patient was then aroused from anesthesia, taken off the OR table, and taken to PACU in stable condition.  Sponge, needle, and instrument counts were correct x2.  Ellie Solares was present for the entire procedure and helped in all portions of the case.    Assistant: Ellie Solares CSA was responsible for performing the following activities: Retraction, Suction, Irrigation, Closing, and Placing Dressing and their skilled assistance was necessary for the success of this case.        The operative note was dictated with the help of the dragon dictation system.

## 2024-05-18 LAB
ANION GAP SERPL CALCULATED.3IONS-SCNC: 8.9 MMOL/L (ref 5–15)
BASOPHILS # BLD AUTO: 0.03 10*3/MM3 (ref 0–0.2)
BASOPHILS NFR BLD AUTO: 0.2 % (ref 0–1.5)
BUN SERPL-MCNC: 14 MG/DL (ref 8–23)
BUN/CREAT SERPL: 12.5 (ref 7–25)
CALCIUM SPEC-SCNC: 8.2 MG/DL (ref 8.6–10.5)
CHLORIDE SERPL-SCNC: 106 MMOL/L (ref 98–107)
CO2 SERPL-SCNC: 23.1 MMOL/L (ref 22–29)
CREAT SERPL-MCNC: 1.12 MG/DL (ref 0.76–1.27)
DEPRECATED RDW RBC AUTO: 42.4 FL (ref 37–54)
EGFRCR SERPLBLD CKD-EPI 2021: 74.7 ML/MIN/1.73
EOSINOPHIL # BLD AUTO: 0.01 10*3/MM3 (ref 0–0.4)
EOSINOPHIL NFR BLD AUTO: 0.1 % (ref 0.3–6.2)
ERYTHROCYTE [DISTWIDTH] IN BLOOD BY AUTOMATED COUNT: 13.2 % (ref 12.3–15.4)
GLUCOSE SERPL-MCNC: 119 MG/DL (ref 65–99)
HCT VFR BLD AUTO: 36 % (ref 37.5–51)
HGB BLD-MCNC: 12 G/DL (ref 13–17.7)
IMM GRANULOCYTES # BLD AUTO: 0.07 10*3/MM3 (ref 0–0.05)
IMM GRANULOCYTES NFR BLD AUTO: 0.4 % (ref 0–0.5)
LYMPHOCYTES # BLD AUTO: 1.22 10*3/MM3 (ref 0.7–3.1)
LYMPHOCYTES NFR BLD AUTO: 7.3 % (ref 19.6–45.3)
MCH RBC QN AUTO: 29 PG (ref 26.6–33)
MCHC RBC AUTO-ENTMCNC: 33.3 G/DL (ref 31.5–35.7)
MCV RBC AUTO: 87 FL (ref 79–97)
MONOCYTES # BLD AUTO: 1.18 10*3/MM3 (ref 0.1–0.9)
MONOCYTES NFR BLD AUTO: 7 % (ref 5–12)
NEUTROPHILS NFR BLD AUTO: 14.24 10*3/MM3 (ref 1.7–7)
NEUTROPHILS NFR BLD AUTO: 85 % (ref 42.7–76)
NRBC BLD AUTO-RTO: 0 /100 WBC (ref 0–0.2)
PLATELET # BLD AUTO: 222 10*3/MM3 (ref 140–450)
PMV BLD AUTO: 11.3 FL (ref 6–12)
POTASSIUM SERPL-SCNC: 4.3 MMOL/L (ref 3.5–5.2)
RBC # BLD AUTO: 4.14 10*6/MM3 (ref 4.14–5.8)
SODIUM SERPL-SCNC: 138 MMOL/L (ref 136–145)
WBC NRBC COR # BLD AUTO: 16.75 10*3/MM3 (ref 3.4–10.8)

## 2024-05-18 PROCEDURE — 80048 BASIC METABOLIC PNL TOTAL CA: CPT | Performed by: SURGERY

## 2024-05-18 PROCEDURE — 25010000002 CEFAZOLIN PER 500 MG: Performed by: SURGERY

## 2024-05-18 PROCEDURE — 25810000003 SODIUM CHLORIDE 0.9 % SOLUTION: Performed by: SURGERY

## 2024-05-18 PROCEDURE — 85025 COMPLETE CBC W/AUTO DIFF WBC: CPT | Performed by: SURGERY

## 2024-05-18 PROCEDURE — 25010000002 HYDROMORPHONE 1 MG/ML SOLUTION: Performed by: SURGERY

## 2024-05-18 PROCEDURE — 99231 SBSQ HOSP IP/OBS SF/LOW 25: CPT | Performed by: SURGERY

## 2024-05-18 PROCEDURE — 25010000002 HEPARIN (PORCINE) PER 1000 UNITS: Performed by: SURGERY

## 2024-05-18 RX ORDER — DOCUSATE SODIUM 100 MG/1
100 CAPSULE, LIQUID FILLED ORAL 2 TIMES DAILY
Status: DISCONTINUED | OUTPATIENT
Start: 2024-05-18 | End: 2024-05-19 | Stop reason: HOSPADM

## 2024-05-18 RX ORDER — DOCUSATE SODIUM 100 MG/1
100 CAPSULE, LIQUID FILLED ORAL 2 TIMES DAILY
Status: DISCONTINUED | OUTPATIENT
Start: 2024-05-18 | End: 2024-05-18

## 2024-05-18 RX ADMIN — GABAPENTIN 400 MG: 400 CAPSULE ORAL at 11:24

## 2024-05-18 RX ADMIN — HYDROCODONE BITARTRATE AND ACETAMINOPHEN 1 TABLET: 10; 325 TABLET ORAL at 13:34

## 2024-05-18 RX ADMIN — DOCUSATE SODIUM 100 MG: 100 CAPSULE, LIQUID FILLED ORAL at 16:04

## 2024-05-18 RX ADMIN — SODIUM CHLORIDE 2000 MG: 9 INJECTION, SOLUTION INTRAVENOUS at 01:31

## 2024-05-18 RX ADMIN — HYDROMORPHONE HYDROCHLORIDE 0.5 MG: 1 INJECTION, SOLUTION INTRAMUSCULAR; INTRAVENOUS; SUBCUTANEOUS at 01:31

## 2024-05-18 RX ADMIN — HYDROCODONE BITARTRATE AND ACETAMINOPHEN 1 TABLET: 10; 325 TABLET ORAL at 22:02

## 2024-05-18 RX ADMIN — MIRTAZAPINE 15 MG: 15 TABLET, FILM COATED ORAL at 22:02

## 2024-05-18 RX ADMIN — HYDROCODONE BITARTRATE AND ACETAMINOPHEN 1 TABLET: 10; 325 TABLET ORAL at 04:15

## 2024-05-18 RX ADMIN — GABAPENTIN 400 MG: 400 CAPSULE ORAL at 18:10

## 2024-05-18 RX ADMIN — HEPARIN SODIUM 5000 UNITS: 5000 INJECTION INTRAVENOUS; SUBCUTANEOUS at 08:20

## 2024-05-18 RX ADMIN — HEPARIN SODIUM 5000 UNITS: 5000 INJECTION INTRAVENOUS; SUBCUTANEOUS at 22:02

## 2024-05-18 RX ADMIN — GABAPENTIN 400 MG: 400 CAPSULE ORAL at 08:20

## 2024-05-18 RX ADMIN — ATORVASTATIN CALCIUM 40 MG: 40 TABLET, FILM COATED ORAL at 22:02

## 2024-05-18 RX ADMIN — METOPROLOL TARTRATE 5 MG: 1 INJECTION, SOLUTION INTRAVENOUS at 02:12

## 2024-05-18 RX ADMIN — HYDROMORPHONE HYDROCHLORIDE 0.5 MG: 1 INJECTION, SOLUTION INTRAMUSCULAR; INTRAVENOUS; SUBCUTANEOUS at 11:24

## 2024-05-18 RX ADMIN — METOPROLOL SUCCINATE 25 MG: 25 TABLET, EXTENDED RELEASE ORAL at 22:02

## 2024-05-18 RX ADMIN — GABAPENTIN 400 MG: 400 CAPSULE ORAL at 22:02

## 2024-05-18 RX ADMIN — HYDROCODONE BITARTRATE AND ACETAMINOPHEN 1 TABLET: 10; 325 TABLET ORAL at 08:20

## 2024-05-18 RX ADMIN — METOPROLOL SUCCINATE 25 MG: 25 TABLET, EXTENDED RELEASE ORAL at 08:20

## 2024-05-18 RX ADMIN — HYDROCODONE BITARTRATE AND ACETAMINOPHEN 1 TABLET: 10; 325 TABLET ORAL at 18:10

## 2024-05-18 RX ADMIN — HYDROMORPHONE HYDROCHLORIDE 0.5 MG: 1 INJECTION, SOLUTION INTRAMUSCULAR; INTRAVENOUS; SUBCUTANEOUS at 19:59

## 2024-05-18 RX ADMIN — SODIUM CHLORIDE 50 ML/HR: 9 INJECTION, SOLUTION INTRAVENOUS at 08:20

## 2024-05-18 RX ADMIN — QUETIAPINE FUMARATE 300 MG: 100 TABLET ORAL at 22:02

## 2024-05-18 RX ADMIN — MONTELUKAST 10 MG: 10 TABLET, FILM COATED ORAL at 22:02

## 2024-05-18 RX ADMIN — PANTOPRAZOLE SODIUM 40 MG: 40 INJECTION, POWDER, FOR SOLUTION INTRAVENOUS at 04:15

## 2024-05-18 NOTE — PLAN OF CARE
Goal Outcome Evaluation:           Progress: improving   Pt VSS, pain well managed this shift. Upgraded diet and tolerated it well. Pt walked four times this shift.  at bedside, pt resting with no complaints at this time. Call light within reach. MARCIAL ARIAS

## 2024-05-18 NOTE — PLAN OF CARE
Goal Outcome Evaluation:  Plan of Care Reviewed With: patient        Progress: no change  Outcome Evaluation: pain being controlled w all forms of meds ordered. no flatus reportred at this time, belching.Michelle Briseno RN

## 2024-05-18 NOTE — PROGRESS NOTES
[facility]     Progress Note    Patient Name: Atilio Recinos  : 1965  MRN: [id number]  Primary Care Physician:  Mitali Saha, APRN  Date of admission: 2024    Subjective   Subjective     Chief Complaint: Status post incisional hernia repair    HPI:  Patient Reports feeling pretty well.  Pain is reasonably controlled.  He is quite sore.  He has been ambulating around the room.  No nausea or vomiting.  Tolerating diet.      Objective   Objective     Vitals:   Temp:  [97.3 °F (36.3 °C)-99.1 °F (37.3 °C)] 98.1 °F (36.7 °C)  Heart Rate:  [70-93] 92  Resp:  [14-20] 18  BP: (146-206)/(63-92) 154/73  Flow (L/min):  [2-5] 2    Physical Exam  Constitutional:       Appearance: Normal appearance.   Cardiovascular:      Rate and Rhythm: Normal rate.   Pulmonary:      Effort: Pulmonary effort is normal.   Abdominal:      Palpations: Abdomen is soft.         Result Review    Result Review:  I have personally reviewed the results from the time of this admission to 2024 10:37 EDT and agree with these findings:  [x]  Laboratory  []  Microbiology  []  Radiology  []  EKG/Telemetry   []  Cardiology/Vascular   []  Pathology  []  Old records  []  Other:  Most notable findings include: Elevated white count of 16, likely just postop    Assessment & Plan   Assessment / Plan     Brief Patient Summary:  Atilio Recinos is a 59 y.o. male who status post large incisional hernia repair    Active Hospital Problems:  Active Hospital Problems    Diagnosis     **Incisional hernia, without obstruction or gangrene     S/P repair of ventral hernia      Plan:  Doing well  Pain is better controlled and I thought it might be  He is ambulating well  He is tolerating diet  Mild elevation of the white count we will check that tomorrow  If he is tolerating regular diet and pain is controlled with the decrease in white count I will likely DC tomorrow       DVT prophylaxis:  Medical and mechanical DVT prophylaxis orders are  present.        CODE STATUS:   Code Status (Patient has no pulse and is not breathing): CPR (Attempt to Resuscitate)  Medical Interventions (Patient has pulse or is breathing): Full Support    Disposition:  I expect patient to be discharged tomorrow.    Electronically signed by Atilio Lindsay MD, 05/18/24, 10:37 AM EDT.

## 2024-05-19 ENCOUNTER — READMISSION MANAGEMENT (OUTPATIENT)
Dept: CALL CENTER | Facility: HOSPITAL | Age: 59
End: 2024-05-19
Payer: MEDICARE

## 2024-05-19 VITALS
HEART RATE: 69 BPM | OXYGEN SATURATION: 91 % | SYSTOLIC BLOOD PRESSURE: 143 MMHG | HEIGHT: 66 IN | TEMPERATURE: 97.9 F | WEIGHT: 146.83 LBS | RESPIRATION RATE: 16 BRPM | BODY MASS INDEX: 23.6 KG/M2 | DIASTOLIC BLOOD PRESSURE: 60 MMHG

## 2024-05-19 LAB
ANION GAP SERPL CALCULATED.3IONS-SCNC: 8 MMOL/L (ref 5–15)
BASOPHILS # BLD AUTO: 0.04 10*3/MM3 (ref 0–0.2)
BASOPHILS NFR BLD AUTO: 0.4 % (ref 0–1.5)
BUN SERPL-MCNC: 15 MG/DL (ref 8–23)
BUN/CREAT SERPL: 15.8 (ref 7–25)
CALCIUM SPEC-SCNC: 8.4 MG/DL (ref 8.6–10.5)
CHLORIDE SERPL-SCNC: 107 MMOL/L (ref 98–107)
CO2 SERPL-SCNC: 25 MMOL/L (ref 22–29)
CREAT SERPL-MCNC: 0.95 MG/DL (ref 0.76–1.27)
DEPRECATED RDW RBC AUTO: 44.7 FL (ref 37–54)
EGFRCR SERPLBLD CKD-EPI 2021: 91.1 ML/MIN/1.73
EOSINOPHIL # BLD AUTO: 0.32 10*3/MM3 (ref 0–0.4)
EOSINOPHIL NFR BLD AUTO: 3.3 % (ref 0.3–6.2)
ERYTHROCYTE [DISTWIDTH] IN BLOOD BY AUTOMATED COUNT: 13.4 % (ref 12.3–15.4)
GLUCOSE SERPL-MCNC: 94 MG/DL (ref 65–99)
HCT VFR BLD AUTO: 32.4 % (ref 37.5–51)
HGB BLD-MCNC: 10.4 G/DL (ref 13–17.7)
IMM GRANULOCYTES # BLD AUTO: 0.05 10*3/MM3 (ref 0–0.05)
IMM GRANULOCYTES NFR BLD AUTO: 0.5 % (ref 0–0.5)
LYMPHOCYTES # BLD AUTO: 1.85 10*3/MM3 (ref 0.7–3.1)
LYMPHOCYTES NFR BLD AUTO: 19.2 % (ref 19.6–45.3)
MCH RBC QN AUTO: 28.9 PG (ref 26.6–33)
MCHC RBC AUTO-ENTMCNC: 32.1 G/DL (ref 31.5–35.7)
MCV RBC AUTO: 90 FL (ref 79–97)
MONOCYTES # BLD AUTO: 0.68 10*3/MM3 (ref 0.1–0.9)
MONOCYTES NFR BLD AUTO: 7.1 % (ref 5–12)
NEUTROPHILS NFR BLD AUTO: 6.69 10*3/MM3 (ref 1.7–7)
NEUTROPHILS NFR BLD AUTO: 69.5 % (ref 42.7–76)
NRBC BLD AUTO-RTO: 0 /100 WBC (ref 0–0.2)
PLATELET # BLD AUTO: 174 10*3/MM3 (ref 140–450)
PMV BLD AUTO: 11.3 FL (ref 6–12)
POTASSIUM SERPL-SCNC: 4.2 MMOL/L (ref 3.5–5.2)
RBC # BLD AUTO: 3.6 10*6/MM3 (ref 4.14–5.8)
SODIUM SERPL-SCNC: 140 MMOL/L (ref 136–145)
WBC NRBC COR # BLD AUTO: 9.63 10*3/MM3 (ref 3.4–10.8)

## 2024-05-19 PROCEDURE — 25010000002 HEPARIN (PORCINE) PER 1000 UNITS: Performed by: SURGERY

## 2024-05-19 PROCEDURE — 25810000003 SODIUM CHLORIDE 0.9 % SOLUTION: Performed by: SURGERY

## 2024-05-19 PROCEDURE — 99238 HOSP IP/OBS DSCHRG MGMT 30/<: CPT | Performed by: SURGERY

## 2024-05-19 PROCEDURE — 80048 BASIC METABOLIC PNL TOTAL CA: CPT | Performed by: SURGERY

## 2024-05-19 PROCEDURE — 85025 COMPLETE CBC W/AUTO DIFF WBC: CPT | Performed by: SURGERY

## 2024-05-19 RX ORDER — HYDROCODONE BITARTRATE AND ACETAMINOPHEN 5; 325 MG/1; MG/1
1-2 TABLET ORAL EVERY 4 HOURS PRN
Qty: 20 TABLET | Refills: 0 | Status: SHIPPED | OUTPATIENT
Start: 2024-05-19 | End: 2024-05-23 | Stop reason: SDUPTHER

## 2024-05-19 RX ORDER — DOCUSATE SODIUM 100 MG/1
100 CAPSULE, LIQUID FILLED ORAL 2 TIMES DAILY PRN
Qty: 10 CAPSULE | Refills: 1 | Status: SHIPPED | OUTPATIENT
Start: 2024-05-19 | End: 2025-05-19

## 2024-05-19 RX ADMIN — DOCUSATE SODIUM 100 MG: 100 CAPSULE, LIQUID FILLED ORAL at 09:07

## 2024-05-19 RX ADMIN — HYDROCODONE BITARTRATE AND ACETAMINOPHEN 1 TABLET: 10; 325 TABLET ORAL at 07:13

## 2024-05-19 RX ADMIN — HYDROCODONE BITARTRATE AND ACETAMINOPHEN 1 TABLET: 10; 325 TABLET ORAL at 11:00

## 2024-05-19 RX ADMIN — HEPARIN SODIUM 5000 UNITS: 5000 INJECTION INTRAVENOUS; SUBCUTANEOUS at 09:06

## 2024-05-19 RX ADMIN — METOPROLOL SUCCINATE 25 MG: 25 TABLET, EXTENDED RELEASE ORAL at 09:07

## 2024-05-19 RX ADMIN — SODIUM CHLORIDE 50 ML/HR: 9 INJECTION, SOLUTION INTRAVENOUS at 02:30

## 2024-05-19 RX ADMIN — PANTOPRAZOLE SODIUM 40 MG: 40 INJECTION, POWDER, FOR SOLUTION INTRAVENOUS at 07:13

## 2024-05-19 RX ADMIN — GABAPENTIN 400 MG: 400 CAPSULE ORAL at 09:07

## 2024-05-19 RX ADMIN — HYDROCODONE BITARTRATE AND ACETAMINOPHEN 1 TABLET: 10; 325 TABLET ORAL at 02:28

## 2024-05-19 NOTE — OUTREACH NOTE
Prep Survey      Flowsheet Row Responses   [facility] facility patient discharged from? [facility]   Is LACE score < 7 ? No   Eligibility Readm Mgmt   Discharge diagnosis OPEN VENTRAL / INCISIONAL HERNIA REPAIR   Does the patient have one of the following disease processes/diagnoses(primary or secondary)? General Surgery   Does the patient have Home health ordered? No   Is there a DME ordered? No   Prep survey completed? Yes            BELINDA LEDESMA - Registered Nurse

## 2024-05-19 NOTE — DISCHARGE INSTR - OTHER ORDERS
Pt will need to Contact Dr. Lindsay's office or contact [facility] [phone] for free standing trapeze bar. Unable to set up over weekend as [employer] is closed.

## 2024-05-19 NOTE — PLAN OF CARE
Goal Outcome Evaluation:      VSS.  Pain meds given X1 per patient request see MAR.  Discharge education provided and supplies provided to patient for home dressing changes.  Dressing changed prior to discharge.  Patient D/C home with spouse.

## 2024-05-19 NOTE — DISCHARGE SUMMARY
[facility]         DISCHARGE SUMMARY    Patient Name: Atilio Recinos  : 1965  MRN: [id number]    Date of Admission: 2024  Date of Discharge: 2024  Primary Care Physician: Mitali Saha APRN    Consults       No orders found from 2024 to 2024.            Surgical Procedures Since Admission:  Procedure(s):  OPEN VENTRAL / INCISIONAL HERNIA REPAIR  Surgeon:  Atilio Lindsay MD  Status:  2 Days Post-Op  -------------------      Presenting Problem:   Incisional hernia, without obstruction or gangrene [K43.2]  S/P repair of ventral hernia [Z98.890, Z87.19]    Active and Resolved Hospital Problems:  Active Hospital Problems    Diagnosis POA    **Incisional hernia, without obstruction or gangrene [K43.2] Unknown    S/P repair of ventral hernia [Z98.890, Z87.19] Not Applicable      Resolved Hospital Problems   No resolved problems to display.         Hospital Course     Hospital Course:  Atilio Recinos is a 59 y.o. male who was admitted after repair of a large ventral hernia.  On postop day 1 he was doing well.  He still had some residual pain.  He had a leukocytosis.  We kept him for an additional day for pain control and lab monitoring.  On postoperative day 2 he was doing quite well.  Pain is well-controlled.  No nausea or vomiting.  Tolerating regular diet.  His white count had normalized to 9.  He was discharged in stable condition.  He will follow-up in the office in 2 weeks.    Day of Discharge     Vital Signs:  Temp:  [97.7 °F (36.5 °C)-99 °F (37.2 °C)] 97.9 °F (36.6 °C)  Heart Rate:  [69-76] 69  Resp:  [16-18] 16  BP: (126-162)/(56-73) 143/60  Output by Drain (mL) 24 0701 - 24 1900 24 1901 - 24 0700 24 0701 - 24 0854 Range Total   Patient has no LDAs of requested type attached.     Physical Exam:  See Progress Note    Pertinent  and/or Most Recent Results     LAB RESULTS:      Lab 24  0402 24  0354 24  0806   WBC  9.63 16.75* 8.37   HEMOGLOBIN 10.4* 12.0* 13.8   HEMATOCRIT 32.4* 36.0* 41.5   PLATELETS 174 222 215   NEUTROS ABS 6.69 14.24* 3.85   IMMATURE GRANS (ABS) 0.05 0.07* 0.03   LYMPHS ABS 1.85 1.22 1.61   MONOS ABS 0.68 1.18* 0.67   EOS ABS 0.32 0.01 2.12*   MCV 90.0 87.0 87.2         Lab 05/19/24  0402 05/18/24  0354 05/17/24  0806   SODIUM 140 138 140   POTASSIUM 4.2 4.3 4.9   CHLORIDE 107 106 106   CO2 25.0 23.1 27.2   ANION GAP 8.0 8.9 6.8   BUN 15 14 8   CREATININE 0.95 1.12 1.06   EGFR 91.1 74.7 79.8   GLUCOSE 94 119* 99   CALCIUM 8.4* 8.2* 9.0               Brief Urine Lab Results       None          Microbiology Results (last 10 days)       ** No results found for the last 240 hours. **         Results for orders placed in visit on 07/13/23    Adult Transthoracic Echo Complete W/ Cont if Necessary Per Protocol    Interpretation Summary    Left ventricular systolic function is low normal. Left ventricular ejection fraction appears to be 51 - 55%.    Left ventricular diastolic function is consistent with (grade II w/high LAP) pseudonormalization.    There is a small (<1cm) pericardial effusion adjacent to the right ventricle. There is no evidence of cardiac tamponade.    No echo evidence of increased intrapericardial pressure      Labs Pending at Discharge:      Discharge Details        Discharge Medications        New Medications        Instructions Start Date   docusate sodium 100 MG capsule  Commonly known as: Colace   100 mg, Oral, 2 Times Daily PRN      HYDROcodone-acetaminophen 5-325 MG per tablet  Commonly known as: NORCO   1-2 tablets, Oral, Every 4 Hours PRN             Changes to Medications        Instructions Start Date   atorvastatin 40 MG tablet  Commonly known as: LIPITOR  What changed: when to take this   40 mg, Oral, Daily             Continue These Medications        Instructions Start Date   albuterol sulfate  (90 Base) MCG/ACT inhaler  Commonly known as: PROVENTIL HFA;VENTOLIN HFA;PROAIR  HFA   2 puffs, Inhalation, Every 4 Hours PRN      aspirin 81 MG EC tablet   81 mg      Breo Ellipta 100-25 MCG/INH aerosol powder   Generic drug: Fluticasone Furoate-Vilanterol   1 puff, Inhalation, Daily - RT      Diclofenac Sodium 1 % gel gel  Commonly known as: VOLTAREN   Topical, 4 Times Daily, Please have pharmacy change prescription to 2 g as needed, apply to affected area 4 times a day.      gabapentin 400 MG capsule  Commonly known as: NEURONTIN   400 mg, Oral, 4 Times Daily      metoprolol succinate XL 25 MG 24 hr tablet  Commonly known as: TOPROL-XL   25 mg, Oral, 2 Times Daily      mirtazapine 15 MG tablet  Commonly known as: REMERON   15 mg, Oral, Nightly      montelukast 10 MG tablet  Commonly known as: SINGULAIR   10 mg, Oral, Every Night at Bedtime      ondansetron ODT 4 MG disintegrating tablet  Commonly known as: ZOFRAN-ODT   4 mg, Sublingual, Every 8 Hours PRN      oxyCODONE-acetaminophen 5-325 MG per tablet  Commonly known as: Percocet   1 tablet, Oral, Every 4 Hours PRN      pantoprazole 40 MG EC tablet  Commonly known as: PROTONIX   TAKE 1 TABLET EVERY DAY      QUEtiapine 300 MG tablet  Commonly known as: SEROquel   300 mg, Oral, Nightly               Allergies   Allergen Reactions    Lorazepam Mental Status Change     Other reaction(s): Hyperactivity    Amoxicillin-Pot Clavulanate Hives      Beta lactam allergy details  Antibiotic reaction: rash  Age at reaction: adult  Dose to reaction time: (!) hours  Reason for antibiotic: other (prophylaxis)  Epinephrine required for reaction?: no  Tolerated antibiotics: cephalexin       Diphenhydramine Hives    Dye [Ct Contrast] Hives         Discharge Disposition:  Home or Self Care    Diet:  Hospital:  Diet Order   Procedures    Diet: Regular/House; Fluid Consistency: Thin (IDDSI 0)       Discharge Activity:   Activity Instructions       Other Restrictions (Specify)      No lifting greater than 15 pounds or strenuous activity for 4 weeks.  No driving if  taking narcotic pain medication.            No future appointments.    Additional Instructions for the Follow-ups that You Need to Schedule       Discharge Follow-up with Specified Provider: Neftali; 2 Weeks   As directed      To: Neftali   Follow Up: 2 Weeks        Notify Physician or Go To The ED For the Following Conditions   As directed      Nausea, vomiting, fever, chills, and/or worsening or unrelenting abdominal pain.    Order Comments: Nausea, vomiting, fever, chills, and/or worsening or unrelenting abdominal pain.                 Time spent on Discharge including face to face service: 15 minutes

## 2024-05-22 ENCOUNTER — TELEPHONE (OUTPATIENT)
Dept: SURGERY | Facility: CLINIC | Age: 59
End: 2024-05-22
Payer: MEDICARE

## 2024-05-22 ENCOUNTER — READMISSION MANAGEMENT (OUTPATIENT)
Dept: CALL CENTER | Facility: HOSPITAL | Age: 59
End: 2024-05-22
Payer: MEDICARE

## 2024-05-22 NOTE — TELEPHONE ENCOUNTER
Patient spouse called for pt requesting a refill of pain meds. He wants it to go to [facility] in [place].

## 2024-05-22 NOTE — OUTREACH NOTE
General Surgery Week 1 Survey      Flowsheet Row Responses   [facility] patient discharged from? [facility]   Does the patient have one of the following disease processes/diagnoses(primary or secondary)? General Surgery   Week 1 attempt successful? Yes   Call start time 0946   Call end time 0948   Discharge diagnosis OPEN VENTRAL / INCISIONAL HERNIA REPAIR   Meds reviewed with patient/caregiver? Yes   Is the patient having any side effects they believe may be caused by any medication additions or changes? No   Does the patient have all medications related to this admission filled (includes all antibiotics, pain medications, etc.) Yes   Is the patient taking all medications as directed (includes completed medication regime)? Yes   Does the patient have a follow up appointment scheduled with their surgeon? Yes   Has the patient kept scheduled appointments due by today? N/A   Comments Surgery follow up 5/31/24   Has home health visited the patient within 72 hours of discharge? N/A   Psychosocial issues? No   Did the patient receive a copy of their discharge instructions? Yes   Nursing interventions Reviewed instructions with patient   What is the patient's perception of their health status since discharge? Improving   Nursing interventions Nurse provided patient education   Is the patient /caregiver able to teach back basic post-op care? Keep incision areas clean,dry and protected, No tub bath, swimming, or hot tub until instructed by MD, Practice 'cough and deep breath', Lifting as instructed by MD in discharge instructions   Is the patient/caregiver able to teach back signs and symptoms of incisional infection? Fever, Pus or odor from incision, Incisional warmth, Increased drainage or bleeding, Increased redness, swelling or pain at the incisonal site   Is the patient/caregiver able to teach back steps to recovery at home? Set small, achievable goals for return to baseline health, Eat a well-balance diet, Rest and  rebuild strength, gradually increase activity   If the patient is a current smoker, are they able to teach back resources for cessation? Smoking cessation medications   Is the patient/caregiver able to teach back the hierarchy of who to call/visit for symptoms/problems? PCP, Specialist, Home health nurse, Urgent Care, ED, 911 Yes   Week 1 call completed? Yes   Is the patient interested in additional calls from an ambulatory ? No   Would this patient benefit from a Referral to [facility] Social Work? No   Call end time [phone]            Joselyn LEDESMA - Registered Nurse

## 2024-05-23 DIAGNOSIS — Z87.19 S/P REPAIR OF VENTRAL HERNIA: ICD-10-CM

## 2024-05-23 DIAGNOSIS — Z98.890 S/P REPAIR OF VENTRAL HERNIA: ICD-10-CM

## 2024-05-23 RX ORDER — HYDROCODONE BITARTRATE AND ACETAMINOPHEN 5; 325 MG/1; MG/1
1-2 TABLET ORAL EVERY 4 HOURS PRN
Qty: 20 TABLET | Refills: 0 | Status: SHIPPED | OUTPATIENT
Start: 2024-05-23

## 2024-05-27 DIAGNOSIS — Z98.890 S/P REPAIR OF VENTRAL HERNIA: ICD-10-CM

## 2024-05-27 DIAGNOSIS — Z87.19 S/P REPAIR OF VENTRAL HERNIA: ICD-10-CM

## 2024-05-28 RX ORDER — HYDROCODONE BITARTRATE AND ACETAMINOPHEN 5; 325 MG/1; MG/1
1-2 TABLET ORAL EVERY 4 HOURS PRN
Qty: 20 TABLET | Refills: 0 | Status: SHIPPED | OUTPATIENT
Start: 2024-05-28

## 2024-05-28 NOTE — PROGRESS NOTES
"Chief Complaint  Follow-up (Urgent care- stated he heart was enlarged and he had bronchitis )    Subjective        Atilio Recinos presents to [facility] for emergency room follow-up visit.  Patient reports that on 4/22/2023 he was experiencing some dizziness, cough, congestion and shortness of breath, patient with longstanding COPD, he was evaluated at urgent care, they referred him to the emergency room due to chest x-ray showing cardiomegaly.  Patient was evaluated in emergency room, they did do CT of head, which was unremarkable, they did do full work-up with labs, all unremarkable.  He was discharged with instructions to establish with cardiology, and to follow-up with me within the week.  Patient reports his symptoms are improving, he is on steroids, antibiotics, and cough medication.  He still has occasional dizziness.  He has contacted cardiology, however his insurance is still not approved the referral, but they were in the process of getting that done and getting him scheduled.  On review of medical records, he has had mild cardiomegaly on chest x-ray for several years.  He has echocardiogram done in February 2022, which was normal.  He denies any other significant symptoms today.    Objective   Vital Signs:   /88 (BP Location: Left arm, Patient Position: Sitting, Cuff Size: Adult)   Pulse (!) 121   Temp 98.1 °F (36.7 °C) (Temporal)   Resp 16   Ht 167.6 cm (66\")   Wt 65.3 kg (144 lb)   SpO2 98%   BMI 23.24 kg/m²     Physical Exam  Vitals and nursing note reviewed.   Constitutional:       Appearance: Normal appearance. He is normal weight.   HENT:      Head: Normocephalic and atraumatic.   Eyes:      Pupils: Pupils are equal, round, and reactive to light.   Cardiovascular:      Rate and Rhythm: Normal rate and regular rhythm.   Pulmonary:      Effort: Pulmonary effort is normal.      Breath sounds: Normal breath sounds.   Neurological:      Mental Status: He is alert. "   Psychiatric:         Mood and Affect: Mood normal.        Result Review :  {The following data was reviewed by DIRK Montelongo on 04/25/23                Diagnoses and all orders for this visit:    1. Cardiomegaly (Primary)    2. Dizziness    3. Acute bronchitis, unspecified organism    Went back and reviewed multiple EKGs, chest x-rays, echoes, overall, cardiomegaly has been present for quite some time, not overly concerning right now in light of current symptoms.  He will go ahead and follow-up with cardiology when they are able to get him scheduled.  Will not order anything today, will defer to cardiology moving forward.  We did not make any changes to his medication regimen.  Encouraged him to continue his medications prescribed from the ER for his bronchitis.  He can follow-up with us in September otherwise.  Sooner if needed.      Follow Up   No follow-ups on file.  Patient was given instructions and counseling regarding his condition or for health maintenance advice. Please see specific information pulled into the AVS if appropriate.     DIRK Montelongo  4/25/2023  This note was electronically signed.        Andres

## 2024-05-29 ENCOUNTER — PATIENT MESSAGE (OUTPATIENT)
Dept: SURGERY | Facility: CLINIC | Age: 59
End: 2024-05-29
Payer: MEDICARE

## 2024-05-30 ENCOUNTER — TELEPHONE (OUTPATIENT)
Dept: SURGERY | Facility: CLINIC | Age: 59
End: 2024-05-30

## 2024-05-30 DIAGNOSIS — Z87.19 S/P REPAIR OF VENTRAL HERNIA: ICD-10-CM

## 2024-05-30 DIAGNOSIS — Z98.890 S/P REPAIR OF VENTRAL HERNIA: ICD-10-CM

## 2024-05-30 RX ORDER — HYDROCODONE BITARTRATE AND ACETAMINOPHEN 5; 325 MG/1; MG/1
1-2 TABLET ORAL EVERY 4 HOURS PRN
Qty: 20 TABLET | Refills: 0 | OUTPATIENT
Start: 2024-05-30

## 2024-05-30 NOTE — TELEPHONE ENCOUNTER
Patient is requesting that a PA be completed for his prescription for Hydrocodone. While attempting PA, there was a flag that popped up for potential drug interaction between the patient's SEROquel and HYDROcodone-acetaminophen prescriptions.    Spoke with Dr. Lindsay in regards to this and he would like for us to reach out to the patient to see if he is still taking Seroquel and to also see if he has taken the Seroquel with the Hydrocodone before. If so, he wants to check with the patient to see if he has had any issues with taking both of these medications at the same time. If patient is not currently taking Seroquel, he should be advised to not take it with the Hydrocodone for the potential interaction. However, if patient has had not had any issues with taking both in the past and is currently using Seroquel, he can take the Hydrocodone as well.     Spoke with the patient and let him know what Dr. Lindsay was advising. Patient states that he has been on Seroquel for years and has taken Hydrocodone at the same time before. He states that he has never had any issues or interactions while taking both medications at the same time. Patient was advised of the warning for a potential drug interaction and would like to proceed with filling out the prior authorization for Hydrocodone. Patient was advised that the PA will be completed but that this does not guarantee that it will be covered by insurance. Patient was also advised that we would let him know what the outcome of the PA was either way. Patient voiced understanding of this.

## 2024-05-30 NOTE — TELEPHONE ENCOUNTER
Patient's hydrocodone PA was approved. LiveRSVP message sent to the patient to let him know. Closing encounter.

## 2024-06-03 NOTE — TELEPHONE ENCOUNTER
Spoke with patient on Friday and let him know that his medication was sent in and they state that he has enough meds at the moment and did not need another prescription. Patient was advised to call office back if he is still in pain and wishes to request another refill at a later time once he is out.

## 2024-06-05 ENCOUNTER — OFFICE VISIT (OUTPATIENT)
Dept: SURGERY | Facility: CLINIC | Age: 59
End: 2024-06-05
Payer: MEDICARE

## 2024-06-05 VITALS — WEIGHT: 144 LBS | BODY MASS INDEX: 23.14 KG/M2 | RESPIRATION RATE: 16 BRPM | HEIGHT: 66 IN

## 2024-06-05 DIAGNOSIS — Z98.890 S/P REPAIR OF VENTRAL HERNIA: ICD-10-CM

## 2024-06-05 DIAGNOSIS — Z87.19 S/P REPAIR OF VENTRAL HERNIA: ICD-10-CM

## 2024-06-05 PROCEDURE — 1160F RVW MEDS BY RX/DR IN RCRD: CPT | Performed by: SURGERY

## 2024-06-05 PROCEDURE — 99024 POSTOP FOLLOW-UP VISIT: CPT | Performed by: SURGERY

## 2024-06-05 PROCEDURE — 1159F MED LIST DOCD IN RCRD: CPT | Performed by: SURGERY

## 2024-06-05 RX ORDER — HYDROCODONE BITARTRATE AND ACETAMINOPHEN 5; 325 MG/1; MG/1
1-2 TABLET ORAL EVERY 4 HOURS PRN
Qty: 20 TABLET | Refills: 0 | Status: SHIPPED | OUTPATIENT
Start: 2024-06-05

## 2024-06-05 NOTE — PROGRESS NOTES
Chief Complaint: Post-op (Open incisional hernia )    Subjective         History of Present Illness      Atilio Recinos is a 59 y.o. male presents to [facility] GENERAL SURGERY     History of Present Illness  Who follows up after incisional hernia repair.  He is reports he is doing well.  Pain is improving overall.  He is able to do more but is not lifting or violating his restrictions.    Objective     Past Medical History:   Diagnosis Date    Allergic     Antihistamines    Allergies     Anemia 2/2022    Anxiety     Arthritis     Asthma 1992    Bipolar 1 disorder     Bowel perforation 01/16/2022    Chronic kidney disease 1/2022    COPD (chronic obstructive pulmonary disease)     COVID-19 02/19/2022    Depression     Fissure, anal 2003    Forgetfulness     GERD (gastroesophageal reflux disease)     Hard to intubate     High gag reflex.    Head injury     Hepatitis     1980s-no current issues    High blood pressure     High cholesterol     History of transfusion 2/2022    Idiopathic pericardial effusion 2023    RESOLVED, DONE WELL SINCE-NOTED IN  CARDIAC OFFICE NOTE    Pancreatitis [id number]    PONV (postoperative nausea and vomiting) 1/2022    Psychiatric illness     Pulmonary embolism     PT STATED UNSURE WHEN THIS OCCURRED    Rectal bleeding 2003    S/P exploratory laparotomy, oversew of perforated ulcer, placement of [place] patch  01/17/2022    Shortness of breath     Sinus trouble     TIA (transient ischemic attack)     NO RESIDUALS       Past Surgical History:   Procedure Laterality Date    COLONOSCOPY N/A 09/22/2022    Procedure: COLONOSCOPY;  Surgeon: Atilio Lindsay MD;  Location: [facility] ENDOSCOPY;  Service: General;  Laterality: N/A;  DIVERTICULOSIS    ENDOSCOPY N/A 09/22/2022    Procedure: ESOPHAGOGASTRODUODENOSCOPY WITH BIOPSIES;  Surgeon: Atilio Lindsay MD;  Location: [facility] ENDOSCOPY;  Service: General;  Laterality: N/A;  DUODENAL ULCERS    ENDOSCOPY N/A 09/07/2023    Procedure:  ESOPHAGOGASTRODUODENOSCOPY WITH BIOPSIES;  Surgeon: Atilio Lindsay MD;  Location: [facility] ENDOSCOPY;  Service: General;  Laterality: N/A;  HEALED DUODENAL ULCER    EXPLORATORY LAPAROTOMY N/A 01/16/2022    Procedure: EXPLORATORY LAPAROTOMY, OVERSEW OF PERFORATED GASTRIC ULCER WITH [place] PATCH;  Surgeon: Atilio Lindsay MD;  Location: [facility] MAIN OR;  Service: General;  Laterality: N/A;    INGUINAL HERNIA REPAIR      PERICARDIAL WINDOW      PERICARDIAL WINDOW  05/2023    PLEURAL SCARIFICATION Right 1990    SPHINCTEROTOMY      VENTRAL HERNIA REPAIR N/A 05/17/2024    Procedure: OPEN VENTRAL / INCISIONAL HERNIA REPAIR;  Surgeon: Atilio Linsday MD;  Location: [facility] MAIN OR;  Service: Robotics - DaVinci;  Laterality: N/A;         Current Outpatient Medications:     albuterol sulfate  (90 Base) MCG/ACT inhaler, Inhale 2 puffs Every 4 (Four) Hours As Needed for Wheezing or Shortness of Air., Disp: 8 g, Rfl: 3    aspirin 81 MG EC tablet, 1 tablet., Disp: , Rfl:     atorvastatin (LIPITOR) 40 MG tablet, Take 1 tablet by mouth Daily. (Patient taking differently: Take 1 tablet by mouth Every Night.), Disp: 90 tablet, Rfl: 1    Diclofenac Sodium (VOLTAREN) 1 % gel gel, Apply  topically to the appropriate area as directed 4 (Four) Times a Day. Please have pharmacy change prescription to 2 g as needed, apply to affected area 4 times a day., Disp: 150 g, Rfl: 1    docusate sodium (Colace) 100 MG capsule, Take 1 capsule by mouth 2 (Two) Times a Day As Needed for Constipation., Disp: 10 capsule, Rfl: 1    Fluticasone Furoate-Vilanterol (Breo Ellipta) 100-25 MCG/INH inhaler, Inhale 1 puff Daily., Disp: 60 each, Rfl: 2    gabapentin (NEURONTIN) 400 MG capsule, Take 1 capsule by mouth 4 (Four) Times a Day., Disp: , Rfl:     HYDROcodone-acetaminophen (NORCO) 5-325 MG per tablet, Take 1-2 tablets by mouth Every 4 (Four) Hours As Needed for Moderate Pain (Pain)., Disp: 20 tablet, Rfl: 0    metoprolol succinate XL  (TOPROL-XL) 25 MG 24 hr tablet, Take 1 tablet by mouth 2 (Two) Times a Day., Disp: , Rfl:     mirtazapine (REMERON) 15 MG tablet, Take 1 tablet by mouth Every Night., Disp: , Rfl:     montelukast (SINGULAIR) 10 MG tablet, Take 1 tablet by mouth every night at bedtime., Disp: , Rfl:     ondansetron ODT (ZOFRAN-ODT) 4 MG disintegrating tablet, Place 1 tablet under the tongue Every 8 (Eight) Hours As Needed for Nausea or Vomiting., Disp: 15 tablet, Rfl: 0    pantoprazole (PROTONIX) 40 MG EC tablet, TAKE 1 TABLET EVERY DAY, Disp: 90 tablet, Rfl: 3    QUEtiapine (SEROquel) 300 MG tablet, Take 1 tablet by mouth Every Night., Disp: , Rfl:     oxyCODONE-acetaminophen (Percocet) 5-325 MG per tablet, Take 1 tablet by mouth Every 4 (Four) Hours As Needed for Moderate Pain. (Patient not taking: Reported on 6/5/2024), Disp: 20 tablet, Rfl: 0    Allergies   Allergen Reactions    Lorazepam Mental Status Change     Other reaction(s): Hyperactivity    Amoxicillin-Pot Clavulanate Hives      Beta lactam allergy details  Antibiotic reaction: rash  Age at reaction: adult  Dose to reaction time: (!) hours  Reason for antibiotic: other (prophylaxis)  Epinephrine required for reaction?: no  Tolerated antibiotics: cephalexin       Diphenhydramine Hives    Dye [Ct Contrast] Hives        Family History   Problem Relation Age of Onset    Alcohol abuse Mother     Asthma Mother     COPD Mother     Depression Mother     Heart disease Mother     Hyperlipidemia Mother     Hypertension Mother     Mental illness Mother     Anxiety disorder Mother     Vision loss Mother     Diabetes Mother     Alcohol abuse Father     Arthritis Father     Hypertension Father     Liver disease Father     Stroke Father     Vision loss Father     Colon cancer Maternal Grandmother     Stroke Other     Heart disease Other     Diabetes Other        Social History     Socioeconomic History    Marital status:    Tobacco Use    Smoking status: Every Day     Current  packs/day: 1.00     Average packs/day: 1 pack/day for 47.0 years (47.0 ttl pk-yrs)     Types: Cigarettes    Smokeless tobacco: Never    Tobacco comments:     LAST CIGARETTE 05/16/2024 AT 2100   Vaping Use    Vaping status: Never Used   Substance and Sexual Activity    Alcohol use: Not Currently    Drug use: Never    Sexual activity: Not Currently     Partners: Male     Birth control/protection: Condom, None, Same-sex partner        Physical Exam   Physical Exam  No acute distress.  Nonlabored breathing.  Abdomen is soft.  Incision is healing well. No signs of infection. A fairly decent-sized seroma is present underneath the midline incision.        Result Review :            Results           Assessment and Plan    Diagnoses and all orders for this visit:    1. S/P repair of ventral hernia  -     HYDROcodone-acetaminophen (NORCO) 5-325 MG per tablet; Take 1-2 tablets by mouth Every 4 (Four) Hours As Needed for Moderate Pain (Pain).  Dispense: 20 tablet; Refill: 0          Assessment & Plan  1. Status post open incisional hernia repair.  The patient is demonstrating satisfactory progress, demonstrating complete healing as expected. It is anticipated that the seroma should self-resolve. However, if it persists, a return visit may be necessary to explore the possibility of draining the seroma. At this juncture, the decision has been made to leave the seroma alone and allow it to resolve independently. A refill of his pain medication will be provided, and he can follow up as necessary. A 6-week lifting restriction has been recommended, which may necessitate adjustments to his work release to cover the 6-week period. All queries were addressed, and he expressed understanding and agreed to proceed with the above plan.      Follow Up   No follow-ups on file.  Patient was given instructions and counseling regarding his condition or for health maintenance advice. Please see specific information pulled into the AVS if  appropriate.     Patient or patient representative verbalized consent for the use of Ambient Listening during the visit with  Atilio Lindsay MD for chart documentation. 6/5/2024  13:07 EDT    Atilio Lindsay MD    Answers submitted by the patient for this visit:  Primary Reason for Visit (Submitted on 5/30/2024)  What is the primary reason for your visit?: Other  Other (Submitted on 5/30/2024)  Please describe your symptoms.: Post-Op visit from Hernia Surgery on 05/17/2024  Have you had these symptoms before?: No  How long have you been having these symptoms?: 5-7 days  Please list any medications you are currently taking for this condition.: Norco

## 2024-06-10 DIAGNOSIS — Z98.890 S/P REPAIR OF VENTRAL HERNIA: ICD-10-CM

## 2024-06-10 DIAGNOSIS — Z87.19 S/P REPAIR OF VENTRAL HERNIA: ICD-10-CM

## 2024-06-10 RX ORDER — HYDROCODONE BITARTRATE AND ACETAMINOPHEN 5; 325 MG/1; MG/1
1-2 TABLET ORAL EVERY 4 HOURS PRN
Qty: 20 TABLET | Refills: 0 | OUTPATIENT
Start: 2024-06-10

## 2024-06-11 DIAGNOSIS — Z87.19 S/P REPAIR OF VENTRAL HERNIA: Primary | ICD-10-CM

## 2024-06-11 DIAGNOSIS — Z98.890 S/P REPAIR OF VENTRAL HERNIA: Primary | ICD-10-CM

## 2024-06-11 RX ORDER — TRAMADOL HYDROCHLORIDE 50 MG/1
50 TABLET ORAL EVERY 6 HOURS PRN
Qty: 20 TABLET | Refills: 0 | Status: SHIPPED | OUTPATIENT
Start: 2024-06-11 | End: 2025-06-11

## 2024-06-17 ENCOUNTER — TELEPHONE (OUTPATIENT)
Dept: SURGERY | Facility: CLINIC | Age: 59
End: 2024-06-17
Payer: MEDICARE

## 2024-06-17 NOTE — TELEPHONE ENCOUNTER
"    Caller: Atilio Recinos \"Durga\"    Relationship: Self    Best call back number: [phone]     Requested Prescriptions: TRAMADOL  Requested Prescriptions      No prescriptions requested or ordered in this encounter        Pharmacy where request should be sent:    [facility], [place] - [address] - [phone] PH - [phone] FX   [address] [place] [address]       Last office visit with prescribing clinician: 6/5/2024   Last telemedicine visit with prescribing clinician: Visit date not found   Next office visit with prescribing clinician: 6/27/2024     Additional details provided by patient:     Does the patient have less than a 3 day supply:  [x] Yes  [] No    Would you like a call back once the refill request has been completed: [] Yes [x] No    If the office needs to give you a call back, can they leave a voicemail: [x] Yes [] No    Kristen Acosta   06/17/24 13:10 EDT             "

## 2024-06-18 DIAGNOSIS — Z87.19 S/P REPAIR OF VENTRAL HERNIA: ICD-10-CM

## 2024-06-18 DIAGNOSIS — Z98.890 S/P REPAIR OF VENTRAL HERNIA: ICD-10-CM

## 2024-06-18 RX ORDER — TRAMADOL HYDROCHLORIDE 50 MG/1
50 TABLET ORAL EVERY 6 HOURS PRN
Qty: 30 TABLET | Refills: 0 | Status: SHIPPED | OUTPATIENT
Start: 2024-06-18 | End: 2025-06-18

## 2024-06-27 ENCOUNTER — OFFICE VISIT (OUTPATIENT)
Dept: SURGERY | Facility: CLINIC | Age: 59
End: 2024-06-27
Payer: MEDICARE

## 2024-06-27 VITALS
HEIGHT: 66 IN | DIASTOLIC BLOOD PRESSURE: 100 MMHG | SYSTOLIC BLOOD PRESSURE: 139 MMHG | WEIGHT: 145 LBS | HEART RATE: 108 BPM | BODY MASS INDEX: 23.3 KG/M2

## 2024-06-27 DIAGNOSIS — Z98.890 S/P REPAIR OF VENTRAL HERNIA: ICD-10-CM

## 2024-06-27 DIAGNOSIS — Z87.19 S/P REPAIR OF VENTRAL HERNIA: ICD-10-CM

## 2024-06-27 PROCEDURE — 3075F SYST BP GE 130 - 139MM HG: CPT | Performed by: SURGERY

## 2024-06-27 PROCEDURE — 1160F RVW MEDS BY RX/DR IN RCRD: CPT | Performed by: SURGERY

## 2024-06-27 PROCEDURE — 3080F DIAST BP >= 90 MM HG: CPT | Performed by: SURGERY

## 2024-06-27 PROCEDURE — 99024 POSTOP FOLLOW-UP VISIT: CPT | Performed by: SURGERY

## 2024-06-27 PROCEDURE — 1159F MED LIST DOCD IN RCRD: CPT | Performed by: SURGERY

## 2024-06-27 RX ORDER — TRAMADOL HYDROCHLORIDE 50 MG/1
50 TABLET ORAL EVERY 6 HOURS PRN
Qty: 30 TABLET | Refills: 0 | Status: SHIPPED | OUTPATIENT
Start: 2024-06-27 | End: 2025-06-27

## 2024-06-28 NOTE — PROGRESS NOTES
Chief Complaint: Post-op Follow-up (Ventral hernia)    Subjective         History of Present Illness      Atilio Recinos is a 59 y.o. male presents to [facility] GENERAL SURGERY     History of Present Illness  The patient presents for evaluation status post open incisional hernia repair.    The patient continues to experience sharp abdominal pain, which he describes as akin to needles being expelled from his abdomen. He reports a sensation of heat on his abdominal incisions. He expresses a desire to return to work, having initiated his leave of absence on the day of his surgery on 05/17/2023. His appetite remains robust, and he maintains adequate food and fluid intake. However, he has not had a bowel movement for the past 3 days, despite increasing his stool softener dosage. He has a history of a sphincter tear, which was repaired in 2002.    Objective     Past Medical History:   Diagnosis Date    Allergic     Antihistamines    Allergies     Anemia 2/2022    Anxiety     Arthritis     Asthma 1992    Bipolar 1 disorder     Bowel perforation 01/16/2022    Chronic kidney disease 1/2022    COPD (chronic obstructive pulmonary disease)     COVID-19 02/19/2022    Depression     Fissure, anal 2003    Forgetfulness     GERD (gastroesophageal reflux disease)     Hard to intubate     High gag reflex.    Head injury     Hepatitis     1980s-no current issues    High blood pressure     High cholesterol     History of transfusion 2/2022    Idiopathic pericardial effusion 2023    RESOLVED, DONE WELL SINCE-NOTED IN  CARDIAC OFFICE NOTE    Pancreatitis [id number]    PONV (postoperative nausea and vomiting) 1/2022    Psychiatric illness     Pulmonary embolism     PT STATED UNSURE WHEN THIS OCCURRED    Rectal bleeding 2003    S/P exploratory laparotomy, oversew of perforated ulcer, placement of Chaparro patch  01/17/2022    Shortness of breath     Sinus trouble     TIA (transient ischemic attack)     NO RESIDUALS        Past Surgical History:   Procedure Laterality Date    COLONOSCOPY N/A 09/22/2022    Procedure: COLONOSCOPY;  Surgeon: Atilio Lindsay MD;  Location: [facility] ENDOSCOPY;  Service: General;  Laterality: N/A;  DIVERTICULOSIS    ENDOSCOPY N/A 09/22/2022    Procedure: ESOPHAGOGASTRODUODENOSCOPY WITH BIOPSIES;  Surgeon: Atilio Lindsay MD;  Location: [facility] ENDOSCOPY;  Service: General;  Laterality: N/A;  DUODENAL ULCERS    ENDOSCOPY N/A 09/07/2023    Procedure: ESOPHAGOGASTRODUODENOSCOPY WITH BIOPSIES;  Surgeon: Atilio Lindsay MD;  Location: [facility] ENDOSCOPY;  Service: General;  Laterality: N/A;  HEALED DUODENAL ULCER    EXPLORATORY LAPAROTOMY N/A 01/16/2022    Procedure: EXPLORATORY LAPAROTOMY, OVERSEW OF PERFORATED GASTRIC ULCER WITH [place] PATCH;  Surgeon: Atilio Lindsay MD;  Location: [facility] MAIN OR;  Service: General;  Laterality: N/A;    INGUINAL HERNIA REPAIR      PERICARDIAL WINDOW      PERICARDIAL WINDOW  05/2023    PLEURAL SCARIFICATION Right 1990    SPHINCTEROTOMY      VENTRAL HERNIA REPAIR N/A 05/17/2024    Procedure: OPEN VENTRAL / INCISIONAL HERNIA REPAIR;  Surgeon: Atilio Lindsay MD;  Location: [facility] MAIN OR;  Service: Robotics - Da[employer]i;  Laterality: N/A;         Current Outpatient Medications:     albuterol sulfate  (90 Base) MCG/ACT inhaler, Inhale 2 puffs Every 4 (Four) Hours As Needed for Wheezing or Shortness of Air., Disp: 8 g, Rfl: 3    aspirin 81 MG EC tablet, 1 tablet., Disp: , Rfl:     atorvastatin (LIPITOR) 40 MG tablet, Take 1 tablet by mouth Daily. (Patient taking differently: Take 1 tablet by mouth Every Night.), Disp: 90 tablet, Rfl: 1    Diclofenac Sodium (VOLTAREN) 1 % gel gel, Apply  topically to the appropriate area as directed 4 (Four) Times a Day. Please have pharmacy change prescription to 2 g as needed, apply to affected area 4 times a day., Disp: 150 g, Rfl: 1    docusate sodium (Colace) 100 MG capsule, Take 1 capsule by mouth 2 (Two) Times a Day As  Needed for Constipation., Disp: 10 capsule, Rfl: 1    Fluticasone Furoate-Vilanterol (Breo Ellipta) 100-25 MCG/INH inhaler, Inhale 1 puff Daily., Disp: 60 each, Rfl: 2    gabapentin (NEURONTIN) 400 MG capsule, Take 1 capsule by mouth 4 (Four) Times a Day., Disp: , Rfl:     metoprolol succinate XL (TOPROL-XL) 25 MG 24 hr tablet, Take 1 tablet by mouth 2 (Two) Times a Day., Disp: , Rfl:     mirtazapine (REMERON) 15 MG tablet, Take 1 tablet by mouth Every Night., Disp: , Rfl:     montelukast (SINGULAIR) 10 MG tablet, Take 1 tablet by mouth every night at bedtime., Disp: , Rfl:     ondansetron ODT (ZOFRAN-ODT) 4 MG disintegrating tablet, Place 1 tablet under the tongue Every 8 (Eight) Hours As Needed for Nausea or Vomiting., Disp: 15 tablet, Rfl: 0    pantoprazole (PROTONIX) 40 MG EC tablet, TAKE 1 TABLET EVERY DAY, Disp: 90 tablet, Rfl: 3    QUEtiapine (SEROquel) 300 MG tablet, Take 1 tablet by mouth Every Night., Disp: , Rfl:     traMADol (Ultram) 50 MG tablet, Take 1 tablet by mouth Every 6 (Six) Hours As Needed for Moderate Pain., Disp: 30 tablet, Rfl: 0    HYDROcodone-acetaminophen (NORCO) 5-325 MG per tablet, Take 1-2 tablets by mouth Every 4 (Four) Hours As Needed for Moderate Pain (Pain). (Patient not taking: Reported on 6/27/2024), Disp: 20 tablet, Rfl: 0    oxyCODONE-acetaminophen (Percocet) 5-325 MG per tablet, Take 1 tablet by mouth Every 4 (Four) Hours As Needed for Moderate Pain. (Patient not taking: Reported on 6/5/2024), Disp: 20 tablet, Rfl: 0    Allergies   Allergen Reactions    Lorazepam Mental Status Change     Other reaction(s): Hyperactivity    Amoxicillin-Pot Clavulanate Hives      Beta lactam allergy details  Antibiotic reaction: rash  Age at reaction: adult  Dose to reaction time: (!) hours  Reason for antibiotic: other (prophylaxis)  Epinephrine required for reaction?: no  Tolerated antibiotics: cephalexin       Diphenhydramine Hives    Dye [Ct Contrast] Hives        Family History   Problem  Relation Age of Onset    Alcohol abuse Mother     Asthma Mother     COPD Mother     Depression Mother     Heart disease Mother     Hyperlipidemia Mother     Hypertension Mother     Mental illness Mother     Anxiety disorder Mother     Vision loss Mother     Diabetes Mother     Alcohol abuse Father     Arthritis Father     Hypertension Father     Liver disease Father     Stroke Father     Vision loss Father     Colon cancer Maternal Grandmother     Stroke Other     Heart disease Other     Diabetes Other     Diabetes Brother        Social History     Socioeconomic History    Marital status:    Tobacco Use    Smoking status: Every Day     Current packs/day: 1.00     Average packs/day: 1 pack/day for 47.0 years (47.0 ttl pk-yrs)     Types: Cigarettes    Smokeless tobacco: Never    Tobacco comments:     LAST CIGARETTE 05/16/2024 AT 2100   Vaping Use    Vaping status: Never Used   Substance and Sexual Activity    Alcohol use: Not Currently    Drug use: Never    Sexual activity: Not Currently     Partners: Male     Birth control/protection: Condom, None, Same-sex partner        Physical Exam   Physical Exam  Patient is in no acute distress.  Lungs show nonlabored breathing.  Abdomen is soft. Incision is healing well. No signs of infection. Seroma is present, but it is improving and smaller. There is still a little bit of puckering in the upper portion of the abdomen, but I think that is from the abdominal weakness. No signs of recurrence of the hernia are felt.        Result Review :            Results           Assessment and Plan    Diagnoses and all orders for this visit:    1. S/P repair of ventral hernia  -     traMADol (Ultram) 50 MG tablet; Take 1 tablet by mouth Every 6 (Six) Hours As Needed for Moderate Pain.  Dispense: 30 tablet; Refill: 0          Assessment & Plan  1. Status post open incisional hernia repair.  The patient's condition is satisfactory. The patient has been instructed to maintain his  overall progress. A follow-up appointment is scheduled in a few weeks to facilitate his return to work. All queries were addressed, and he expressed understanding and agreement with the proposed plan.      Follow Up   No follow-ups on file.  Patient was given instructions and counseling regarding his condition or for health maintenance advice. Please see specific information pulled into the AVS if appropriate.     Patient or patient representative verbalized consent for the use of Ambient Listening during the visit with  Atilio Lindsay MD for chart documentation. 6/28/2024  19:00 EDT    Atilio Lindsay MD     <-- Click to add NO significant Past Surgical History

## 2024-07-16 ENCOUNTER — OFFICE VISIT (OUTPATIENT)
Dept: SURGERY | Facility: CLINIC | Age: 59
End: 2024-07-16
Payer: MEDICARE

## 2024-07-16 VITALS
HEART RATE: 79 BPM | WEIGHT: 144.4 LBS | DIASTOLIC BLOOD PRESSURE: 82 MMHG | SYSTOLIC BLOOD PRESSURE: 177 MMHG | BODY MASS INDEX: 23.21 KG/M2 | HEIGHT: 66 IN

## 2024-07-16 DIAGNOSIS — K43.2 INCISIONAL HERNIA, WITHOUT OBSTRUCTION OR GANGRENE: Primary | ICD-10-CM

## 2024-07-16 PROCEDURE — 99024 POSTOP FOLLOW-UP VISIT: CPT | Performed by: SURGERY

## 2024-07-17 NOTE — PROGRESS NOTES
Chief Complaint: Follow-up (2 WEEK.)    Subjective         History of Present Illness      Atilio Recinos is a 59 y.o. male presents to [facility] GENERAL SURGERY     History of Present Illness  The patient presents for evaluation status post open incisional hernia repair.    The patient reports an improvement in his condition, however, he continues to experience fluid accumulation in his abdomen, which he attributes to his shirt rubbing against the area. He expresses a desire to fill out his necessary paperwork.    Objective     Past Medical History:   Diagnosis Date    Allergic     Antihistamines    Allergies     Anemia 2/2022    Anxiety     Arthritis     Asthma 1992    Bipolar 1 disorder     Bowel perforation 01/16/2022    Chronic kidney disease 1/2022    COPD (chronic obstructive pulmonary disease)     COVID-19 02/19/2022    Depression     Fissure, anal 2003    Forgetfulness     GERD (gastroesophageal reflux disease)     Hard to intubate     High gag reflex.    Head injury     Hepatitis     1980s-no current issues    High blood pressure     High cholesterol     History of transfusion 2/2022    Idiopathic pericardial effusion 2023    RESOLVED, DONE WELL SINCE-NOTED IN  CARDIAC OFFICE NOTE    Pancreatitis [id number]    PONV (postoperative nausea and vomiting) 1/2022    Psychiatric illness     Pulmonary embolism     PT STATED UNSURE WHEN THIS OCCURRED    Rectal bleeding 2003    S/P exploratory laparotomy, oversew of perforated ulcer, placement of Chaparro patch  01/17/2022    Shortness of breath     Sinus trouble     TIA (transient ischemic attack)     NO RESIDUALS       Past Surgical History:   Procedure Laterality Date    COLONOSCOPY N/A 09/22/2022    Procedure: COLONOSCOPY;  Surgeon: Atilio Lindsay MD;  Location: [facility] ENDOSCOPY;  Service: General;  Laterality: N/A;  DIVERTICULOSIS    ENDOSCOPY N/A 09/22/2022    Procedure: ESOPHAGOGASTRODUODENOSCOPY WITH BIOPSIES;  Surgeon: Atilio Lindsay  MD;  Location: [facility] ENDOSCOPY;  Service: General;  Laterality: N/A;  DUODENAL ULCERS    ENDOSCOPY N/A 09/07/2023    Procedure: ESOPHAGOGASTRODUODENOSCOPY WITH BIOPSIES;  Surgeon: Atilio Lindsay MD;  Location: [facility] ENDOSCOPY;  Service: General;  Laterality: N/A;  HEALED DUODENAL ULCER    EXPLORATORY LAPAROTOMY N/A 01/16/2022    Procedure: EXPLORATORY LAPAROTOMY, OVERSEW OF PERFORATED GASTRIC ULCER WITH [place] PATCH;  Surgeon: Atilio Lindsay MD;  Location: [facility] MAIN OR;  Service: General;  Laterality: N/A;    INGUINAL HERNIA REPAIR      PERICARDIAL WINDOW      PERICARDIAL WINDOW  05/2023    PLEURAL SCARIFICATION Right 1990    SPHINCTEROTOMY      VENTRAL/INCISIONAL HERNIA REPAIR N/A 5/17/2024    Procedure: OPEN VENTRAL / INCISIONAL HERNIA REPAIR;  Surgeon: Atilio Lindsay MD;  Location: [facility] MAIN OR;  Service: General;  Laterality: N/A;         Current Outpatient Medications:     albuterol sulfate  (90 Base) MCG/ACT inhaler, Inhale 2 puffs Every 4 (Four) Hours As Needed for Wheezing or Shortness of Air., Disp: 8 g, Rfl: 3    aspirin 81 MG EC tablet, 1 tablet., Disp: , Rfl:     atorvastatin (LIPITOR) 40 MG tablet, Take 1 tablet by mouth Daily. (Patient taking differently: Take 1 tablet by mouth Every Night.), Disp: 90 tablet, Rfl: 1    Diclofenac Sodium (VOLTAREN) 1 % gel gel, Apply  topically to the appropriate area as directed 4 (Four) Times a Day. Please have pharmacy change prescription to 2 g as needed, apply to affected area 4 times a day., Disp: 150 g, Rfl: 1    docusate sodium (Colace) 100 MG capsule, Take 1 capsule by mouth 2 (Two) Times a Day As Needed for Constipation., Disp: 10 capsule, Rfl: 1    Fluticasone Furoate-Vilanterol (Breo Ellipta) 100-25 MCG/INH inhaler, Inhale 1 puff Daily., Disp: 60 each, Rfl: 2    gabapentin (NEURONTIN) 400 MG capsule, Take 1 capsule by mouth 4 (Four) Times a Day., Disp: , Rfl:     metoprolol succinate XL (TOPROL-XL) 25 MG 24 hr tablet, Take 1 tablet  by mouth 2 (Two) Times a Day., Disp: , Rfl:     mirtazapine (REMERON) 15 MG tablet, Take 1 tablet by mouth Every Night., Disp: , Rfl:     montelukast (SINGULAIR) 10 MG tablet, Take 1 tablet by mouth every night at bedtime., Disp: , Rfl:     ondansetron ODT (ZOFRAN-ODT) 4 MG disintegrating tablet, Place 1 tablet under the tongue Every 8 (Eight) Hours As Needed for Nausea or Vomiting., Disp: 15 tablet, Rfl: 0    pantoprazole (PROTONIX) 40 MG EC tablet, TAKE 1 TABLET EVERY DAY, Disp: 90 tablet, Rfl: 3    QUEtiapine (SEROquel) 300 MG tablet, Take 1 tablet by mouth Every Night., Disp: , Rfl:     traMADol (Ultram) 50 MG tablet, Take 1 tablet by mouth Every 6 (Six) Hours As Needed for Moderate Pain., Disp: 30 tablet, Rfl: 0    HYDROcodone-acetaminophen (NORCO) 5-325 MG per tablet, Take 1-2 tablets by mouth Every 4 (Four) Hours As Needed for Moderate Pain (Pain). (Patient not taking: Reported on 6/27/2024), Disp: 20 tablet, Rfl: 0    oxyCODONE-acetaminophen (Percocet) 5-325 MG per tablet, Take 1 tablet by mouth Every 4 (Four) Hours As Needed for Moderate Pain. (Patient not taking: Reported on 6/5/2024), Disp: 20 tablet, Rfl: 0    Allergies   Allergen Reactions    Lorazepam Mental Status Change     Other reaction(s): Hyperactivity    Amoxicillin-Pot Clavulanate Hives      Beta lactam allergy details  Antibiotic reaction: rash  Age at reaction: adult  Dose to reaction time: (!) hours  Reason for antibiotic: other (prophylaxis)  Epinephrine required for reaction?: no  Tolerated antibiotics: cephalexin       Diphenhydramine Hives    Dye [Ct Contrast] Hives        Family History   Problem Relation Age of Onset    Alcohol abuse Mother     Asthma Mother     COPD Mother     Depression Mother     Heart disease Mother     Hyperlipidemia Mother     Hypertension Mother     Mental illness Mother     Anxiety disorder Mother     Vision loss Mother     Diabetes Mother     Alcohol abuse Father     Arthritis Father     Hypertension Father      Liver disease Father     Stroke Father     Vision loss Father     Colon cancer Maternal Grandmother     Stroke Other     Heart disease Other     Diabetes Other     Diabetes Brother        Social History     Socioeconomic History    Marital status:    Tobacco Use    Smoking status: Every Day     Current packs/day: 1.00     Average packs/day: 1 pack/day for 47.0 years (47.0 ttl pk-yrs)     Types: Cigarettes    Smokeless tobacco: Never    Tobacco comments:     LAST CIGARETTE 05/16/2024 AT 2100   Vaping Use    Vaping status: Never Used   Substance and Sexual Activity    Alcohol use: Not Currently    Drug use: Never    Sexual activity: Not Currently     Partners: Male     Birth control/protection: Condom, None, Same-sex partner        Physical Exam   Physical Exam  No acute distress, nonlabored breathing.  Abdomen is soft. A little bit of diastasis is still present in the central portion of the abdomen, but no evidence of hernia is felt. Nothing is pushed back in. Diastasis is likely somewhat permanent due to previous repairs and placement of mesh. A little bit of fluid is still present, but it seems to be resolving.        Result Review :            Results           Assessment and Plan    Diagnoses and all orders for this visit:    1. Incisional hernia, without obstruction or gangrene (Primary)          Assessment & Plan  1. Post open incisional hernia repair.  The patient's progress is satisfactory, as evidenced by his disparity. Paperwork has been completed for his return to work on 07/22/2024. A comprehensive discussion was held with the patient, and all his queries were addressed. He expressed understanding and agreed to proceed with the above plan.    Follow-up  Follow-up visits will be scheduled as required.      Follow Up   No follow-ups on file.  Patient was given instructions and counseling regarding his condition or for health maintenance advice. Please see specific information pulled into the AVS  if appropriate.     Patient or patient representative verbalized consent for the use of Ambient Listening during the visit with  Atilio Lindsay MD for chart documentation. 7/17/2024  16:00 EDT    Atilio Lindsay MD

## 2024-08-22 ENCOUNTER — HOSPITAL ENCOUNTER (EMERGENCY)
Facility: HOSPITAL | Age: 59
Discharge: HOME | End: 2024-08-22
Attending: EMERGENCY MEDICINE
Payer: MEDICARE

## 2024-08-22 ENCOUNTER — APPOINTMENT (OUTPATIENT)
Dept: GENERAL RADIOLOGY | Facility: HOSPITAL | Age: 59
End: 2024-08-22
Payer: MEDICARE

## 2024-08-22 VITALS
HEART RATE: 59 BPM | SYSTOLIC BLOOD PRESSURE: 163 MMHG | RESPIRATION RATE: 18 BRPM | WEIGHT: 145.72 LBS | TEMPERATURE: 98 F | OXYGEN SATURATION: 99 % | HEIGHT: 66 IN | BODY MASS INDEX: 23.42 KG/M2 | DIASTOLIC BLOOD PRESSURE: 67 MMHG

## 2024-08-22 DIAGNOSIS — R07.89 CHEST WALL PAIN: Primary | ICD-10-CM

## 2024-08-22 LAB
ALBUMIN SERPL-MCNC: 3.9 G/DL (ref 3.5–5.2)
ALBUMIN/GLOB SERPL: 1.4 G/DL
ALP SERPL-CCNC: 124 U/L (ref 39–117)
ALT SERPL W P-5'-P-CCNC: 8 U/L (ref 1–41)
ANION GAP SERPL CALCULATED.3IONS-SCNC: 9.3 MMOL/L (ref 5–15)
AST SERPL-CCNC: 15 U/L (ref 1–40)
BASOPHILS # BLD AUTO: 0.09 10*3/MM3 (ref 0–0.2)
BASOPHILS NFR BLD AUTO: 1 % (ref 0–1.5)
BILIRUB SERPL-MCNC: 0.4 MG/DL (ref 0–1.2)
BUN SERPL-MCNC: 5 MG/DL (ref 8–23)
BUN/CREAT SERPL: 4.6 (ref 7–25)
CALCIUM SPEC-SCNC: 9.3 MG/DL (ref 8.6–10.5)
CHLORIDE SERPL-SCNC: 103 MMOL/L (ref 98–107)
CO2 SERPL-SCNC: 27.7 MMOL/L (ref 22–29)
CREAT SERPL-MCNC: 1.08 MG/DL (ref 0.76–1.27)
DEPRECATED RDW RBC AUTO: 41.1 FL (ref 37–54)
EGFRCR SERPLBLD CKD-EPI 2021: 78.1 ML/MIN/1.73
EOSINOPHIL # BLD AUTO: 1.79 10*3/MM3 (ref 0–0.4)
EOSINOPHIL NFR BLD AUTO: 19.2 % (ref 0.3–6.2)
ERYTHROCYTE [DISTWIDTH] IN BLOOD BY AUTOMATED COUNT: 12.8 % (ref 12.3–15.4)
GLOBULIN UR ELPH-MCNC: 2.8 GM/DL
GLUCOSE SERPL-MCNC: 100 MG/DL (ref 65–99)
HCT VFR BLD AUTO: 42.3 % (ref 37.5–51)
HGB BLD-MCNC: 13.8 G/DL (ref 13–17.7)
HOLD SPECIMEN: NORMAL
HOLD SPECIMEN: NORMAL
IMM GRANULOCYTES # BLD AUTO: 0.02 10*3/MM3 (ref 0–0.05)
IMM GRANULOCYTES NFR BLD AUTO: 0.2 % (ref 0–0.5)
LIPASE SERPL-CCNC: 26 U/L (ref 13–60)
LYMPHOCYTES # BLD AUTO: 2.58 10*3/MM3 (ref 0.7–3.1)
LYMPHOCYTES NFR BLD AUTO: 27.7 % (ref 19.6–45.3)
MAGNESIUM SERPL-MCNC: 1.9 MG/DL (ref 1.6–2.4)
MCH RBC QN AUTO: 28.6 PG (ref 26.6–33)
MCHC RBC AUTO-ENTMCNC: 32.6 G/DL (ref 31.5–35.7)
MCV RBC AUTO: 87.8 FL (ref 79–97)
MONOCYTES # BLD AUTO: 0.72 10*3/MM3 (ref 0.1–0.9)
MONOCYTES NFR BLD AUTO: 7.7 % (ref 5–12)
NEUTROPHILS NFR BLD AUTO: 4.1 10*3/MM3 (ref 1.7–7)
NEUTROPHILS NFR BLD AUTO: 44.2 % (ref 42.7–76)
NRBC BLD AUTO-RTO: 0 /100 WBC (ref 0–0.2)
NT-PROBNP SERPL-MCNC: 797.1 PG/ML (ref 0–900)
PLATELET # BLD AUTO: 249 10*3/MM3 (ref 140–450)
PMV BLD AUTO: 10.7 FL (ref 6–12)
POTASSIUM SERPL-SCNC: 3.9 MMOL/L (ref 3.5–5.2)
PROT SERPL-MCNC: 6.7 G/DL (ref 6–8.5)
QT INTERVAL: 433 MS
QTC INTERVAL: 451 MS
RBC # BLD AUTO: 4.82 10*6/MM3 (ref 4.14–5.8)
SODIUM SERPL-SCNC: 140 MMOL/L (ref 136–145)
TROPONIN T SERPL HS-MCNC: 8 NG/L
WBC NRBC COR # BLD AUTO: 9.3 10*3/MM3 (ref 3.4–10.8)
WHOLE BLOOD HOLD COAG: NORMAL
WHOLE BLOOD HOLD SPECIMEN: NORMAL

## 2024-08-22 PROCEDURE — 85025 COMPLETE CBC W/AUTO DIFF WBC: CPT

## 2024-08-22 PROCEDURE — 93005 ELECTROCARDIOGRAM TRACING: CPT | Performed by: EMERGENCY MEDICINE

## 2024-08-22 PROCEDURE — 83735 ASSAY OF MAGNESIUM: CPT

## 2024-08-22 PROCEDURE — 80053 COMPREHEN METABOLIC PANEL: CPT

## 2024-08-22 PROCEDURE — 93005 ELECTROCARDIOGRAM TRACING: CPT

## 2024-08-22 PROCEDURE — 84484 ASSAY OF TROPONIN QUANT: CPT

## 2024-08-22 PROCEDURE — 83690 ASSAY OF LIPASE: CPT

## 2024-08-22 PROCEDURE — 71045 X-RAY EXAM CHEST 1 VIEW: CPT

## 2024-08-22 PROCEDURE — 83880 ASSAY OF NATRIURETIC PEPTIDE: CPT

## 2024-08-22 PROCEDURE — 99284 EMERGENCY DEPT VISIT MOD MDM: CPT

## 2024-08-22 RX ORDER — SODIUM CHLORIDE 0.9 % (FLUSH) 0.9 %
10 SYRINGE (ML) INJECTION AS NEEDED
Status: DISCONTINUED | OUTPATIENT
Start: 2024-08-22 | End: 2024-08-22 | Stop reason: HOSPADM

## 2024-08-22 RX ORDER — ASPIRIN 81 MG/1
324 TABLET, CHEWABLE ORAL ONCE
Status: DISCONTINUED | OUTPATIENT
Start: 2024-08-22 | End: 2024-08-22 | Stop reason: HOSPADM

## 2024-08-22 NOTE — ED PROVIDER NOTES
Time: 10:48 AM EDT  Date of encounter:  8/22/2024  Independent Historian/Clinical History and Information was obtained by:   Patient    History is limited by: N/A    Chief Complaint: Chest pain      History of Present Illness:  Patient is a 59 y.o. year old male who presents to the emergency department for evaluation of chest pain.  Patient reports that he has been having some chest pain has been ongoing since Sunday.  States has been intermittent in nature.  States that it hurts worse when he touches the middle of his chest.  Does have a history of pericardial effusion with window in the past.  States that he had some similar symptoms last time he had this 1 year ago.  Denies any shortness of breath.  Does have a history of COPD, high blood pressure, smoking.  No other complaints this time.      Patient Care Team  Primary Care Provider: Mitali Saha APRN    Past Medical History:     Allergies   Allergen Reactions    Lorazepam Mental Status Change     Other reaction(s): Hyperactivity    Amoxicillin-Pot Clavulanate Hives      Beta lactam allergy details  Antibiotic reaction: rash  Age at reaction: adult  Dose to reaction time: (!) hours  Reason for antibiotic: other (prophylaxis)  Epinephrine required for reaction?: no  Tolerated antibiotics: cephalexin       Diphenhydramine Hives    Dye [Ct Contrast] Hives     Past Medical History:   Diagnosis Date    Allergic     Antihistamines    Allergies     Anemia 2/2022    Anxiety     Arthritis     Asthma 1992    Bipolar 1 disorder     Bowel perforation 01/16/2022    Chronic kidney disease 1/2022    COPD (chronic obstructive pulmonary disease)     COVID-19 02/19/2022    Depression     Fissure, anal 2003    Forgetfulness     GERD (gastroesophageal reflux disease)     Hard to intubate     High gag reflex.    Head injury     Hepatitis     1980s-no current issues    High blood pressure     High cholesterol     History of transfusion 2/2022    Idiopathic pericardial effusion  2023    RESOLVED, DONE WELL SINCE-NOTED IN  CARDIAC OFFICE NOTE    Pancreatitis 6501-2229    PONV (postoperative nausea and vomiting) 1/2022    Psychiatric illness     Pulmonary embolism     PT STATED UNSURE WHEN THIS OCCURRED    Rectal bleeding 2003    S/P exploratory laparotomy, oversew of perforated ulcer, placement of Chaparro patch  01/17/2022    Shortness of breath     Sinus trouble     TIA (transient ischemic attack)     NO RESIDUALS     Past Surgical History:   Procedure Laterality Date    COLONOSCOPY N/A 09/22/2022    Procedure: COLONOSCOPY;  Surgeon: Atilio Lindsay MD;  Location: [facility] ENDOSCOPY;  Service: General;  Laterality: N/A;  DIVERTICULOSIS    ENDOSCOPY N/A 09/22/2022    Procedure: ESOPHAGOGASTRODUODENOSCOPY WITH BIOPSIES;  Surgeon: Atilio Lindsay MD;  Location: [facility] ENDOSCOPY;  Service: General;  Laterality: N/A;  DUODENAL ULCERS    ENDOSCOPY N/A 09/07/2023    Procedure: ESOPHAGOGASTRODUODENOSCOPY WITH BIOPSIES;  Surgeon: Atilio Lindsay MD;  Location: [facility] ENDOSCOPY;  Service: General;  Laterality: N/A;  HEALED DUODENAL ULCER    EXPLORATORY LAPAROTOMY N/A 01/16/2022    Procedure: EXPLORATORY LAPAROTOMY, OVERSEW OF PERFORATED GASTRIC ULCER WITH CHAPARRO PATCH;  Surgeon: Atilio Lindsay MD;  Location: [facility] MAIN OR;  Service: General;  Laterality: N/A;    INGUINAL HERNIA REPAIR      PERICARDIAL WINDOW      PERICARDIAL WINDOW  05/2023    PLEURAL SCARIFICATION Right 1990    SPHINCTEROTOMY      VENTRAL/INCISIONAL HERNIA REPAIR N/A 5/17/2024    Procedure: OPEN VENTRAL / INCISIONAL HERNIA REPAIR;  Surgeon: Atilio Lindsay MD;  Location: [facility] MAIN OR;  Service: General;  Laterality: N/A;     Family History   Problem Relation Age of Onset    Alcohol abuse Mother     Asthma Mother     COPD Mother     Depression Mother     Heart disease Mother     Hyperlipidemia Mother     Hypertension Mother     Mental illness Mother     Anxiety disorder Mother     Vision loss Mother     Diabetes  Mother     Alcohol abuse Father     Arthritis Father     Hypertension Father     Liver disease Father     Stroke Father     Vision loss Father     Colon cancer Maternal Grandmother     Stroke Other     Heart disease Other     Diabetes Other     Diabetes Brother        Home Medications:  Prior to Admission medications    Medication Sig Start Date End Date Taking? Authorizing Provider   albuterol sulfate  (90 Base) MCG/ACT inhaler Inhale 2 puffs Every 4 (Four) Hours As Needed for Wheezing or Shortness of Air. 3/2/23   Humberto Cox APRN   aspirin 81 MG EC tablet 1 tablet. 6/13/23   Ren Boswell MD   atorvastatin (LIPITOR) 40 MG tablet Take 1 tablet by mouth Daily.  Patient taking differently: Take 1 tablet by mouth Every Night. 3/2/23   Humberto Cox APRN   Diclofenac Sodium (VOLTAREN) 1 % gel gel Apply  topically to the appropriate area as directed 4 (Four) Times a Day. Please have pharmacy change prescription to 2 g as needed, apply to affected area 4 times a day. 3/2/23   Humberto Cox APRN   docusate sodium (Colace) 100 MG capsule Take 1 capsule by mouth 2 (Two) Times a Day As Needed for Constipation. 5/19/24 5/19/25  Atilio Lindsay MD   Fluticasone Furoate-Vilanterol (Breo Ellipta) 100-25 MCG/INH inhaler Inhale 1 puff Daily. 12/12/21   Ronaldo Prater APRN   gabapentin (NEURONTIN) 400 MG capsule Take 1 capsule by mouth 4 (Four) Times a Day. 3/15/22   Ren Boswell MD   metoprolol succinate XL (TOPROL-XL) 25 MG 24 hr tablet Take 1 tablet by mouth 2 (Two) Times a Day. 1/9/24   Ren Boswell MD   metoprolol succinate XL (TOPROL-XL) 25 MG 24 hr tablet Take 1 tablet by mouth 2 (Two) Times a Day. 8/19/24   Ren Boswell MD   mirtazapine (REMERON) 15 MG tablet Take 1 tablet by mouth Every Night.    Ren Boswell MD   montelukast (SINGULAIR) 10 MG tablet Take 1 tablet by mouth every night at bedtime.    Ren Boswell MD   montelukast (SINGULAIR) 10 MG tablet  "1 tablet Every Night. 8/19/24   Provider, MD Ren   ondansetron ODT (ZOFRAN-ODT) 4 MG disintegrating tablet Place 1 tablet under the tongue Every 8 (Eight) Hours As Needed for Nausea or Vomiting. 11/10/22   Humberto Cox APRN   pantoprazole (PROTONIX) 40 MG EC tablet TAKE 1 TABLET EVERY DAY 3/6/24   Atilio Lindsay MD   QUEtiapine (SEROquel) 300 MG tablet Take 1 tablet by mouth Every Night.    Provider, MD Ren   HYDROcodone-acetaminophen (NORCO) 5-325 MG per tablet Take 1-2 tablets by mouth Every 4 (Four) Hours As Needed for Moderate Pain (Pain).  Patient not taking: Reported on 6/27/2024 6/5/24 8/22/24  Atilio Lindsay MD   oxyCODONE-acetaminophen (Percocet) 5-325 MG per tablet Take 1 tablet by mouth Every 4 (Four) Hours As Needed for Moderate Pain.  Patient not taking: Reported on 6/5/2024 4/11/24 8/22/24  Atilio Lindsay MD   traMADol (Ultram) 50 MG tablet Take 1 tablet by mouth Every 6 (Six) Hours As Needed for Moderate Pain. 6/27/24 8/22/24  Atilio Lindsay MD        Social History:   Social History     Tobacco Use    Smoking status: Every Day     Current packs/day: 1.00     Average packs/day: 1 pack/day for 47.0 years (47.0 ttl pk-yrs)     Types: Cigarettes    Smokeless tobacco: Never    Tobacco comments:     LAST CIGARETTE 05/16/2024 AT 2100   Vaping Use    Vaping status: Never Used   Substance Use Topics    Alcohol use: Not Currently    Drug use: Never         Review of Systems:  Review of Systems   Cardiovascular:  Positive for chest pain.        Physical Exam:  /82   Pulse 64   Temp 98 °F (36.7 °C) (Oral)   Resp 18   Ht 167.6 cm (66\")   Wt 66.1 kg (145 lb 11.6 oz)   SpO2 100%   BMI 23.52 kg/m²     Physical Exam  Vitals and nursing note reviewed.   Constitutional:       Appearance: Normal appearance.   HENT:      Head: Normocephalic and atraumatic.   Eyes:      General: No scleral icterus.  Cardiovascular:      Rate and Rhythm: Normal rate and regular rhythm.      " Heart sounds: Normal heart sounds.   Pulmonary:      Effort: Pulmonary effort is normal.      Breath sounds: Normal breath sounds.      Comments: Anterior chest wall tenderness overlying the sternum  Chest:      Chest wall: Tenderness present.   Abdominal:      Palpations: Abdomen is soft.      Tenderness: There is no abdominal tenderness.   Musculoskeletal:         General: Normal range of motion.      Cervical back: Normal range of motion.   Skin:     Findings: No rash.   Neurological:      General: No focal deficit present.      Mental Status: He is alert.                  Procedures:  Procedures      Medical Decision Making:      Comorbidities that affect care:    COPD, Hypertension, Smoking    External Notes reviewed:      Reviewed note from 5/17/2023, reviewed urgent care note from today    The following orders were placed and all results were independently analyzed by me:  Orders Placed This Encounter   Procedures    XR Chest 1 View    [place] Draw    High Sensitivity Troponin T    Comprehensive Metabolic Panel    Lipase    BNP    Magnesium    CBC Auto Differential    High Sensitivity Troponin T 2Hr    NPO Diet NPO Type: Strict NPO    Undress & Gown    Continuous Pulse Oximetry    Oxygen Therapy- Nasal Cannula; Titrate 1-6 LPM Per SpO2; 90 - 95%    ECG 12 Lead ED Triage Standing Order; Chest Pain    ECG 12 Lead ED Triage Standing Order; Chest Pain    Insert Peripheral IV    CBC & Differential    Green Top (Gel)    Lavender Top    Gold Top - SST    Light Blue Top       Medications Given in the Emergency Department:  Medications   sodium chloride 0.9 % flush 10 mL (has no administration in time range)   aspirin chewable tablet 324 mg (324 mg Oral Not Given 8/22/24 0940)        ED Course:    ED Course as of 08/22/24 1059   Thu Aug 22, 2024   1041 EKG interpreted by me  Time: 0 944  Heart rate 65  Sinus, ventricular trigeminy, nonspecific ST changes [MA]   1056 I did a bedside ultrasound with no pericardial  effusion noted.  Good wall movement around the ventricles and septum. [MA]      ED Course User Index  [MA] Nick Jackson MD       Labs:    Lab Results (last 24 hours)       Procedure Component Value Units Date/Time    Covid-19 + Flu A&B AG, Veritor [[id number]]  (Normal) Collected: 08/22/24 0848    Specimen: Swab Updated: 08/22/24 0848     SARS Antigen Not Detected     Influenza A Antigen TIANNA Not Detected     Influenza B Antigen TIANNA Not Detected     Internal Control Passed     Lot Number [id number]     Expiration Date 2,202,025    High Sensitivity Troponin T [[id number]]  (Normal) Collected: 08/22/24 0946    Specimen: Blood Updated: 08/22/24 1019     HS Troponin T 8 ng/L     Narrative:      High Sensitive Troponin T Reference Range:  <14.0 ng/L- Negative Female for AMI  <22.0 ng/L- Negative Male for AMI  >=14 - Abnormal Female indicating possible myocardial injury.  >=22 - Abnormal Male indicating possible myocardial injury.   Clinicians would have to utilize clinical acumen, EKG, Troponin, and serial changes to determine if it is an Acute Myocardial Infarction or myocardial injury due to an underlying chronic condition.         CBC & Differential [[id number]]  (Abnormal) Collected: 08/22/24 0946    Specimen: Blood Updated: 08/22/24 0955    Narrative:      The following orders were created for panel order CBC & Differential.  Procedure                               Abnormality         Status                     ---------                               -----------         ------                     CBC Auto Differential[[id number]]        Abnormal            Final result                 Please view results for these tests on the individual orders.    Comprehensive Metabolic Panel [[id number]]  (Abnormal) Collected: 08/22/24 0946    Specimen: Blood Updated: 08/22/24 1019     Glucose 100 mg/dL      BUN 5 mg/dL      Creatinine 1.08 mg/dL      Sodium 140 mmol/L      Potassium 3.9 mmol/L      Chloride 103 mmol/L      CO2  27.7 mmol/L      Calcium 9.3 mg/dL      Total Protein 6.7 g/dL      Albumin 3.9 g/dL      ALT (SGPT) 8 U/L      AST (SGOT) 15 U/L      Alkaline Phosphatase 124 U/L      Total Bilirubin 0.4 mg/dL      Globulin 2.8 gm/dL      A/G Ratio 1.4 g/dL      BUN/Creatinine Ratio 4.6     Anion Gap 9.3 mmol/L      eGFR 78.1 mL/min/1.73     Narrative:      GFR Normal >60  Chronic Kidney Disease <60  Kidney Failure <15      Lipase [[id number]]  (Normal) Collected: 08/22/24 0946    Specimen: Blood Updated: 08/22/24 1019     Lipase 26 U/L     BNP [[id number]]  (Normal) Collected: 08/22/24 0946    Specimen: Blood Updated: 08/22/24 1017     proBNP 797.1 pg/mL     Narrative:      This assay is used as an aid in the diagnosis of individuals suspected of having heart failure. It can be used as an aid in the diagnosis of acute decompensated heart failure (ADHF) in patients presenting with signs and symptoms of ADHF to the emergency department (ED). In addition, NT-proBNP of <300 pg/mL indicates ADHF is not likely.    Age Range Result Interpretation  NT-proBNP Concentration (pg/mL:      <50             Positive            >450                   Gray                 300-450                    Negative             <300    50-75           Positive            >900                  Gray                300-900                  Negative            <300      >75             Positive            >1800                  Gray                300-1800                  Negative            <300    Magnesium [[id number]]  (Normal) Collected: 08/22/24 0946    Specimen: Blood Updated: 08/22/24 1019     Magnesium 1.9 mg/dL     CBC Auto Differential [[id number]]  (Abnormal) Collected: 08/22/24 0946    Specimen: Blood Updated: 08/22/24 0955     WBC 9.30 10*3/mm3      RBC 4.82 10*6/mm3      Hemoglobin 13.8 g/dL      Hematocrit 42.3 %      MCV 87.8 fL      MCH 28.6 pg      MCHC 32.6 g/dL      RDW 12.8 %      RDW-SD 41.1 fl      MPV 10.7 fL      Platelets 249 10*3/mm3       Neutrophil % 44.2 %      Lymphocyte % 27.7 %      Monocyte % 7.7 %      Eosinophil % 19.2 %      Basophil % 1.0 %      Immature Grans % 0.2 %      Neutrophils, Absolute 4.10 10*3/mm3      Lymphocytes, Absolute 2.58 10*3/mm3      Monocytes, Absolute 0.72 10*3/mm3      Eosinophils, Absolute 1.79 10*3/mm3      Basophils, Absolute 0.09 10*3/mm3      Immature Grans, Absolute 0.02 10*3/mm3      nRBC 0.0 /100 WBC              Imaging:    XR Chest 1 View    Result Date: 8/22/2024  XR CHEST 1 VW Date of Exam: 8/22/2024 9:45 AM EDT Indication: Chest Pain Triage Protocol Comparison: Chest radiograph 1/4/2024. Findings: Cardiomediastinal silhouette is within normal limits. No focal consolidation or overt pulmonary edema. No pleural effusion or pneumothorax. Osseous structures are unremarkable.     Impression: No evidence of acute cardiopulmonary disease. Electronically Signed: Baljeet Tovar MD  8/22/2024 9:57 AM EDT  Workstation ID: [id number]       Differential Diagnosis and Discussion:    Chest Pain:  Based on the patient's signs and symptoms, I considered aortic dissection, myocardial infaction, pulmonary embolism, cardiac tamponade, pericarditis, pneumothorax, musculoskeletal chest pain and other differential diagnosis as an etiology of the patient's chest pain.     All labs were reviewed and interpreted by me.  All X-rays impressions were independently interpreted by me.  EKG was interpreted by me.    MDM     Patient is a 59-year-old gentleman who presents with complaints of chest pain.  Been intermittent and ongoing since a couple days ago.  States that he has a history of pericardial effusions and was concerned that something may be going on.  Went to urgent care today and they sent him here for further eval because of possible abnormal EKG.  On labs and imaging here he does not have any acute findings.  Low concern for cardiac causes since it is more chest wall pain in nature.  Worse with movement as well as  palpation.  Labs here are unremarkable.  I did do a bedside ultrasound which did not show any pericardial effusion.  At this time he is well-appearing.  Recommend supportive care at home.  Will DC and have patient follow-up as an outpatient.  EKG here is unremarkable as well as I reviewed his EKG at urgent care.          Patient Care Considerations:          Consultants/Shared Management Plan:    None    Social Determinants of Health:    Patient is independent, reliable, and has access to care.       Disposition and Care Coordination:    Discharged: I considered escalation of care by admitting this patient to the hospital, however patient is stable and pain has been ongoing for several days.  No pericardial effusion    I have explained the patient´s condition, diagnoses and treatment plan based on the information available to me at this time. I have answered questions and addressed any concerns. The patient has a good  understanding of the patient´s diagnosis, condition, and treatment plan as can be expected at this point. The vital signs have been stable. The patient´s condition is stable and appropriate for discharge from the emergency department.      The patient will pursue further outpatient evaluation with the primary care physician or other designated or consulting physician as outlined in the discharge instructions. They are agreeable to this plan of care and follow-up instructions have been explained in detail. The patient has received these instructions in written format and have expressed an understanding of the discharge instructions. The patient is aware that any significant change in condition or worsening of symptoms should prompt an immediate return to this or the closest emergency department or call to 911.      Final diagnoses:   Chest wall pain        ED Disposition       ED Disposition   Discharge    Condition   Stable    Comment   --               This medical record created using voice  recognition software.             Nick Jackson MD  08/22/24 1100

## 2024-08-22 NOTE — Clinical Note
[facility] EMERGENCY ROOM  [address] [place] [address]  Phone: [phone]  Fax: [phone]    Atilio Recinos was seen and treated in our emergency department on 8/22/2024.  He may return to work on 08/23/2024.         Thank you for choosing [facility].    Nick Jackson MD

## 2024-09-15 LAB
QT INTERVAL: 433 MS
QTC INTERVAL: 451 MS

## 2024-12-09 NOTE — PLAN OF CARE
Goal Outcome Evaluation:  Plan of Care Reviewed With: patient        Progress: improving  Outcome Evaluation: pain well controlled w po pain meds q4hr. pt up to toilet independently.Michelle Briseno RN                                  Detail Level: Detailed

## 2024-12-30 RX ORDER — PANTOPRAZOLE SODIUM 40 MG/1
TABLET, DELAYED RELEASE ORAL
Qty: 90 TABLET | Refills: 3 | Status: SHIPPED | OUTPATIENT
Start: 2024-12-30

## 2025-06-10 ENCOUNTER — OFFICE VISIT (OUTPATIENT)
Dept: FAMILY MEDICINE CLINIC | Facility: CLINIC | Age: 60
End: 2025-06-10
Payer: MEDICARE

## 2025-06-10 VITALS
WEIGHT: 149 LBS | BODY MASS INDEX: 23.95 KG/M2 | HEIGHT: 66 IN | HEART RATE: 78 BPM | SYSTOLIC BLOOD PRESSURE: 142 MMHG | OXYGEN SATURATION: 97 % | TEMPERATURE: 98.7 F | DIASTOLIC BLOOD PRESSURE: 78 MMHG

## 2025-06-10 DIAGNOSIS — I31.39 IDIOPATHIC PERICARDIAL EFFUSION: ICD-10-CM

## 2025-06-10 DIAGNOSIS — K21.9 GERD WITHOUT ESOPHAGITIS: ICD-10-CM

## 2025-06-10 DIAGNOSIS — Z13.0 SCREENING FOR DEFICIENCY ANEMIA: ICD-10-CM

## 2025-06-10 DIAGNOSIS — J44.9 CHRONIC OBSTRUCTIVE PULMONARY DISEASE, UNSPECIFIED COPD TYPE: ICD-10-CM

## 2025-06-10 DIAGNOSIS — Z72.0 TOBACCO ABUSE DISORDER: ICD-10-CM

## 2025-06-10 DIAGNOSIS — I25.10 CORONARY ARTERY DISEASE INVOLVING NATIVE HEART WITHOUT ANGINA PECTORIS, UNSPECIFIED VESSEL OR LESION TYPE: ICD-10-CM

## 2025-06-10 DIAGNOSIS — Z76.89 ENCOUNTER TO ESTABLISH CARE WITH NEW DOCTOR: Primary | ICD-10-CM

## 2025-06-10 DIAGNOSIS — Z87.891 HISTORY OF NICOTINE USE: ICD-10-CM

## 2025-06-10 LAB
ALBUMIN SERPL-MCNC: 4.1 G/DL (ref 3.5–5.2)
ALBUMIN/GLOB SERPL: 1.6 G/DL
ALP SERPL-CCNC: 93 U/L (ref 39–117)
ALT SERPL W P-5'-P-CCNC: 11 U/L (ref 1–41)
ANION GAP SERPL CALCULATED.3IONS-SCNC: 8.1 MMOL/L (ref 5–15)
AST SERPL-CCNC: 20 U/L (ref 1–40)
BILIRUB SERPL-MCNC: 0.4 MG/DL (ref 0–1.2)
BUN SERPL-MCNC: 7 MG/DL (ref 8–23)
BUN/CREAT SERPL: 6.5 (ref 7–25)
CALCIUM SPEC-SCNC: 9.5 MG/DL (ref 8.6–10.5)
CHLORIDE SERPL-SCNC: 106 MMOL/L (ref 98–107)
CHOLEST SERPL-MCNC: 122 MG/DL (ref 0–200)
CO2 SERPL-SCNC: 25.9 MMOL/L (ref 22–29)
CREAT SERPL-MCNC: 1.08 MG/DL (ref 0.76–1.27)
EGFRCR SERPLBLD CKD-EPI 2021: 77.6 ML/MIN/1.73
GLOBULIN UR ELPH-MCNC: 2.5 GM/DL
GLUCOSE SERPL-MCNC: 87 MG/DL (ref 65–99)
HDLC SERPL-MCNC: 38 MG/DL (ref 40–60)
LDLC SERPL CALC-MCNC: 65 MG/DL (ref 0–100)
LDLC/HDLC SERPL: 1.67 {RATIO}
POTASSIUM SERPL-SCNC: 4.3 MMOL/L (ref 3.5–5.2)
PROT SERPL-MCNC: 6.6 G/DL (ref 6–8.5)
SODIUM SERPL-SCNC: 140 MMOL/L (ref 136–145)
TRIGL SERPL-MCNC: 102 MG/DL (ref 0–150)
TSH SERPL DL<=0.05 MIU/L-ACNC: 1.23 UIU/ML (ref 0.27–4.2)
VLDLC SERPL-MCNC: 19 MG/DL (ref 5–40)

## 2025-06-10 PROCEDURE — 99406 BEHAV CHNG SMOKING 3-10 MIN: CPT | Performed by: FAMILY MEDICINE

## 2025-06-10 PROCEDURE — 85025 COMPLETE CBC W/AUTO DIFF WBC: CPT | Performed by: FAMILY MEDICINE

## 2025-06-10 PROCEDURE — 1126F AMNT PAIN NOTED NONE PRSNT: CPT | Performed by: FAMILY MEDICINE

## 2025-06-10 PROCEDURE — 80053 COMPREHEN METABOLIC PANEL: CPT | Performed by: FAMILY MEDICINE

## 2025-06-10 PROCEDURE — 84443 ASSAY THYROID STIM HORMONE: CPT | Performed by: FAMILY MEDICINE

## 2025-06-10 PROCEDURE — 3077F SYST BP >= 140 MM HG: CPT | Performed by: FAMILY MEDICINE

## 2025-06-10 PROCEDURE — 3078F DIAST BP <80 MM HG: CPT | Performed by: FAMILY MEDICINE

## 2025-06-10 PROCEDURE — 1159F MED LIST DOCD IN RCRD: CPT | Performed by: FAMILY MEDICINE

## 2025-06-10 PROCEDURE — 36415 COLL VENOUS BLD VENIPUNCTURE: CPT | Performed by: FAMILY MEDICINE

## 2025-06-10 PROCEDURE — 99214 OFFICE O/P EST MOD 30 MIN: CPT | Performed by: FAMILY MEDICINE

## 2025-06-10 PROCEDURE — 1160F RVW MEDS BY RX/DR IN RCRD: CPT | Performed by: FAMILY MEDICINE

## 2025-06-10 PROCEDURE — 80061 LIPID PANEL: CPT | Performed by: FAMILY MEDICINE

## 2025-06-10 RX ORDER — ATORVASTATIN CALCIUM 40 MG/1
40 TABLET, FILM COATED ORAL NIGHTLY
Qty: 90 TABLET | Refills: 1 | Status: SHIPPED | OUTPATIENT
Start: 2025-06-10

## 2025-06-10 RX ORDER — ONDANSETRON 4 MG/1
4 TABLET, ORALLY DISINTEGRATING ORAL EVERY 8 HOURS PRN
Qty: 15 TABLET | Refills: 0 | Status: SHIPPED | OUTPATIENT
Start: 2025-06-10

## 2025-06-10 NOTE — PROGRESS NOTES
Chief Complaint  Establish Care    Subjective      Atilio Recinos is a 60 y.o. male who presents to [facility]     History of Present Illness  60-year-old male, new patient, needs medication refills. Multiple underlying conditions.    No cardiology consultation since October 2024. Pericardial window procedure 2 years ago for pericarditis. Post-procedure, developed pleural effusion managed with gout medication. 2.5 liters of fluid drained via small tube in heart. No current chest pain. On atorvastatin 40 mg nightly, needs refill. Uncertain if initiated pre-surgery.    Intermittent nausea, possibly related to ulcer. Last Zofran prescription 3 years ago, needs refill. Takes Seroquel at night, avoids concurrent use with Zofran. Under general surgery care for stomach ulcer, has had upper endoscopies, plans another endoscopy for frequent throat dilation. No general surgery consultation since 2023. Significant weight loss (>100 lbs) post-ulcer rupture, discontinued several medications. Hernia repair last year.    Diagnosed with COPD and emphysema, no pulmonologist consultation in years. Advised to quit smoking before further consultations. Smoking varies from 3 cigarettes to a pack daily, previously 3 packs daily pre-surgery. Uses smoking for stress management. Evaluated for sleep apnea, negative results. 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    Discontinued amlodipine, uses Flonase during illness. Informed of 2 lung nodules under observation. Never received shingles vaccine, completed hepatitis B vaccine series due to past infection.    Under [facility] care for psychiatric issues, prescribed Seroquel, Remeron, and gabapentin.    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        Patient Care Team:  Kerri Price MD as PCP  "- General (Family Medicine)  Atilio Lindsay MD as Consulting Physician (General Surgery)  [place] April, APRN as Nurse Practitioner (Nurse Practitioner)    Objective   Vital Signs:   Vitals:    06/10/25 0916   BP: 142/78   Pulse: 78   Temp: 98.7 °F (37.1 °C)   SpO2: 97%   Weight: 67.6 kg (149 lb)   Height: 167.6 cm (66\")     Body mass index is 24.05 kg/m².    Wt Readings from Last 3 Encounters:   06/10/25 67.6 kg (149 lb)   08/22/24 66.1 kg (145 lb 11.6 oz)   08/22/24 64.4 kg (142 lb)     BP Readings from Last 3 Encounters:   06/10/25 142/78   08/22/24 163/67   08/22/24 179/84       Health Maintenance   Topic Date Due    ZOSTER VACCINE (1 of 2) Never done    Pneumococcal Vaccine 50+ (2 of 2 - PCV) 03/04/2022    Hepatitis B (1 of 3 - Risk 3-dose series) Never done    LUNG CANCER SCREENING  05/17/2024    COVID-19 Vaccine (4 - 2024-25 season) 09/01/2024    LIPID PANEL  01/09/2025    ANNUAL WELLNESS VISIT  08/27/2025    INFLUENZA VACCINE  07/01/2025    GASTROSCOPY (EGD)  09/07/2025    COLORECTAL CANCER SCREENING  09/22/2027    TDAP/TD VACCINES (2 - Td or Tdap) 09/14/2031    HEPATITIS C SCREENING  Completed       Lab Results (last 24 hours)       Procedure Component Value Units Date/Time    Comprehensive Metabolic Panel [[id number]] Collected: 06/10/25 0955    Specimen: Blood Updated: 06/10/25 0955    CBC & Differential [[id number]] Collected: 06/10/25 0955    Specimen: Blood Updated: 06/10/25 0955    Narrative:      The following orders were created for panel order CBC & Differential.  Procedure                               Abnormality         Status                     ---------                               -----------         ------                     CBC Auto Differential[[id number]]                            In process                   Please view results for these tests on the individual orders.    TSH [[id number]] Collected: 06/10/25 0955    Specimen: Blood Updated: 06/10/25 0955    Lipid Panel [[id number]] " Collected: 06/10/25 0955    Specimen: Blood Updated: 06/10/25 0955    CBC Auto Differential [[id number]] Collected: 06/10/25 0955    Specimen: Blood Updated: 06/10/25 0955               Physical Exam     Physical Exam  General Appearance: Normal.  Vital signs: Within normal limits.  HEENT: Within normal limits.  Respiratory: Clear to auscultation, no wheezing, rales or rhonchi.  Cardiovascular: Regular rate and rhythm, no murmurs, rubs, or gallops.  Skin: Warm and dry, no rash.  Neurological: Normal.      Result Review   The following data was reviewed by: Kerri Price MD on 06/10/2025:  [x]  Tests & Results  []  Hospitalization/Emergency Department/Urgent Care  [x]  Internal/External Consultant Notes    Results  - Imaging:    - CT scan (05/2023): Chronic scarring in the right upper lobe and some lymph nodes, thought to be related to reaction      Procedures          ASSESSMENT/PLAN  Diagnoses and all orders for this visit:    1. Encounter to establish care with new doctor (Primary)    2. Chronic obstructive pulmonary disease, unspecified COPD type  -     Complete PFT - Pre & Post Bronchodilator; Future    3. Idiopathic pericardial effusion  -     Ambulatory Referral to Cardiology for Other; follow up, underlying CAD, h/o pericardial effusion s/p pericardial window surgery.  -     Comprehensive Metabolic Panel  -     TSH  -     Lipid Panel    4. Coronary artery disease involving native heart without angina pectoris, unspecified vessel or lesion type  -     atorvastatin (LIPITOR) 40 MG tablet; Take 1 tablet by mouth Every Night.  Dispense: 90 tablet; Refill: 1  -     Ambulatory Referral to Cardiology for Other; follow up, underlying CAD, h/o pericardial effusion s/p pericardial window surgery.  -     Lipid Panel    5. Tobacco abuse disorder  -      CT Chest Low Dose Cancer Screening WO; Future    6. History of nicotine use  -      CT Chest Low Dose Cancer Screening WO; Future    7. Screening for deficiency  anemia  -     CBC & Differential    8. GERD without esophagitis  -     ondansetron ODT (ZOFRAN-ODT) 4 MG disintegrating tablet; Place 1 tablet under the tongue Every 8 (Eight) Hours As Needed for Nausea or Vomiting.  Dispense: 15 tablet; Refill: 0        Assessment & Plan  1. Hyperlipidemia.  - Prescribed atorvastatin 40 mg for 90 days with refill.  - Cholesterol panel today.    2. Nausea.  - Refill for Zofran, PRN.  - Advised against concurrent use of Zofran and Seroquel.    3. COPD and emphysema.  - Strongly encouraged smoking cessation due to impact on lung disease, heart disease, acid reflux, and esophageal issues.  - Discussed nicotine supplements for cessation.  - Pulmonary function test to assess lung disease severity.    4. Cardiac issues.  - Referred to Dr. Singh in cardiology.    5. Psychiatric issues.  - On Seroquel, Remeron, and gabapentin, prescribed by [facility].    6. Health maintenance.  - Due for lung cancer screening.  - Advised to receive Shingrix vaccine series.  - Comprehensive panel to evaluate kidney and liver function, electrolytes, blood count, and thyroid function.        BMI is within normal parameters. No other follow-up for BMI required.       Atilio Recinos  reports that he has been smoking cigarettes. He has a 47 pack-year smoking history. He has never used smokeless tobacco. I have educated him on the risk of diseases from using tobacco products such as cancer, COPD, and heart disease.     I advised him to quit and he is not willing to quit.    I spent 5 minutes counseling the patient.               FOLLOW UP  Return in about 3 months (around 8/28/2025) for Medicare Wellness.  Patient was given instructions and counseling regarding his condition or for health maintenance advice. Please see specific information pulled into the AVS if appropriate.       Kerri Price MD  06/10/25  09:56 EDT    Part of this note may be an electronic transcription/translation of spoken  language to printed text using the Dragon Dictation System.    Patient or patient representative verbalized consent for the use of Ambient Listening during the visit with  Kerri Price MD for chart documentation. 6/10/2025  09:53 EDT

## 2025-06-11 LAB
BASOPHILS # BLD AUTO: 0.11 10*3/MM3 (ref 0–0.2)
BASOPHILS NFR BLD AUTO: 1.2 % (ref 0–1.5)
DEPRECATED RDW RBC AUTO: 43.1 FL (ref 37–54)
EOSINOPHIL # BLD AUTO: 2.03 10*3/MM3 (ref 0–0.4)
EOSINOPHIL NFR BLD AUTO: 22.4 % (ref 0.3–6.2)
ERYTHROCYTE [DISTWIDTH] IN BLOOD BY AUTOMATED COUNT: 13 % (ref 12.3–15.4)
HCT VFR BLD AUTO: 42.5 % (ref 37.5–51)
HGB BLD-MCNC: 13.9 G/DL (ref 13–17.7)
IMM GRANULOCYTES # BLD AUTO: 0.03 10*3/MM3 (ref 0–0.05)
IMM GRANULOCYTES NFR BLD AUTO: 0.3 % (ref 0–0.5)
LYMPHOCYTES # BLD AUTO: 2.54 10*3/MM3 (ref 0.7–3.1)
LYMPHOCYTES NFR BLD AUTO: 28 % (ref 19.6–45.3)
MCH RBC QN AUTO: 29.6 PG (ref 26.6–33)
MCHC RBC AUTO-ENTMCNC: 32.7 G/DL (ref 31.5–35.7)
MCV RBC AUTO: 90.6 FL (ref 79–97)
MONOCYTES # BLD AUTO: 0.81 10*3/MM3 (ref 0.1–0.9)
MONOCYTES NFR BLD AUTO: 8.9 % (ref 5–12)
NEUTROPHILS NFR BLD AUTO: 3.55 10*3/MM3 (ref 1.7–7)
NEUTROPHILS NFR BLD AUTO: 39.2 % (ref 42.7–76)
NRBC BLD AUTO-RTO: 0 /100 WBC (ref 0–0.2)
PLATELET # BLD AUTO: 234 10*3/MM3 (ref 140–450)
PMV BLD AUTO: 11.1 FL (ref 6–12)
RBC # BLD AUTO: 4.69 10*6/MM3 (ref 4.14–5.8)
WBC NRBC COR # BLD AUTO: 9.07 10*3/MM3 (ref 3.4–10.8)

## 2025-06-19 ENCOUNTER — HOSPITAL ENCOUNTER (OUTPATIENT)
Dept: CT IMAGING | Facility: HOSPITAL | Age: 60
Discharge: HOME | End: 2025-06-19
Admitting: FAMILY MEDICINE
Payer: MEDICARE

## 2025-06-19 DIAGNOSIS — Z72.0 TOBACCO ABUSE DISORDER: ICD-10-CM

## 2025-06-19 DIAGNOSIS — Z87.891 HISTORY OF NICOTINE USE: ICD-10-CM

## 2025-06-19 PROCEDURE — 71271 CT THORAX LUNG CANCER SCR C-: CPT

## 2025-06-20 ENCOUNTER — TELEPHONE (OUTPATIENT)
Dept: FAMILY MEDICINE CLINIC | Facility: CLINIC | Age: 60
End: 2025-06-20
Payer: MEDICARE

## 2025-07-03 ENCOUNTER — OFFICE VISIT (OUTPATIENT)
Dept: CARDIOLOGY | Facility: CLINIC | Age: 60
End: 2025-07-03
Payer: MEDICARE

## 2025-07-03 VITALS
OXYGEN SATURATION: 97 % | WEIGHT: 146.2 LBS | BODY MASS INDEX: 23.5 KG/M2 | SYSTOLIC BLOOD PRESSURE: 160 MMHG | HEIGHT: 66 IN | HEART RATE: 81 BPM | DIASTOLIC BLOOD PRESSURE: 87 MMHG

## 2025-07-03 DIAGNOSIS — R06.09 DYSPNEA ON EXERTION: ICD-10-CM

## 2025-07-03 DIAGNOSIS — I30.0 IDIOPATHIC PERICARDITIS, UNSPECIFIED CHRONICITY: Primary | ICD-10-CM

## 2025-07-03 DIAGNOSIS — Z87.891 SMOKING HISTORY: ICD-10-CM

## 2025-07-03 NOTE — PROGRESS NOTES
Chief Complaint  Follow-up (Per pcp )    Subjective            Atilio CAROL Recinos presents to [facility] CARDIOLOGY  History of Present Illness    Durga is here for follow-up evaluation and management of previous idiopathic pericarditis, paroxysmal SVT, hypertension, dyslipidemia.  It has been a couple of years since he has been seen in the clinic.  He has been doing very well.  He has no cardiac complaints today.  Denies chest pain, excessive shortness of breath, or palpitations.    PMH  Past Medical History:   Diagnosis Date    Allergic     Antihistamines    Allergies     Anemia 2/2022    Anxiety     Arthritis     Asthma 1992    Bipolar 1 disorder     Bowel perforation 01/16/2022    Bronchiectasis     Chronic kidney disease 1/2022    COPD (chronic obstructive pulmonary disease)     Coronary artery disease 2023    Pericardial Window    COVID-19 02/19/2022    Depression     Emphysema of lung     Fissure, anal 2003    Forgetfulness     GERD (gastroesophageal reflux disease)     Hard to intubate     High gag reflex.    Head injury     Heart valve disease 1980's    Hepatitis     1980s-no current issues    High blood pressure     High cholesterol     History of transfusion 2/2022    Idiopathic pericardial effusion 2023    RESOLVED, DONE WELL SINCE-NOTED IN  CARDIAC OFFICE NOTE    Pancreatitis [id number]    Pleural effusion     PONV (postoperative nausea and vomiting) 1/2022    Psychiatric illness     Pulmonary embolism     PT STATED UNSURE WHEN THIS OCCURRED    Rectal bleeding 2003    S/P exploratory laparotomy, oversew of perforated ulcer, placement of [place] patch  01/17/2022    Shortness of breath     Sinus trouble     TIA (transient ischemic attack)     NO RESIDUALS         SURGICALHX  Past Surgical History:   Procedure Laterality Date    COLONOSCOPY N/A 09/22/2022    Procedure: COLONOSCOPY;  Surgeon: Atilio Lindsay MD;  Location: [facility] ENDOSCOPY;  Service: General;  Laterality: N/A;  DIVERTICULOSIS     ENDOSCOPY N/A 09/22/2022    Procedure: ESOPHAGOGASTRODUODENOSCOPY WITH BIOPSIES;  Surgeon: Atilio Lindsay MD;  Location: [facility] ENDOSCOPY;  Service: General;  Laterality: N/A;  DUODENAL ULCERS    ENDOSCOPY N/A 09/07/2023    Procedure: ESOPHAGOGASTRODUODENOSCOPY WITH BIOPSIES;  Surgeon: Atilio Lindsay MD;  Location: [facility] ENDOSCOPY;  Service: General;  Laterality: N/A;  HEALED DUODENAL ULCER    EXPLORATORY LAPAROTOMY N/A 01/16/2022    Procedure: EXPLORATORY LAPAROTOMY, OVERSEW OF PERFORATED GASTRIC ULCER WITH [place] PATCH;  Surgeon: Atilio Lindsay MD;  Location: [facility] MAIN OR;  Service: General;  Laterality: N/A;    INGUINAL HERNIA REPAIR      PERICARDIAL WINDOW      PERICARDIAL WINDOW  05/2023    PLEURAL SCARIFICATION Right 1990    SPHINCTEROTOMY      VENTRAL/INCISIONAL HERNIA REPAIR N/A 5/17/2024    Procedure: OPEN VENTRAL / INCISIONAL HERNIA REPAIR;  Surgeon: Atilio Lindsay MD;  Location: [facility] MAIN OR;  Service: General;  Laterality: N/A;        SOC  Social History     Socioeconomic History    Marital status: Single   Tobacco Use    Smoking status: Every Day     Current packs/day: 1.00     Average packs/day: 1 pack/day for 47.0 years (47.0 ttl pk-yrs)     Types: Cigarettes    Smokeless tobacco: Never    Tobacco comments:     LAST CIGARETTE 05/16/2024 AT 2100   Vaping Use    Vaping status: Never Used   Substance and Sexual Activity    Alcohol use: Not Currently    Drug use: Never    Sexual activity: Not Currently     Partners: Male     Birth control/protection: Condom, None, Partner of same sex         FAMHX  Family History   Problem Relation Age of Onset    Alcohol abuse Mother     Asthma Mother     COPD Mother     Depression Mother     Heart disease Mother     Hyperlipidemia Mother     Hypertension Mother     Mental illness Mother     Anxiety disorder Mother     Vision loss Mother     Diabetes Mother     Alcohol abuse Father     Arthritis Father     Hypertension Father     Liver disease  Father     Stroke Father     Vision loss Father     Heart disease Father     Hyperlipidemia Father     Kidney disease Father     Colon cancer Maternal Grandmother     Stroke Other     Heart disease Other     Diabetes Other     Diabetes Brother           ALLERGY  Allergies   Allergen Reactions    Lorazepam Mental Status Change     Other reaction(s): Hyperactivity    Amoxicillin-Pot Clavulanate Hives      Beta lactam allergy details  Antibiotic reaction: rash  Age at reaction: adult  Dose to reaction time: (!) hours  Reason for antibiotic: other (prophylaxis)  Epinephrine required for reaction?: no  Tolerated antibiotics: cephalexin       Antihistamines, Chlorpheniramine-Type Rash    Diphenhydramine Hives    Dye [Ct Contrast] Hives        MEDSCURRENT    Current Outpatient Medications:     albuterol sulfate  (90 Base) MCG/ACT inhaler, Inhale 2 puffs Every 4 (Four) Hours As Needed for Wheezing or Shortness of Air., Disp: 8 g, Rfl: 3    aspirin 81 MG EC tablet, 1 tablet., Disp: , Rfl:     atorvastatin (LIPITOR) 40 MG tablet, Take 1 tablet by mouth Every Night., Disp: 90 tablet, Rfl: 1    Diclofenac Sodium (VOLTAREN) 1 % gel gel, Apply  topically to the appropriate area as directed 4 (Four) Times a Day. Please have pharmacy change prescription to 2 g as needed, apply to affected area 4 times a day., Disp: 150 g, Rfl: 1    Fluticasone Furoate-Vilanterol (Breo Ellipta) 100-25 MCG/INH inhaler, Inhale 1 puff Daily., Disp: 60 each, Rfl: 2    gabapentin (NEURONTIN) 400 MG capsule, Take 1 capsule by mouth 4 (Four) Times a Day., Disp: , Rfl:     metoprolol succinate XL (TOPROL-XL) 25 MG 24 hr tablet, Take 1 tablet by mouth 2 (Two) Times a Day., Disp: , Rfl:     mirtazapine (REMERON) 15 MG tablet, Take 1 tablet by mouth Every Night., Disp: , Rfl:     montelukast (SINGULAIR) 10 MG tablet, 1 tablet Every Night., Disp: , Rfl:     ondansetron ODT (ZOFRAN-ODT) 4 MG disintegrating tablet, Place 1 tablet under the tongue Every 8  "(Eight) Hours As Needed for Nausea or Vomiting., Disp: 15 tablet, Rfl: 0    pantoprazole (PROTONIX) 40 MG EC tablet, TAKE 1 TABLET EVERY DAY, Disp: 90 tablet, Rfl: 3    QUEtiapine (SEROquel) 300 MG tablet, Take 1 tablet by mouth Every Night., Disp: , Rfl:       Review of Systems   Cardiovascular: Negative.    Respiratory: Negative.     Gastrointestinal:  Positive for constipation.        Objective     /87   Pulse 81   Ht 167.6 cm (66\")   Wt 66.3 kg (146 lb 3.2 oz)   SpO2 97%   BMI 23.60 kg/m²       General Appearance:   well developed  well nourished  HENT:   oropharynx moist  lips not cyanotic  Neck:  thyroid not enlarged  supple  Respiratory:  no respiratory distress  normal breath sounds  no rales  Cardiovascular:  no jugular venous distention  regular rhythm  apical impulse normal  S1 normal, S2 normal  no S3, no S4   no murmur  no rub, no thrill  carotid pulses normal; no bruit  pedal pulses normal  lower extremity edema: none    Musculoskeletal:  no clubbing of fingers.   normocephalic, head atraumatic  Skin:   warm, dry  Psychiatric:  judgement and insight appropriate  normal mood and affect      Result Review :     The following data was reviewed by: DIRK Hanson on 07/03/2025:    CMP          8/22/2024    09:46 6/10/2025    09:55   CMP   Glucose 100  87    BUN 5  7.0    Creatinine 1.08  1.08    EGFR 78.1  77.6    Sodium 140  140    Potassium 3.9  4.3    Chloride 103  106    Calcium 9.3  9.5    Total Protein 6.7  6.6    Albumin 3.9  4.1    Globulin 2.8  2.5    Total Bilirubin 0.4  0.4    Alkaline Phosphatase 124  93    AST (SGOT) 15  20    ALT (SGPT) 8  11    Albumin/Globulin Ratio 1.4  1.6    BUN/Creatinine Ratio 4.6  6.5    Anion Gap 9.3  8.1      CBC          8/22/2024    09:46 6/10/2025    09:55   CBC   WBC 9.30  9.07    RBC 4.82  4.69    Hemoglobin 13.8  13.9    Hematocrit 42.3  42.5    MCV 87.8  90.6    MCH 28.6  29.6    MCHC 32.6  32.7    RDW 12.8  13.0    Platelets 249  234  "     Lipid Panel          6/10/2025    09:55   Lipid Panel   Total Cholesterol 122    Triglycerides 102    HDL Cholesterol 38    VLDL Cholesterol 19    LDL Cholesterol  65    LDL/HDL Ratio 1.67      TSH          6/10/2025    09:55   TSH   TSH 1.230        Data reviewed : Cardiology studies previous cardiac records reviewed     Procedures      Atilio Recinos  reports that he has been smoking cigarettes. He has a 47 pack-year smoking history. He has never used smokeless tobacco. I have educated him on the risk of diseases from using tobacco products such as cancer, COPD, and heart disease.     I advised him to quit and he is not willing to quit.    I spent 3  minutes counseling the patient.                Assessment and Plan        ASSESSMENT:  Encounter Diagnoses   Name Primary?    Idiopathic pericarditis, unspecified chronicity Yes    Dyspnea on exertion     Smoking history          PLAN:    1.  Idiopathic pericarditis with pericardial effusion and tamponade-resolved, no clinical recurrence.  Continue to monitor.  2.  SVT, stable.  Continue Toprol-XL 25 mg twice daily.  3.  Dyslipidemia stable on statin therapy, continue.  4.  His blood pressure is elevated today.  He will monitor that at home and notify the office if consistently greater than 140/90.    Follow-up annually.      Patient was given instructions and counseling regarding his condition or for health maintenance advice. Please see specific information pulled into the AVS if appropriate.           Mitali Carlson, APRTEZ   7/3/2025  09:11 EDT

## 2025-08-08 ENCOUNTER — HOSPITAL ENCOUNTER (OUTPATIENT)
Dept: RESPIRATORY THERAPY | Facility: HOSPITAL | Age: 60
Discharge: HOME | End: 2025-08-08
Payer: MEDICARE

## (undated) DEVICE — SINGLE-USE BIOPSY FORCEPS: Brand: RADIAL JAW 4

## (undated) DEVICE — EGD OR ERCP KIT: Brand: MEDLINE INDUSTRIES, INC.

## (undated) DEVICE — GLOVE,SURG,SENSICARE SLT,LF,PF,7.5: Brand: MEDLINE

## (undated) DEVICE — SUT SILK 2/0 TIES 18IN A185H

## (undated) DEVICE — SOL IRRG H2O PL/BG 1000ML STRL

## (undated) DEVICE — SYR LL TP 10ML STRL

## (undated) DEVICE — DEV OPN LIGASURE CRV 180D 36MM 13.5CM  1P/U

## (undated) DEVICE — SUT PDS 1 CT1 36IN Z347H

## (undated) DEVICE — SLV SCD LEG COMFORT KENDALLSCD MD REPROC

## (undated) DEVICE — TOTAL TRAY, 16FR 10ML SIL FOLEY, URN: Brand: MEDLINE

## (undated) DEVICE — SUT VIC 0 UR6 27IN VCP603H

## (undated) DEVICE — DAVINCI-LF: Brand: MEDLINE INDUSTRIES, INC.

## (undated) DEVICE — SOL IRR NACL 0.9PCT BT 1000ML

## (undated) DEVICE — SLV SCD KN/LEN ADJ EXPRSS BLENDED MD 1P/U

## (undated) DEVICE — COLON KIT: Brand: MEDLINE INDUSTRIES, INC.

## (undated) DEVICE — Device: Brand: DEFENDO AIR/WATER/SUCTION AND BIOPSY VALVE

## (undated) DEVICE — GLOVE,SURG,SENSICARE SLT,LF,PF,6.5: Brand: MEDLINE

## (undated) DEVICE — MAJOR-LF: Brand: MEDLINE INDUSTRIES, INC.

## (undated) DEVICE — SUT VIC PLS CTD BR 0 TIE 18IN VIL

## (undated) DEVICE — GOWN,REINFRCE,POLY,SIRUS,BREATH SLV,XXLG: Brand: MEDLINE

## (undated) DEVICE — BARRIER, ABSORBABLE, ADHESION: Brand: SEPRAFILM®

## (undated) DEVICE — SUT PDS 2/0 SH 27IN Z317H

## (undated) DEVICE — LINER SURG CANSTR SXN S/RIGD 1500CC

## (undated) DEVICE — STERILE POLYISOPRENE POWDER-FREE SURGICAL GLOVES WITH EMOLLIENT COATING: Brand: PROTEXIS

## (undated) DEVICE — Device

## (undated) DEVICE — BLAKE SILICONE DRAIN, 19 FR ROUND, HUBLESS WITH 1/4" TROCAR: Brand: BLAKE

## (undated) DEVICE — TIP COVER ACCESSORY

## (undated) DEVICE — SUT ETHLN 3-0 FS118IN 663H

## (undated) DEVICE — GLV SURG SENSICARE SLT PF LF 6.5 STRL

## (undated) DEVICE — 3 RING SUTURE PASSER - 16 CM: Brand: 3 RING SUTURE PASSER - 16 CM

## (undated) DEVICE — JACKSON-PRATT 100CC BULB RESERVOIR: Brand: CARDINAL HEALTH

## (undated) DEVICE — BLCK/BITE BLOX WO/DENTL/RIM W/STRAP 54F

## (undated) DEVICE — SUT PDS 1 XLH LP 99IN Z881G

## (undated) DEVICE — INTENDED FOR TISSUE SEPARATION, AND OTHER PROCEDURES THAT REQUIRE A SHARP SURGICAL BLADE TO PUNCTURE OR CUT.: Brand: BARD-PARKER ® CARBON RIB-BACK BLADES

## (undated) DEVICE — SOLIDIFIER LIQLOC PLS 1500CC BT

## (undated) DEVICE — SEAL

## (undated) DEVICE — ANTIBACTERIAL VIOLET BRAIDED (POLYGLACTIN 910), SYNTHETIC ABSORBABLE SUTURE: Brand: COATED VICRYL

## (undated) DEVICE — PENCL E/S SMOKEEVAC W/TELESCP CANN

## (undated) DEVICE — PROXIMATE RH ROTATING HEAD SKIN STAPLERS (35 WIDE) CONTAINS 35 STAINLESS STEEL STAPLES: Brand: PROXIMATE

## (undated) DEVICE — GLV SURG SENSICARE SLT PF LF 7.5 STRL

## (undated) DEVICE — ARM DRAPE

## (undated) DEVICE — CONN JET HYDRA H20 AUXILIARY DISP

## (undated) DEVICE — ANTIBACTERIAL UNDYED BRAIDED (POLYGLACTIN 910), SYNTHETIC ABSORBABLE SUTURE: Brand: COATED VICRYL

## (undated) DEVICE — LAPAROVUE VISIBILITY SYSTEM LAPAROSCOPIC SOLUTIONS: Brand: LAPAROVUE

## (undated) DEVICE — SUT SILK 3/0 SH CR8 18IN C013D

## (undated) DEVICE — ENDOPATH XCEL WITH OPTIVIEW TECHNOLOGY BLADELESS TROCARS WITH STABILITY SLEEVES: Brand: ENDOPATH XCEL OPTIVIEW